# Patient Record
Sex: FEMALE | Race: WHITE | NOT HISPANIC OR LATINO | Employment: UNEMPLOYED | URBAN - NONMETROPOLITAN AREA
[De-identification: names, ages, dates, MRNs, and addresses within clinical notes are randomized per-mention and may not be internally consistent; named-entity substitution may affect disease eponyms.]

---

## 2017-01-06 ENCOUNTER — OFFICE VISIT - GICH (OUTPATIENT)
Dept: FAMILY MEDICINE | Facility: OTHER | Age: 47
End: 2017-01-06

## 2017-01-06 ENCOUNTER — HISTORY (OUTPATIENT)
Dept: FAMILY MEDICINE | Facility: OTHER | Age: 47
End: 2017-01-06

## 2017-01-06 DIAGNOSIS — Z00.00 ENCOUNTER FOR GENERAL ADULT MEDICAL EXAMINATION WITHOUT ABNORMAL FINDINGS: ICD-10-CM

## 2017-01-17 ENCOUNTER — OFFICE VISIT - GICH (OUTPATIENT)
Dept: FAMILY MEDICINE | Facility: OTHER | Age: 47
End: 2017-01-17

## 2017-01-17 ENCOUNTER — HISTORY (OUTPATIENT)
Dept: FAMILY MEDICINE | Facility: OTHER | Age: 47
End: 2017-01-17

## 2017-01-17 DIAGNOSIS — M17.9 OSTEOARTHRITIS OF KNEE, UNSPECIFIED (CODE): ICD-10-CM

## 2017-01-17 DIAGNOSIS — I10 ESSENTIAL (PRIMARY) HYPERTENSION: ICD-10-CM

## 2017-01-17 ASSESSMENT — PATIENT HEALTH QUESTIONNAIRE - PHQ9: SUM OF ALL RESPONSES TO PHQ QUESTIONS 1-9: 0

## 2017-03-05 ENCOUNTER — COMMUNICATION - GICH (OUTPATIENT)
Dept: FAMILY MEDICINE | Facility: OTHER | Age: 47
End: 2017-03-05

## 2017-03-05 DIAGNOSIS — M54.6 PAIN IN THORACIC SPINE: ICD-10-CM

## 2017-03-22 ENCOUNTER — HISTORY (OUTPATIENT)
Dept: FAMILY MEDICINE | Facility: OTHER | Age: 47
End: 2017-03-22

## 2017-03-22 ENCOUNTER — OFFICE VISIT - GICH (OUTPATIENT)
Dept: FAMILY MEDICINE | Facility: OTHER | Age: 47
End: 2017-03-22

## 2017-03-22 DIAGNOSIS — Q77.4 ACHONDROPLASIA: ICD-10-CM

## 2017-03-22 DIAGNOSIS — G25.81 RESTLESS LEGS SYNDROME: ICD-10-CM

## 2017-03-22 DIAGNOSIS — G89.29 OTHER CHRONIC PAIN: ICD-10-CM

## 2017-03-22 DIAGNOSIS — Z02.89 ENCOUNTER FOR OTHER ADMINISTRATIVE EXAMINATIONS: ICD-10-CM

## 2017-03-22 DIAGNOSIS — M54.6 PAIN IN THORACIC SPINE: ICD-10-CM

## 2017-03-22 DIAGNOSIS — M17.10 PRIMARY OSTEOARTHRITIS OF ONE KNEE: ICD-10-CM

## 2017-03-22 DIAGNOSIS — M54.50 LOW BACK PAIN: ICD-10-CM

## 2017-03-22 DIAGNOSIS — F41.9 ANXIETY DISORDER: ICD-10-CM

## 2017-03-22 DIAGNOSIS — F41.8 OTHER SPECIFIED ANXIETY DISORDERS: ICD-10-CM

## 2017-03-22 ASSESSMENT — ANXIETY QUESTIONNAIRES
6. BECOMING EASILY ANNOYED OR IRRITABLE: NOT AT ALL
5. BEING SO RESTLESS THAT IT IS HARD TO SIT STILL: NOT AT ALL
GAD7 TOTAL SCORE: 0
1. FEELING NERVOUS, ANXIOUS, OR ON EDGE: NOT AT ALL
2. NOT BEING ABLE TO STOP OR CONTROL WORRYING: NOT AT ALL
4. TROUBLE RELAXING: NOT AT ALL
3. WORRYING TOO MUCH ABOUT DIFFERENT THINGS: NOT AT ALL
7. FEELING AFRAID AS IF SOMETHING AWFUL MIGHT HAPPEN: NOT AT ALL

## 2017-03-22 ASSESSMENT — PATIENT HEALTH QUESTIONNAIRE - PHQ9: SUM OF ALL RESPONSES TO PHQ QUESTIONS 1-9: 1

## 2017-04-05 LAB
6-MONOACETYL MORPHINE - HISTORICAL: ABNORMAL NG/ML
AMPHETAMINE URINE - HISTORICAL: ABNORMAL NG/ML
BARBITURATE URINE - HISTORICAL: ABNORMAL NG/ML
BENZODIAZEPINE URINE - HISTORICAL: ABNORMAL NG/ML
BUPRENORPHRINE URINE - HISTORICAL: ABNORMAL NG/ML
COCAINE METAB URINE - HISTORICAL: ABNORMAL NG/ML
CREAT UR - HISTORICAL: 148 MG/DL
ETHYLGLUCURONIDE URINE - HISTORICAL: ABNORMAL NG/ML
FENTANYL URINE - HISTORICAL: ABNORMAL NG/ML
MASS SPECTROMETRY URINE - HISTORICAL: ABNORMAL
METHADONE URINE - HISTORICAL: ABNORMAL NG/ML
OPIATES URINE - HISTORICAL: ABNORMAL NG/ML
OXYCODONE URINE - HISTORICAL: ABNORMAL NG/ML
PH URINE - HISTORICAL: 9.4
PROPOXYPHENE URINE - HISTORICAL: ABNORMAL NG/ML
THC 50 URINE - HISTORICAL: ABNORMAL NG/ML
TRAMADOL - HISTORICAL: ABNORMAL NG/ML

## 2017-04-06 ENCOUNTER — OFFICE VISIT - GICH (OUTPATIENT)
Dept: FAMILY MEDICINE | Facility: OTHER | Age: 47
End: 2017-04-06

## 2017-04-06 ENCOUNTER — HISTORY (OUTPATIENT)
Dept: FAMILY MEDICINE | Facility: OTHER | Age: 47
End: 2017-04-06

## 2017-04-06 DIAGNOSIS — M17.10 PRIMARY OSTEOARTHRITIS OF ONE KNEE: ICD-10-CM

## 2017-04-21 ENCOUNTER — COMMUNICATION - GICH (OUTPATIENT)
Dept: INTERNAL MEDICINE | Facility: OTHER | Age: 47
End: 2017-04-21

## 2017-04-21 ENCOUNTER — COMMUNICATION - GICH (OUTPATIENT)
Dept: FAMILY MEDICINE | Facility: OTHER | Age: 47
End: 2017-04-21

## 2017-04-21 DIAGNOSIS — F41.1 GENERALIZED ANXIETY DISORDER: ICD-10-CM

## 2017-04-21 DIAGNOSIS — F43.21 ADJUSTMENT DISORDER WITH DEPRESSED MOOD: ICD-10-CM

## 2017-05-16 ENCOUNTER — AMBULATORY - GICH (OUTPATIENT)
Dept: FAMILY MEDICINE | Facility: OTHER | Age: 47
End: 2017-05-16

## 2017-05-16 ENCOUNTER — AMBULATORY - GICH (OUTPATIENT)
Dept: SCHEDULING | Facility: OTHER | Age: 47
End: 2017-05-16

## 2017-06-19 ENCOUNTER — OFFICE VISIT - GICH (OUTPATIENT)
Dept: FAMILY MEDICINE | Facility: OTHER | Age: 47
End: 2017-06-19

## 2017-06-19 ENCOUNTER — HISTORY (OUTPATIENT)
Dept: FAMILY MEDICINE | Facility: OTHER | Age: 47
End: 2017-06-19

## 2017-06-19 DIAGNOSIS — N92.0 EXCESSIVE AND FREQUENT MENSTRUATION WITH REGULAR CYCLE: ICD-10-CM

## 2017-06-19 DIAGNOSIS — M17.10 PRIMARY OSTEOARTHRITIS OF ONE KNEE: ICD-10-CM

## 2017-06-19 DIAGNOSIS — M54.6 PAIN IN THORACIC SPINE: ICD-10-CM

## 2017-06-19 DIAGNOSIS — F43.21 ADJUSTMENT DISORDER WITH DEPRESSED MOOD: ICD-10-CM

## 2017-07-06 ENCOUNTER — HISTORY (OUTPATIENT)
Dept: FAMILY MEDICINE | Facility: OTHER | Age: 47
End: 2017-07-06

## 2017-07-06 ENCOUNTER — OFFICE VISIT - GICH (OUTPATIENT)
Dept: FAMILY MEDICINE | Facility: OTHER | Age: 47
End: 2017-07-06

## 2017-07-06 DIAGNOSIS — M17.11 PRIMARY OSTEOARTHRITIS OF RIGHT KNEE: ICD-10-CM

## 2017-07-12 ENCOUNTER — COMMUNICATION - GICH (OUTPATIENT)
Dept: FAMILY MEDICINE | Facility: OTHER | Age: 47
End: 2017-07-12

## 2017-07-12 DIAGNOSIS — F41.8 OTHER SPECIFIED ANXIETY DISORDERS: ICD-10-CM

## 2017-08-29 ENCOUNTER — AMBULATORY - GICH (OUTPATIENT)
Dept: FAMILY MEDICINE | Facility: OTHER | Age: 47
End: 2017-08-29

## 2017-08-29 DIAGNOSIS — M17.11 PRIMARY OSTEOARTHRITIS OF RIGHT KNEE: ICD-10-CM

## 2017-08-29 DIAGNOSIS — Q77.4 ACHONDROPLASIA: ICD-10-CM

## 2017-09-01 ENCOUNTER — AMBULATORY - GICH (OUTPATIENT)
Dept: ORTHOPEDICS | Facility: OTHER | Age: 47
End: 2017-09-01

## 2017-09-01 DIAGNOSIS — Q77.4 ACHONDROPLASIA: ICD-10-CM

## 2017-09-01 DIAGNOSIS — M17.10 PRIMARY OSTEOARTHRITIS OF ONE KNEE: ICD-10-CM

## 2017-09-05 ENCOUNTER — HISTORY (OUTPATIENT)
Dept: ORTHOPEDICS | Facility: OTHER | Age: 47
End: 2017-09-05

## 2017-09-05 ENCOUNTER — OFFICE VISIT - GICH (OUTPATIENT)
Dept: ORTHOPEDICS | Facility: OTHER | Age: 47
End: 2017-09-05

## 2017-09-05 ENCOUNTER — HOSPITAL ENCOUNTER (OUTPATIENT)
Dept: RADIOLOGY | Facility: OTHER | Age: 47
End: 2017-09-05
Attending: ORTHOPAEDIC SURGERY

## 2017-09-05 DIAGNOSIS — Q77.4 ACHONDROPLASIA: ICD-10-CM

## 2017-09-05 DIAGNOSIS — M17.11 PRIMARY OSTEOARTHRITIS OF RIGHT KNEE: ICD-10-CM

## 2017-09-05 DIAGNOSIS — M17.10 PRIMARY OSTEOARTHRITIS OF ONE KNEE: ICD-10-CM

## 2017-09-13 ENCOUNTER — HOSPITAL ENCOUNTER (OUTPATIENT)
Dept: RADIOLOGY | Facility: OTHER | Age: 47
End: 2017-09-13
Attending: ORTHOPAEDIC SURGERY

## 2017-09-13 DIAGNOSIS — Q77.4 ACHONDROPLASIA: ICD-10-CM

## 2017-09-13 DIAGNOSIS — M17.11 PRIMARY OSTEOARTHRITIS OF RIGHT KNEE: ICD-10-CM

## 2017-09-15 ENCOUNTER — OFFICE VISIT - GICH (OUTPATIENT)
Dept: FAMILY MEDICINE | Facility: OTHER | Age: 47
End: 2017-09-15

## 2017-09-15 ENCOUNTER — HISTORY (OUTPATIENT)
Dept: FAMILY MEDICINE | Facility: OTHER | Age: 47
End: 2017-09-15

## 2017-09-15 DIAGNOSIS — F41.9 ANXIETY DISORDER: ICD-10-CM

## 2017-09-15 DIAGNOSIS — M17.10 PRIMARY OSTEOARTHRITIS OF ONE KNEE: ICD-10-CM

## 2017-09-15 DIAGNOSIS — R60.9 EDEMA: ICD-10-CM

## 2017-09-15 DIAGNOSIS — G25.81 RESTLESS LEGS SYNDROME: ICD-10-CM

## 2017-09-15 DIAGNOSIS — Z02.89 ENCOUNTER FOR OTHER ADMINISTRATIVE EXAMINATIONS: ICD-10-CM

## 2017-09-26 LAB
6-MONOACETYL MORPHINE - HISTORICAL: ABNORMAL NG/ML
AMPHETAMINE URINE - HISTORICAL: ABNORMAL NG/ML
BARBITURATE URINE - HISTORICAL: ABNORMAL NG/ML
BENZODIAZEPINE URINE - HISTORICAL: ABNORMAL NG/ML
BUPRENORPHRINE URINE - HISTORICAL: ABNORMAL NG/ML
COCAINE METAB URINE - HISTORICAL: ABNORMAL NG/ML
CREAT UR - HISTORICAL: 150 MG/DL
ETHYLGLUCURONIDE URINE - HISTORICAL: ABNORMAL NG/ML
FENTANYL URINE - HISTORICAL: ABNORMAL NG/ML
MASS SPECTROMETRY URINE - HISTORICAL: ABNORMAL
METHADONE URINE - HISTORICAL: ABNORMAL NG/ML
OPIATES URINE - HISTORICAL: ABNORMAL NG/ML
OXYCODONE URINE - HISTORICAL: ABNORMAL NG/ML
PH URINE - HISTORICAL: 5.9
PROPOXYPHENE URINE - HISTORICAL: ABNORMAL NG/ML
THC 50 URINE - HISTORICAL: ABNORMAL NG/ML
TRAMADOL - HISTORICAL: ABNORMAL NG/ML

## 2017-10-24 ENCOUNTER — AMBULATORY - GICH (OUTPATIENT)
Dept: FAMILY MEDICINE | Facility: OTHER | Age: 47
End: 2017-10-24

## 2017-10-24 ENCOUNTER — OFFICE VISIT - GICH (OUTPATIENT)
Dept: FAMILY MEDICINE | Facility: OTHER | Age: 47
End: 2017-10-24

## 2017-10-24 ENCOUNTER — HISTORY (OUTPATIENT)
Dept: FAMILY MEDICINE | Facility: OTHER | Age: 47
End: 2017-10-24

## 2017-10-24 DIAGNOSIS — Q77.4 ACHONDROPLASIA: ICD-10-CM

## 2017-10-24 DIAGNOSIS — M17.11 PRIMARY OSTEOARTHRITIS OF RIGHT KNEE: ICD-10-CM

## 2017-10-24 ASSESSMENT — PATIENT HEALTH QUESTIONNAIRE - PHQ9: SUM OF ALL RESPONSES TO PHQ QUESTIONS 1-9: 0

## 2017-11-08 ENCOUNTER — COMMUNICATION - GICH (OUTPATIENT)
Dept: FAMILY MEDICINE | Facility: OTHER | Age: 47
End: 2017-11-08

## 2017-11-08 DIAGNOSIS — G25.81 RESTLESS LEGS SYNDROME: ICD-10-CM

## 2017-11-08 DIAGNOSIS — F41.9 ANXIETY DISORDER: ICD-10-CM

## 2017-11-09 ENCOUNTER — AMBULATORY - GICH (OUTPATIENT)
Dept: SCHEDULING | Facility: OTHER | Age: 47
End: 2017-11-09

## 2017-12-11 ENCOUNTER — OFFICE VISIT - GICH (OUTPATIENT)
Dept: FAMILY MEDICINE | Facility: OTHER | Age: 47
End: 2017-12-11

## 2017-12-11 ENCOUNTER — HISTORY (OUTPATIENT)
Dept: FAMILY MEDICINE | Facility: OTHER | Age: 47
End: 2017-12-11

## 2017-12-11 DIAGNOSIS — R03.0 ELEVATED BLOOD PRESSURE READING WITHOUT DIAGNOSIS OF HYPERTENSION: ICD-10-CM

## 2017-12-11 DIAGNOSIS — M54.81 OCCIPITAL NEURALGIA: ICD-10-CM

## 2017-12-11 DIAGNOSIS — Z12.31 ENCOUNTER FOR SCREENING MAMMOGRAM FOR MALIGNANT NEOPLASM OF BREAST: ICD-10-CM

## 2017-12-11 DIAGNOSIS — G89.29 OTHER CHRONIC PAIN: ICD-10-CM

## 2017-12-11 DIAGNOSIS — Z80.3 FAMILY HISTORY OF MALIGNANT NEOPLASM OF BREAST: ICD-10-CM

## 2017-12-11 DIAGNOSIS — M54.50 LOW BACK PAIN: ICD-10-CM

## 2017-12-11 DIAGNOSIS — F43.21 ADJUSTMENT DISORDER WITH DEPRESSED MOOD: ICD-10-CM

## 2017-12-11 DIAGNOSIS — M17.10 PRIMARY OSTEOARTHRITIS OF ONE KNEE: ICD-10-CM

## 2017-12-11 DIAGNOSIS — F41.1 GENERALIZED ANXIETY DISORDER: ICD-10-CM

## 2017-12-11 DIAGNOSIS — Z02.89 ENCOUNTER FOR OTHER ADMINISTRATIVE EXAMINATIONS: ICD-10-CM

## 2017-12-11 ASSESSMENT — PATIENT HEALTH QUESTIONNAIRE - PHQ9: SUM OF ALL RESPONSES TO PHQ QUESTIONS 1-9: 5

## 2017-12-11 ASSESSMENT — ANXIETY QUESTIONNAIRES
3. WORRYING TOO MUCH ABOUT DIFFERENT THINGS: SEVERAL DAYS
4. TROUBLE RELAXING: NOT AT ALL
7. FEELING AFRAID AS IF SOMETHING AWFUL MIGHT HAPPEN: NOT AT ALL
GAD7 TOTAL SCORE: 3
6. BECOMING EASILY ANNOYED OR IRRITABLE: NOT AT ALL
1. FEELING NERVOUS, ANXIOUS, OR ON EDGE: SEVERAL DAYS
5. BEING SO RESTLESS THAT IT IS HARD TO SIT STILL: NOT AT ALL
2. NOT BEING ABLE TO STOP OR CONTROL WORRYING: SEVERAL DAYS

## 2017-12-12 ENCOUNTER — AMBULATORY - GICH (OUTPATIENT)
Dept: FAMILY MEDICINE | Facility: OTHER | Age: 47
End: 2017-12-12

## 2017-12-27 NOTE — PROGRESS NOTES
Patient Information     Patient Name MRN Sex Wilmer Odell [id number] Female 1970      Progress Notes by Zhou Lang DO at 2017  3:45 PM     Author:  Zhou Lang DO Service:  (none) Author Type:  PHYS- Osteopathic     Filed:  2017  4:44 PM Encounter Date:  2017 Status:  Signed     :  Zhou Lang DO (PHYS- Osteopathic)            Wilmer Morse was seen in consultation for Neptali French MD for a chief complaint of right knee pain.    CHIEF COMPLAINT: Wilmer Morse is a 46 y.o.  female  Chief Complaint     Patient presents with       Consult      right knee       HISTORY OF PRESENTING INJURY:  This 46-year-old female presents with history of chronic right knee pain.  Patient has achondroplastic dwarfism. Relates a history of lower extremity limb lengthening years ago.  Over the past year or more she has received cortisone injections to the right knee. Most recent was in July of this year.  Prior injections helped better.  Describes generalized right knee pain worse with weightbearing activities.  Ambulates unassisted.  Interested in pursuing viscoelastic injection for the right knee.    REVIEW OF SYSTEMS:  Constitutional:  Denies constitutional problems  Cardiovascular: normal  Respiratory: normal    The review of systems as documented in the HPI and on the intake questionnaire, completed by the patient 2017, have been reviewed by myself and the pertinent positives and negatives addressed.  The remainder of the complete review of systems was non-contributory.    (PFSH) PAST, FAMILY, and/or SOCIAL HISTORY:    PAST MEDICAL HISTORY:  Past Medical History:     Diagnosis  Date     Achondroplastic dwarfism      Degenerative disc disease, lumbar      Depression with anxiety      Family history of breast cancer in female      Herpes zoster 2006    left side of neck.      Hx of pregnancy      3, Para 2-0-1-2.      Hyperlipidemia     not currently on  treatment      Osteoarthritis of knee, unilateral 5/3/2012     Overactive bladder 2014     Restless leg syndrome        PAST SURGICAL HISTORY:  Past Surgical History:      Procedure  Laterality Date     ADENOIDECTOMY  as a child      SECTION  1996    Primary low transverse  section. Repeat        OSTEOTOMY      Limb lengthening procedures.       TUBAL LIGATION         ALLERGIES:  Allergies     Allergen  Reactions     Penicillins *Unknown - Childhood Rxn       CURRENT MEDICATIONS:  Current Outpatient Prescriptions       Medication  Sig Dispense Refill     buPROPion (WELLBUTRIN XL) 300 mg Extended-Release tablet Take 1 tablet by mouth every morning. 90 tablet 3     clonazePAM (KLONOPIN) 0.5 mg tablet Take 1 tablet by mouth 3 times daily if needed. 60 tablet 5     ferrous sulfate 325 mg delayed release tablet Take 1 tablet by mouth 3 times daily with meals. 90 tablet 6     gabapentin (NEURONTIN) 300 mg capsule 1 tab a.m. 2 tab pm. 270 capsule 4     HYDROcodone-acetaminophen, 5-325 mg, (NORCO) per tablet Take 2 tablets by mouth 3 times daily if needed  for Pain May take a maximum of 7 tablets per day. 200 tablet 0     ibuprofen (ADVIL; MOTRIN) 600 mg tablet Take 600 mg by mouth 3 times daily if needed. Maximum of 3200 mg in 24 hours.       sertraline (ZOLOFT) 100 mg tablet Take 2 tablets by mouth once daily. 180 tablet 3     No current facility-administered medications for this visit.      Medications have been reviewed by me and are current to the best of my knowledge and ability.      FAMILY HISTORY:  Family History       Problem   Relation Age of Onset     Other  Mother      Achondroplasia.         Cancer-breast  Mother      breast cancer        Cancer-breast  Maternal Grandmother 70      breast cancer        Unknown  Father      Good Health  Daughter      Good Health  Son      Good Health  Other      Good Health  Son      (mariana)91         Additional [facility] information  documented on the intake form completed by the patient 9/5/2017 was reviewed by myself.    PHYSICAL EXAM:   There were no vitals taken for this visit. There is no height or weight on file to calculate BMI.    General Appearance: Pleasant female in good appearance, mood and affect.  Alert and orientated times three ( time, date and location).    Examination of Right knee:    Skin: Multiple scars to the right lower extremity, healed from prior surgeries.  Standing exam demonstrates a varus deformity of both lower extremities.  Sensation is Normal  Alignment: Varummoderate to severe  Effusion: mild  Passive extension: Lacks about 5    Passive flexion: Knee flexion to about 100+ degrees  Patella tracks:  without an inverted J sign  ligaments appear stable with varus and valgus stress of the right knee joint  stable with anterior drawer testing  patient's calf is supple  patients knee joint is difficult to appreciate with palpation because of the soft tissue layers and skin folds.    Xray/MRI/MRA:  Radiographic images where independently reviewed and discussed with the patient.   X-rays of the right knee today are compared to prior x-rays. Varus bowing of the right femur   advanced right knee osteoarthritis with medial bone-on-bone and patellofemoral arthritis.   Bowing and irregularity of the proximal middle right tibia   on the lateral view there is considerable anatomic variation of the proximal tibial plateau    IMPRESSION:  Achondroplastic dwarf with right knee advanced osteoarthritis   varus bowing of the right tibia   anatomic variation of the right knee joint proximal tibia with regards to deficiency of the posterior condylar region on the lateral view   history of lower extremity limb lengthening years ago   history of prior right knee cortisone injections     PLAN:  Given the patient's anatomy and short extremity along with soft tissue thickening over the knee joint I would recommend referral to radiology for  ultrasound-guided right knee viscoelastic injection.   Patient has had prior cortisone injections and would like to pursue the viscoelastic injection at this time. I discussed with her it may have limited results given her arthritic findings.   Ultimately she may require knee replacement surgery but I would recommend referring her to a university setting as she may require custom implants and this would involve more complex surgical intervention and treatment.     Plan for referral to radiology for right knee injection.    questions answered.    Zhou Lang D.O., F.A.O.A.O.  Orthopedic Surgeon    [address] [address]  Phone [phone]  Fax [phone]    4:31 PM 9/5/2017

## 2017-12-27 NOTE — PROGRESS NOTES
Patient Information     Patient Name MRN Sex Wilmer Odell [id number] Female 1970      Progress Notes by Neptali French MD at 10/24/2017  4:15 PM     Author:  Neptali French MD Service:  (none) Author Type:  Physician     Filed:  10/24/2017  5:50 PM Encounter Date:  10/24/2017 Status:  Signed     :  Neptali French MD (Physician)            Nursing Notes:   Ruth Salazar  10/24/2017  5:00 PM  Signed  She is here today for increased right knee pain.  Ruth Salazar LPN..................10/24/2017   4:56 PM      SUBJECTIVE:  Wilmer Morse  is a 46 y.o. female who comes in today for follow-up and consideration of repeat right knee injection. She had fluoroscopic placement of Synvisc 1 done on  after she saw Dr. Lang.    Very little relief from the Synvisc injection. She wonders about getting a steroid shot.    Past Medical, Family, and Social History reviewed and updated as noted below.   ROS is negative except as noted above       Allergies     Allergen  Reactions     Penicillins *Unknown - Childhood Rxn   ,   Family History       Problem   Relation Age of Onset     Other  Mother      Achondroplasia.         Cancer-breast  Mother      breast cancer        Cancer-breast  Maternal Grandmother 70      breast cancer        Unknown  Father      Good Health  Daughter      Good Health  Son      Good Health  Other      Good Health  Son      (alanon)[phone]     ,   Current Outpatient Prescriptions on File Prior to Visit       Medication  Sig Dispense Refill     buPROPion (WELLBUTRIN XL) 300 mg Extended-Release tablet Take 1 tablet by mouth every morning. 90 tablet 3     clonazePAM (KLONOPIN) 0.5 mg tablet Take 1 tablet by mouth 3 times daily if needed. 90 tablet 5     gabapentin (NEURONTIN) 300 mg capsule 1 tab a.m. 2 tab pm. 270 capsule 4     HYDROcodone-acetaminophen, 5-325 mg, (NORCO) per tablet Take 2 tablets by mouth 3 times daily if needed  for Pain Earliest Fill Date:  9/15/17 May take a maximum of 7 tablets per day. 200 tablet 0     HYDROcodone-acetaminophen, 5-325 mg, (NORCO) per tablet Take 2 tablets by mouth 3 times daily if needed  for Pain Earliest Fill Date: 9/15/17 May take a maximum of 7 tablets per day. 200 tablet 0     HYDROcodone-acetaminophen, 5-325 mg, (NORCO) per tablet Take 2 tablets by mouth 3 times daily if needed  for Pain Earliest Fill Date: 9/15/17 May take a maximum of 7 tablets per day. 200 tablet 0     ibuprofen (ADVIL; MOTRIN) 600 mg tablet Take 600 mg by mouth 3 times daily if needed. Maximum of 3200 mg in 24 hours.       sertraline (ZOLOFT) 100 mg tablet Take 2 tablets by mouth once daily. 180 tablet 3     No current facility-administered medications on file prior to visit.    ,   Past Medical History:     Diagnosis  Date     Achondroplastic dwarfism      Degenerative disc disease, lumbar      Depression with anxiety      Family history of breast cancer in female      Herpes zoster 2006    left side of neck.      Hx of pregnancy      3, Para 2-0-1-2.      Hyperlipidemia     not currently on treatment      Osteoarthritis of knee, unilateral 5/3/2012     Overactive bladder 2014     Restless leg syndrome    ,   Patient Active Problem List       Diagnosis  Date Noted     Dependent edema  09/15/2017     Chronic midline low back pain without sciatica  2016     Achondroplasia  06/15/2016     Pain medication agreement completed 16,3/22/17  2016     Health care maintenance  2016     Colonoscopy: Age 51  Breast Exam/Mammography: Does complete self exams; last mammogram  and normal, repeat every 2 years now with family history and then likely yearly around age 51  Pap smear: Never any abnormal, next visit  DEXA: May consider checking early due to significant alteration with surgeries/co-morbidities  Immunizations: UTD on vaccines.   Lipids/Annual Exam: Done today and every 5 years or as needed sooner  Hepatitis C screen:  not indicated             Restless leg syndrome       Hyperlipidemia       not currently on treatment        Family history of breast cancer in female       Depression with anxiety       Osteoarthritis of knee, unilateral  2012   ,   Past Surgical History:      Procedure  Laterality Date     ADENOIDECTOMY  as a child      SECTION  1996    Primary low transverse  section. Repeat        OSTEOTOMY      Limb lengthening procedures.       TUBAL LIGATION      and   Social History     Substance Use Topics       Smoking status: Never Smoker     Smokeless tobacco: Never Used     Alcohol use No     OBJECTIVE:  /100  Pulse 88  Wt 83.7 kg (184 lb 9.6 oz)  Breastfeeding? No  BMI 49.9 kg/m2   EXAM:  Alert and cooperative, no distress. Reviewed x-rays of her tib-fib and knees with her.  ASSESSMENT/Plan :      Wilmer was seen today for follow up.    Diagnoses and all orders for this visit:    Primary osteoarthritis of right knee  -     MS DRAIN/INJECT LARGE JOINT/BURSA (hip, knee,shoulder) - single joint  [92791.0]  -     triamcinolone acetonide 80 mg injection (KENALOG); Inject 2 mL intra-articular one time.    Achondroplasia     lengthy discussion with regard to potential knee replacement and we'll begin to investigate where we might send her for an opinion as far as how best to proceed. Dr. Lang's note was reviewed.    A total of 15 minutes was spent with the patient, greater than 50% of the time was spent in counseling/discussion of the aforementioned concerns.     RIGHT KNEE INJECTION:  Risks, benefits, alternatives discussed, consent signed and pt wishes to proceed. Chloraprep used for sterile prep x2 prior to injection. Timeout is performed. Ethyl Chloride used as skin refridgerant for topical anesthesia. A lateral approach is used.  The knee joint is injected with a mixture of 2ml each of 1% lidocaine without epinephrine, 0.5% Marcaine, and  2ml Kenalog 40mg/ml.  Patient  noted improvement in symptoms.  Ice 20 min tid and prn.  RTC if not improved over next 7 days.       Neptali French MD

## 2017-12-27 NOTE — PROGRESS NOTES
Patient Information     Patient Name MRN Sex Wilmer Odell [id number] Female 1970      Progress Notes by Ashley Grijalva R.T. ([facility]) at 2017 12:49 PM     Author:  Ash[facility]T. [facility] Service:  (none) Author Type:  [employer] - Registered Radiologic Technologist     Filed:  2017 12:49 PM Date of Service:  2017 12:49 PM Status:  Signed     :  Ashley Grijalva R.T. (ARRT) ([facility] - Registered Radiologic Technologist)            Falls Risk Criteria:    Age 65 and older or under age 4        Sensory deficits    Poor vision    Use of ambulatory aides    Impaired judgment    Unable to walk independently    Meets High Risk criteria for falls:  no

## 2017-12-27 NOTE — PROGRESS NOTES
Patient Information     Patient Name MRN Sex Wilmer Odell [id number] Female 1970      Progress Notes by Neptali French MD at 2017  1:45 PM     Author:  Neptali French MD Service:  (none) Author Type:  Physician     Filed:  2017  3:09 PM Encounter Date:  2017 Status:  Signed     :  Neptali French MD (Physician)            There are no exam notes on file for this visit.    SUBJECTIVE:  Wilmer Morse  is a 46 y.o. female who comes in today for follow-up and medication management. I last saw her in April when we injected her right knee with steroid. She is using a knee brace.     She had increased anxiety. She gets relief of anxiety with clonazepam. She will take it always at night.    PHQ Depression Screening 2017   Date of PHQ exam (doc flow) 2017   1. Lack of interest/pleasure (No Data) 0 - Not at all   2. Feeling down/depressed - 0 - Not at all   PHQ-2 TOTAL SCORE - 0   3. Trouble sleeping - 1 - Several days   4. Decreased energy - 1 - Several days   5. Appetite change - 0 - Not at all   6. Feelings of failure - 0 - Not at all   7. Trouble concentrating - 0 - Not at all   8. Activity level - 0 - Not at all   9. Hurting yourself - 0 - Not at all   PHQ-9 TOTAL SCORE - 2   PHQ-9 Severity Level - none   Functional Impairment - somewhat difficult          She has had increased leg pain, right greater left.  She has lost some weight and is more active.  She has had to take more medication.  She tried increasing her gabapentin to tid and she got more drowsy. She typically will sleep ok.  No particular stressors going on.  She has more symptoms perimenstrually. Her menses are regular and heavy.      CHRONIC PAIN MANAGEMENT:    DIAGNOSIS: (N92.0) Menorrhagia with regular cycle  (primary encounter diagnosis)  (F43.21) Adjustment disorder with depressed mood  (M54.6) BACK PAIN, THORACIC REGION  (M17.10) Osteoarthritis of knee, unilateral     PAIN  CLINIC EVALUATION:(NO)      PAIN MEDICATION AGREEMENT: (YES)    DATE SIGNED: 4/28/16,3/22/17      NON-MEDICATION MODALITIES MAXIMIZED? (YES)  Better after knee injection      NEUROPATHIC PAIN: (NO)  ON GABAPENTIN/LYRICA/TCA/SNRI: (NO) was previously, but that pain has dissipated.       NOCICEPTIVE PAIN: (YES)      HISTORY OF CD: (NO)      MENTAL HEALTH DIAGNOSIS: (YES)  DIAGNOSIS: Anxiety and restless leg syndrome    ON BENZODIAZEPINES: (YES). Klonopin 0.5-1 mg at bedtime.    DISCLOSURE REGARDING RISK OF CONCOMITANT USE WITH OPIOIDS DISCUSSED: (YES)  DATE DISCUSSED: 4/28/16,06/15/16, 9/6/16, 12/20/16, 3/22/17      MEDICATION: hydrocodone/APAP 7.5/325 #180/month, 6 per day      ORAL MORPHINE EQUIVALENTS: 30/d      LAST TAPER TRIAL: April 2016       QUERY TODAY: (YES)  APPROPRIATE?:         (YES)      TOXASSURE TODAY: (YES)  IF NO, DATE OF LAST TOXASURE: 9/6/16      PAIN SCORE FLOWSHEET REVIEWED: (YES)  STABLE OR IMPROVED: (YES)     AUDIT-10 QUESTIONNAIRE COMPLETED:   AUDIT ALCOHOL SCREEN 6/15/2016   Date of exam 6/15/2016   Frequency 0 = never         OSWESTRY PAIN DISABILITY INDEX:  OSWESTRY DISABILITY INDEX (ESTEFANIA) v2.1A 6/15/2016   Date of exam 6/15/2016   Pain intensity 2 = the pain is moderate at the moment   Personal care 0 = I can look after myself normally without causing additional pain   Lifting 2 = pain prevents me from lifting heavy weights off the floor but I can manage if they are conveniently positioned, e.g. on a table   Walking 2 = pain prevents me from walking more than a quarter of a mile   Sitting 0 = I can sit in any chair as long as I like   Standing 3 = pain prevents me from standing for more than half an hour   Sleeping 0 = my sleep is never interrupted by pain   Sex life 0 = my sex life is normal and causes no additional pain   Social life 0 = my social life is normal and causes me no additional pain   Traveling 0 = I can travel anywhere without pain   Score (max 50) 9                 Past  Medical, Family, and Social History reviewed and updated as noted below.   ROS is negative except as noted above       Allergies     Allergen  Reactions     Penicillins *Unknown - Childhood Rxn   ,   Family History       Problem   Relation Age of Onset     Other  Mother      Achondroplasia.         Cancer-breast  Mother      breast cancer        Cancer-breast  Maternal Grandmother 70      breast cancer        Unknown  Father      Good Health  Daughter      Good Health  Son      Good Health  Other      Good Health  Son      (mariana)91     ,   Current Outpatient Prescriptions on File Prior to Visit       Medication  Sig Dispense Refill     buPROPion (WELLBUTRIN XL) 300 mg Extended-Release tablet Take 1 tablet by mouth every morning. 90 tablet 3     clonazePAM (KLONOPIN) 0.5 mg tablet Take 1 tablet by mouth 3 times daily if needed. 60 tablet 5     ferrous sulfate 325 mg delayed release tablet Take 1 tablet by mouth 3 times daily with meals. 90 tablet 6     ibuprofen (ADVIL; MOTRIN) 600 mg tablet Take 600 mg by mouth 3 times daily if needed. Maximum of 3200 mg in 24 hours.       No current facility-administered medications on file prior to visit.    ,   Past Medical History:     Diagnosis  Date     Achondroplastic dwarfism      Degenerative disc disease, lumbar      Depression with anxiety      Family history of breast cancer in female      Herpes zoster 2006    left side of neck.      Hx of pregnancy      3, Para 2-0-1-2.      Hyperlipidemia     not currently on treatment      Osteoarthritis of knee, unilateral 5/3/2012     Overactive bladder 2014     Restless leg syndrome    ,   Patient Active Problem List       Diagnosis  Date Noted     Chronic midline low back pain without sciatica  2016     Achondroplasia  06/15/2016     Pain medication agreement completed 16,3/22/17  2016     Health care maintenance  2016     Colonoscopy: Age 51  Breast Exam/Mammography: Does complete  self exams; last mammogram  and normal, repeat every 2 years now with family history and then likely yearly around age 51  Pap smear: Never any abnormal, next visit  DEXA: May consider checking early due to significant alteration with surgeries/co-morbidities  Immunizations: UTD on vaccines.   Lipids/Annual Exam: Done today and every 5 years or as needed sooner  Hepatitis C screen: not indicated             Restless leg syndrome       Hyperlipidemia       not currently on treatment        Family history of breast cancer in female       Depression with anxiety       Osteoarthritis of knee, unilateral  2012   ,   Past Surgical History:      Procedure  Laterality Date     ADENOIDECTOMY  as a child      SECTION  1996    Primary low transverse  section. Repeat        OSTEOTOMY      Limb lengthening procedures.       TUBAL LIGATION      and   Social History     Substance Use Topics       Smoking status: Never Smoker     Smokeless tobacco: Never Used     Alcohol use No     OBJECTIVE:  /100  Pulse 96  Wt 81.5 kg (179 lb 9.6 oz)  Breastfeeding? No  BMI 48.55 kg/m2   EXAM:  Alert and cooperative but anxious, no distress. She does become tearful but appropriately so. She moves slowly with pain behavior. Exam is otherwise unchanged.  ASSESSMENT/Plan :      Wilmer was seen today for medication management.    Diagnoses and all orders for this visit:    Menorrhagia with regular cycle  -     AMB CONSULT TO GYNECOLOGY; Future    Adjustment disorder with depressed mood  -     sertraline (ZOLOFT) 100 mg tablet; Take 2 tablets by mouth once daily.    BACK PAIN, THORACIC REGION  -     gabapentin (NEURONTIN) 300 mg capsule; 1 tab a.m. 2 tab pm.    Osteoarthritis of knee, unilateral  -     Discontinue: HYDROcodone-acetaminophen, 5-325 mg, (NORCO) per tablet; Take 2 tablets by mouth 3 times daily if needed  for Pain May take a maximum of 7 tablets per day.  -     Discontinue:  HYDROcodone-acetaminophen, 5-325 mg, (NORCO) per tablet; Take 2 tablets by mouth 3 times daily if needed  for Pain May take a maximum of 7 tablets per day.  -     HYDROcodone-acetaminophen, 5-325 mg, (NORCO) per tablet; Take 2 tablets by mouth 3 times daily if needed  for Pain May take a maximum of 7 tablets per day.      With regard to menorrhagia, discussed options but will refer to gynecology for consultation. She really does not want to do any type of hormonal treatments that she is worried about her family history of breast cancer. She might be interested in an ablation but technically this could be challenging because of her anatomy.    We'll increase her gabapentin to 1 tablet in the a.m. and 2 in the PM.    We will increase her sertraline to 200 mg daily. Continue with clonazepam on a when necessary basis during the day and only at nighttime for restless legs. Again reviewed the potential risks with use with hydrocodone.     query is appropriate and renewed hydrocodone 5/325 #200 x3.     Recommend follow-up in a few weeks, sooner if needed    A total of 25 minutes was spent with the patient, greater than 50% of the time was spent in counseling/discussion of the aforementioned concerns.     Neptali French MD

## 2017-12-27 NOTE — PROGRESS NOTES
Patient Information     Patient Name MRN Sex Wilmer Odell [id number] Female 1970      Progress Notes by Ashley [facility] R.T. [facility] at 2017 12:49 PM     Author:  Ashley Grijalva R.T. ([facility] Service:  (none) Author Type:  [employer] - Registered Radiologic Technologist     Filed:  2017 12:49 PM Date of Service:  2017 12:49 PM Status:  Signed     :  Ashley Grijalva R.T. ([facility]) ([facility] - Registered Radiologic Technologist)            [facility] Protocol    A. Pre-procedure verification complete yes  1-relevant information / documentation available, reviewed and properly matched to the patient; 2-consent accurate and complete, 3-equipment and supplies available    B. Site marking complete Yes  Site marked if not in continuous attendance with patient    C. TIME OUT completed yes  Time Out was conducted just prior to starting procedure to verify the eight required elements: 1-patient identity, 2-consent accurate and complete, 3-position, 4-correct side/site marked (if applicable), 5-procedure, 6-relevant images / results properly labeled and displayed (if applicable), 7-antibiotics / irrigation fluids (if applicable), 8-safety precautions.

## 2017-12-28 ENCOUNTER — AMBULATORY - GICH (OUTPATIENT)
Dept: FAMILY MEDICINE | Facility: OTHER | Age: 47
End: 2017-12-28

## 2017-12-28 NOTE — PROGRESS NOTES
Patient Information     Patient Name MRN Wilmer Razo [id number] Female 1970      Progress Notes by Ashley Grijalva R.T. (ARRT) at 2017 12:49 PM     Author:  Ashley Grjialva R.T. (ARRT) Service:  (none) Author Type:  [employer] - Registered Radiologic Technologist     Filed:  2017 12:49 PM Date of Service:  2017 12:49 PM Status:  Signed     :  Ashley Grijalva R.T. (ARRT) ([facility] - Registered Radiologic Technologist)            RECOVERY TIME  You may experience numbness and/or relief of your pain for up to 4-6 hours after the injection.  Your usual symptoms may return the night of the procedure and may possible be more severe than usual a day or two following.  Please keep track of your pain over the next several days and report how long the relief lasts to the doctor who referred you for this procedure.    The beneficial effects of the steroids usually require 2 to 3 days to take effect, buy may take as long as 5 to 7 days.  If there is no change in the pain, then investigation can be focused on other possible sources of your pain.  In either case, the information is useful to the doctor who referred you for this procedure.    POSSIBLE SIDE EFFECTS  Facial flushing (redness), occasional low grade fevers of 99.5F or less, hiccups, insomnia, headaches, increased heart rate, abdominal cramping, and/or a bloating feeling are side effects of the steroid medications and will go away 3 to 4 days after the injection.    Diabetic Patients  The steroids you have received may significantly increase your blood sugar levels.  Monitor your blood sugar level closely (4-6 times per day) for a period of 4 days or until your blood sugar level normalizes.  If your blood sugar level elevates significantly or you experience confusion, dizziness, sweating, please notify our primary physician and make him/her aware that you have received steroids.

## 2017-12-28 NOTE — TELEPHONE ENCOUNTER
Patient Information     Patient Name MRN Wilmer Razo [id number] Female 1970      Telephone Encounter by Angy Velásquez RN at 2017 12:09 PM     Author:  Angy Velásquez RN Service:  (none) Author Type:  NURS- Registered Nurse     Filed:  2017 12:11 PM Encounter Date:  2017 Status:  Signed     :  Angy Velásquez RN (NURS- Registered Nurse)            New Rx filled 17 180 x 3. Pharmacy alerted. Unable to complete prescription refill per RN Medication Refill Policy.................... Angy Velásquez RN ....................  2017   12:10 PM  Prescribing Provider: Ruddy French MD                   Order Date: 2017  Ordered by: RUDDY FRENCH  Medication:sertraline (ZOLOFT) 100 mg tablet    Qty:180 tablet  Ref:3  Start:2017   End:              Route:Oral                  WHITNEY:No   Class:eRx    Sig:Take 2 tablets by mouth once daily.    Pharmacy:[facility] [id number] [address]  [address]              OF [facility] [address] - [phone]

## 2017-12-28 NOTE — PROGRESS NOTES
Patient Information     Patient Name MRN Sex Wilmer Odell [id number] Female 1970      Progress Notes by Neptali French MD at 2017  3:45 PM     Author:  Neptali French MD Service:  (none) Author Type:  Physician     Filed:  2017  4:29 PM Encounter Date:  2017 Status:  Signed     :  Neptali French MD (Physician)            Nursing Notes:   Gosselin, Norma J  2017  4:14 PM  Signed  Patient has right knee pain, requesting cortisone injection.  Norma J Gosselin LPN....................  2017   3:53 PM    Universal Protocol    A. Pre-procedure verification complete yes  1-relevant information / documentation available, reviewed and properly matched to the patient; 2-consent accurate and complete, 3-equipment and supplies available    B. Site marking complete N/A  Site marked if not in continuous attendance with patient    C. TIME OUT completed yes  Time Out was conducted just prior to starting procedure to verify the eight required elements: 1-patient identity, 2-consent accurate and complete, 3-position, 4-correct side/site marked (if applicable), 5-procedure, 6-relevant images / results properly labeled and displayed (if applicable), 7-antibiotics / irrigation fluids (if applicable), 8-safety precautions.        SUBJECTIVE:  Wilmer Morse  is a 46 y.o. female who comes in today for follow-up and consideration of injection in her right knee for osteoarthritis. Her last injection was 3 months ago.    At her last visit, we discussed sending her to gynecology for consultation with regard to menorrhagia.    We also increase gabapentin to 1 tablet in a.m. and 2 in the PM, and increased sertraline to 200 mg daily. She feels it is helpful. She has been able to limit her hydrocodone to 5-6 tab per day.     PHQ Depression Screening 2017   Date of PHQ exam (doc flow) 2017   1. Lack of interest/pleasure 0 - Not at all 0 - Not at all   2. Feeling  down/depressed 0 - Not at all 0 - Not at all   PHQ-2 TOTAL SCORE 0 0   3. Trouble sleeping 1 - Several days -   4. Decreased energy 1 - Several days -   5. Appetite change 0 - Not at all -   6. Feelings of failure 0 - Not at all -   7. Trouble concentrating 0 - Not at all -   8. Activity level 0 - Not at all -   9. Hurting yourself 0 - Not at all -   PHQ-9 TOTAL SCORE 2 -   PHQ-9 Severity Level none -   Functional Impairment somewhat difficult -          .    Past Medical, Family, and Social History reviewed and updated as noted below.   ROS is negative except as noted above       Allergies     Allergen  Reactions     Penicillins *Unknown - Childhood Rxn   ,   Family History       Problem   Relation Age of Onset     Other  Mother      Achondroplasia.         Cancer-breast  Mother      breast cancer        Cancer-breast  Maternal Grandmother 70      breast cancer        Unknown  Father      Good Health  Daughter      Good Health  Son      Good Health  Other      Good Health  Son      (mariana)7/24/91     ,   Current Outpatient Prescriptions on File Prior to Visit       Medication  Sig Dispense Refill     buPROPion (WELLBUTRIN XL) 300 mg Extended-Release tablet Take 1 tablet by mouth every morning. 90 tablet 3     clonazePAM (KLONOPIN) 0.5 mg tablet Take 1 tablet by mouth 3 times daily if needed. 60 tablet 5     ferrous sulfate 325 mg delayed release tablet Take 1 tablet by mouth 3 times daily with meals. 90 tablet 6     gabapentin (NEURONTIN) 300 mg capsule 1 tab a.m. 2 tab pm. 270 capsule 4     HYDROcodone-acetaminophen, 5-325 mg, (NORCO) per tablet Take 2 tablets by mouth 3 times daily if needed  for Pain May take a maximum of 7 tablets per day. 200 tablet 0     ibuprofen (ADVIL; MOTRIN) 600 mg tablet Take 600 mg by mouth 3 times daily if needed. Maximum of 3200 mg in 24 hours.       sertraline (ZOLOFT) 100 mg tablet Take 2 tablets by mouth once daily. 180 tablet 3     No current facility-administered medications  "on file prior to visit.    ,   Past Medical History:     Diagnosis  Date     Achondroplastic dwarfism      Degenerative disc disease, lumbar      Depression with anxiety      Family history of breast cancer in female      Herpes zoster 2006    left side of neck.      Hx of pregnancy      3, Para 2-0-1-2.      Hyperlipidemia     not currently on treatment      Osteoarthritis of knee, unilateral 5/3/2012     Overactive bladder 2014     Restless leg syndrome    ,   Patient Active Problem List       Diagnosis  Date Noted     Chronic midline low back pain without sciatica  2016     Achondroplasia  06/15/2016     Pain medication agreement completed 16,3/22/17  2016     Health care maintenance  2016     Colonoscopy: Age 51  Breast Exam/Mammography: Does complete self exams; last mammogram  and normal, repeat every 2 years now with family history and then likely yearly around age 51  Pap smear: Never any abnormal, next visit  DEXA: May consider checking early due to significant alteration with surgeries/co-morbidities  Immunizations: UTD on vaccines.   Lipids/Annual Exam: Done today and every 5 years or as needed sooner  Hepatitis C screen: not indicated             Restless leg syndrome       Hyperlipidemia       not currently on treatment        Family history of breast cancer in female       Depression with anxiety       Osteoarthritis of knee, unilateral  2012   ,   Past Surgical History:      Procedure  Laterality Date     ADENOIDECTOMY  as a child      SECTION  1996    Primary low transverse  section. Repeat        OSTEOTOMY      Limb lengthening procedures.       TUBAL LIGATION      and   Social History     Substance Use Topics       Smoking status: Never Smoker     Smokeless tobacco: Never Used     Alcohol use No     OBJECTIVE:  /88  Pulse 68  Temp 98.9  F (37.2  C) (Tympanic)   Ht 1.295 m (4' 3\")  Wt 81.6 kg (180 lb)  BMI " 48.66 kg/m2   EXAM:  Affect is broad ranging and appropriate. Examination of her right knee is unchanged  ASSESSMENT/Plan :      Wilmer was seen today for knee pain/problem.    Diagnoses and all orders for this visit:    Primary osteoarthritis of right knee  -     NC DRAIN/INJECT LARGE JOINT/BURSA (hip, knee,shoulder) - single joint  [85349.0]  -     triamcinolone acetonide 80 mg injection (KENALOG); Inject 2 mL intra-articular one time.        RIGHT KNEE INJECTION:  Risks, benefits, alternatives discussed, consent signed and pt wishes to proceed. Chloraprep used for sterile prep x2 prior to injection. Timeout is performed. Ethyl Chloride used as skin refridgerant for topical anesthesia. A lateral approach is used.  The knee joint is injected with a mixture of 2ml each of 1% lidocaine without epinephrine, 0.5% Marcaine, and  2ml Kenalog 40mg/ml.  Patient noted improvement in symptoms.  Ice 20 min tid and prn.  RTC if not improved over next 7 days.  Neptali French MD

## 2017-12-28 NOTE — TELEPHONE ENCOUNTER
Patient Information     Patient Name MRN Wilmer Razo [id number] Female 1970      Telephone Encounter by Bassam Sepulveda RN at 11/10/2017 10:47 AM     Author:  Bassam Sepulveda RN Service:  (none) Author Type:  NURS- Registered Nurse     Filed:  11/10/2017 10:49 AM Encounter Date:  2017 Status:  Signed     :  Bassam Sepulveda RN (NURS- Registered Nurse)            Redundant Refill Request for Clonazepam refused;    Prescribing Provider: Ruddy French MD                   Order Date: 09/15/2017  Ordered by: RUDDY FRENCH  Medication:clonazePAM (KLONOPIN) 0.5 mg tablet    Qty:90 tablet   Ref:5  Start:9/15/2017   End:              Route:Oral                  WHITNEY:No   Class:Print    Sig:Take 1 tablet by mouth 3 times daily if needed.    Pharmacy:[facility] [address] [place] [address] AT [address]              OF [address] [facility] - [phone]    Unable to complete prescription refill per RN Medication Refill Policy.................... Bassam Sepulveda RN ....................  11/10/2017   10:47 AM

## 2017-12-28 NOTE — PROGRESS NOTES
Patient Information     Patient Name MRN Sex Wilmer Odell [id number] Female 1970      Progress Notes by Neptali French MD at 9/15/2017  1:45 PM     Author:  Neptali French MD Service:  (none) Author Type:  Physician     Filed:  9/15/2017  3:58 PM Encounter Date:  9/15/2017 Status:  Signed     :  Neptali French MD (Physician)            Nursing Notes:   Meredith Chan  9/15/2017  2:15 PM  Signed  Patient presents to the clinic today for med refill, and to follow up on her right knee.    Meredith Chan ....................  9/15/2017   1:52 PM         SUBJECTIVE:  Wilmer Morse  is a 46 y.o. female who comes in today for follow-up and medication refills. She saw Dr. Lang for consultation with regard to her knee and possible replacement. He referred her for fluoroscopic guided right knee joint injection of Synvisc 1. She had that done 2 days ago.    CHRONIC PAIN MANAGEMENT:    DIAGNOSIS: (M17.10) Osteoarthritis of knee, unilateral  (primary encounter diagnosis)  (F41.9) Anxiety  (G25.81) Restless leg syndrome  (Z02.89) Pain medication agreement completed 16,3/22/17  (R60.9) Dependent edema     PAIN CLINIC EVALUATION:(NO)      PAIN MEDICATION AGREEMENT: (YES)    DATE SIGNED: 16,3/22/17      NON-MEDICATION MODALITIES MAXIMIZED? (YES)  Better after knee injection      NEUROPATHIC PAIN: (NO)  ON GABAPENTIN/LYRICA/TCA/SNRI: (NO) was previously, but that pain has dissipated.       NOCICEPTIVE PAIN: (YES)      HISTORY OF CD: (NO)      MENTAL HEALTH DIAGNOSIS: (YES)  DIAGNOSIS: Anxiety and restless leg syndrome    ON BENZODIAZEPINES: (YES). Klonopin 0.5-1 mg at bedtime.    DISCLOSURE REGARDING RISK OF CONCOMITANT USE WITH OPIOIDS DISCUSSED: (YES)  DATE DISCUSSED: 16,06/15/16, 16, 16, 3/22/17, 9/15/17      MEDICATION: hydrocodone/APAP 7.5/325 #200/month, 7 per day      ORAL MORPHINE EQUIVALENTS: 30/d      LAST TAPER TRIAL: 2016       QUERY TODAY:  (YES)  APPROPRIATE?:         (YES)      TOXASSURE TODAY: (YES)  IF NO, DATE OF LAST TOXASURE: 9/6/16, 3/22/17      PAIN SCORE FLOWSHEET REVIEWED: (YES)  STABLE OR IMPROVED: (YES)      AUDIT-10 QUESTIONNAIRE COMPLETED:   AUDIT ALCOHOL SCREEN 6/15/2016   Date of exam 6/15/2016   Frequency 0 = never           OSWESTRY PAIN DISABILITY INDEX:  OSWESTRY DISABILITY INDEX (ESTEFANIA) v2.1A 6/15/2016   Date of exam 6/15/2016   Pain intensity 2 = the pain is moderate at the moment   Personal care 0 = I can look after myself normally without causing additional pain   Lifting 2 = pain prevents me from lifting heavy weights off the floor but I can manage if they are conveniently positioned, e.g. on a table   Walking 2 = pain prevents me from walking more than a quarter of a mile   Sitting 0 = I can sit in any chair as long as I like   Standing 3 = pain prevents me from standing for more than half an hour   Sleeping 0 = my sleep is never interrupted by pain   Sex life 0 = my sex life is normal and causes no additional pain   Social life 0 = my social life is normal and causes me no additional pain   Traveling 0 = I can travel anywhere without pain   Score (max 50) 9                    Past Medical, Family, and Social History reviewed and updated as noted below.   ROS is negative except as noted above       Allergies     Allergen  Reactions     Penicillins *Unknown - Childhood Rxn   ,   Family History       Problem   Relation Age of Onset     Other  Mother      Achondroplasia.         Cancer-breast  Mother      breast cancer        Cancer-breast  Maternal Grandmother 70      breast cancer        Unknown  Father      Good Health  Daughter      Good Health  Son      Good Health  Other      Good Health  Son      (mariana)7/24/91     ,   Current Outpatient Prescriptions on File Prior to Visit       Medication  Sig Dispense Refill     buPROPion (WELLBUTRIN XL) 300 mg Extended-Release tablet Take 1 tablet by mouth every morning. 90 tablet 3      gabapentin (NEURONTIN) 300 mg capsule 1 tab a.m. 2 tab pm. 270 capsule 4     ibuprofen (ADVIL; MOTRIN) 600 mg tablet Take 600 mg by mouth 3 times daily if needed. Maximum of 3200 mg in 24 hours.       sertraline (ZOLOFT) 100 mg tablet Take 2 tablets by mouth once daily. 180 tablet 3     No current facility-administered medications on file prior to visit.    ,   Past Medical History:     Diagnosis  Date     Achondroplastic dwarfism      Degenerative disc disease, lumbar      Depression with anxiety      Family history of breast cancer in female      Herpes zoster 2006    left side of neck.      Hx of pregnancy      3, Para 2-0-1-2.      Hyperlipidemia     not currently on treatment      Osteoarthritis of knee, unilateral 5/3/2012     Overactive bladder 2014     Restless leg syndrome    ,   Patient Active Problem List       Diagnosis  Date Noted     Dependent edema  09/15/2017     Chronic midline low back pain without sciatica  2016     Achondroplasia  06/15/2016     Pain medication agreement completed 16,3/22/17  2016     Health care maintenance  2016     Colonoscopy: Age 51  Breast Exam/Mammography: Does complete self exams; last mammogram  and normal, repeat every 2 years now with family history and then likely yearly around age 51  Pap smear: Never any abnormal, next visit  DEXA: May consider checking early due to significant alteration with surgeries/co-morbidities  Immunizations: UTD on vaccines.   Lipids/Annual Exam: Done today and every 5 years or as needed sooner  Hepatitis C screen: not indicated             Restless leg syndrome       Hyperlipidemia       not currently on treatment        Family history of breast cancer in female       Depression with anxiety       Osteoarthritis of knee, unilateral  2012   ,   Past Surgical History:      Procedure  Laterality Date     ADENOIDECTOMY  as a child      SECTION  1996    Primary low transverse  " section. Repeat 1996       OSTEOTOMY      Limb lengthening procedures.       TUBAL LIGATION  1996    and   Social History     Substance Use Topics       Smoking status: Never Smoker     Smokeless tobacco: Never Used     Alcohol use No     OBJECTIVE:  /86  Pulse 76  Ht 1.295 m (4' 3\")  Wt 83 kg (183 lb)  Breastfeeding? No  BMI 49.47 kg/m2   EXAM:  Alert and cooperative, no distress. Exam is unchanged.  ASSESSMENT/Plan :      Wilmer was seen today for medication management.    Diagnoses and all orders for this visit:    Osteoarthritis of knee, unilateral  -     HYDROcodone-acetaminophen, 5-325 mg, (NORCO) per tablet; Take 2 tablets by mouth 3 times daily if needed  for Pain Earliest Fill Date: 9/15/17 May take a maximum of 7 tablets per day.  -     HYDROcodone-acetaminophen, 5-325 mg, (NORCO) per tablet; Take 2 tablets by mouth 3 times daily if needed  for Pain Earliest Fill Date: 9/15/17 May take a maximum of 7 tablets per day.  -     HYDROcodone-acetaminophen, 5-325 mg, (NORCO) per tablet; Take 2 tablets by mouth 3 times daily if needed  for Pain Earliest Fill Date: 9/15/17 May take a maximum of 7 tablets per day.  -     COMPLIANCE DRUG ANALYSIS; Future    Anxiety  -     clonazePAM (KLONOPIN) 0.5 mg tablet; Take 1 tablet by mouth 3 times daily if needed.    Restless leg syndrome  -     clonazePAM (KLONOPIN) 0.5 mg tablet; Take 1 tablet by mouth 3 times daily if needed.    Pain medication agreement completed 16,3/22/17  -     COMPLIANCE DRUG ANALYSIS; Future    Dependent edema     she remains optimistic that she may get some good relief from the viscoelastic injection in her right knee. It's too early to tell.  query is appropriate. ToxAssure today. Renewal on hydrocodone 5/325 #200×3. Renewal on clonazepam which she takes mainly at nighttime for restless leg syndrome and anxiety and has tolerated long-term. It is hoped that when she has relief from the injection, she can drop down on the " hydrocodone to a lower level again. She'll continue to work on weight loss.    A total of 15 minutes was spent with the patient, greater than 50% of the time was spent in counseling/discussion of the aforementioned concerns.       Neptali French MD

## 2017-12-30 NOTE — NURSING NOTE
Patient Information     Patient Name MRN Wilmer Razo [id number] Female 1970      Nursing Note by Ruth Salazar at 10/24/2017  4:15 PM     Author:  Ruth Salazar Service:  (none) Author Type:  (none)     Filed:  10/24/2017  5:00 PM Encounter Date:  10/24/2017 Status:  Signed     :  Ruth Salazar            She is here today for increased right knee pain.  Ruth Salazar LPN..................10/24/2017   4:56 PM

## 2017-12-30 NOTE — NURSING NOTE
Patient Information     Patient Name MRN Wilmer Razo [id number] Female 1970      Nursing Note by Meredith Chan at 9/15/2017  1:45 PM     Author:  Meredith Chan Service:  (none) Author Type:  (none)     Filed:  9/15/2017  2:15 PM Encounter Date:  9/15/2017 Status:  Signed     :  Meredith Chan            Patient presents to the clinic today for med refill, and to follow up on her right knee.    Meredith Chan ....................  9/15/2017   1:52 PM

## 2017-12-30 NOTE — NURSING NOTE
Patient Information     Patient Name MRN Sex Wilmer Odell [id number] Female 1970      Nursing Note by Eufemia Mayberry at 2017  3:45 PM     Author:  Eufemia Mayberry Service:  (none) Author Type:  (none)     Filed:  2017  4:01 PM Encounter Date:  2017 Status:  Signed     :  Eufemia Mayberry            Patient was referred by Dr. French for her right knee OA.  Eufemia Mayberry LPN .......2017 4:00 PM

## 2017-12-30 NOTE — NURSING NOTE
Patient Information     Patient Name MRN Sex Wilmer Odell [id number] Female 1970      Nursing Note by Gosselin, Norma J at 2017  3:45 PM     Author:  Gosselin, Norma J Service:  (none) Author Type:  (none)     Filed:  2017  4:14 PM Encounter Date:  2017 Status:  Signed     :  Gosselin, Norma J            Patient has right knee pain, requesting cortisone injection.  Norma J Gosselin LPN....................  2017   3:53 PM    Universal Protocol    A. Pre-procedure verification complete yes  1-relevant information / documentation available, reviewed and properly matched to the patient; 2-consent accurate and complete, 3-equipment and supplies available    B. Site marking complete N/A  Site marked if not in continuous attendance with patient    C. TIME OUT completed yes  Time Out was conducted just prior to starting procedure to verify the eight required elements: 1-patient identity, 2-consent accurate and complete, 3-position, 4-correct side/site marked (if applicable), 5-procedure, 6-relevant images / results properly labeled and displayed (if applicable), 7-antibiotics / irrigation fluids (if applicable), 8-safety precautions.

## 2018-01-02 NOTE — PATIENT INSTRUCTIONS
Patient Information     Patient Name MRN Wilmer Razo [id number] Female 1970      Patient Instructions by Jamila Haley NP at 2017  2:00 PM     Author:  Jamila Haley NP Service:  (none) Author Type:  PHYS- Nurse Practitioner     Filed:  2017  2:22 PM Encounter Date:  2017 Status:  Signed     :  Jamila Haley NP (PHYS- Nurse Practitioner)            Thank you for choosing [facility] for your care.     You are advised to contact our office if there is no improvement or if there is worsening of conditions or symptoms, either come back or follow up with your primary care provider.     You were seen in the [facility]. This is for urgent care needs. If you have other questions or concerns please see your primary care provider.     You will need to be evaluated if you start to experience:  Fever higher than 102.5 F (39.2 C)   Trouble seeing or seeing double   Trouble thinking clearly   Trouble breathing or a stiff neck    * If you are a smoker, try to quit *    Call  or go to the emergency room if you:  Have trouble breathing   Chest pain  Abdominal pain    Jamila Haley RN, MSN, FNP  [facility]

## 2018-01-02 NOTE — PROGRESS NOTES
"Patient Information     Patient Name MRN Sex Wilmer Odell [id number] Female 1970      Progress Notes by Jamila Haley NP at 2017  2:30 PM     Author:  Jamila Haley NP Service:  (none) Author Type:  PHYS- Nurse Practitioner     Filed:  2017  3:16 PM Encounter Date:  2017 Status:  Signed     :  Jamila Haley NP (PHYS- Nurse Practitioner)            Nursing Notes:   Vaishali Hinojosa  2017  2:53 PM  Signed  Patient presents to clinic with fatigue which could be r/t menses currently but unsure.  Also, doesn't take iron routinely and could be r/t to fatigue as well per pt.  \"Cheek and eye heaviness and right ear pain.\"  Missed two days of work and would like a work note.  Vaishali Hinojosa ....................  2017   2:06 PM.   SUBJECTIVE:    Wilmer Morse is a 46 y.o. female who presents for fatigue    General Illness/Other    The current episode started yesterday (She c/o sinus fullness and fatigue). The onset is undetermined. The problem is moderate. Nothing relieves the symptoms. Nothing aggravates the symptoms. Pertinent negatives include no fever, no eye itching, no nausea, no vomiting, no congestion, no ear discharge, no ear pain, no headaches, no hearing loss, no mouth sores, no rhinorrhea, no sore throat, no stridor, no swollen glands, no cough, no URI, no rash, no eye discharge and no eye pain. She has been eating and drinking normally.     She wants a note d/t missing work yesterday and today. However s/s she presents with are vague.     Current Outpatient Prescriptions on File Prior to Visit       Medication  Sig Dispense Refill     buPROPion (WELLBUTRIN XL) 300 mg Extended-Release tablet Take 1 tablet by mouth every morning. 90 tablet 3     clonazePAM (KLONOPIN) 0.5 mg tablet TAKE 1 TABLET BY MOUTH THREE TIMES DAILY AS NEEDED 60 tablet 5     ferrous sulfate 325 mg delayed release tablet Take 1 tablet by mouth 3 times daily with meals. 90 tablet " "6     HYDROcodone-acetaminophen, 5-325 mg, (NORCO) per tablet Take 2 tablets by mouth 3 times daily if needed  for Pain May take a maximum of 7 tablets per day. 200 tablet 0     ibuprofen (ADVIL; MOTRIN) 600 mg tablet Take 600 mg by mouth 3 times daily if needed. Maximum of 3200 mg in 24 hours.       sertraline (ZOLOFT) 100 mg tablet TAKE 1 AND 1/2 TABLETS BY MOUTH DAILY 45 tablet 10     No current facility-administered medications on file prior to visit.        REVIEW OF SYSTEMS:  Review of Systems   Constitutional: Negative for fever.   HENT: Negative for congestion, ear discharge, ear pain, hearing loss, mouth sores, rhinorrhea and sore throat.    Eyes: Negative for pain, discharge and itching.   Respiratory: Negative for cough and stridor.    Gastrointestinal: Negative for nausea and vomiting.   Skin: Negative for rash.   Neurological: Negative for headaches.       OBJECTIVE:  /84  Pulse 91  Temp 98.6  F (37  C) (Tympanic)  Resp 16  Ht 1.295 m (4' 3\")  Wt 85.7 kg (189 lb)  LMP 01/06/2017 (Exact Date)  SpO2 96%  Breastfeeding? No  BMI 51.09 kg/m2    EXAM:   Physical Exam   Constitutional: She is well-developed, well-nourished, and in no distress.   HENT:   Head: Normocephalic and atraumatic.   Right Ear: Tympanic membrane and ear canal normal.   Left Ear: Tympanic membrane and ear canal normal.   Nose: Nose normal.   Mouth/Throat: Uvula is midline, oropharynx is clear and moist and mucous membranes are normal.   Eyes: Conjunctivae are normal.   Neck: Neck supple.   Cardiovascular: Normal rate, regular rhythm and normal heart sounds.    Pulmonary/Chest: Effort normal and breath sounds normal. No respiratory distress. She has no wheezes. She has no rales.   Lymphadenopathy:     She has no cervical adenopathy.   Nursing note and vitals reviewed.      ASSESSMENT/PLAN:    ICD-10-CM    1. Normal physical exam Z00.00         Plan:  Clearly here just for a work note since her s/s are vague and exam is benign. " Unsure why she missed work, note just states she was seen and can rtw today. If work calls I can not verify she was ill enough to miss two days of work.     RICO DUQUE NP ....................  1/6/2017   2:34 PM

## 2018-01-02 NOTE — NURSING NOTE
"Patient Information     Patient Name MRN Wilmer Razo [id number] Female 1970      Nursing Note by Vaishali Hinojosa at 2017  2:30 PM     Author:  Vaishali Hinojosa Service:  (none) Author Type:  (none)     Filed:  2017  2:53 PM Encounter Date:  2017 Status:  Signed     :  Vaishali Hinojosa            Patient presents to clinic with fatigue which could be r/t menses currently but unsure.  Also, doesn't take iron routinely and could be r/t to fatigue as well per pt.  \"Cheek and eye heaviness and right ear pain.\"  Missed two days of work and would like a work note.  Vaishali Hinojosa ....................  2017   2:06 PM.         "

## 2018-01-03 ENCOUNTER — AMBULATORY - GICH (OUTPATIENT)
Dept: FAMILY MEDICINE | Facility: OTHER | Age: 48
End: 2018-01-03

## 2018-01-03 NOTE — TELEPHONE ENCOUNTER
Patient Information     Patient Name MRN Wilmer Razo [id number] Female 1970      Telephone Encounter by Bassam Sepulveda RN at 3/6/2017  3:28 PM     Author:  Bassam Sepulveda RN Service:  (none) Author Type:  NURS- Registered Nurse     Filed:  3/6/2017  3:42 PM Encounter Date:  3/5/2017 Status:  Signed     :  Bassam Sepulveda RN (NURS- Registered Nurse)            This is a Refill request from: [facility]  Name of Medication: Gabapentin  Quantity requested: 60 capsules  Last fill date: 17  Due for refill: Unknown, see below  Last visit with RUDDY FRENCH was on: 2017 in [facility]  PCP:  Ruddy French MD  Controlled Substance Agreement:  Yes, but not in relation to this medication   Diagnosis r/t this medication request: Back Pain, Thoracic region     Per chart review, requested medication was discontinued in patient's chart on 16 as patient stated no longer taking. Office visit notes on that date show that patient and PCP discussed gabapentin use, and it was agreed upon that patient would remain off of the medication. Attempted to contact patient to discuss. Unable to reach patient at this time. Patient with follow up appointment with PCP on 3/16/17 per chart review. Will pend rx request to PCP for his consideration/approval.    Unable to complete prescription refill per RN Medication Refill Policy.................... Bassam Sepulveda RN ....................  3/6/2017   3:34 PM

## 2018-01-03 NOTE — ADDENDUM NOTE
Patient Information     Patient Name MRN Wilmer Razo [id number] Female 1970      Addendum Note by Ruddy French MD at 2017 11:26 AM     Author:  Ruddy French MD Service:  (none) Author Type:  Physician     Filed:  2017 11:26 AM Encounter Date:  2017 Status:  Signed     :  Ruddy French MD (Physician)       Addended by: RUDDY FRENCH on: 2017 11:26 AM        Modules accepted: Orders, SmartSet

## 2018-01-03 NOTE — NURSING NOTE
Patient Information     Patient Name MRN Sex Wilmer Odell [id number] Female 1970      Nursing Note by Gosselin, Norma J at 2017  4:15 PM     Author:  Gosselin, Norma J Service:  (none) Author Type:  (none)     Filed:  2017  4:53 PM Encounter Date:  2017 Status:  Signed     :  Gosselin, Norma J            Patient Presents to clinic with right knee pain requesting cortisone injection .    Norma Gosselin LPN ....................................2017 4:21 PM

## 2018-01-03 NOTE — PROGRESS NOTES
Patient Information     Patient Name MRN Sex Wilmer Odell [id number] Female 1970      Progress Notes by Neptali rFench MD at 2017  4:15 PM     Author:  Neptali French MD  Service:  (none) Author Type:  Physician     Filed:  2017 11:26 AM  Encounter Date:  2017 Status:  Addendum     :  Neptali French MD (Physician)        Related Notes: Original Note by Neptali French MD (Physician) filed at 2017  7:56 AM            Nursing Notes:   Gosselin, Norma J  2017  4:53 PM  Signed  Patient Presents to clinic with right knee pain requesting cortisone injection .    Norma Gosselin LPN ....................................2017 4:21 PM      SUBJECTIVE:  Wilmer Morse  is a 46 y.o. female who comes in today for follow-up of right knee pain. She would like to do another injection. Her previous injection was in October. She got modest relief from that one.  She got great relief from the initial injection.  Her second injection was technically difficult and likely did not go intra-articular.     Blood pressure has been better outside of the clinic.    She is beginning to work on losing some weight. Needs to be able to exercise.    Past Medical, Family, and Social History reviewed and updated as noted below.   ROS is negative except as noted above       Allergies     Allergen  Reactions     Penicillins *Unknown - Childhood Rxn   ,   Family History       Problem   Relation Age of Onset     Other  Mother      Achondroplasia.         Cancer-breast  Mother      breast cancer        Cancer-breast  Maternal Grandmother 70      breast cancer        Unknown  Father      Good Health  Daughter      Good Health  Son      Good Health  Other      Good Health  Son      (stepson)91     ,   Current Outpatient Prescriptions on File Prior to Visit       Medication  Sig Dispense Refill     buPROPion (WELLBUTRIN XL) 300 mg Extended-Release tablet Take 1 tablet by mouth every morning. 90  tablet 3     clonazePAM (KLONOPIN) 0.5 mg tablet TAKE 1 TABLET BY MOUTH THREE TIMES DAILY AS NEEDED 60 tablet 5     ferrous sulfate 325 mg delayed release tablet Take 1 tablet by mouth 3 times daily with meals. 90 tablet 6     HYDROcodone-acetaminophen, 5-325 mg, (NORCO) per tablet Take 2 tablets by mouth 3 times daily if needed  for Pain May take a maximum of 7 tablets per day. 200 tablet 0     ibuprofen (ADVIL; MOTRIN) 600 mg tablet Take 600 mg by mouth 3 times daily if needed. Maximum of 3200 mg in 24 hours.       sertraline (ZOLOFT) 100 mg tablet TAKE 1 AND 1/2 TABLETS BY MOUTH DAILY 45 tablet 10     No current facility-administered medications on file prior to visit.    ,   Past Medical History      Diagnosis   Date     Achondroplastic dwarfism       Degenerative disc disease, lumbar       Depression with anxiety       Family history of breast cancer in female       Herpes zoster  2006     left side of neck.      Hx of pregnancy        3, Para 2-0-1-2.      Hyperlipidemia       not currently on treatment      Osteoarthritis of knee, unilateral  5/3/2012     Overactive bladder  2014     Restless leg syndrome     ,   Patient Active Problem List       Diagnosis  Date Noted     Chronic midline low back pain without sciatica  2016     Achondroplasia  06/15/2016     Pain medication agreement completed 2016     Health care maintenance  2016     Colonoscopy: Age 51  Breast Exam/Mammography: Does complete self exams; last mammogram  and normal, repeat every 2 years now with family history and then likely yearly around age 51  Pap smear: Never any abnormal, next visit  DEXA: May consider checking early due to significant alteration with surgeries/co-morbidities  Immunizations: UTD on vaccines.   Lipids/Annual Exam: Done today and every 5 years or as needed sooner  Hepatitis C screen: not indicated             Restless leg syndrome       Hyperlipidemia       not currently on  "treatment        Family history of breast cancer in female       Depression with anxiety       Osteoarthritis of knee, unilateral  2012   ,   Past Surgical History       Procedure   Laterality Date     Osteotomy        Limb lengthening procedures.        section   1996     Primary low transverse  section. Repeat        Tubal ligation        Adenoidectomy   as a child    and   Social History     Substance Use Topics       Smoking status: Never Smoker     Smokeless tobacco: Never Used     Alcohol use No     OBJECTIVE:  Visit Vitals       BP (!) 158/120     Pulse 80     Ht 1.295 m (4' 3\")     Wt 86.7 kg (191 lb 2 oz)     LMP 2017 (Exact Date)     BMI 51.66 kg/m2      EXAM:  Alert and cooperative, no distress. Examination of the right knee is difficult because of distortion of anatomic landmarks. The lateral dimple where we would typically inject feels actually lower than what would be anticipated based on skin folds etc. with her knee completely relaxed and hanging at 90 , it was easy to feel.  ASSESSMENT/Plan :      Wilmer was seen today for knee pain/problem.    Diagnoses and all orders for this visit:    Osteoarthritis of knee, unilateral  -     [place] DRAIN/INJECT LARGE JOINT/BURSA (hip, knee,shoulder) - single joint  [42362.0]  -     triamcinolone acetonide 80 mg injection (KENALOG); Inject 2 mL intra-articular one time.    Elevated blood pressure      Continue to monitor blood pressure at home and at work.    Ms. Morse's Body mass index is 51.66 kg/(m^2). This is out of the normal range for a 46 y.o. Normal range for ages 18-64 is between 18.5 and 24.9; normal range for ages 65+ is 23-30. To lose weight we reviewed risks and benefits of appropriate options such as diet, exercise, and medications. Patient's strategy will be  self-directed nutrition plan and self-directed exercise program     Weight loss will help both the knee pain and her blood pressure.    Discussed " possible orthopedic referral and/or Synvisc shot as options if steroid shot is ineffective.  For now, she wants to go ahead with the steroid injection.        RIGHT KNEE INJECTION:  Risks, benefits, alternatives discussed, consent signed and pt wishes to proceed. Chloraprep used for sterile prep x2 prior to injection. Timeout is performed. Ethyl Chloride used as skin refridgerant for topical anesthesia. A lateral approach is used.  The knee joint is injected with a mixture of 2ml each of 1% lidocaine without epinephrine, 0.5% Marcaine, and  2ml Kenalog 40mg/ml.  Patient noted improvement in symptoms.  Ice 20 min tid and prn.  RTC if not improved over next 7 days.    Neptali French MD

## 2018-01-04 NOTE — TELEPHONE ENCOUNTER
Patient Information     Patient Name MRN Wilmer Razo [id number] Female 1970      Telephone Encounter by Bassam Sepulveda RN at 2017  1:44 PM     Author:  Bassam Sepulveda RN Service:  (none) Author Type:  NURS- Registered Nurse     Filed:  2017  1:46 PM Encounter Date:  2017 Status:  Signed     :  Bassam Sepulveda RN (NURS- Registered Nurse)            Redundant Refill Request for Wellbutrin refused;    Prescribing Provider: Ruddy French MD                   Order Date: 2016  Ordered by: RUDDY FRENCH  Medication:buPROPion (WELLBUTRIN XL) 300 mg Extended-Release tablet    Qty:90 tablet   Ref:3  Start:2016  End:              Route:Oral                  WHITNEY:No   Class:eRx    Sig:Take 1 tablet by mouth every morning.    Pharmacy:[facility] [address] [place] [address] AT [address]              OF [facility] [address] [facility] [phone]    Unable to complete prescription refill per RN Medication Refill Policy.................... Bassam Sepulveda RN ....................  2017   1:45 PM

## 2018-01-04 NOTE — TELEPHONE ENCOUNTER
Patient Information     Patient Name MRN Wilmer Razo [id number] Female 1970      Telephone Encounter by Soledad Barksdale RN at 2017  1:16 PM     Author:  Soledad Barksdale RN Service:  (none) Author Type:  NURS- Registered Nurse     Filed:  2017  1:19 PM Encounter Date:  2017 Status:  Signed     :  Soledad Barksdale RN (NURS- Registered Nurse)            Depression-in adults 18 and over  SSRI    Office visit in the past 12 months or as indicated in chart.  Should have clinic visit 1-2 months after initial prescription.    Last visit with Neptali French MD for med management was 3/22/17  Max refills 12 months from last office visit or per providers notes.    Prescription refilled per RN Medication Refill Policy.................... SOLEDAD BARKSDALE RN ....................  2017   1:16 PM      PHQ Depression Screening 3/22/2017 2017   Date of PHQ exam (doc flow) 3/22/2017 2017   1. Lack of interest/pleasure 0 - Not at all (No Data)   2. Feeling down/depressed 0 - Not at all -   PHQ-2 TOTAL SCORE 0 -   3. Trouble sleeping 1 - Several days -   4. Decreased energy 0 - Not at all -   5. Appetite change 0 - Not at all -   6. Feelings of failure 0 - Not at all -   7. Trouble concentrating 0 - Not at all -   8. Activity level 0 - Not at all -   9. Hurting yourself 0 - Not at all -   PHQ-9 TOTAL SCORE 1 -   PHQ-9 Severity Level none -   Functional Impairment not difficult at all -     Due for FU in 3 months per provider note

## 2018-01-04 NOTE — PROGRESS NOTES
Patient Information     Patient Name MRN Sex Wilmer Odell [id number] Female 1970      Progress Notes by Neptali French MD at 2017  3:45 PM     Author:  Neptali French MD Service:  (none) Author Type:  Physician     Filed:  2017  4:53 PM Encounter Date:  2017 Status:  Signed     :  Neptali French MD (Physician)            There are no exam notes on file for this visit.    SUBJECTIVE:  Wilmer Morse  is a 46 y.o. female who comes in today for follow-up of right knee pain. She would like to do another injection. Her previous injection was in October. She got modest relief from that one.  She got great relief from the initial injection.  Her second injection was technically difficult and likely did not go intra-articular.  We injected her again about 3 months ago when she got good relief from that. Discussed possible orthopedic referral and/or Synvisc shot as options if steroid shot is ineffective.  For now, she wants to go ahead with the steroid injection.     Past Medical, Family, and Social History reviewed and updated as noted below.   ROS is negative except as noted above       Allergies     Allergen  Reactions     Penicillins *Unknown - Childhood Rxn   ,   Family History       Problem   Relation Age of Onset     Other  Mother      Achondroplasia.         Cancer-breast  Mother      breast cancer        Cancer-breast  Maternal Grandmother 70      breast cancer        Unknown  Father      Good Health  Daughter      Good Health  Son      Good Health  Other      Good Health  Son      (alanon)91     ,   Current Outpatient Prescriptions on File Prior to Visit       Medication  Sig Dispense Refill     buPROPion (WELLBUTRIN XL) 300 mg Extended-Release tablet Take 1 tablet by mouth every morning. 90 tablet 3     clonazePAM (KLONOPIN) 0.5 mg tablet Take 1 tablet by mouth 3 times daily if needed. 60 tablet 5     ferrous sulfate 325 mg delayed release tablet Take 1 tablet by  mouth 3 times daily with meals. 90 tablet 6     gabapentin (NEURONTIN) 300 mg capsule Take 1 capsule by mouth 2 times daily. 180 capsule 4     HYDROcodone-acetaminophen, 5-325 mg, (NORCO) per tablet Take 2 tablets by mouth 3 times daily if needed  for Pain May take a maximum of 7 tablets per day. 200 tablet 0     ibuprofen (ADVIL; MOTRIN) 600 mg tablet Take 600 mg by mouth 3 times daily if needed. Maximum of 3200 mg in 24 hours.       sertraline (ZOLOFT) 100 mg tablet TAKE 1 AND 1/2 TABLETS BY MOUTH DAILY 45 tablet 10     No current facility-administered medications on file prior to visit.    ,   Past Medical History:     Diagnosis  Date     Achondroplastic dwarfism      Degenerative disc disease, lumbar      Depression with anxiety      Family history of breast cancer in female      Herpes zoster 2006    left side of neck.      Hx of pregnancy      3, Para 2-0-1-2.      Hyperlipidemia     not currently on treatment      Osteoarthritis of knee, unilateral 5/3/2012     Overactive bladder 2014     Restless leg syndrome    ,   Patient Active Problem List       Diagnosis  Date Noted     Chronic midline low back pain without sciatica  2016     Achondroplasia  06/15/2016     Pain medication agreement completed 16,3/22/17  2016     Health care maintenance  2016     Colonoscopy: Age 51  Breast Exam/Mammography: Does complete self exams; last mammogram  and normal, repeat every 2 years now with family history and then likely yearly around age 51  Pap smear: Never any abnormal, next visit  DEXA: May consider checking early due to significant alteration with surgeries/co-morbidities  Immunizations: UTD on vaccines.   Lipids/Annual Exam: Done today and every 5 years or as needed sooner  Hepatitis C screen: not indicated             Restless leg syndrome       Hyperlipidemia       not currently on treatment        Family history of breast cancer in female       Depression with anxiety        Osteoarthritis of knee, unilateral  2012   ,   Past Surgical History:      Procedure  Laterality Date     ADENOIDECTOMY  as a child      SECTION  1996    Primary low transverse  section. Repeat        OSTEOTOMY      Limb lengthening procedures.       TUBAL LIGATION      and   Social History     Substance Use Topics       Smoking status: Never Smoker     Smokeless tobacco: Never Used     Alcohol use No     OBJECTIVE:  /90  Pulse 64  Wt 82.8 kg (182 lb 9.6 oz)  Breastfeeding? No  BMI 49.36 kg/m2   EXAM:  Alert and cooperative, no distress. Examination of her right knee is unchanged.  ASSESSMENT/Plan :      Wilmer was seen today for follow up.    Diagnoses and all orders for this visit:    Osteoarthritis of knee, unilateral  -     [place] DRAIN/INJECT LARGE JOINT/BURSA (hip, knee,shoulder) - single joint  [25680.0]  -     triamcinolone acetonide 80 mg injection (KENALOG); Inject 2 mL intra-articular one time.        RIGHT KNEE INJECTION:  Risks, benefits, alternatives discussed, consent signed and pt wishes to proceed. Chloraprep used for sterile prep x2 prior to injection. Timeout is performed. Ethyl Chloride used as skin refridgerant for topical anesthesia. A lateral approach is used.  The knee joint is injected with a mixture of 2ml each of 1% lidocaine without epinephrine, 0.5% Marcaine, and  2ml Kenalog 40mg/ml.  Patient noted improvement in symptoms.  Ice 20 min tid and prn.  RTC if not improved over next 7 days.  Neptali French MD

## 2018-01-04 NOTE — PROGRESS NOTES
Patient Information     Patient Name MRN Sex Wilmer Odell [id number] Female 1970      Progress Notes by Neptali French MD at 3/22/2017  8:30 AM     Author:  Neptali French MD Service:  (none) Author Type:  Physician     Filed:  3/22/2017  6:21 PM Encounter Date:  3/22/2017 Status:  Signed     :  Neptali French MD (Physician)            There are no exam notes on file for this visit.    SUBJECTIVE:  Wilmer Morse  is a 46 y.o. female who comes in today for medication management follow-up. I last saw her 3 months ago. She has chronic right knee pain. We have injected it with some mixed results in the past. We talked about possibly sending her for an orthopedic referral and/or Synvisc injection as an option at some point. She was working on some weight loss.She's been able to stay at 6 tablets a day for the most part on the hydrocodone and finds that gabapentin is helpful and has continued with that.    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 (YES)  Better after knee injection      NEUROPATHIC PAIN: (NO)  ON GABAPENTIN/LYRICA/TCA/SNRI: (NO) was previously, but that pain has dissipated.       NOCICEPTIVE PAIN: (YES)      HISTORY OF CD: (NO)      MENTAL HEALTH DIAGNOSIS: (YES)  DIAGNOSIS: Anxiety and restless leg syndrome    ON BENZODIAZEPINES: (YES). Klonopin 0.5-1 mg at bedtime.    DISCLOSURE REGARDING RISK OF CONCOMITANT USE WITH OPIOIDS DISCUSSED: (YES)  DATE DISCUSSED: 16,06/15/16, 16, 16,  3/22/17      MEDICATION: hydrocodone/APAP 7.5/325 #180/month, 6 per day      ORAL MORPHINE EQUIVALENTS: 30/d      LAST TAPER TRIAL: April 2016       QUERY TODAY: (YES)  APPROPRIATE?:         (YES)     TOXASSURE TODAY: (YES)  IF NO, DATE OF LAST TOXASURE: 9/6/16     PAIN SCORE FLOWSHEET REVIEWED: (YES)  STABLE OR IMPROVED: (YES)    AUDIT-10 QUESTIONNAIRE COMPLETED:   AUDIT ALCOHOL SCREEN 6/15/2016   Date of exam 6/15/2016   Frequency 0 = never       OSWESTRY PAIN DISABILITY INDEX:  OSWESTRY DISABILITY INDEX (ESTEFANIA) v2.1A 6/15/2016   Date of exam 6/15/2016   Pain intensity 2 = the pain is moderate at the moment   Personal care 0 = I can look after myself normally without causing additional pain   Lifting 2 = pain prevents me from lifting heavy weights off the floor but I can manage if they are conveniently positioned, e.g. on a table   Walking 2 = pain prevents me from walking more than a quarter of a mile   Sitting 0 = I can sit in any chair as long as I like   Standing 3 = pain prevents me from standing for more than half an hour   Sleeping 0 = my sleep is never interrupted by pain   Sex life 0 = my sex life is normal and causes no additional pain   Social life 0 = my social life is normal and causes me no additional pain   Traveling 0 = I can travel anywhere without pain   Score (max 50) 9                    Past Medical, Family, and Social History reviewed and updated as noted below.   ROS is negative except as noted above       Allergies     Allergen  Reactions     Penicillins *Unknown - Childhood Rxn   ,   Family History       Problem   Relation Age of Onset     Other  Mother      Achondroplasia.         Cancer-breast  Mother      breast cancer        Cancer-breast  Maternal Grandmother 70      breast cancer        Unknown  Father      Good Health  Daughter      Good Health  Son      Good Health  Other      Good Health  Son      (mariana)7/24/91     ,   Current Outpatient Prescriptions on File Prior to Visit        Medication  Sig Dispense Refill     buPROPion (WELLBUTRIN XL) 300 mg Extended-Release tablet Take 1 tablet by mouth every morning. 90 tablet 3     ferrous sulfate 325 mg delayed release tablet Take 1 tablet by mouth 3 times daily with meals. 90 tablet 6     ibuprofen (ADVIL; MOTRIN) 600 mg tablet Take 600 mg by mouth 3 times daily if needed. Maximum of 3200 mg in 24 hours.       sertraline (ZOLOFT) 100 mg tablet TAKE 1 AND 1/2 TABLETS BY MOUTH DAILY 45 tablet 10     No current facility-administered medications on file prior to visit.    ,   Past Medical History      Diagnosis   Date     Achondroplastic dwarfism       Degenerative disc disease, lumbar       Depression with anxiety       Family history of breast cancer in female       Herpes zoster  2006     left side of neck.      Hx of pregnancy        3, Para 2-0-1-2.      Hyperlipidemia       not currently on treatment      Osteoarthritis of knee, unilateral  5/3/2012     Overactive bladder  2014     Restless leg syndrome     ,   Patient Active Problem List       Diagnosis  Date Noted     Chronic midline low back pain without sciatica  2016     Achondroplasia  06/15/2016     Pain medication agreement completed 16,3/22/17  2016     Health care maintenance  2016     Colonoscopy: Age 51  Breast Exam/Mammography: Does complete self exams; last mammogram  and normal, repeat every 2 years now with family history and then likely yearly around age 51  Pap smear: Never any abnormal, next visit  DEXA: May consider checking early due to significant alteration with surgeries/co-morbidities  Immunizations: UTD on vaccines.   Lipids/Annual Exam: Done today and every 5 years or as needed sooner  Hepatitis C screen: not indicated             Restless leg syndrome       Hyperlipidemia       not currently on treatment        Family history of breast cancer in female       Depression with anxiety       Osteoarthritis of knee, unilateral   2012   ,   Past Surgical History       Procedure   Laterality Date     Osteotomy        Limb lengthening procedures.        section   1996     Primary low transverse  section. Repeat        Tubal ligation        Adenoidectomy   as a child    and   Social History     Substance Use Topics       Smoking status: Never Smoker     Smokeless tobacco: Never Used     Alcohol use No     OBJECTIVE:  Visit Vitals       /74     Pulse 80     Wt 81.6 kg (180 lb)     Breastfeeding No     BMI 48.66 kg/m2      EXAM:  Alert and cooperative, no distress. Walks with an antalgic gait. Affect is broad ranging and appropriate today. The exam is unchanged  ASSESSMENT/Plan :      Wilmer was seen today for medication management.    Diagnoses and all orders for this visit:    Osteoarthritis of knee, unilateral  -     COMPLIANCE DRUG ANALYSIS; Future  -     COMPLIANCE DRUG ANALYSIS  -     Discontinue: HYDROcodone-acetaminophen, 5-325 mg, (NORCO) per tablet; Take 2 tablets by mouth 3 times daily if needed  for Pain May take a maximum of 7 tablets per day.  -     Discontinue: HYDROcodone-acetaminophen, 5-325 mg, (NORCO) per tablet; Take 2 tablets by mouth 3 times daily if needed  for Pain May take a maximum of 7 tablets per day.  -     HYDROcodone-acetaminophen, 5-325 mg, (NORCO) per tablet; Take 2 tablets by mouth 3 times daily if needed  for Pain May take a maximum of 7 tablets per day.    Pain medication agreement completed 16  -     COMPLIANCE DRUG ANALYSIS; Future  -     COMPLIANCE DRUG ANALYSIS    Chronic midline low back pain without sciatica  -     COMPLIANCE DRUG ANALYSIS; Future  -     COMPLIANCE DRUG ANALYSIS    Achondroplasia    Depression with anxiety    Restless leg syndrome  -     clonazePAM (KLONOPIN) 0.5 mg tablet; Take 1 tablet by mouth 3 times daily if needed.    Anxiety  -     clonazePAM (KLONOPIN) 0.5 mg tablet; Take 1 tablet by mouth 3 times daily if needed.    BACK PAIN,  THORACIC REGION  -     gabapentin (NEURONTIN) 300 mg capsule; Take 1 capsule by mouth 2 times daily.    Continue with gabapentin. Klonopin is renewed for bedtime (see previous note for attempts at tapering). Renewal on hydrocodone 5/325 #200 tablets. She will try and minimize use. ToxAssure today and  query is appropriate    Discussed possible referral to [facility] for consultation with regard to her knee at some point but she wishes to hold off at this time as she seems to get reasonable relief from the injections and is continuing to work on weight loss which has helped her knee pain. She is congratulated on her weight loss and will continue with that.    Ms. Morse's Body mass index is 48.66 kg/(m^2). This is out of the normal range for a 46 y.o. Normal range for ages 18-64 is between 18.5 and 24.9; normal range for ages 65+ is 23-30. To lose weight we reviewed risks and benefits of appropriate options such as diet, exercise, and medications. Patient's strategy will be  self-directed nutrition plan and self-directed exercise program     follow-up in 3 months, sooner if needed  Neptali French MD

## 2018-01-05 ENCOUNTER — AMBULATORY - GICH (OUTPATIENT)
Dept: SCHEDULING | Facility: OTHER | Age: 48
End: 2018-01-05

## 2018-01-08 ENCOUNTER — AMBULATORY - GICH (OUTPATIENT)
Dept: FAMILY MEDICINE | Facility: OTHER | Age: 48
End: 2018-01-08

## 2018-01-08 DIAGNOSIS — G89.29 OTHER CHRONIC PAIN: ICD-10-CM

## 2018-01-08 DIAGNOSIS — Q77.4 ACHONDROPLASIA: ICD-10-CM

## 2018-01-08 DIAGNOSIS — M17.10 PRIMARY OSTEOARTHRITIS OF ONE KNEE: ICD-10-CM

## 2018-01-08 DIAGNOSIS — M54.50 LOW BACK PAIN: ICD-10-CM

## 2018-01-12 ENCOUNTER — COMMUNICATION - GICH (OUTPATIENT)
Dept: FAMILY MEDICINE | Facility: OTHER | Age: 48
End: 2018-01-12

## 2018-01-12 DIAGNOSIS — M17.10 PRIMARY OSTEOARTHRITIS OF ONE KNEE: ICD-10-CM

## 2018-01-18 ENCOUNTER — HISTORY (OUTPATIENT)
Dept: RADIOLOGY | Facility: OTHER | Age: 48
End: 2018-01-18

## 2018-01-18 ENCOUNTER — HOSPITAL ENCOUNTER (OUTPATIENT)
Dept: RADIOLOGY | Facility: OTHER | Age: 48
End: 2018-01-18
Attending: FAMILY MEDICINE

## 2018-01-18 DIAGNOSIS — Z80.3 FAMILY HISTORY OF MALIGNANT NEOPLASM OF BREAST: ICD-10-CM

## 2018-01-18 DIAGNOSIS — Z12.31 ENCOUNTER FOR SCREENING MAMMOGRAM FOR MALIGNANT NEOPLASM OF BREAST: ICD-10-CM

## 2018-01-25 ENCOUNTER — HISTORY (OUTPATIENT)
Dept: FAMILY MEDICINE | Facility: OTHER | Age: 48
End: 2018-01-25

## 2018-01-25 ENCOUNTER — DOCUMENTATION ONLY (OUTPATIENT)
Dept: FAMILY MEDICINE | Facility: OTHER | Age: 48
End: 2018-01-25

## 2018-01-25 ENCOUNTER — OFFICE VISIT - GICH (OUTPATIENT)
Dept: FAMILY MEDICINE | Facility: OTHER | Age: 48
End: 2018-01-25

## 2018-01-25 DIAGNOSIS — R03.0 ELEVATED BLOOD PRESSURE READING WITHOUT DIAGNOSIS OF HYPERTENSION: ICD-10-CM

## 2018-01-25 DIAGNOSIS — Q77.4 ACHONDROPLASIA: ICD-10-CM

## 2018-01-25 DIAGNOSIS — M17.10 PRIMARY OSTEOARTHRITIS OF ONE KNEE: ICD-10-CM

## 2018-01-25 PROBLEM — F41.8 DEPRESSION WITH ANXIETY: Status: ACTIVE | Noted: 2018-01-25

## 2018-01-25 PROBLEM — R60.9 DEPENDENT EDEMA: Status: ACTIVE | Noted: 2017-09-15

## 2018-01-25 PROBLEM — Z80.3 FAMILY HISTORY OF BREAST CANCER IN FEMALE: Status: ACTIVE | Noted: 2018-01-25

## 2018-01-25 PROBLEM — E78.5 HYPERLIPIDEMIA: Status: ACTIVE | Noted: 2018-01-25

## 2018-01-25 PROBLEM — G25.81 RESTLESS LEG SYNDROME: Status: ACTIVE | Noted: 2018-01-25

## 2018-01-25 RX ORDER — IBUPROFEN 600 MG/1
600 TABLET, FILM COATED ORAL 3 TIMES DAILY PRN
COMMUNITY

## 2018-01-25 RX ORDER — SERTRALINE HYDROCHLORIDE 100 MG/1
200 TABLET, FILM COATED ORAL DAILY
COMMUNITY
Start: 2017-06-19 | End: 2018-03-08

## 2018-01-25 RX ORDER — CLONAZEPAM 0.5 MG/1
0.5 TABLET ORAL 3 TIMES DAILY PRN
COMMUNITY
Start: 2017-09-15 | End: 2018-03-08

## 2018-01-25 RX ORDER — HYDROCODONE BITARTRATE AND ACETAMINOPHEN 5; 325 MG/1; MG/1
2 TABLET ORAL 3 TIMES DAILY PRN
COMMUNITY
Start: 2017-12-11 | End: 2018-03-08

## 2018-01-25 RX ORDER — BUPROPION HYDROCHLORIDE 300 MG/1
300 TABLET ORAL EVERY MORNING
COMMUNITY
Start: 2017-12-11 | End: 2018-12-20

## 2018-01-25 ASSESSMENT — PATIENT HEALTH QUESTIONNAIRE - PHQ9: SUM OF ALL RESPONSES TO PHQ QUESTIONS 1-9: 4

## 2018-01-27 VITALS
WEIGHT: 183 LBS | HEART RATE: 76 BPM | HEIGHT: 55 IN | SYSTOLIC BLOOD PRESSURE: 132 MMHG | DIASTOLIC BLOOD PRESSURE: 86 MMHG | BODY MASS INDEX: 42.35 KG/M2

## 2018-01-27 VITALS
DIASTOLIC BLOOD PRESSURE: 74 MMHG | HEART RATE: 80 BPM | WEIGHT: 180 LBS | SYSTOLIC BLOOD PRESSURE: 146 MMHG | BODY MASS INDEX: 48.66 KG/M2

## 2018-01-27 VITALS
RESPIRATION RATE: 16 BRPM | BODY MASS INDEX: 43.74 KG/M2 | DIASTOLIC BLOOD PRESSURE: 84 MMHG | SYSTOLIC BLOOD PRESSURE: 120 MMHG | TEMPERATURE: 98.6 F | OXYGEN SATURATION: 96 % | HEART RATE: 91 BPM | WEIGHT: 189 LBS | HEIGHT: 55 IN

## 2018-01-27 VITALS
HEART RATE: 88 BPM | HEART RATE: 96 BPM | DIASTOLIC BLOOD PRESSURE: 100 MMHG | SYSTOLIC BLOOD PRESSURE: 148 MMHG | DIASTOLIC BLOOD PRESSURE: 100 MMHG | TEMPERATURE: 98.9 F | HEART RATE: 68 BPM | BODY MASS INDEX: 41.66 KG/M2 | BODY MASS INDEX: 49.9 KG/M2 | SYSTOLIC BLOOD PRESSURE: 150 MMHG | WEIGHT: 179.6 LBS | WEIGHT: 184.6 LBS | BODY MASS INDEX: 48.55 KG/M2 | DIASTOLIC BLOOD PRESSURE: 88 MMHG | WEIGHT: 180 LBS | SYSTOLIC BLOOD PRESSURE: 130 MMHG | HEIGHT: 55 IN

## 2018-01-27 VITALS
DIASTOLIC BLOOD PRESSURE: 120 MMHG | HEIGHT: 55 IN | SYSTOLIC BLOOD PRESSURE: 158 MMHG | HEART RATE: 80 BPM | BODY MASS INDEX: 44.23 KG/M2 | WEIGHT: 191.13 LBS

## 2018-01-27 VITALS
SYSTOLIC BLOOD PRESSURE: 136 MMHG | WEIGHT: 182.6 LBS | HEART RATE: 64 BPM | BODY MASS INDEX: 49.36 KG/M2 | DIASTOLIC BLOOD PRESSURE: 90 MMHG

## 2018-02-01 ASSESSMENT — PATIENT HEALTH QUESTIONNAIRE - PHQ9
SUM OF ALL RESPONSES TO PHQ QUESTIONS 1-9: 2
SUM OF ALL RESPONSES TO PHQ QUESTIONS 1-9: 0
SUM OF ALL RESPONSES TO PHQ QUESTIONS 1-9: 1
SUM OF ALL RESPONSES TO PHQ QUESTIONS 1-9: 0

## 2018-02-01 ASSESSMENT — ANXIETY QUESTIONNAIRES: GAD7 TOTAL SCORE: 0

## 2018-02-05 ENCOUNTER — AMBULATORY - GICH (OUTPATIENT)
Dept: SCHEDULING | Facility: OTHER | Age: 48
End: 2018-02-05

## 2018-02-09 VITALS
WEIGHT: 180 LBS | HEART RATE: 76 BPM | SYSTOLIC BLOOD PRESSURE: 146 MMHG | DIASTOLIC BLOOD PRESSURE: 100 MMHG | TEMPERATURE: 99.6 F | BODY MASS INDEX: 48.66 KG/M2

## 2018-02-09 VITALS
HEART RATE: 92 BPM | SYSTOLIC BLOOD PRESSURE: 138 MMHG | DIASTOLIC BLOOD PRESSURE: 86 MMHG | BODY MASS INDEX: 49.3 KG/M2 | WEIGHT: 182.4 LBS

## 2018-02-10 ASSESSMENT — PATIENT HEALTH QUESTIONNAIRE - PHQ9: SUM OF ALL RESPONSES TO PHQ QUESTIONS 1-9: 5

## 2018-02-10 ASSESSMENT — ANXIETY QUESTIONNAIRES: GAD7 TOTAL SCORE: 3

## 2018-02-11 ENCOUNTER — HEALTH MAINTENANCE LETTER (OUTPATIENT)
Age: 48
End: 2018-02-11

## 2018-02-11 ASSESSMENT — PATIENT HEALTH QUESTIONNAIRE - PHQ9: SUM OF ALL RESPONSES TO PHQ QUESTIONS 1-9: 4

## 2018-02-12 ENCOUNTER — TRANSFERRED RECORDS (OUTPATIENT)
Dept: HEALTH INFORMATION MANAGEMENT | Facility: OTHER | Age: 48
End: 2018-02-12

## 2018-02-12 NOTE — PROGRESS NOTES
Patient Information     Patient Name MRN Wilmer Razo [id number] Female 1970      Progress Notes by Neptali French MD at 2017  3:45 PM     Author:  Neptali French MD Service:  (none) Author Type:  Physician     Filed:  2017  5:47 PM Encounter Date:  2017 Status:  Signed     :  Neptali French MD (Physician)            Nursing Notes:   Ruth Salazar  2017  4:09 PM  Signed  She is here for medication management.  Ruth Salazar LPN..................2017   4:06 PM      SUBJECTIVE:  Wilmer Morse  is a 47 y.o. female who comes in today for follow-up and medication management. He has chronic right knee pain and had a Synvisc 1 injection done in September but really didn't get any relief from that. We gave her steroid shot on . Prior to that had gone on  and then on . We talked about exploring potential knee replacement in the future.    She was taking gabapentin 300 mg a.m. 600 mg pm.  She was feeling shaky and felt foggy in the head. She stopped completely and was restless for 3 nights but it helped with the side effects.  She has been off since December 3. She is not sure if it made the stiffness in her legs worse but she has been more stiff.     She fell out of bed on  and hit her head on the nightstand. She has had a constant headache since then. It is some better with pain medications.  She no loss of consciousness.  No laceration. She did have 2 goose eggs.  It is better with sleep. She will at times feel slight vertigo when laying on her right side, but it is brief.     PHQ Depression Screening 10/24/2017 2017   Date of PHQ exam (doc flow) 10/24/2017 2017   1. Lack of interest/pleasure 0 - Not at all 0 - Not at all   2. Feeling down/depressed 0 - Not at all 1 - Several days   PHQ-2 TOTAL SCORE 0 1   3. Trouble sleeping 0 - Not at all 1 - Several days   4. Decreased energy 0 - Not at all 1 - Several  days   5. Appetite change 0 - Not at all 0 - Not at all   6. Feelings of failure 0 - Not at all 1 - Several days   7. Trouble concentrating 0 - Not at all 0 - Not at all   8. Activity level 0 - Not at all 1 - Several days   9. Hurting yourself 0 - Not at all 0 - Not at all   PHQ-9 TOTAL SCORE 0 5   PHQ-9 Severity Level none mild   Functional Impairment not difficult at all somewhat difficult   Some recent data might be hidden        YESI-7 ANXIETY SCREENING 3/22/2017 12/11/2017   YESI date (doc flow) 3/22/2017 12/11/2017   Nervous, anxious 0 1   Cannot stop worrying 0 1   Worry about different things 0 1   Cannot relax 0 0   Feeling restless 0 0   Easily annoyed/irritated 0 0   Afraid of awful event 0 0   Score 0 3   Severity none none   Some recent data might be hidden          CHRONIC PAIN MANAGEMENT:    DIAGNOSIS: (M17.10) Osteoarthritis of knee, unilateral  (primary encounter diagnosis)  (Z02.89) Pain medication agreement completed 4-28-16,3/22/17  (Z12.31) Visit for screening mammogram  (Z80.3) Family history of breast cancer in female  (R03.0) Elevated blood pressure reading without diagnosis of hypertension  (M54.5,  G89.29) Chronic midline low back pain without sciatica  (M54.81) Occipital neuralgia, unspecified laterality  (F41.1) Anxiety state  (F43.21) Adjustment disorder with depressed mood     PAIN CLINIC EVALUATION:(NO)      PAIN MEDICATION AGREEMENT: (YES)    DATE SIGNED: 4/28/16,3/22/17      NON-MEDICATION MODALITIES MAXIMIZED? (YES)  Better after knee injection      NEUROPATHIC PAIN: (NO)  ON GABAPENTIN/LYRICA/TCA/SNRI: (NO) was previously, but that pain has dissipated.       NOCICEPTIVE PAIN: (YES)      HISTORY OF CD: (NO)      MENTAL HEALTH DIAGNOSIS: (YES)  DIAGNOSIS: Anxiety and restless leg syndrome    ON BENZODIAZEPINES: (YES). Klonopin 0.5-1 mg at bedtime.    DISCLOSURE REGARDING RISK OF CONCOMITANT USE WITH OPIOIDS DISCUSSED: (YES)  DATE DISCUSSED: 4/28/16,06/15/16, 9/6/16, 12/20/16, 3/22/17,  9/15/17      MEDICATION: hydrocodone/APAP 7.5/325 #200/month, 7 per day      ORAL MORPHINE EQUIVALENTS: 30/d      LAST TAPER TRIAL: April 2016       QUERY TODAY: (YES)  APPROPRIATE?:         (YES)      TOXASSURE TODAY: (NO)  IF NO, DATE OF LAST TOXASURE: 9/6/16, 3/22/17      PAIN SCORE FLOWSHEET REVIEWED: (YES)  STABLE OR IMPROVED: (YES)      AUDIT-10 QUESTIONNAIRE COMPLETED:   AUDIT ALCOHOL SCREEN 6/15/2016   Date of exam 6/15/2016   Frequency 0 = never           OSWESTRY PAIN DISABILITY INDEX:  OSWESTRY DISABILITY INDEX (ESTEFANIA) v2.1A 6/15/2016   Date of exam 6/15/2016   Pain intensity 2 = the pain is moderate at the moment   Personal care 0 = I can look after myself normally without causing additional pain   Lifting 2 = pain prevents me from lifting heavy weights off the floor but I can manage if they are conveniently positioned, e.g. on a table   Walking 2 = pain prevents me from walking more than a quarter of a mile   Sitting 0 = I can sit in any chair as long as I like   Standing 3 = pain prevents me from standing for more than half an hour   Sleeping 0 = my sleep is never interrupted by pain   Sex life 0 = my sex life is normal and causes no additional pain   Social life 0 = my social life is normal and causes me no additional pain   Traveling 0 = I can travel anywhere without pain   Score (max 50) 9                      Past Medical, Family, and Social History reviewed and updated as noted below.   ROS is negative except as noted above       Allergies     Allergen  Reactions     Penicillins *Unknown - Childhood Rxn   ,   Family History       Problem   Relation Age of Onset     Other  Mother      Achondroplasia.         Cancer-breast  Mother      breast cancer        Cancer-breast  Maternal Grandmother 70      breast cancer        Unknown  Father      Good Health  Daughter      Good Health  Son      Good Health  Other      Good Health  Son      (mariana)7/24/91     ,   Current Outpatient Prescriptions on File  Prior to Visit       Medication  Sig Dispense Refill     clonazePAM (KLONOPIN) 0.5 mg tablet Take 1 tablet by mouth 3 times daily if needed. 90 tablet 5     ibuprofen (ADVIL; MOTRIN) 600 mg tablet Take 600 mg by mouth 3 times daily if needed. Maximum of 3200 mg in 24 hours.       sertraline (ZOLOFT) 100 mg tablet Take 2 tablets by mouth once daily. 180 tablet 3     No current facility-administered medications on file prior to visit.    ,   Past Medical History:     Diagnosis  Date     Achondroplastic dwarfism      Degenerative disc disease, lumbar      Depression with anxiety      Family history of breast cancer in female      Herpes zoster 2006    left side of neck.      Hx of pregnancy      3, Para 2-0-1-2.      Hyperlipidemia     not currently on treatment      Osteoarthritis of knee, unilateral 5/3/2012     Overactive bladder 2014     Restless leg syndrome    ,   Patient Active Problem List       Diagnosis  Date Noted     Dependent edema  09/15/2017     Chronic midline low back pain without sciatica  2016     Achondroplasia  06/15/2016     Pain medication agreement completed 16,3/22/17  2016     Restless leg syndrome       Hyperlipidemia       not currently on treatment        Family history of breast cancer in female       Depression with anxiety       Osteoarthritis of knee, unilateral  2012   ,   Past Surgical History:      Procedure  Laterality Date     ADENOIDECTOMY  as a child      SECTION  1996    Primary low transverse  section. Repeat        OSTEOTOMY      Limb lengthening procedures.       steroid injection right knee Right 10/16,,,10/17     Synvisc 1 injection Right 2017    fluorscopically guided. no improvement.        TUBAL LIGATION  1996    and   Social History     Substance Use Topics       Smoking status: Never Smoker     Smokeless tobacco: Never Used     Alcohol use No     OBJECTIVE:  /86  Pulse 92  Wt 82.7  kg (182 lb 6.4 oz)  Breastfeeding? No  BMI 49.3 kg/m2   EXAM:  Alert and cooperative but somewhat tearful today. Has had increased pain since stopping the gabapentin but not sure it's related to that or possibly the weather or just other stressors. She is feeling like she is ready to move ahead with consultation with regard to her knee and possible replacement. Neck range of motion is preserved but she has some tightness on flexion and extension. Some tightness across the trapezius and neck strap muscles as well.  ASSESSMENT/Plan :      Wilmer was seen today for medication management.    Diagnoses and all orders for this visit:    Osteoarthritis of knee, unilateral  -     AMB CONSULT TO PHYSICAL THERAPY; Future  -     Discontinue: HYDROcodone-acetaminophen, 5-325 mg, (NORCO) per tablet; Take 2 tablets by mouth 3 times daily if needed  for Pain May take a maximum of 7 tablets per day.  -     Discontinue: HYDROcodone-acetaminophen, 5-325 mg, (NORCO) per tablet; Take 2 tablets by mouth 3 times daily if needed  for Pain May take a maximum of 7 tablets per day.  -     HYDROcodone-acetaminophen, 5-325 mg, (NORCO) per tablet; Take 2 tablets by mouth 3 times daily if needed  for Pain May take a maximum of 7 tablets per day.    Pain medication agreement completed 4-28-16,3/22/17    Visit for screening mammogram  -     XR MAMMO BILAT SCREENING; Future    Family history of breast cancer in female  -     XR MAMMO BILAT SCREENING; Future    Elevated blood pressure reading without diagnosis of hypertension    Chronic midline low back pain without sciatica  -     AMB CONSULT TO PHYSICAL THERAPY; Future    Occipital neuralgia, unspecified laterality  -     AMB CONSULT TO PHYSICAL THERAPY; Future    Anxiety state  -     buPROPion (WELLBUTRIN XL) 300 mg Extended-Release tablet; Take 1 tablet by mouth every morning.    Adjustment disorder with depressed mood  -     buPROPion (WELLBUTRIN XL) 300 mg Extended-Release tablet; Take 1  tablet by mouth every morning.     I think her headache is probably due from a neck strain. Reassured and will do expectant and symptomatic treatment. For her low back and her neck, as well as for her knee, will send to physical therapy at [facility] in the warm therapy pool to see if that will be helpful.    We'll continue to monitor blood pressure. She is referred for mammogram. Renewal on Wellbutrin.     query is appropriate. Renewed hydrocodone 5/325 #200×3. We'll try and find out options for her for consultation for total knee arthroplasty.    A total of 25 minutes was spent with the patient, greater than 50% of the time was spent in counseling/discussion of the aforementioned concerns.       Neptali French MD

## 2018-02-12 NOTE — TELEPHONE ENCOUNTER
Patient Information     Patient Name MRN Wilmer Razo [id number] Female 1970      Telephone Encounter by Guanako Velazquez at 2017  5:41 PM     Author:  Guanako Velazquez Service:  (none) Author Type:  (none)     Filed:  2017  5:41 PM Encounter Date:  2017 Status:  Signed     :  Guanako Velazquez            Closing encounter.    Guanako Velazquez ....................  2017   5:41 PM

## 2018-02-12 NOTE — NURSING NOTE
Patient Information     Patient Name MRN Wilmer Razo [id number] Female 1970      Nursing Note by Ruth Salazar at 2017  3:45 PM     Author:  Ruth Salazar Service:  (none) Author Type:  (none)     Filed:  2017  4:09 PM Encounter Date:  2017 Status:  Signed     :  Ruth Salazar            She is here for medication management.  Ruth Salazar LPN..................2017   4:06 PM

## 2018-02-12 NOTE — TELEPHONE ENCOUNTER
Patient Information     Patient Name MRN Wilmer Razo [id number] Female 1970      Telephone Encounter by Bassam Sepulveda RN at 2018  4:08 PM     Author:  Bassam Sepulveda RN Service:  (none) Author Type:  NURS- Registered Nurse     Filed:  2018  4:13 PM Encounter Date:  2018 Status:  Signed     :  Bassam Sepulveda RN (NURS- Registered Nurse)            Writer received a faxed rx request from [facility] pharmacy in relation to patient's norco. Chart review shows that patient is on a CSA dated 3/22/17 for that rx, as well as patient was given a 3 month supply of rx as requested on 17. Writer will refuse rx request at this time.    Unable to complete prescription refill per RN Medication Refill Policy.................... Bassam Sepulveda RN ....................  2018   4:11 PM

## 2018-02-13 NOTE — NURSING NOTE
Patient Information     Patient Name MRN Wilmer Razo [id number] Female 1970      Nursing Note by Ruth Salazar at 2018  2:00 PM     Author:  Ruth Salazar Service:  (none) Author Type:  (none)     Filed:  2018  2:20 PM Encounter Date:  2018 Status:  Signed     :  Ruth Salazar            She is here today as she wants an injection in her right knee.  Ruth Salazar LPN..................2018   2:17 PM

## 2018-02-13 NOTE — PROGRESS NOTES
Patient Information     Patient Name MRN Sex Wilmer Odell [id number] Female 1970      Progress Notes by Zo Causey at 2018  2:18 PM     Author:  Zo Causey Service:  (none) Author Type:  (none)     Filed:  2018  2:18 PM Date of Service:  2018  2:18 PM Status:  Signed     :  Zo Causey            Falls Risk Criteria:    Age 65 and older or under age 4        Sensory deficits    Poor vision    Use of ambulatory aides    Impaired judgment    Unable to walk independently    Meets High Risk criteria for falls:  no

## 2018-02-13 NOTE — PROGRESS NOTES
Patient Information     Patient Name MRN Sex Wilmer Odell [id number] Female 1970      Progress Notes by Neptali French MD at 2018  2:00 PM     Author:  Neptali French MD Service:  (none) Author Type:  Physician     Filed:  2018  2:50 PM Encounter Date:  2018 Status:  Signed     :  Neptali French MD (Physician)            Nursing Notes:   Ruth Salazar  2018  2:20 PM  Signed  She is here today as she wants an injection in her right knee.  Ruth CARABALLO Martin LPN..................2018   2:17 PM      SUBJECTIVE:  Wilmer Morse  is a 47 y.o. female who comes in today for consideration of an injection in her right knee. She has advanced osteoarthritis in her right knee and likely is in need of knee replacement. She will be seeing Nahid Terrazas M.D. from [facility] coming up on  and then likely will be referred on to either [facility] or [facility] for consideration of total knee arthroplasty. Because of her income hyperplasia, she is certainly a high risk case. She will need this done in a tertiary care setting. She like to get an injection in her knee in the interim. We last injected her knee on 2017. She had fluoroscopic placement of Synvisc one done on  without any significant improvement.    She is now on a leave from work because of her disability with her worsening right knee pain and also chronic back issues. It is hoped that after she has knee replacement, she'll be able to exercise more easily and able to improve her fitness. She continues on chronic pain medication under contract.    Past Medical, Family, and Social History reviewed and updated as noted below.   ROS is negative except as noted above       Allergies     Allergen  Reactions     Penicillins *Unknown - Childhood Rxn   ,   Family History       Problem   Relation Age of Onset     Other  Mother      Achondroplasia.         Cancer-breast  Mother       breast cancer        Cancer-breast  Maternal Grandmother 70      breast cancer        Unknown  Father      Good Health  Daughter      Good Health  Son      Good Health  Other      Good Health  Son      (mariana)[phone]     ,   Current Outpatient Prescriptions on File Prior to Visit       Medication  Sig Dispense Refill     buPROPion (WELLBUTRIN XL) 300 mg Extended-Release tablet Take 1 tablet by mouth every morning. 90 tablet 3     clonazePAM (KLONOPIN) 0.5 mg tablet Take 1 tablet by mouth 3 times daily if needed. 90 tablet 5     HYDROcodone-acetaminophen, 5-325 mg, (NORCO) per tablet Take 2 tablets by mouth 3 times daily if needed  for Pain May take a maximum of 7 tablets per day. 200 tablet 0     ibuprofen (ADVIL; MOTRIN) 600 mg tablet Take 600 mg by mouth 3 times daily if needed. Maximum of 3200 mg in 24 hours.       sertraline (ZOLOFT) 100 mg tablet Take 2 tablets by mouth once daily. 180 tablet 3     No current facility-administered medications on file prior to visit.    ,   Past Medical History:     Diagnosis  Date     Achondroplastic dwarfism      Degenerative disc disease, lumbar      Depression with anxiety      Family history of breast cancer in female      Herpes zoster 2006    left side of neck.      Hx of pregnancy      3, Para 2-0-1-2.      Hyperlipidemia     not currently on treatment      Osteoarthritis of knee, unilateral 5/3/2012     Overactive bladder 2014     Restless leg syndrome    ,   Patient Active Problem List       Diagnosis  Date Noted     Dependent edema  09/15/2017     Chronic midline low back pain without sciatica  2016     Achondroplasia  06/15/2016     Pain medication agreement completed 16,3/22/17  2016     Restless leg syndrome       Hyperlipidemia       not currently on treatment        Family history of breast cancer in female       Depression with anxiety       Osteoarthritis of knee, unilateral  2012   ,   Past Surgical History:       Procedure  Laterality Date     ADENOIDECTOMY  as a child      SECTION  1996    Primary low transverse  section. Repeat        OSTEOTOMY      Limb lengthening procedures.       steroid injection right knee Right 10/16,,,10/17     Synvisc 1 injection Right 2017    fluorscopically guided. no improvement.        TUBAL LIGATION      and   Social History     Substance Use Topics       Smoking status: Never Smoker     Smokeless tobacco: Never Used     Alcohol use No     OBJECTIVE:  /100 (Cuff Site: Right Arm, Position: Sitting, Cuff Size: Adult Large)  Pulse 76  Temp 99.6  F (37.6  C) (Tympanic)   Wt 81.6 kg (180 lb)  Breastfeeding? No  BMI 48.66 kg/m2   EXAM:  Alert and cooperative, no distress. Examination of her right knee reveals degenerative change.  ASSESSMENT/Plan :      Wilmer was seen today for follow up.    Diagnoses and all orders for this visit:    Osteoarthritis of knee, unilateral  -     NH DRAIN/INJECT LARGE JOINT/BURSA (hip, knee,shoulder) - single joint  [38942.0]  -     triamcinolone acetonide 80 mg injection (KENALOG); Inject 2 mL intra-articular one time.    Achondroplasia    Elevated blood pressure reading without diagnosis of hypertension     will follow through with her appointment with Dr. Terrazas. Once she sees the surgeon, will need preoperative clearance and will come back for that. Blood pressure today is elevated and we may need to consider use of antihypertensive medication in the future. Discussed continuing to work on weight loss prior to her surgery and this will also help to optimize her blood pressure.    RIGHT KNEE INJECTION:  Risks, benefits, alternatives discussed, consent signed and pt wishes to proceed. Chloraprep used for sterile prep x2 prior to injection. Timeout is performed. Ethyl Chloride used as skin refridgerant for topical anesthesia. A lateral approach is used.  The knee joint is injected with a mixture of 2ml each of  1% lidocaine without epinephrine, 0.5% Marcaine, and  2ml Kenalog 40mg/ml.  Patient noted improvement in symptoms.  Ice 20 min tid and prn.  RTC if not improved over next 7 days.  Neptali French MD

## 2018-02-20 ENCOUNTER — TELEPHONE (OUTPATIENT)
Dept: FAMILY MEDICINE | Facility: OTHER | Age: 48
End: 2018-02-20

## 2018-02-28 NOTE — TELEPHONE ENCOUNTER
No return call from the patient after messages left. This message will be filed.  Ruth Salazar LPN..................2/28/2018   11:45 AM

## 2018-03-08 ENCOUNTER — OFFICE VISIT (OUTPATIENT)
Dept: FAMILY MEDICINE | Facility: OTHER | Age: 48
End: 2018-03-08
Attending: FAMILY MEDICINE
Payer: COMMERCIAL

## 2018-03-08 VITALS
WEIGHT: 174.4 LBS | DIASTOLIC BLOOD PRESSURE: 100 MMHG | SYSTOLIC BLOOD PRESSURE: 142 MMHG | BODY MASS INDEX: 47.14 KG/M2 | HEART RATE: 88 BPM

## 2018-03-08 DIAGNOSIS — Q77.4 ACHONDROPLASIA: ICD-10-CM

## 2018-03-08 DIAGNOSIS — M17.10 OSTEOARTHRITIS OF KNEE, UNILATERAL: ICD-10-CM

## 2018-03-08 DIAGNOSIS — E78.5 HYPERLIPIDEMIA, UNSPECIFIED HYPERLIPIDEMIA TYPE: ICD-10-CM

## 2018-03-08 DIAGNOSIS — Z13.0 SCREENING FOR DEFICIENCY ANEMIA: ICD-10-CM

## 2018-03-08 DIAGNOSIS — F41.8 DEPRESSION WITH ANXIETY: ICD-10-CM

## 2018-03-08 DIAGNOSIS — Z01.818 PREOP GENERAL PHYSICAL EXAM: ICD-10-CM

## 2018-03-08 DIAGNOSIS — Z00.00 VISIT FOR PREVENTIVE HEALTH EXAMINATION: Primary | ICD-10-CM

## 2018-03-08 DIAGNOSIS — Z02.89 PAIN MEDICATION AGREEMENT COMPLETED: ICD-10-CM

## 2018-03-08 DIAGNOSIS — I10 BENIGN ESSENTIAL HYPERTENSION: ICD-10-CM

## 2018-03-08 DIAGNOSIS — Z13.228 SCREENING FOR METABOLIC DISORDER: ICD-10-CM

## 2018-03-08 DIAGNOSIS — E61.1 IRON DEFICIENCY: ICD-10-CM

## 2018-03-08 LAB
ALBUMIN SERPL-MCNC: 4.4 G/DL (ref 3.5–5.7)
ALBUMIN UR-MCNC: NEGATIVE MG/DL
ALP SERPL-CCNC: 68 U/L (ref 34–104)
ALT SERPL W P-5'-P-CCNC: 12 U/L (ref 7–52)
ANION GAP SERPL CALCULATED.3IONS-SCNC: 11 MMOL/L (ref 3–14)
APPEARANCE UR: CLEAR
AST SERPL W P-5'-P-CCNC: 13 U/L (ref 13–39)
BACTERIA #/AREA URNS HPF: ABNORMAL /HPF
BASOPHILS # BLD AUTO: 0 10E9/L (ref 0–0.2)
BASOPHILS NFR BLD AUTO: 0.3 %
BILIRUB SERPL-MCNC: 0.5 MG/DL (ref 0.3–1)
BILIRUB UR QL STRIP: ABNORMAL
BUN SERPL-MCNC: 10 MG/DL (ref 7–25)
CALCIUM SERPL-MCNC: 9.5 MG/DL (ref 8.6–10.3)
CHLORIDE SERPL-SCNC: 102 MMOL/L (ref 98–107)
CHOLEST SERPL-MCNC: 256 MG/DL
CO2 SERPL-SCNC: 24 MMOL/L (ref 21–31)
COLOR UR AUTO: YELLOW
CREAT SERPL-MCNC: 0.59 MG/DL (ref 0.6–1.2)
DIFFERENTIAL METHOD BLD: ABNORMAL
EOSINOPHIL # BLD AUTO: 0 10E9/L (ref 0–0.7)
EOSINOPHIL NFR BLD AUTO: 0.4 %
ERYTHROCYTE [DISTWIDTH] IN BLOOD BY AUTOMATED COUNT: 13.1 % (ref 10–15)
GFR SERPL CREATININE-BSD FRML MDRD: >90 ML/MIN/1.7M2
GLUCOSE SERPL-MCNC: 95 MG/DL (ref 70–105)
GLUCOSE UR STRIP-MCNC: NEGATIVE MG/DL
HCT VFR BLD AUTO: 43.8 % (ref 35–47)
HDLC SERPL-MCNC: 58 MG/DL (ref 23–92)
HGB BLD-MCNC: 14.9 G/DL (ref 11.7–15.7)
HGB UR QL STRIP: ABNORMAL
IMM GRANULOCYTES # BLD: 0 10E9/L (ref 0–0.4)
IMM GRANULOCYTES NFR BLD: 0.3 %
IRON SATN MFR SERPL: 14 % (ref 20–55)
IRON SERPL-MCNC: 71 UG/DL (ref 50–212)
KETONES UR STRIP-MCNC: NEGATIVE MG/DL
LDLC SERPL CALC-MCNC: 158 MG/DL
LEUKOCYTE ESTERASE UR QL STRIP: NEGATIVE
LYMPHOCYTES # BLD AUTO: 2.2 10E9/L (ref 0.8–5.3)
LYMPHOCYTES NFR BLD AUTO: 23.3 %
MCH RBC QN AUTO: 28.6 PG (ref 26.5–33)
MCHC RBC AUTO-ENTMCNC: 34 G/DL (ref 31.5–36.5)
MCV RBC AUTO: 84 FL (ref 78–100)
MONOCYTES # BLD AUTO: 0.8 10E9/L (ref 0–1.3)
MONOCYTES NFR BLD AUTO: 8.7 %
NEUTROPHILS # BLD AUTO: 6.2 10E9/L (ref 1.6–8.3)
NEUTROPHILS NFR BLD AUTO: 67 %
NITRATE UR QL: NEGATIVE
NON-SQ EPI CELLS #/AREA URNS LPF: ABNORMAL /LPF
NONHDLC SERPL-MCNC: 198 MG/DL
PH UR STRIP: 6 PH (ref 5–7)
PLATELET # BLD AUTO: 403 10E9/L (ref 150–450)
POTASSIUM SERPL-SCNC: 3.5 MMOL/L (ref 3.5–5.1)
PROT SERPL-MCNC: 8.4 G/DL (ref 6.4–8.9)
RBC # BLD AUTO: 5.21 10E12/L (ref 3.8–5.2)
RBC #/AREA URNS AUTO: ABNORMAL /HPF
SODIUM SERPL-SCNC: 137 MMOL/L (ref 134–144)
SOURCE: ABNORMAL
SP GR UR STRIP: >1.03 (ref 1–1.03)
TIBC SERPL-MCNC: 502.6 UG/DL (ref 245–400)
TRIGL SERPL-MCNC: 199 MG/DL
UIBC (UNSATURATED): 431.6 MG/DL
UROBILINOGEN UR STRIP-ACNC: 0.2 EU/DL (ref 0.2–1)
WBC # BLD AUTO: 9.3 10E9/L (ref 4–11)
WBC #/AREA URNS AUTO: ABNORMAL /HPF

## 2018-03-08 PROCEDURE — 83550 IRON BINDING TEST: CPT | Performed by: FAMILY MEDICINE

## 2018-03-08 PROCEDURE — 99396 PREV VISIT EST AGE 40-64: CPT | Performed by: FAMILY MEDICINE

## 2018-03-08 PROCEDURE — 36415 COLL VENOUS BLD VENIPUNCTURE: CPT | Performed by: FAMILY MEDICINE

## 2018-03-08 PROCEDURE — 83540 ASSAY OF IRON: CPT | Performed by: FAMILY MEDICINE

## 2018-03-08 PROCEDURE — 80061 LIPID PANEL: CPT | Performed by: FAMILY MEDICINE

## 2018-03-08 PROCEDURE — 80307 DRUG TEST PRSMV CHEM ANLYZR: CPT | Performed by: FAMILY MEDICINE

## 2018-03-08 PROCEDURE — 85025 COMPLETE CBC W/AUTO DIFF WBC: CPT | Performed by: FAMILY MEDICINE

## 2018-03-08 PROCEDURE — 80053 COMPREHEN METABOLIC PANEL: CPT | Performed by: FAMILY MEDICINE

## 2018-03-08 PROCEDURE — 81001 URINALYSIS AUTO W/SCOPE: CPT | Performed by: FAMILY MEDICINE

## 2018-03-08 RX ORDER — HYDROCODONE BITARTRATE AND ACETAMINOPHEN 5; 325 MG/1; MG/1
2 TABLET ORAL 3 TIMES DAILY PRN
Qty: 200 TABLET | Refills: 0 | Status: SHIPPED | OUTPATIENT
Start: 2018-03-08 | End: 2018-06-04

## 2018-03-08 RX ORDER — SERTRALINE HYDROCHLORIDE 100 MG/1
200 TABLET, FILM COATED ORAL DAILY
Qty: 90 TABLET | Refills: 11 | Status: SHIPPED | OUTPATIENT
Start: 2018-03-08 | End: 2019-03-13

## 2018-03-08 RX ORDER — LISINOPRIL 10 MG/1
10 TABLET ORAL DAILY
Qty: 30 TABLET | Refills: 11 | Status: SHIPPED | OUTPATIENT
Start: 2018-03-08 | End: 2019-03-08

## 2018-03-08 RX ORDER — HYDROCODONE BITARTRATE AND ACETAMINOPHEN 5; 325 MG/1; MG/1
2 TABLET ORAL 3 TIMES DAILY PRN
Qty: 200 TABLET | Refills: 0 | Status: SHIPPED | OUTPATIENT
Start: 2018-03-08 | End: 2018-03-08

## 2018-03-08 RX ORDER — CLONAZEPAM 0.5 MG/1
0.5 TABLET ORAL 3 TIMES DAILY PRN
Qty: 180 TABLET | Refills: 5 | Status: SHIPPED | OUTPATIENT
Start: 2018-03-08 | End: 2018-09-15

## 2018-03-08 ASSESSMENT — ANXIETY QUESTIONNAIRES
2. NOT BEING ABLE TO STOP OR CONTROL WORRYING: NOT AT ALL
IF YOU CHECKED OFF ANY PROBLEMS ON THIS QUESTIONNAIRE, HOW DIFFICULT HAVE THESE PROBLEMS MADE IT FOR YOU TO DO YOUR WORK, TAKE CARE OF THINGS AT HOME, OR GET ALONG WITH OTHER PEOPLE: SOMEWHAT DIFFICULT
GAD7 TOTAL SCORE: 8
1. FEELING NERVOUS, ANXIOUS, OR ON EDGE: MORE THAN HALF THE DAYS
6. BECOMING EASILY ANNOYED OR IRRITABLE: MORE THAN HALF THE DAYS
3. WORRYING TOO MUCH ABOUT DIFFERENT THINGS: NEARLY EVERY DAY
5. BEING SO RESTLESS THAT IT IS HARD TO SIT STILL: NOT AT ALL
7. FEELING AFRAID AS IF SOMETHING AWFUL MIGHT HAPPEN: NOT AT ALL

## 2018-03-08 ASSESSMENT — PAIN SCALES - GENERAL: PAINLEVEL: NO PAIN (0)

## 2018-03-08 ASSESSMENT — PATIENT HEALTH QUESTIONNAIRE - PHQ9: 5. POOR APPETITE OR OVEREATING: SEVERAL DAYS

## 2018-03-08 NOTE — LETTER
2018      RE: Wilmer Morse (:1970)  [address] [facility] [address]        To Whom It May Concern,     I am the primary physician for this patient.  She has achondroplastic dwarfism and has had multiple surgeries on her legs in the past.  She has chronic knee arthritis and needs knee replacement.  This will be challenging and will need to be done at a tertiary medical center.  This is currently in the planning stages and will be happening sometime in the next few months.    She also has chronic back pain and severe generalized anxiety disorder.  She is physically unable to continue with work at this time.  She struggles with anxiety and is very nervous and worried regarding her upcoming surgery and her likely prolonged recovery.  It is unclear at this time if she will have significant enough improvement after surgery to be able to return to work or whether she may need to pursue disability.    It is my recommendation that she pursue a medical leave.    I hope this is been helpful and if I can answer further questions or be of assistance please feel free to contact our office      Sincerely,        Neptali French MD

## 2018-03-08 NOTE — MR AVS SNAPSHOT
After Visit Summary   3/8/2018    Wilmer Morse    MRN: [id number]           Patient Information     Date Of Birth          1970        Visit Information        Provider Department      3/8/2018 1:00 PM Neptali French MD [facility]        Today's Diagnoses     Visit for preventive health examination    -  1    Achondroplasia        Osteoarthritis of knee, unilateral        Hyperlipidemia, unspecified hyperlipidemia type        Pain medication agreement completed        Screening for deficiency anemia        Screening for metabolic disorder        Preop general physical exam        Depression with anxiety        Benign essential hypertension        Iron deficiency           Follow-ups after your visit        Who to contact     If you have questions or need follow up information about today's clinic visit or your schedule please contact [facility] hospitals directly at [phone].  Normal or non-critical lab and imaging results will be communicated to you by Dsg.[email or web address], letter or phone within 4 business days after the clinic has received the results. If you do not hear from us within 7 days, please contact the clinic through Aspectivat or phone. If you have a critical or abnormal lab result, we will notify you by phone as soon as possible.  Submit refill requests through Wattics or call your pharmacy and they will forward the refill request to us. Please allow 3 business days for your refill to be completed.          Additional Information About Your Visit        [email or web address] Information     Wattics gives you secure access to your electronic health record. If you see a primary care provider, you can also send messages to your care team and make appointments. If you have questions, please call your primary care clinic.  If you do not have a primary care provider, please call [phone] and they will assist you.        Care EveryWhere ID     This is your Care  EveryWhere ID. This could be used by other organizations to access your [place] medical records  [id number]        Your Vitals Were     Pulse Breastfeeding? BMI (Body Mass Index)             88 No 47.14 kg/m2          Blood Pressure from Last 3 Encounters:   03/08/18 (!) 142/100   01/25/18 (!) 146/100   12/11/17 138/86    Weight from Last 3 Encounters:   03/08/18 174 lb 6.4 oz (79.1 kg)   01/25/18 180 lb (81.6 kg)   12/11/17 182 lb 6.4 oz (82.7 kg)              We Performed the Following     *UA reflex to Microscopic     CBC with platelets differential     Comprehensive metabolic panel     Iron Binding Panel GH     Lipid Profile     Pain Drug Scr UR W Rptd Meds     Urine Microscopic          Today's Medication Changes          These changes are accurate as of 3/8/18  6:18 PM.  If you have any questions, ask your nurse or doctor.               Start taking these medicines.        Dose/Directions    HYDROcodone-acetaminophen 5-325 MG per tablet   Commonly known as:  NORCO   Used for:  Pain medication agreement completed, Osteoarthritis of knee, unilateral   Started by:  Neptali French MD        Dose:  2 tablet   Take 2 tablets by mouth 3 times daily as needed for pain . May take a maximum of 7 tablets per day.   Quantity:  200 tablet   Refills:  0       lisinopril 10 MG tablet   Commonly known as:  PRINIVIL/ZESTRIL   Used for:  Benign essential hypertension   Started by:  Neptali French MD        Dose:  10 mg   Take 1 tablet (10 mg) by mouth daily   Quantity:  30 tablet   Refills:  11            Where to get your medicines      These medications were sent to [id number] - [place], [place] - [address] [facility] [address]     Phone:  [phone]     lisinopril 10 MG tablet    sertraline 100 MG tablet         Some of these will need a paper prescription and others can be bought over the counter.  Ask your nurse if you have questions.     Bring a  paper prescription for each of these medications     clonazePAM 0.5 MG tablet    HYDROcodone-acetaminophen 5-325 MG per tablet                Primary Care Provider Office Phone # Fax #    Neptali GIRON MD [place] [phone] [address]   [place] [address]        Equal Access to Services     [facility] LEAH : Hadii monica ku hadasho Soomaali, waaxda luqadaha, qaybta kaalmada adeegyada, waxay herlinda monterojodeecandy sanchez. So [facility] [phone].    ATENCIÓN: Si habla español, tiene a valdez disposición servicios gratuitos de asistencia lingüística. Trino al [phone].    We comply with applicable federal civil rights laws and [place] laws. We do not discriminate on the basis of race, color, national origin, age, disability, sex, sexual orientation, or gender identity.            Thank you!     Thank you for choosing [facility] AND [facility]  for your care. Our goal is always to provide you with excellent care. Hearing back from our patients is one way we can continue to improve our services. Please take a few minutes to complete the written survey that you may receive in the mail after your visit with us. Thank you!             Your Updated Medication List - Protect others around you: Learn how to safely use, store and throw away your medicines at www.disposemymeds.org.          This list is accurate as of 3/8/18  6:18 PM.  Always use your most recent med list.                   Brand Name Dispense Instructions for use Diagnosis    buPROPion 300 MG 24 hr tablet    WELLBUTRIN XL     Take 300 mg by mouth every morning        clonazePAM 0.5 MG tablet    klonoPIN    180 tablet    Take 1 tablet (0.5 mg) by mouth 3 times daily as needed    Depression with anxiety       HYDROcodone-acetaminophen 5-325 MG per tablet    NORCO    200 tablet    Take 2 tablets by mouth 3 times daily as needed for pain . May take a maximum of 7 tablets per day.    Pain medication agreement completed,  Osteoarthritis of knee, unilateral       ibuprofen 600 MG tablet    ADVIL/MOTRIN     Take 600 mg by mouth 3 times daily as needed . Maximum of 3200 mg in 24 hours.        lisinopril 10 MG tablet    PRINIVIL/ZESTRIL    30 tablet    Take 1 tablet (10 mg) by mouth daily    Benign essential hypertension       sertraline 100 MG tablet    ZOLOFT    90 tablet    Take 2 tablets (200 mg) by mouth daily    Depression with anxiety

## 2018-03-08 NOTE — NURSING NOTE
Patient presents today for annual physical and medication management.    Herlinda Golden LPN on 3/8/2018 at 1:05 PM

## 2018-03-08 NOTE — PROGRESS NOTES
Nursing Notes:   Herlinda Golden LPN  3/8/2018  1:05 PM  Signed  Patient presents today for annual physical and medication management.    Herlinda Golden LPN on 3/8/2018 at 1:05 PM    SUBJECTIVE:  Wilmer Morse  is a 47 year old female who comes in today for complete evaluation and medication management.    She is due for Pap smear.  She had negative Pap and HPV 5 years ago.  She is up-to-date on mammogram and other health maintenance issues.     She saw Dr. Magaña for consultation regarding total knee arthroplasty.  She sees him on Monday.  She is ready to proceed with surgery once the preoperative planning is done.  She is going to [place] for a gait analysis through Dr. Terrazas.      This exam today is also for her preoperative risk assessment consultation for Dr. Magaña.    CHRONIC PAIN MANAGEMENT:    Patient Active Problem List   Diagnosis     Achondroplasia     Chronic midline low back pain without sciatica     Dependent edema     Depression with anxiety     Family history of breast cancer in female     Hyperlipidemia     Osteoarthritis of knee, unilateral     Pain medication agreement completed     Restless leg syndrome        PAIN CLINIC EVALUATION:(NO)      PAIN MEDICATION AGREEMENT: (YES)    DATE SIGNED: 4/28/16, 3/22/17, 3/8/18      NON-MEDICATION MODALITIES MAXIMIZED? (YES)  Better after knee injection      NEUROPATHIC PAIN: (NO)  ON GABAPENTIN/LYRICA/TCA/SNRI: (NO) was previously, but that pain has dissipated.       NOCICEPTIVE PAIN: (YES)      HISTORY OF CD: (NO)      MENTAL HEALTH DIAGNOSIS: (YES)  DIAGNOSIS: Anxiety and restless leg syndrome    ON BENZODIAZEPINES: (YES). Klonopin 0.5-1 mg at bedtime.    DISCLOSURE REGARDING RISK OF CONCOMITANT USE WITH OPIOIDS DISCUSSED: (YES)  DATE DISCUSSED: 4/28/16,06/15/16, 9/6/16, 12/20/16, 3/22/17, 9/15/17      MEDICATION: hydrocodone/APAP 7.5/325 #200/month, 7 per day      ORAL MORPHINE EQUIVALENTS: 30/d      LAST TAPER TRIAL: April 2016        QUERY TODAY: (YES)  APPROPRIATE?:         (YES)      TOXASSURE TODAY: (YES)  IF NO, DATE OF LAST TOXASURE: 9/6/16, 3/22/17      PAIN SCORE FLOWSHEET REVIEWED: (YES)  STABLE OR IMPROVED: (YES)      AUDIT-10 QUESTIONNAIRE COMPLETED:   AUDIT ALCOHOL SCREEN 6/15/2016   Date of exam 6/15/2016   Frequency 0 = never           OSWESTRY PAIN DISABILITY INDEX:  OSWESTRY DISABILITY INDEX (ESTEFANIA) v2.1A 6/15/2016   Date of exam 6/15/2016   Pain intensity 2 = the pain is moderate at the moment   Personal care 0 = I can look after myself normally without causing additional pain   Lifting 2 = pain prevents me from lifting heavy weights off the floor but I can manage if they are conveniently positioned, e.g. on a table   Walking 2 = pain prevents me from walking more than a quarter of a mile   Sitting 0 = I can sit in any chair as long as I like   Standing 3 = pain prevents me from standing for more than half an hour   Sleeping 0 = my sleep is never interrupted by pain   Sex life 0 = my sex life is normal and causes no additional pain   Social life 0 = my social life is normal and causes me no additional pain   Traveling 0 = I can travel anywhere without pain   Score (max 50) 9                        Past Medical, Family, and Social History reviewed and updated as noted below.   ROS is negative except as noted above       Allergies   Allergen Reactions     Penicillins Unknown   ,   Family History   Problem Relation Age of Onset     Other - See Comments Mother      Achondroplasia.     Breast Cancer Mother      Cancer-breast,breast cancer     Breast Cancer Maternal Grandmother 70     Cancer-breast, breast cancer     Unknown/Adopted Father      Unknown     Family History Negative Daughter      Good Health     Family History Negative Son      Good Health     Family History Negative Other      Good Health     Family History Negative Son      Good Health,(stepson)7/24/91   ,   Current Outpatient Prescriptions   Medication     sertraline  (ZOLOFT) 100 MG tablet     clonazePAM (KLONOPIN) 0.5 MG tablet     HYDROcodone-acetaminophen (NORCO) 5-325 MG per tablet     lisinopril (PRINIVIL/ZESTRIL) 10 MG tablet     buPROPion (WELLBUTRIN XL) 300 MG 24 hr tablet     ibuprofen (ADVIL/MOTRIN) 600 MG tablet     [DISCONTINUED] clonazePAM (KLONOPIN) 0.5 MG tablet     [DISCONTINUED] sertraline (ZOLOFT) 100 MG tablet     No current facility-administered medications for this visit.    ,   Past Medical History:   Diagnosis Date     Achondroplasia     No Comments Provided     Family history of malignant neoplasm of breast     No Comments Provided     Hyperlipidemia     not currently on treatment     Other intervertebral disc degeneration, lumbar region     No Comments Provided     Other specified anxiety disorders     No Comments Provided     Overactive bladder     2014     Personal history of other medical treatment (CODE)      3, Para 2-0-1-2.     Primary osteoarthritis of one knee     5/3/2012     Restless legs syndrome     No Comments Provided     Zoster without complications     2006,left side of neck.   ,   Patient Active Problem List    Diagnosis Date Noted     Depression with anxiety 2018     Priority: Medium     Family history of breast cancer in female 2018     Priority: Medium     Hyperlipidemia 2018     Priority: Medium     Overview:   not currently on treatment       Restless leg syndrome 2018     Priority: Medium     Dependent edema 09/15/2017     Priority: Medium     Chronic midline low back pain without sciatica 2016     Priority: Medium     Achondroplasia 06/15/2016     Priority: Medium     Pain medication agreement completed 2016     Priority: Medium     Osteoarthritis of knee, unilateral 2012     Priority: Medium   ,   Past Surgical History:   Procedure Laterality Date     ADENOIDECTOMY      as a child      SECTION      1996,Primary low transverse  section. Repeat       LAPAROSCOPIC TUBAL LIGATION      1996     OTHER SURGICAL HISTORY      [id number],OSTEOTOMY,Limb lengthening procedures.     OTHER SURGICAL HISTORY      10/16,4/17,7/17,10/17,721193,OTHER,Right     OTHER SURGICAL HISTORY      09/13/2017,077922,OTHER,Right,fluorscopically guided. no improvement.    and   Social History   Substance Use Topics     Smoking status: Never Smoker     Smokeless tobacco: Never Used     Alcohol use No     OBJECTIVE:  BP (!) 142/100 (BP Location: Right arm, Patient Position: Sitting, Cuff Size: Adult Large)  Pulse 88  Wt 174 lb 6.4 oz (79.1 kg)  Breastfeeding? No  BMI 47.14 kg/m2   EXAM:  General Appearance: Pleasant, alert, appropriate appearance for age. No acute distress.  Has achondroplastic dwarfism.  Head Exam: Normal. Normocephalic, atraumatic.  Eye Exam: PERRLA, EOMI, conjunctiva, sclerae normal.  Ear Exam: Normal TM's bilaterally. Normal auditory canals and externalears. Non-tender.  Nose Exam: Normal external nose, mucus membranes, and septum.  OroPharynx Exam:  Dental hygiene adequate. Normal buccal mucose. Normal pharynx.  Neck Exam:  Supple, no masses or nodes. No bruits  Thyroid Exam: No nodules or enlargement.  Chest/Respiratory Exam: Normal chest wall and respirations. Clear to auscultation.  Breast Exam: No dimpling, nipple retraction or discharge. No masses or nodes.  Cardiovascular Exam: Regular rate and rhythm. S1, S2, no murmur, click, gallop, or rubs.  Gastrointestinal Exam: Soft, non-tender, no masses or organomegaly.  Genitourinary exam: Patient declines Pap smear today  Lymphatic Exam: Non-palpable nodes in neck,clavicular, axillary, or inguinal regions.  Musculoskeletal Exam: Back is straight and non-tender, full ROM of upper and lower extremities.  Significant degenerative change of her right knee.  Foot Exam: Left and right foot: good pedal pulses .   Skin: no rash or abnormalities  Neurologic Exam:  normal gross motor, tone coordination and no  tremor.  Psychiatric Exam: Alert and oriented - appropriate affect.     Results for orders placed or performed in visit on 03/08/18   CBC with platelets differential   Result Value Ref Range    WBC 9.3 4.0 - 11.0 10e9/L    RBC Count 5.21 (H) 3.8 - 5.2 10e12/L    Hemoglobin 14.9 11.7 - 15.7 g/dL    Hematocrit 43.8 35.0 - 47.0 %    MCV 84 78 - 100 fl    MCH 28.6 26.5 - 33.0 pg    MCHC 34.0 31.5 - 36.5 g/dL    RDW 13.1 10.0 - 15.0 %    Platelet Count 403 150 - 450 10e9/L    Diff Method Automated Method     % Neutrophils 67.0 %    % Lymphocytes 23.3 %    % Monocytes 8.7 %    % Eosinophils 0.4 %    % Basophils 0.3 %    % Immature Granulocytes 0.3 %    Absolute Neutrophil 6.2 1.6 - 8.3 10e9/L    Absolute Lymphocytes 2.2 0.8 - 5.3 10e9/L    Absolute Monocytes 0.8 0.0 - 1.3 10e9/L    Absolute Eosinophils 0.0 0.0 - 0.7 10e9/L    Absolute Basophils 0.0 0.0 - 0.2 10e9/L    Abs Immature Granulocytes 0.0 0 - 0.4 10e9/L   Comprehensive metabolic panel   Result Value Ref Range    Sodium 137 134 - 144 mmol/L    Potassium 3.5 3.5 - 5.1 mmol/L    Chloride 102 98 - 107 mmol/L    Carbon Dioxide 24 21 - 31 mmol/L    Anion Gap 11 3 - 14 mmol/L    Glucose 95 70 - 105 mg/dL    Urea Nitrogen 10 7 - 25 mg/dL    Creatinine 0.59 (L) 0.60 - 1.20 mg/dL    GFR Estimate >90 >60 mL/min/1.7m2    GFR Estimate If Black >90 >60 mL/min/1.7m2    Calcium 9.5 8.6 - 10.3 mg/dL    Bilirubin Total 0.5 0.3 - 1.0 mg/dL    Albumin 4.4 3.5 - 5.7 g/dL    Protein Total 8.4 6.4 - 8.9 g/dL    Alkaline Phosphatase 68 34 - 104 U/L    ALT 12 7 - 52 U/L    AST 13 13 - 39 U/L   *UA reflex to Microscopic   Result Value Ref Range    Color Urine Yellow     Appearance Urine Clear     Glucose Urine Negative NEG^Negative mg/dL    Bilirubin Urine Small (A) NEG^Negative    Ketones Urine Negative NEG^Negative mg/dL    Specific Gravity Urine >1.030 1.003 - 1.035    Blood Urine Trace (A) NEG^Negative    pH Urine 6.0 5.0 - 7.0 pH    Protein Albumin Urine Negative NEG^Negative mg/dL     Urobilinogen Urine 0.2 0.2 - 1.0 EU/dL    Nitrite Urine Negative NEG^Negative    Leukocyte Esterase Urine Negative NEG^Negative    Source Unspecified Urine    Lipid Profile   Result Value Ref Range    Cholesterol 256 (H) <200 mg/dL    Triglycerides 199 (H) <150 mg/dL    HDL Cholesterol 58 23 - 92 mg/dL    LDL Cholesterol Calculated 158 (H) <100 mg/dL    Non HDL Cholesterol 198 (H) <130 mg/dL   Iron Binding Panel GH   Result Value Ref Range    Iron 71 50 - 212 ug/dL    UIBC (Unsaturated) 431.60 mg/dL    Iron Binding Capacity 502.60 (H) 245.00 - 400.00 ug/dL    Iron Saturation 14 (L) 20 - 55 %   Urine Microscopic   Result Value Ref Range    WBC Urine 5-10 (A) OTO5^0 - 5 /HPF    RBC Urine O - 2 OTO2^O - 2 /HPF    Squamous Epithelial /LPF Urine Many (A) FEW^Few /LPF    Bacteria Urine Many (A) NEG^Negative /HPF        EKG not indicated.     ASSESSMENT/Plan :    Wilmer was seen today for physical.    Diagnoses and all orders for this visit:    Visit for preventive health examination  -     Urine Microscopic    Achondroplasia    Osteoarthritis of knee, unilateral  -     Discontinue: HYDROcodone-acetaminophen (NORCO) 5-325 MG per tablet; Take 2 tablets by mouth 3 times daily as needed for pain . May take a maximum of 7 tablets per day.  -     Discontinue: HYDROcodone-acetaminophen (NORCO) 5-325 MG per tablet; Take 2 tablets by mouth 3 times daily as needed for pain . May take a maximum of 7 tablets per day.  -     HYDROcodone-acetaminophen (NORCO) 5-325 MG per tablet; Take 2 tablets by mouth 3 times daily as needed for pain . May take a maximum of 7 tablets per day.  -     Pain Drug Scr UR W Rptd Meds    Hyperlipidemia, unspecified hyperlipidemia type  -     Lipid Profile    Pain medication agreement completed  -     Discontinue: HYDROcodone-acetaminophen (NORCO) 5-325 MG per tablet; Take 2 tablets by mouth 3 times daily as needed for pain . May take a maximum of 7 tablets per day.  -     Discontinue:  HYDROcodone-acetaminophen (NORCO) 5-325 MG per tablet; Take 2 tablets by mouth 3 times daily as needed for pain . May take a maximum of 7 tablets per day.  -     HYDROcodone-acetaminophen (NORCO) 5-325 MG per tablet; Take 2 tablets by mouth 3 times daily as needed for pain . May take a maximum of 7 tablets per day.  -     Pain Drug Scr UR W Rptd Meds    Screening for deficiency anemia  -     CBC with platelets differential  -     Iron Binding Panel GH    Screening for metabolic disorder  -     Comprehensive metabolic panel    Preop general physical exam  -     *UA reflex to Microscopic    Depression with anxiety  -     sertraline (ZOLOFT) 100 MG tablet; Take 2 tablets (200 mg) by mouth daily  -     clonazePAM (KLONOPIN) 0.5 MG tablet; Take 1 tablet (0.5 mg) by mouth 3 times daily as needed    Benign essential hypertension  -     lisinopril (PRINIVIL/ZESTRIL) 10 MG tablet; Take 1 tablet (10 mg) by mouth daily    Will notify of lab results when available. Discussed diet, exercise and healthy lifestyle changes. Continue current medications.  She has declined Pap smear today.    We will begin lisinopril 10 mg daily for hypertension.    Renewal on her other medications.   query is appropriate.  ToxAssure today.  Renewal on hydrocodone 5/325 #200×3.  She will try and minimize pain medications prior to her surgery.    She is not anemic but despite having normal iron, has an increased iron binding capacity and low percent saturation so does have some mild iron deficiency and will benefit from iron supplementation.  Will begin on ferrous sulfate 325 mg twice daily.    No evidence of UTI as she has no symptoms but this is not a clean-catch urine specimen so epithelial cells and white cells are seen.  This should not preclude her from surgery.    She should be low risk from a cardiovascular standpoint for upcoming knee replacement.    Letter done for her work for medical leave.    Neptali French MD

## 2018-03-09 ASSESSMENT — ANXIETY QUESTIONNAIRES: GAD7 TOTAL SCORE: 8

## 2018-03-09 ASSESSMENT — PATIENT HEALTH QUESTIONNAIRE - PHQ9: SUM OF ALL RESPONSES TO PHQ QUESTIONS 1-9: 3

## 2018-03-12 ENCOUNTER — TRANSFERRED RECORDS (OUTPATIENT)
Dept: HEALTH INFORMATION MANAGEMENT | Facility: OTHER | Age: 48
End: 2018-03-12

## 2018-03-15 LAB — PAIN DRUG SCR UR W RPTD MEDS: NORMAL

## 2018-04-23 ENCOUNTER — OFFICE VISIT (OUTPATIENT)
Dept: FAMILY MEDICINE | Facility: OTHER | Age: 48
End: 2018-04-23
Attending: FAMILY MEDICINE
Payer: COMMERCIAL

## 2018-04-23 VITALS
HEIGHT: 55 IN | WEIGHT: 173.6 LBS | SYSTOLIC BLOOD PRESSURE: 136 MMHG | DIASTOLIC BLOOD PRESSURE: 96 MMHG | TEMPERATURE: 98.1 F | HEART RATE: 84 BPM | BODY MASS INDEX: 40.17 KG/M2 | OXYGEN SATURATION: 95 %

## 2018-04-23 DIAGNOSIS — M17.10 OSTEOARTHRITIS OF KNEE, UNILATERAL: ICD-10-CM

## 2018-04-23 DIAGNOSIS — Z01.818 PREOP GENERAL PHYSICAL EXAM: Primary | ICD-10-CM

## 2018-04-23 DIAGNOSIS — Z02.89 PAIN MEDICATION AGREEMENT COMPLETED: ICD-10-CM

## 2018-04-23 DIAGNOSIS — E61.1 IRON DEFICIENCY: ICD-10-CM

## 2018-04-23 DIAGNOSIS — Q77.4 ACHONDROPLASIA: ICD-10-CM

## 2018-04-23 DIAGNOSIS — I10 BENIGN ESSENTIAL HYPERTENSION: ICD-10-CM

## 2018-04-23 LAB
ANION GAP SERPL CALCULATED.3IONS-SCNC: 9 MMOL/L (ref 3–14)
BASOPHILS # BLD AUTO: 0 10E9/L (ref 0–0.2)
BASOPHILS NFR BLD AUTO: 0.3 %
BUN SERPL-MCNC: 16 MG/DL (ref 7–25)
CALCIUM SERPL-MCNC: 9.7 MG/DL (ref 8.6–10.3)
CHLORIDE SERPL-SCNC: 105 MMOL/L (ref 98–107)
CO2 SERPL-SCNC: 22 MMOL/L (ref 21–31)
CREAT SERPL-MCNC: 0.67 MG/DL (ref 0.6–1.2)
DIFFERENTIAL METHOD BLD: NORMAL
EOSINOPHIL # BLD AUTO: 0.1 10E9/L (ref 0–0.7)
EOSINOPHIL NFR BLD AUTO: 0.9 %
ERYTHROCYTE [DISTWIDTH] IN BLOOD BY AUTOMATED COUNT: 13.9 % (ref 10–15)
GFR SERPL CREATININE-BSD FRML MDRD: >90 ML/MIN/1.7M2
GLUCOSE SERPL-MCNC: 87 MG/DL (ref 70–105)
HCT VFR BLD AUTO: 42.8 % (ref 35–47)
HGB BLD-MCNC: 14.8 G/DL (ref 11.7–15.7)
IMM GRANULOCYTES # BLD: 0 10E9/L (ref 0–0.4)
IMM GRANULOCYTES NFR BLD: 0.5 %
LYMPHOCYTES # BLD AUTO: 2.6 10E9/L (ref 0.8–5.3)
LYMPHOCYTES NFR BLD AUTO: 29.9 %
MCH RBC QN AUTO: 29.8 PG (ref 26.5–33)
MCHC RBC AUTO-ENTMCNC: 34.6 G/DL (ref 31.5–36.5)
MCV RBC AUTO: 86 FL (ref 78–100)
MONOCYTES # BLD AUTO: 0.7 10E9/L (ref 0–1.3)
MONOCYTES NFR BLD AUTO: 8.2 %
NEUTROPHILS # BLD AUTO: 5.3 10E9/L (ref 1.6–8.3)
NEUTROPHILS NFR BLD AUTO: 60.2 %
PLATELET # BLD AUTO: 313 10E9/L (ref 150–450)
POTASSIUM SERPL-SCNC: 4.1 MMOL/L (ref 3.5–5.1)
RBC # BLD AUTO: 4.97 10E12/L (ref 3.8–5.2)
SODIUM SERPL-SCNC: 136 MMOL/L (ref 134–144)
WBC # BLD AUTO: 8.8 10E9/L (ref 4–11)

## 2018-04-23 PROCEDURE — 36415 COLL VENOUS BLD VENIPUNCTURE: CPT | Performed by: FAMILY MEDICINE

## 2018-04-23 PROCEDURE — 99214 OFFICE O/P EST MOD 30 MIN: CPT | Performed by: FAMILY MEDICINE

## 2018-04-23 PROCEDURE — 85025 COMPLETE CBC W/AUTO DIFF WBC: CPT | Performed by: FAMILY MEDICINE

## 2018-04-23 PROCEDURE — 80048 BASIC METABOLIC PNL TOTAL CA: CPT | Performed by: FAMILY MEDICINE

## 2018-04-23 RX ORDER — FERROUS SULFATE 325(65) MG
325 TABLET ORAL 2 TIMES DAILY
Qty: 60 TABLET | Refills: 2 | Status: SHIPPED | OUTPATIENT
Start: 2018-04-23 | End: 2019-06-18

## 2018-04-23 ASSESSMENT — ANXIETY QUESTIONNAIRES
3. WORRYING TOO MUCH ABOUT DIFFERENT THINGS: NOT AT ALL
IF YOU CHECKED OFF ANY PROBLEMS ON THIS QUESTIONNAIRE, HOW DIFFICULT HAVE THESE PROBLEMS MADE IT FOR YOU TO DO YOUR WORK, TAKE CARE OF THINGS AT HOME, OR GET ALONG WITH OTHER PEOPLE: NOT DIFFICULT AT ALL
GAD7 TOTAL SCORE: 2
5. BEING SO RESTLESS THAT IT IS HARD TO SIT STILL: NOT AT ALL
1. FEELING NERVOUS, ANXIOUS, OR ON EDGE: SEVERAL DAYS
7. FEELING AFRAID AS IF SOMETHING AWFUL MIGHT HAPPEN: NOT AT ALL
6. BECOMING EASILY ANNOYED OR IRRITABLE: NOT AT ALL
2. NOT BEING ABLE TO STOP OR CONTROL WORRYING: SEVERAL DAYS

## 2018-04-23 ASSESSMENT — PAIN SCALES - GENERAL: PAINLEVEL: MODERATE PAIN (5)

## 2018-04-23 ASSESSMENT — PATIENT HEALTH QUESTIONNAIRE - PHQ9: 5. POOR APPETITE OR OVEREATING: NOT AT ALL

## 2018-04-23 NOTE — MR AVS SNAPSHOT
After Visit Summary   4/23/2018    Wilmer Morse    MRN: [id number]           Patient Information     Date Of Birth          1970        Visit Information        Provider Department      4/23/2018 1:00 PM Neptali French MD [facility]        Today's Diagnoses     Preop general physical exam    -  1    Osteoarthritis of knee, unilateral        Pain medication agreement completed        Achondroplasia        Benign essential hypertension        Iron deficiency          Care Instructions      Before Your Surgery      Call your surgeon if there is any change in your health. This includes signs of a cold or flu (such as a sore throat, runny nose, cough, rash or fever).    Do not smoke, drink alcohol or take over the counter medicine (unless your surgeon or primary care doctor tells you to) for the 24 hours before and after surgery.    If you take prescribed drugs: Follow your doctor s orders about which medicines to take and which to stop until after surgery.    Eating and drinking prior to surgery: follow the instructions from your surgeon    Take a shower or bath the night before surgery. Use the soap your surgeon gave you to gently clean your skin. If you do not have soap from your surgeon, use your regular soap. Do not shave or scrub the surgery site.  Wear clean pajamas and have clean sheets on your bed.           Follow-ups after your visit        Future tests that were ordered for you today     Open Future Orders        Priority Expected Expires Ordered    UA reflex to Microscopic Routine  4/23/2019 4/23/2018            Who to contact     If you have questions or need follow up information about today's clinic visit or your schedule please contact [facility] directly at [phone].  Normal or non-critical lab and imaging results will be communicated to you by MyChart, letter or phone within 4 business days after the clinic has received the  "results. If you do not hear from us within 7 days, please contact the clinic through [employer] or phone. If you have a critical or abnormal lab result, we will notify you by phone as soon as possible.  Submit refill requests through [employer] or call your pharmacy and they will forward the refill request to us. Please allow 3 business days for your refill to be completed.          Additional Information About Your Visit        [employer] Information     [employer] gives you secure access to your electronic health record. If you see a primary care provider, you can also send messages to your care team and make appointments. If you have questions, please call your primary care clinic.  If you do not have a primary care provider, please call [phone] and they will assist you.        Care EveryWhere ID     This is your Care EveryWhere ID. This could be used by other organizations to access your [facility] medical records  [id number]        Your Vitals Were     Pulse Temperature Height Last Period Pulse Oximetry Breastfeeding?    84 98.1  F (36.7  C) (Tympanic) 4' 3.75\" (1.314 m) 04/22/2018 95% No    BMI (Body Mass Index)                   45.58 kg/m2            Blood Pressure from Last 3 Encounters:   04/23/18 (!) 136/96   03/08/18 (!) 142/100   01/25/18 (!) 146/100    Weight from Last 3 Encounters:   04/23/18 173 lb 9.6 oz (78.7 kg)   03/08/18 174 lb 6.4 oz (79.1 kg)   01/25/18 180 lb (81.6 kg)              We Performed the Following     Basic metabolic panel     CBC with platelets differential          Today's Medication Changes          These changes are accurate as of 4/23/18  6:01 PM.  If you have any questions, ask your nurse or doctor.               Start taking these medicines.        Dose/Directions    ferrous sulfate 325 (65 Fe) MG tablet   Commonly known as:  IRON   Used for:  Iron deficiency   Started by:  Neptali French MD        Dose:  325 mg   Take 1 tablet (325 mg) by mouth 2 times daily   Quantity:  60 " tablet   Refills:  2            Where to get your medicines      These medications were sent to [id number] - [place], [place] - [address] AT [address]  [address], [facility] [address]     Phone:  [phone]     ferrous sulfate 325 (65 Fe) MG tablet                Primary Care Provider Office Phone # Fax #    Neptali GIRON MD [place] [phone]       [address]  [facility] [address]        Equal Access to Services     [facility] Hospital[facility] : Hadii aad ku hadasho Soomaali, waaxda luqadaha, qaybta kaalmada adeegyada, waxay idiin hayaan adejosé antonio khla decker . So [facility] [phone].    ATENCIÓN: Si habla español, tiene a valdez disposición servicios gratuitos de asistencia lingüística. Ll[facility] [phone].    We comply with applicable federal civil rights laws and [place] laws. We do not discriminate on the basis of race, color, national origin, age, disability, sex, sexual orientation, or gender identity.            Thank you!     Thank you for choosing [facility]  for your care. Our goal is always to provide you with excellent care. Hearing back from our patients is one way we can continue to improve our services. Please take a few minutes to complete the written survey that you may receive in the mail after your visit with us. Thank you!             Your Updated Medication List - Protect others around you: Learn how to safely use, store and throw away your medicines at www.disposemymeds.org.          This list is accurate as of 4/23/18  6:01 PM.  Always use your most recent med list.                   Brand Name Dispense Instructions for use Diagnosis    buPROPion 300 MG 24 hr tablet    WELLBUTRIN XL     Take 300 mg by mouth every morning        clonazePAM 0.5 MG tablet    klonoPIN    180 tablet    Take 1 tablet (0.5 mg) by mouth 3 times daily as needed    Depression with anxiety       ferrous sulfate 325 (65 Fe) MG tablet    IRON    60  tablet    Take 1 tablet (325 mg) by mouth 2 times daily    Iron deficiency       HYDROcodone-acetaminophen 5-325 MG per tablet    NORCO    200 tablet    Take 2 tablets by mouth 3 times daily as needed for pain . May take a maximum of 7 tablets per day.    Pain medication agreement completed, Osteoarthritis of knee, unilateral       ibuprofen 600 MG tablet    ADVIL/MOTRIN     Take 600 mg by mouth 3 times daily as needed . Maximum of 3200 mg in 24 hours.        lisinopril 10 MG tablet    PRINIVIL/ZESTRIL    30 tablet    Take 1 tablet (10 mg) by mouth daily    Benign essential hypertension       sertraline 100 MG tablet    ZOLOFT    90 tablet    Take 2 tablets (200 mg) by mouth daily    Depression with anxiety

## 2018-04-23 NOTE — NURSING NOTE
She is here today for a pre-op physical.  Ruth Salazar LPN..................4/23/2018   1:21 PM  -

## 2018-04-23 NOTE — PROGRESS NOTES
[facility] AND [facility]  [address]  [place] [address]  [phone]    PRE-OP EVALUATION:  Today's date: 2018    Wilmer Morse (: 1970) presents for pre-operative evaluation assessment as requested by Dr. Magaña.  She requires evaluation and anesthesia risk assessment prior to undergoing surgery/procedure for treatment of Right knee pain .    Proposed Surgery/ Procedure: Right TKA  Date of Surgery/ Procedure: 2018  Time of Surgery/ Procedure: to be determined  Hospital/Surgical Facility: [facility]  Primary Physician: Neptali French  Type of Anesthesia Anticipated: to be determined    Patient has a Health Care Directive or Living Will:  NO    1. NO - Do you have a history of heart attack, stroke, stent, bypass or surgery on an artery in the head, neck, heart or legs?  2. NO - Do you ever have any pain or discomfort in your chest?  3. NO - Do you have a history of  Heart Failure?  4. NO - Are you troubled by shortness of breath when: walking on the level, up a slight hill or at night?  5. NO - Do you currently have a cold, bronchitis or other respiratory infection?  6. NO - Do you have a cough, shortness of breath or wheezing?  7. NO - Do you sometimes get pains in the calves of your legs when you walk?  9. NO - Do you or does anyone in your family have a serious bleeding problem such as prolonged bleeding following surgeries or cuts?  10. NO - Have you ever had problems with anemia or been told to take iron pills?  12. NO - Have you ever had a blood transfusion?  13. NO - Have you or any of your relatives ever had problems with anesthesia?  14. NO - Do you have sleep apnea, excessive snoring or daytime drowsiness?  15. NO - Do you have any prosthetic heart valves?  16. NO - Do you have prosthetic joints?  17. NO - Is there any chance that you may be pregnant?  8. yes - Do you or anyone in your family have previous history of blood clots?  11. yes - Have you  had any abnormal blood loss such as black, tarry or bloody stools, or abnormal vaginal bleeding?      HPI:     HPI related to upcoming procedure: Right knee pain    She had a gait analysis from Dr. Terrazas.       HYPERTENSION - Patient has longstanding history of HTN , currently denies any symptoms referable to elevated blood pressure. Specifically denies chest pain, palpitations, dyspnea, orthopnea, PND or peripheral edema. Blood pressure readings have been in normal range. Current medication regimen is as listed below. Patient denies any side effects of medication.                                                                                                                                                                                          .    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     Priority: Medium     Pain medication agreement completed 04/28/2016     Priority: Medium     Osteoarthritis of knee, unilateral 05/03/2012     Priority: Medium      Past Medical History:   Diagnosis Date     Achondroplasia     No Comments Provided     Family history of malignant neoplasm of breast     No Comments Provided     Hyperlipidemia     not currently on treatment     Other intervertebral disc degeneration, lumbar region     No Comments Provided     Other specified anxiety disorders     No Comments Provided     Overactive bladder     12/18/2014     Personal history of  other medical treatment (CODE)      3, Para 2-0-1-2.     Primary osteoarthritis of one knee     5/3/2012     Restless legs syndrome     No Comments Provided     Zoster without complications     2006,left side of neck.     Past Surgical History:   Procedure Laterality Date     ADENOIDECTOMY      as a child      SECTION      1996,Primary low transverse  section. Repeat      LAPAROSCOPIC TUBAL LIGATION           OTHER SURGICAL HISTORY      [id number],OSTEOTOMY,Limb lengthening procedures.     OTHER SURGICAL HISTORY      10/16,,,10/17,[id number],OTHER,Right     OTHER SURGICAL HISTORY      2017,[id number],OTHER,Right,fluorscopically guided. no improvement.     Current Outpatient Prescriptions   Medication Sig Dispense Refill     buPROPion (WELLBUTRIN XL) 300 MG 24 hr tablet Take 300 mg by mouth every morning       clonazePAM (KLONOPIN) 0.5 MG tablet Take 1 tablet (0.5 mg) by mouth 3 times daily as needed 180 tablet 5     ferrous sulfate (IRON) 325 (65 Fe) MG tablet Take 1 tablet (325 mg) by mouth 2 times daily 60 tablet 2     HYDROcodone-acetaminophen (NORCO) 5-325 MG per tablet Take 2 tablets by mouth 3 times daily as needed for pain . May take a maximum of 7 tablets per day. 200 tablet 0     ibuprofen (ADVIL/MOTRIN) 600 MG tablet Take 600 mg by mouth 3 times daily as needed . Maximum of 3200 mg in 24 hours.       lisinopril (PRINIVIL/ZESTRIL) 10 MG tablet Take 1 tablet (10 mg) by mouth daily 30 tablet 11     sertraline (ZOLOFT) 100 MG tablet Take 2 tablets (200 mg) by mouth daily 90 tablet 11     OTC products: no recent use of OTC ASA, NSAIDS or Steroids    Allergies   Allergen Reactions     Penicillins Unknown      Latex Allergy: NO    Social History   Substance Use Topics     Smoking status: Never Smoker     Smokeless tobacco: Never Used     Alcohol use No     History   Drug Use Not on file     Comment: Drug use: No       REVIEW OF SYSTEMS:   Constitutional, neuro, ENT,  "endocrine, pulmonary, cardiac, gastrointestinal, genitourinary, musculoskeletal, integument and psychiatric systems are negative, except as otherwise noted.    EXAM:   BP (!) 138/96 (BP Location: Right arm, Patient Position: Sitting, Cuff Size: Adult Large)  Pulse 84  Temp 98.1  F (36.7  C) (Tympanic)  Ht 4' 3.75\" (1.314 m)  Wt 173 lb 9.6 oz (78.7 kg)  LMP 04/22/2018  SpO2 95%  Breastfeeding? No  BMI 45.58 kg/m2  General Appearance: Pleasant, alert, appropriate appearance for age. No acute distress  Head Exam: Normal. Normocephalic, atraumatic.  Eye Exam: PERRLA, EOMI, conjunctiva, sclerae normal.  Ear Exam: Normal TM's bilaterally. Normal auditory canals and externalears. Non-tender.  Nose Exam: Normal external nose, mucus membranes, and septum.  OroPharynx Exam:  Dental hygiene adequate. Normal buccal mucose. Normal pharynx.  Neck Exam:  Supple, no masses or nodes. No bruits  Thyroid Exam: No nodules or enlargement.  Chest/Respiratory Exam: Normal chest wall and respirations. Clear to auscultation.  Cardiovascular Exam: Regular rate and rhythm. S1, S2, no murmur, click, gallop, or rubs.  Gastrointestinal Exam: Soft, non-tender, no masses or organomegaly.  Lymphatic Exam: Non-palpable nodes in neck,clavicular  regions.  Musculoskeletal Exam: per Dr. Magaña.  Achondroplastic dwarfism is noted.  Foot Exam: Left and right foot: good pedal pulses   Skin: no rash or abnormalities  Neurologic Exam:  normal gross motor, tone coordination and no tremor.  Psychiatric Exam: Alert and oriented - appropriate affect.     DIAGNOSTICS:   EKG: Not indicated due to non-vascular surgery and low risk of event (age <65 and without cardiac risk factors)    Recent Labs   Lab Test  03/08/18   1424  03/09/16   0959  03/09/16   0938   HGB  14.9   --   12.5   PLT  403   --   345   NA  137  138   --    POTASSIUM  3.5  3.9   --    CR  0.59*  0.52*   --       Results for orders placed or performed in visit on 04/23/18   CBC with " platelets differential   Result Value Ref Range    WBC 8.8 4.0 - 11.0 10e9/L    RBC Count 4.97 3.8 - 5.2 10e12/L    Hemoglobin 14.8 11.7 - 15.7 g/dL    Hematocrit 42.8 35.0 - 47.0 %    MCV 86 78 - 100 fl    MCH 29.8 26.5 - 33.0 pg    MCHC 34.6 31.5 - 36.5 g/dL    RDW 13.9 10.0 - 15.0 %    Platelet Count 313 150 - 450 10e9/L    Diff Method Automated Method     % Neutrophils 60.2 %    % Lymphocytes 29.9 %    % Monocytes 8.2 %    % Eosinophils 0.9 %    % Basophils 0.3 %    % Immature Granulocytes 0.5 %    Absolute Neutrophil 5.3 1.6 - 8.3 10e9/L    Absolute Lymphocytes 2.6 0.8 - 5.3 10e9/L    Absolute Monocytes 0.7 0.0 - 1.3 10e9/L    Absolute Eosinophils 0.1 0.0 - 0.7 10e9/L    Absolute Basophils 0.0 0.0 - 0.2 10e9/L    Abs Immature Granulocytes 0.0 0 - 0.4 10e9/L   Basic metabolic panel   Result Value Ref Range    Sodium 136 134 - 144 mmol/L    Potassium 4.1 3.5 - 5.1 mmol/L    Chloride 105 98 - 107 mmol/L    Carbon Dioxide 22 21 - 31 mmol/L    Anion Gap 9 3 - 14 mmol/L    Glucose 87 70 - 105 mg/dL    Urea Nitrogen 16 7 - 25 mg/dL    Creatinine 0.67 0.60 - 1.20 mg/dL    GFR Estimate >90 >60 mL/min/1.7m2    GFR Estimate If Black >90 >60 mL/min/1.7m2    Calcium 9.7 8.6 - 10.3 mg/dL      IMPRESSION:   Reason for surgery/procedure: osteoarthritis right knee with chronic pain   Diagnosis/reason for consult: Hypertension. Chronic back pain     The proposed surgical procedure is considered INTERMEDIATE risk.    REVISED CARDIAC RISK INDEX  The patient has the following serious cardiovascular risks for perioperative complications such as (MI, PE, VFib and 3  AV Block):  No serious cardiac risks  INTERPRETATION: 1 risks: Class II (low risk - 0.9% complication rate)    The patient has the following additional risks for perioperative complications:  No identified additional risks      ICD-10-CM    1. Preop general physical exam Z01.818 CBC with platelets differential     Basic metabolic panel     *UA reflex to Microscopic   2.  Osteoarthritis of knee, unilateral M17.10 CBC with platelets differential     Basic metabolic panel   3. Pain medication agreement completed Z02.89    4. Achondroplasia Q77.4    5. Benign essential hypertension I10 Basic metabolic panel   6. Iron deficiency E61.1 ferrous sulfate (IRON) 325 (65 Fe) MG tablet       RECOMMENDATIONS:         --Patient is to take all scheduled medications on the day of surgery EXCEPT for modifications listed below.    ACE Inhibitor or Angiotensin Receptor Blocker (ARB) Use  Ace inhibitor or Angiotensin Receptor Blocker (ARB) and will continue this medication due to the higher risk of uncontrolled perioerative hypertension (e.g. neurosurgical procedure)      APPROVAL GIVEN to proceed with proposed procedure, without further diagnostic evaluation     Patient is on chronic pain medication and takes 30 morphine equivalents daily for chronic pain from her knee and back.  May need about twice that much postoperatively initially for the first few days so could achieve that with 10 mg of hydrocodone up to 6-7 tablets per day, or oxycodone up to 4-5 tablets per day.    Signed Electronically by: Neptali French MD    Copy of this evaluation report is provided to requesting physician.    [facility] Preop Guidelines    Revised Cardiac Risk Index

## 2018-04-24 ASSESSMENT — ANXIETY QUESTIONNAIRES: GAD7 TOTAL SCORE: 2

## 2018-04-24 ASSESSMENT — PATIENT HEALTH QUESTIONNAIRE - PHQ9: SUM OF ALL RESPONSES TO PHQ QUESTIONS 1-9: 0

## 2018-05-01 ENCOUNTER — TRANSFERRED RECORDS (OUTPATIENT)
Dept: HEALTH INFORMATION MANAGEMENT | Facility: OTHER | Age: 48
End: 2018-05-01

## 2018-05-06 ENCOUNTER — TRANSFERRED RECORDS (OUTPATIENT)
Dept: HEALTH INFORMATION MANAGEMENT | Facility: OTHER | Age: 48
End: 2018-05-06

## 2018-05-06 DIAGNOSIS — Q77.4 ACHONDROPLASIA: ICD-10-CM

## 2018-05-06 DIAGNOSIS — M54.50 CHRONIC MIDLINE LOW BACK PAIN WITHOUT SCIATICA: ICD-10-CM

## 2018-05-06 DIAGNOSIS — I10 BENIGN ESSENTIAL HYPERTENSION: ICD-10-CM

## 2018-05-06 DIAGNOSIS — Z96.651 S/P TOTAL KNEE ARTHROPLASTY, RIGHT: Primary | ICD-10-CM

## 2018-05-06 DIAGNOSIS — M17.10 OSTEOARTHRITIS OF KNEE, UNILATERAL: ICD-10-CM

## 2018-05-06 DIAGNOSIS — F41.8 DEPRESSION WITH ANXIETY: ICD-10-CM

## 2018-05-06 DIAGNOSIS — Z02.89 PAIN MEDICATION AGREEMENT COMPLETED: ICD-10-CM

## 2018-05-06 DIAGNOSIS — G89.29 CHRONIC MIDLINE LOW BACK PAIN WITHOUT SCIATICA: ICD-10-CM

## 2018-05-06 PROCEDURE — G0180 MD CERTIFICATION HHA PATIENT: HCPCS | Performed by: FAMILY MEDICINE

## 2018-05-07 ENCOUNTER — TRANSFERRED RECORDS (OUTPATIENT)
Dept: HEALTH INFORMATION MANAGEMENT | Facility: OTHER | Age: 48
End: 2018-05-07

## 2018-05-08 ENCOUNTER — MYC MEDICAL ADVICE (OUTPATIENT)
Dept: FAMILY MEDICINE | Facility: OTHER | Age: 48
End: 2018-05-08

## 2018-05-08 DIAGNOSIS — M62.838 MUSCLE SPASM: Primary | ICD-10-CM

## 2018-05-08 RX ORDER — METAXALONE 800 MG/1
800 TABLET ORAL 3 TIMES DAILY PRN
Qty: 30 TABLET | Refills: 1 | Status: SHIPPED | OUTPATIENT
Start: 2018-05-08 | End: 2018-05-17

## 2018-05-08 NOTE — TELEPHONE ENCOUNTER
Please call her pharmacy and ask.  Probably is Skelaxin. Ok to refill if not already done by Dr. Magaña's office.  Neptali French MD on 5/8/2018 at 1:20 PM

## 2018-05-08 NOTE — TELEPHONE ENCOUNTER
Spoke with Leif at [facility], he see's nothing on file for muscle relaxer's for this patient.  Mabel Nunez LPN ...... 5/8/2018 3:06 PM

## 2018-05-09 ENCOUNTER — TELEPHONE (OUTPATIENT)
Dept: FAMILY MEDICINE | Facility: OTHER | Age: 48
End: 2018-05-09

## 2018-05-09 NOTE — TELEPHONE ENCOUNTER
Called pharmacy and they stated the patient got an Rx from her Dr. Magaña in [place] of Flexeril 10mg and PCP ordered the Metaxalone 800mg tablet. Pharmacy is wondering if patient needs the Rx PCP sent to them or not. Please advise.  Garrison Keller ..............5/9/2018 2:56 PM

## 2018-05-09 NOTE — TELEPHONE ENCOUNTER
Called and talked to Leif the pharmacist and let him know what PCP stated.  Garrison Keller ..............5/9/2018 3:41 PM

## 2018-05-09 NOTE — TELEPHONE ENCOUNTER
Okay to disregard my prescription for metaxalone and fill the prescription for cyclobenzaprine from Dr. Magaña.  Neptali French MD on 5/9/2018 at 3:32 PM

## 2018-05-14 ENCOUNTER — TRANSFERRED RECORDS (OUTPATIENT)
Dept: HEALTH INFORMATION MANAGEMENT | Facility: OTHER | Age: 48
End: 2018-05-14

## 2018-05-14 ENCOUNTER — MYC MEDICAL ADVICE (OUTPATIENT)
Dept: FAMILY MEDICINE | Facility: OTHER | Age: 48
End: 2018-05-14

## 2018-05-14 NOTE — TELEPHONE ENCOUNTER
I called [facility] and they had nothing on file for this?  Ruth Salazar LPN..................5/14/2018   12:18 PM

## 2018-05-16 ENCOUNTER — MYC MEDICAL ADVICE (OUTPATIENT)
Dept: FAMILY MEDICINE | Facility: OTHER | Age: 48
End: 2018-05-16

## 2018-05-16 DIAGNOSIS — M62.838 MUSCLE SPASM: Primary | ICD-10-CM

## 2018-05-17 RX ORDER — CYCLOBENZAPRINE HCL 10 MG
10 TABLET ORAL 3 TIMES DAILY PRN
Qty: 30 TABLET | Refills: 3 | Status: SHIPPED | OUTPATIENT
Start: 2018-05-17 | End: 2018-12-20

## 2018-05-29 ENCOUNTER — MYC MEDICAL ADVICE (OUTPATIENT)
Dept: FAMILY MEDICINE | Facility: OTHER | Age: 48
End: 2018-05-29

## 2018-05-29 ENCOUNTER — TELEPHONE (OUTPATIENT)
Dept: FAMILY MEDICINE | Facility: OTHER | Age: 48
End: 2018-05-29

## 2018-05-29 NOTE — TELEPHONE ENCOUNTER
The patient was told there was no open appointments with Neptali French MD this week or next week.  Ruth Salazar LPN..................5/29/2018   2:19 PM

## 2018-05-29 NOTE — TELEPHONE ENCOUNTER
[facility] - Patient called and stated she needed a refill on her Hydrocodone.  We tried scheduling her this week or early next week, but there was nothing available for me to schedule.  Is there anything she can be worked in?  Please call patient with any questions.          Martha Reed

## 2018-05-31 ENCOUNTER — MYC MEDICAL ADVICE (OUTPATIENT)
Dept: FAMILY MEDICINE | Facility: OTHER | Age: 48
End: 2018-05-31

## 2018-06-04 ENCOUNTER — OFFICE VISIT (OUTPATIENT)
Dept: FAMILY MEDICINE | Facility: OTHER | Age: 48
End: 2018-06-04
Attending: PHYSICIAN ASSISTANT
Payer: COMMERCIAL

## 2018-06-04 VITALS
BODY MASS INDEX: 44.63 KG/M2 | DIASTOLIC BLOOD PRESSURE: 90 MMHG | WEIGHT: 170 LBS | HEART RATE: 92 BPM | SYSTOLIC BLOOD PRESSURE: 130 MMHG

## 2018-06-04 DIAGNOSIS — M17.10 OSTEOARTHRITIS OF KNEE, UNILATERAL: ICD-10-CM

## 2018-06-04 DIAGNOSIS — Z02.89 PAIN MEDICATION AGREEMENT COMPLETED: Primary | ICD-10-CM

## 2018-06-04 DIAGNOSIS — M54.42 ACUTE LEFT-SIDED LOW BACK PAIN WITH LEFT-SIDED SCIATICA: ICD-10-CM

## 2018-06-04 PROCEDURE — 99213 OFFICE O/P EST LOW 20 MIN: CPT | Performed by: PHYSICIAN ASSISTANT

## 2018-06-04 RX ORDER — ASPIRIN 325 MG
1 TABLET ORAL
Refills: 0 | COMMUNITY
Start: 2018-05-04 | End: 2018-12-20

## 2018-06-04 RX ORDER — GABAPENTIN 300 MG/1
300 CAPSULE ORAL AT BEDTIME
Qty: 30 CAPSULE | Refills: 0 | Status: SHIPPED | OUTPATIENT
Start: 2018-06-04 | End: 2018-06-28

## 2018-06-04 RX ORDER — HYDROCODONE BITARTRATE AND ACETAMINOPHEN 5; 325 MG/1; MG/1
2 TABLET ORAL 3 TIMES DAILY PRN
Qty: 200 TABLET | Refills: 0 | Status: SHIPPED | OUTPATIENT
Start: 2018-06-04 | End: 2018-06-28

## 2018-06-04 ASSESSMENT — PAIN SCALES - GENERAL: PAINLEVEL: EXTREME PAIN (8)

## 2018-06-04 NOTE — PATIENT INSTRUCTIONS
Encouraged to take ibuprofen for relief up to 4 times per day.  Refilled norco. Started on gabapentin 300 mg at night.   Encouraged rest and elevation.  Encouraged to use ice or heat 15 minutes at a time several times per day to decrease pain. Return to clinic with any change or worsening of symptoms.   Recheck with Dr. French in the next month prior to further refills.

## 2018-06-04 NOTE — PROGRESS NOTES
Nursing Notes:   Meghan Fowler LPN  2018 11:25 AM  Signed  Patient  presenting today for medication management   Meghan Fowler LPN 2018 11:10 AM     HPI:    Wilmer Morse is a 47 year old female who presents for medication management.  Patient is up-to-date on her narcotic contract along with her urine drug screen.  Patient has history of right knee pain.  She had a total right knee arthroplasty on May 1.  History of having injections in her right knee.  She has advanced osteoarthritis in her right knee.  Left buttock pain down to leg.  Right knee is feeling better after surgery.  Walking differently after surgery.  Currently on 2nd week of PT. Using a walker since surgery. Have a cane also. Next recheck is on . No infections, fevers, chills. Hx sciatica issues in the past. PT is working on sciatica pain too. Go to [facility].  Using norco for pain every 4 hours. Before was using the norco at nighttime. Now not using norco at night. Tx flexeril with spasms.  Tx ice, heat.  Stretching. Doing PT exercises at home.   Hx gabapentin. They gave it to her in the hospital which helped.  Last took from [facility] about 6 months ago.  They gave it to her in the hospital a few weeks ago once at night 300 mg.  Helped with her symptoms.  Wondering if she can restart this short-term.    Past Medical History:   Diagnosis Date     Achondroplasia     No Comments Provided     Family history of malignant neoplasm of breast     No Comments Provided     Hyperlipidemia     not currently on treatment     Other intervertebral disc degeneration, lumbar region     No Comments Provided     Other specified anxiety disorders     No Comments Provided     Overactive bladder     2014     Personal history of other medical treatment (CODE)      3, Para 2-0-1-2.     Primary osteoarthritis of one knee     5/3/2012     Restless legs syndrome     No Comments Provided     Zoster without complications     2006,left  side of neck.       Past Surgical History:   Procedure Laterality Date     ADENOIDECTOMY      as a child      SECTION      1996,Primary low transverse  section. Repeat      LAPAROSCOPIC TUBAL LIGATION           OTHER SURGICAL HISTORY      [id number],OSTEOTOMY,Limb lengthening procedures.     OTHER SURGICAL HISTORY      10/16,,,10/17,364340,OTHER,Right     OTHER SURGICAL HISTORY      2017,[id number],OTHER,Right,fluorscopically guided. no improvement.       Family History   Problem Relation Age of Onset     Other - See Comments Mother      Achondroplasia.     Breast Cancer Mother      Cancer-breast,breast cancer     Breast Cancer Maternal Grandmother 70     Cancer-breast, breast cancer     Unknown/Adopted Father      Unknown     Family History Negative Daughter      Good Health     Family History Negative Son      Good Health     Family History Negative Other      Good Health     Family History Negative Son      Good Health,(mariana)91       Social History     Social History     Marital status:      Spouse name: N/A     Number of children: N/A     Years of education: N/A     Occupational History     Not on file.     Social History Main Topics     Smoking status: Never Smoker     Smokeless tobacco: Never Used     Alcohol use No     Drug use: Not on file      Comment: Drug use: No     Sexual activity: Not Currently     Other Topics Concern     Not on file     Social History Narrative    .  She has two children without dwarfism.    Works at [employer] in [place]    Jourdan Gutierrez Father.  Lauren Gutierrez Mother ().  Mikel Spouse.  Alyssa Child.  Solitario Obrien (mariana)91  Emerson Child.       Current Outpatient Prescriptions   Medication Sig Dispense Refill     aspirin 325 MG tablet Take 1 tablet by mouth 2 times daily  0     buPROPion (WELLBUTRIN XL) 300 MG 24 hr tablet Take 300 mg by mouth every morning       clonazePAM  (KLONOPIN) 0.5 MG tablet Take 1 tablet (0.5 mg) by mouth 3 times daily as needed 180 tablet 5     cyclobenzaprine (FLEXERIL) 10 MG tablet Take 1 tablet (10 mg) by mouth 3 times daily as needed for muscle spasms 30 tablet 3     ferrous sulfate (IRON) 325 (65 Fe) MG tablet Take 1 tablet (325 mg) by mouth 2 times daily 60 tablet 2     gabapentin (NEURONTIN) 300 MG capsule Take 1 capsule (300 mg) by mouth At Bedtime 30 capsule 0     HYDROcodone-acetaminophen (NORCO) 5-325 MG per tablet Take 2 tablets by mouth 3 times daily as needed for pain . May take a maximum of 7 tablets per day. 200 tablet 0     ibuprofen (ADVIL/MOTRIN) 600 MG tablet Take 600 mg by mouth 3 times daily as needed . Maximum of 3200 mg in 24 hours.       lisinopril (PRINIVIL/ZESTRIL) 10 MG tablet Take 1 tablet (10 mg) by mouth daily 30 tablet 11     sertraline (ZOLOFT) 100 MG tablet Take 2 tablets (200 mg) by mouth daily 90 tablet 11       Allergies   Allergen Reactions     Penicillins Unknown       REVIEW OF SYSTEMS:  Refer to HPI.    EXAM:   Vitals:    /90 (BP Location: Right arm, Patient Position: Sitting, Cuff Size: Adult Regular)  Pulse 92  Wt 170 lb (77.1 kg)  Breastfeeding? No  BMI 44.63 kg/m2    General Appearance: Pleasant, alert, appropriate appearance for age. No acute distress  Chest/Respiratory Exam: Normal chest wall and respirations. Clear to auscultation.  Cardiovascular Exam: Regular rate and rhythm. S1, S2, no murmur, click, gallop, or rubs.  Musculoskeletal Exam: Back is straight, full ROM of lower extremities.  Posterior buttock pain with palpation.  No bruising or swelling appreciated.  Left SI joint pain with palpation.  Low back pain with left leg flexion, extension, internal and external rotation.  Skin: no rash or abnormalities  Neurologic Exam: Nonfocal, normal gross motor, tone coordination and no tremor.  Psychiatric Exam: Alert and oriented - appropriate affect.    PHQ Depression Screen  PHQ-9 SCORE 3/8/2018  4/23/2018 6/4/2018   Total Score 3 0 0       ASSESSMENT AND PLAN:    1. Pain medication agreement completed    2. Osteoarthritis of knee, unilateral    3. Acute left-sided low back pain with left-sided sciatica        Encouraged to take ibuprofen for relief up to 4 times per day.  Refilled norco 5/325 mg quantity 200. Started on gabapentin 300 mg at night quantity 30 capsules given.   Encouraged rest and elevation.  Encouraged to use ice or heat 15 minutes at a time several times per day to decrease pain. Return to clinic with any change or worsening of symptoms.   Recheck with Dr. French in the next month prior to further refills.      Patient was given a one time refill of the narcotic medication to use prior to having a medication management appointment with the primary care provider.   Patient was given the side effect profile and the safety concerns with using a narcotic prescription.   Patient should not share the narcotic medication with other people.    website was reviewed and printed.     Patient Instructions   Encouraged to take ibuprofen for relief up to 4 times per day.  Refilled norco. Started on gabapentin 300 mg at night.   Encouraged rest and elevation.  Encouraged to use ice or heat 15 minutes at a time several times per day to decrease pain. Return to clinic with any change or worsening of symptoms.   Recheck with Dr. French in the next month prior to further refills.      Carmen Gilbert PA-C..................6/4/2018 11:21 AM

## 2018-06-04 NOTE — MR AVS SNAPSHOT
After Visit Summary   6/4/2018    Wilmer Morse    MRN: [id number]           Patient Information     Date Of Birth          1970        Visit Information        Provider Department      6/4/2018 10:45 AM Carmen Gilbert PA-C [facility]        Today's Diagnoses     Pain medication agreement completed    -  1    Osteoarthritis of knee, unilateral        Acute left-sided low back pain with left-sided sciatica          Care Instructions    Encouraged to take ibuprofen for relief up to 4 times per day.  Refilled norco. Started on gabapentin 300 mg at night.   Encouraged rest and elevation.  Encouraged to use ice or heat 15 minutes at a time several times per day to decrease pain. Return to clinic with any change or worsening of symptoms.   Recheck with Dr. French in the next month prior to further refills.           Follow-ups after your visit        Follow-up notes from your care team     Return if symptoms worsen or fail to improve.      Who to contact     If you have questions or need follow up information about today's clinic visit or your schedule please contact [facility] directly at [phone].  Normal or non-critical lab and imaging results will be communicated to you by [facility], letter or phone within 4 business days after the clinic has received the results. If you do not hear from us within 7 days, please contact the clinic through In[employer]antonlinet or phone. If you have a critical or abnormal lab result, we will notify you by phone as soon as possible.  Submit refill requests through Peer60 or call your pharmacy and they will forward the refill request to us. Please allow 3 business days for your refill to be completed.          Additional Information About Your Visit        MyChart Information     Peer60 gives you secure access to your electronic health record. If you see a primary care provider, you can also send messages to your care team  and make appointments. If you have questions, please call your primary care clinic.  If you do not have a primary care provider, please call [phone] and they will assist you.        Care EveryWhere ID     This is your Care EveryWhere ID. This could be used by other organizations to access your [place] medical records  [id number]        Your Vitals Were     Pulse Breastfeeding? BMI (Body Mass Index)             92 No 44.63 kg/m2          Blood Pressure from Last 3 Encounters:   06/04/18 130/90   04/23/18 (!) 136/96   03/08/18 (!) 142/100    Weight from Last 3 Encounters:   06/04/18 170 lb (77.1 kg)   04/23/18 173 lb 9.6 oz (78.7 kg)   03/08/18 174 lb 6.4 oz (79.1 kg)              Today, you had the following     No orders found for display         Today's Medication Changes          These changes are accurate as of 6/4/18 11:37 AM.  If you have any questions, ask your nurse or doctor.               Start taking these medicines.        Dose/Directions    gabapentin 300 MG capsule   Commonly known as:  NEURONTIN   Used for:  Pain medication agreement completed, Osteoarthritis of knee, unilateral, Acute left-sided low back pain with left-sided sciatica   Started by:  Carmen Gilbert PA-C        Dose:  300 mg   Take 1 capsule (300 mg) by mouth At Bedtime   Quantity:  30 capsule   Refills:  0            Where to get your medicines      These medications were sent to [facility] [address] [place], [place] - [address] [facility] [facility] [address]     Phone:  [phone]     gabapentin 300 MG capsule         Some of these will need a paper prescription and others can be bought over the counter.  Ask your nurse if you have questions.     Bring a paper prescription for each of these medications     HYDROcodone-acetaminophen 5-325 MG per tablet               Information about OPIOIDS     PRESCRIPTION OPIOIDS: WHAT YOU NEED TO KNOW   You have a prescription for an  opioid (narcotic) pain medicine. Opioids can cause addiction. If you have a history of chemical dependency of any type, you are at a higher risk of becoming addicted to opioids. Only take this medicine after all other options have been tried. Take it for as short a time and as few doses as possible.     Do not:    Drive. If you drive while taking these medicines, you could be arrested for driving under the influence (DUI).    Operate heavy machinery    Do any other dangerous activities while taking these medicines.     Drink any alcohol while taking these medicines.      Take with any other medicines that contain acetaminophen. Read all labels carefully. Look for the word  acetaminophen  or  Tylenol.  Ask your pharmacist if you have questions or are unsure.    Store your pills in a secure place, locked if possible. We will not replace any lost or stolen medicine. If you don t finish your medicine, please throw away (dispose) as directed by your pharmacist. The [place] Pollution Control Agency has more information about safe disposal: https://www.pca.[facility].[place]/living-green/managing-unwanted-medications    All opioids tend to cause constipation. Drink plenty of water and eat foods that have a lot of fiber, such as fruits, vegetables, prune juice, apple juice and high-fiber cereal. Take a laxative (Miralax, milk of magnesia, Colace, Senna) if you don t move your bowels at least every other day.          Primary Care Provider Office Phone # Fax #    Neptali GIRON MD [place] [phone]       [address] [facility] [address]        Equal Access to Services     CHARU LYNN : Hadii monica dacostao Socelestine, waaxda lupaulie, qaybta kaalmada vince velasquez . So [facility] [phone].    ATENCIÓN: Si habla español, tiene a valdez disposición servicios gratuitos de asistencia lingüística. Llame al [phone].    We comply with applicable federal civil rights laws and  [place] laws. We do not discriminate on the basis of race, color, national origin, age, disability, sex, sexual orientation, or gender identity.            Thank you!     Thank you for choosing [facility] [place]  for your care. Our goal is always to provide you with excellent care. Hearing back from our patients is one way we can continue to improve our services. Please take a few minutes to complete the written survey that you may receive in the mail after your visit with us. Thank you!             Your Updated Medication List - Protect others around you: Learn how to safely use, store and throw away your medicines at www.disposemymeds.org.          This list is accurate as of 6/4/18 11:37 AM.  Always use your most recent med list.                   Brand Name Dispense Instructions for use Diagnosis    aspirin 325 MG tablet      Take 1 tablet by mouth 2 times daily        buPROPion 300 MG 24 hr tablet    WELLBUTRIN XL     Take 300 mg by mouth every morning        clonazePAM 0.5 MG tablet    klonoPIN    180 tablet    Take 1 tablet (0.5 mg) by mouth 3 times daily as needed    Depression with anxiety       cyclobenzaprine 10 MG tablet    FLEXERIL    30 tablet    Take 1 tablet (10 mg) by mouth 3 times daily as needed for muscle spasms    Muscle spasm       ferrous sulfate 325 (65 Fe) MG tablet    IRON    60 tablet    Take 1 tablet (325 mg) by mouth 2 times daily    Iron deficiency       gabapentin 300 MG capsule    NEURONTIN    30 capsule    Take 1 capsule (300 mg) by mouth At Bedtime    Pain medication agreement completed, Osteoarthritis of knee, unilateral, Acute left-sided low back pain with left-sided sciatica       HYDROcodone-acetaminophen 5-325 MG per tablet    NORCO    200 tablet    Take 2 tablets by mouth 3 times daily as needed for pain . May take a maximum of 7 tablets per day.    Pain medication agreement completed, Osteoarthritis of knee, unilateral       ibuprofen 600 MG tablet     ADVIL/MOTRIN     Take 600 mg by mouth 3 times daily as needed . Maximum of 3200 mg in 24 hours.        lisinopril 10 MG tablet    PRINIVIL/ZESTRIL    30 tablet    Take 1 tablet (10 mg) by mouth daily    Benign essential hypertension       sertraline 100 MG tablet    ZOLOFT    90 tablet    Take 2 tablets (200 mg) by mouth daily    Depression with anxiety

## 2018-06-05 ASSESSMENT — PATIENT HEALTH QUESTIONNAIRE - PHQ9: SUM OF ALL RESPONSES TO PHQ QUESTIONS 1-9: 0

## 2018-06-18 ENCOUNTER — TRANSFERRED RECORDS (OUTPATIENT)
Dept: HEALTH INFORMATION MANAGEMENT | Facility: OTHER | Age: 48
End: 2018-06-18

## 2018-06-27 DIAGNOSIS — F41.8 DEPRESSION WITH ANXIETY: ICD-10-CM

## 2018-06-28 ENCOUNTER — OFFICE VISIT (OUTPATIENT)
Dept: FAMILY MEDICINE | Facility: OTHER | Age: 48
End: 2018-06-28
Attending: FAMILY MEDICINE
Payer: COMMERCIAL

## 2018-06-28 VITALS
BODY MASS INDEX: 46.36 KG/M2 | SYSTOLIC BLOOD PRESSURE: 126 MMHG | WEIGHT: 176.6 LBS | HEART RATE: 66 BPM | DIASTOLIC BLOOD PRESSURE: 82 MMHG

## 2018-06-28 DIAGNOSIS — Z02.89 PAIN MEDICATION AGREEMENT COMPLETED: ICD-10-CM

## 2018-06-28 DIAGNOSIS — M17.10 OSTEOARTHRITIS OF KNEE, UNILATERAL: ICD-10-CM

## 2018-06-28 DIAGNOSIS — Z96.651 HISTORY OF TOTAL KNEE ARTHROPLASTY, RIGHT: Primary | ICD-10-CM

## 2018-06-28 DIAGNOSIS — M54.42 ACUTE LEFT-SIDED LOW BACK PAIN WITH LEFT-SIDED SCIATICA: ICD-10-CM

## 2018-06-28 PROCEDURE — 99214 OFFICE O/P EST MOD 30 MIN: CPT | Performed by: FAMILY MEDICINE

## 2018-06-28 RX ORDER — HYDROCODONE BITARTRATE AND ACETAMINOPHEN 5; 325 MG/1; MG/1
2 TABLET ORAL 3 TIMES DAILY PRN
Qty: 200 TABLET | Refills: 0 | Status: SHIPPED | OUTPATIENT
Start: 2018-06-28 | End: 2018-06-28

## 2018-06-28 RX ORDER — GABAPENTIN 300 MG/1
300 CAPSULE ORAL 3 TIMES DAILY
Qty: 90 CAPSULE | Refills: 11 | Status: SHIPPED | OUTPATIENT
Start: 2018-06-28 | End: 2019-03-13

## 2018-06-28 RX ORDER — HYDROCODONE BITARTRATE AND ACETAMINOPHEN 5; 325 MG/1; MG/1
2 TABLET ORAL 3 TIMES DAILY PRN
Qty: 200 TABLET | Refills: 0 | Status: SHIPPED | OUTPATIENT
Start: 2018-06-28 | End: 2018-08-27

## 2018-06-28 RX ORDER — SERTRALINE HYDROCHLORIDE 100 MG/1
TABLET, FILM COATED ORAL
Qty: 180 TABLET | Refills: 0 | OUTPATIENT
Start: 2018-06-28

## 2018-06-28 ASSESSMENT — ANXIETY QUESTIONNAIRES
6. BECOMING EASILY ANNOYED OR IRRITABLE: NOT AT ALL
3. WORRYING TOO MUCH ABOUT DIFFERENT THINGS: NOT AT ALL
7. FEELING AFRAID AS IF SOMETHING AWFUL MIGHT HAPPEN: NOT AT ALL
2. NOT BEING ABLE TO STOP OR CONTROL WORRYING: NOT AT ALL
GAD7 TOTAL SCORE: 0
1. FEELING NERVOUS, ANXIOUS, OR ON EDGE: NOT AT ALL
5. BEING SO RESTLESS THAT IT IS HARD TO SIT STILL: NOT AT ALL
IF YOU CHECKED OFF ANY PROBLEMS ON THIS QUESTIONNAIRE, HOW DIFFICULT HAVE THESE PROBLEMS MADE IT FOR YOU TO DO YOUR WORK, TAKE CARE OF THINGS AT HOME, OR GET ALONG WITH OTHER PEOPLE: NOT DIFFICULT AT ALL

## 2018-06-28 ASSESSMENT — PATIENT HEALTH QUESTIONNAIRE - PHQ9: 5. POOR APPETITE OR OVEREATING: NOT AT ALL

## 2018-06-28 ASSESSMENT — PAIN SCALES - GENERAL: PAINLEVEL: MILD PAIN (2)

## 2018-06-28 NOTE — MR AVS SNAPSHOT
After Visit Summary   6/28/2018    Wilmer Morse    MRN: [id number]           Patient Information     Date Of Birth          1970        Visit Information        Provider Department      6/28/2018 9:30 AM Neptali French MD [facility]        Today's Diagnoses     History of total knee arthroplasty, right    -  1    Pain medication agreement completed        Osteoarthritis of knee, unilateral        Acute left-sided low back pain with left-sided sciatica           Follow-ups after your visit        Who to contact     If you have questions or need follow up information about today's clinic visit or your schedule please contact [facility] directly at [phone].  Normal or non-critical lab and imaging results will be communicated to you by Zefanclubhart, letter or phone within 4 business days after the clinic has received the results. If you do not hear from us within 7 days, please contact the clinic through [facility] or phone. If you have a critical or abnormal lab result, we will notify you by phone as soon as possible.  Submit refill requests through Druva or call your pharmacy and they will forward the refill request to us. Please allow 3 business days for your refill to be completed.          Additional Information About Your Visit        MyChart Information     Druva gives you secure access to your electronic health record. If you see a primary care provider, you can also send messages to your care team and make appointments. If you have questions, please call your primary care clinic.  If you do not have a primary care provider, please call [phone] and they will assist you.        Care EveryWhere ID     This is your Care EveryWhere ID. This could be used by other organizations to access your [place] medical records  [id number]        Your Vitals Were     Pulse Breastfeeding? BMI (Body Mass Index)             66 No 46.36 kg/m2           Blood Pressure from Last 3 Encounters:   06/28/18 126/82   06/04/18 130/90   04/23/18 (!) 136/96    Weight from Last 3 Encounters:   06/28/18 176 lb 9.6 oz (80.1 kg)   06/04/18 170 lb (77.1 kg)   04/23/18 173 lb 9.6 oz (78.7 kg)              Today, you had the following     No orders found for display         Today's Medication Changes          These changes are accurate as of 6/28/18  1:19 PM.  If you have any questions, ask your nurse or doctor.               Start taking these medicines.        Dose/Directions    HYDROcodone-acetaminophen 5-325 MG per tablet   Commonly known as:  NORCO   Used for:  Pain medication agreement completed, Osteoarthritis of knee, unilateral   Started by:  Neptali French MD        Dose:  2 tablet   Take 2 tablets by mouth 3 times daily as needed for pain . May take a maximum of 7 tablets per day.   Quantity:  200 tablet   Refills:  0         These medicines have changed or have updated prescriptions.        Dose/Directions    gabapentin 300 MG capsule   Commonly known as:  NEURONTIN   This may have changed:  when to take this   Used for:  Pain medication agreement completed, Osteoarthritis of knee, unilateral, Acute left-sided low back pain with left-sided sciatica   Changed by:  Neptali French MD        Dose:  300 mg   Take 1 capsule (300 mg) by mouth 3 times daily   Quantity:  90 capsule   Refills:  11            Where to get your medicines      These medications were sent to [facility] [address] [place], [place] - [address], [facility] [address]     Phone:  [phone]     gabapentin 300 MG capsule         Some of these will need a paper prescription and others can be bought over the counter.  Ask your nurse if you have questions.     Bring a paper prescription for each of these medications     HYDROcodone-acetaminophen 5-325 MG per tablet               Information about OPIOIDS     PRESCRIPTION OPIOIDS: WHAT YOU NEED  TO KNOW   We gave you an opioid (narcotic) pain medicine. It is important to manage your pain, but opioids are not always the best choice. You should first try all the other options your care team gave you. Take this medicine for as short a time (and as few doses) as possible.     These medicines have risks:    DO NOT drive when on new or higher doses of pain medicine. These medicines can affect your alertness and reaction times, and you could be arrested for driving under the influence (DUI). If you need to use opioids long-term, talk to your care team about driving.    DO NOT operate heave machinery    DO NOT do any other dangerous activities while taking these medicines.     DO NOT drink any alcohol while taking these medicines.      If the opioid prescribed includes acetaminophen, DO NOT take with any other medicines that contain acetaminophen. Read all labels carefully. Look for the word  acetaminophen  or  Tylenol.  Ask your pharmacist if you have questions or are unsure.    You can get addicted to pain medicines, especially if you have a history of addiction (chemical, alcohol or substance dependence). Talk to your care team about ways to reduce this risk.    Store your pills in a secure place, locked if possible. We will not replace any lost or stolen medicine. If you don t finish your medicine, please throw away (dispose) as directed by your pharmacist. The [place] Pollution Control Agency has more information about safe disposal: https://www.pca.[facility].[place]/living-green/managing-unwanted-medications.     All opioids tend to cause constipation. Drink plenty of water and eat foods that have a lot of fiber, such as fruits, vegetables, prune juice, apple juice and high-fiber cereal. Take a laxative (Miralax, milk of magnesia, Colace, Senna) if you don t move your bowels at least every other day.          Primary Care Provider Office Phone # Fax #    Neptali French [phone] [facility] [address]        Equal Access to Services     EL LYNN : Hadii monica An, berna morris, brown velasquez, vince sanchez. So [facility] [phone].    ATENCIÓN: Si habla español, tiene a valdez disposición servicios gratuitos de asistencia lingüística. Llame al [phone].    We comply with applicable federal civil rights laws and [place] laws. We do not discriminate on the basis of race, color, national origin, age, disability, sex, sexual orientation, or gender identity.            Thank you!     Thank you for choosing [facility]  for your care. Our goal is always to provide you with excellent care. Hearing back from our patients is one way we can continue to improve our services. Please take a few minutes to complete the written survey that you may receive in the mail after your visit with us. Thank you!             Your Updated Medication List - Protect others around you: Learn how to safely use, store and throw away your medicines at www.disposemymeds.org.          This list is accurate as of 6/28/18  1:19 PM.  Always use your most recent med list.                   Brand Name Dispense Instructions for use Diagnosis    aspirin 325 MG tablet      Take 1 tablet by mouth 2 times daily        buPROPion 300 MG 24 hr tablet    WELLBUTRIN XL     Take 300 mg by mouth every morning        clonazePAM 0.5 MG tablet    klonoPIN    180 tablet    Take 1 tablet (0.5 mg) by mouth 3 times daily as needed    Depression with anxiety       cyclobenzaprine 10 MG tablet    FLEXERIL    30 tablet    Take 1 tablet (10 mg) by mouth 3 times daily as needed for muscle spasms    Muscle spasm       ferrous sulfate 325 (65 Fe) MG tablet    IRON    60 tablet    Take 1 tablet (325 mg) by mouth 2 times daily    Iron deficiency       gabapentin 300 MG capsule    NEURONTIN    90 capsule    Take 1 capsule (300 mg) by mouth 3 times daily    Pain  medication agreement completed, Osteoarthritis of knee, unilateral, Acute left-sided low back pain with left-sided sciatica       HYDROcodone-acetaminophen 5-325 MG per tablet    NORCO    200 tablet    Take 2 tablets by mouth 3 times daily as needed for pain . May take a maximum of 7 tablets per day.    Pain medication agreement completed, Osteoarthritis of knee, unilateral       ibuprofen 600 MG tablet    ADVIL/MOTRIN     Take 600 mg by mouth 3 times daily as needed . Maximum of 3200 mg in 24 hours.        lisinopril 10 MG tablet    PRINIVIL/ZESTRIL    30 tablet    Take 1 tablet (10 mg) by mouth daily    Benign essential hypertension       sertraline 100 MG tablet    ZOLOFT    90 tablet    Take 2 tablets (200 mg) by mouth daily    Depression with anxiety

## 2018-06-28 NOTE — PROGRESS NOTES
Nursing Notes:   Ashley Edwards LPN  6/28/2018 10:24 AM  Signed  Patient presents to clinic for post-op knee surgery from May 1st 2018.   Ashley Edwards LPN on 6/28/2018 at 9:51 AM      SUBJECTIVE:  Wilmer Morse  is a 47 year old female who comes in for follow up after total knee arthoplasty on the right.  She has seen Dr. Magaña twice in follow up. She is doing well and has no knee pain, but now she has more pain in her back and her left hip.  She sees Dr. Terrazas again in August. She has been seeing Narinder at [facility] and will continue through mid July. She is now just using a cane instead of the walker. She is going to [employer] today.     She has varied her pain medications depending on how she feels. She has had some muscle spasms. She is still having some disrupted sleep and is going from bed to recliner. She has some pain in her left thigh anteriorly and posteriorly.     She has a wedding August 1 at [facility] in the [address] [facility].     Past Medical, Family, and Social History reviewed and updated as noted below.   ROS is negative except as noted above       Allergies   Allergen Reactions     Penicillins Unknown   ,   Family History   Problem Relation Age of Onset     Other - See Comments Mother      Achondroplasia.     Breast Cancer Mother      Cancer-breast,breast cancer     Breast Cancer Maternal Grandmother 70     Cancer-breast, breast cancer     Unknown/Adopted Father      Unknown     Family History Negative Daughter      Good Health     Family History Negative Son      Good Health     Family History Negative Other      Good Health     Family History Negative Son      Good Health,(stepson)7/24/91   ,   Current Outpatient Prescriptions   Medication     aspirin 325 MG tablet     buPROPion (WELLBUTRIN XL) 300 MG 24 hr tablet     clonazePAM (KLONOPIN) 0.5 MG tablet     cyclobenzaprine (FLEXERIL) 10 MG tablet     ferrous sulfate (IRON) 325 (65 Fe) MG tablet     gabapentin (NEURONTIN) 300 MG capsule      HYDROcodone-acetaminophen (NORCO) 5-325 MG per tablet     ibuprofen (ADVIL/MOTRIN) 600 MG tablet     lisinopril (PRINIVIL/ZESTRIL) 10 MG tablet     sertraline (ZOLOFT) 100 MG tablet     [DISCONTINUED] gabapentin (NEURONTIN) 300 MG capsule     No current facility-administered medications for this visit.    ,   Past Medical History:   Diagnosis Date     Achondroplasia     No Comments Provided     Family history of malignant neoplasm of breast     No Comments Provided     Hyperlipidemia     not currently on treatment     Other intervertebral disc degeneration, lumbar region     No Comments Provided     Other specified anxiety disorders     No Comments Provided     Overactive bladder     2014     Personal history of other medical treatment (CODE)      3, Para 2-0-1-2.     Primary osteoarthritis of one knee     5/3/2012     Restless legs syndrome     No Comments Provided     Zoster without complications     2006,left side of neck.   ,   Patient Active Problem List    Diagnosis Date Noted     Benign essential hypertension 2018     Priority: Medium     Depression with anxiety 2018     Priority: Medium     Family history of breast cancer in female 2018     Priority: Medium     Hyperlipidemia 2018     Priority: Medium     Overview:   not currently on treatment       Restless leg syndrome 2018     Priority: Medium     Dependent edema 09/15/2017     Priority: Medium     Chronic midline low back pain without sciatica 2016     Priority: Medium     Achondroplasia 06/15/2016     Priority: Medium     Pain medication agreement completed 2016     Priority: Medium     Osteoarthritis of knee, unilateral 2012     Priority: Medium   ,   Past Surgical History:   Procedure Laterality Date     ADENOIDECTOMY      as a child      SECTION      1996,Primary low transverse  section. Repeat      LAPAROSCOPIC TUBAL LIGATION           OTHER SURGICAL  HISTORY      [id number],OSTEOTOMY,Limb lengthening procedures.     OTHER SURGICAL HISTORY      10/16,4/17,7/17,10/17,009062,OTHER,Right     OTHER SURGICAL HISTORY      09/13/2017,442132,OTHER,Right,fluorscopically guided. no improvement.     right knee total arthroplasty Right 05/01/2018    Dr. Magaña, [facility]    and   Social History   Substance Use Topics     Smoking status: Never Smoker     Smokeless tobacco: Never Used     Alcohol use No     OBJECTIVE:  /82 (BP Location: Right arm, Patient Position: Chair, Cuff Size: Adult Regular)  Pulse 66  Wt 176 lb 9.6 oz (80.1 kg)  Breastfeeding? No  BMI 46.36 kg/m2   EXAM:  Alert cooperative, no distress.  She has greater than 90  flexion of her right knee and her wound is healing well.  She walks with a cane.  Affect is broad ranging and appropriate.  Back exam was not repeated today.  ASSESSMENT/Plan :    Wilmer was seen today for surgical followup.    Diagnoses and all orders for this visit:    History of total knee arthroplasty, right    Pain medication agreement completed  -     gabapentin (NEURONTIN) 300 MG capsule; Take 1 capsule (300 mg) by mouth 3 times daily  -     Discontinue: HYDROcodone-acetaminophen (NORCO) 5-325 MG per tablet; Take 2 tablets by mouth 3 times daily as needed for pain . May take a maximum of 7 tablets per day.  -     Discontinue: HYDROcodone-acetaminophen (NORCO) 5-325 MG per tablet; Take 2 tablets by mouth 3 times daily as needed for pain . May take a maximum of 7 tablets per day.  -     HYDROcodone-acetaminophen (NORCO) 5-325 MG per tablet; Take 2 tablets by mouth 3 times daily as needed for pain . May take a maximum of 7 tablets per day.    Osteoarthritis of knee, unilateral  -     gabapentin (NEURONTIN) 300 MG capsule; Take 1 capsule (300 mg) by mouth 3 times daily  -     Discontinue: HYDROcodone-acetaminophen (NORCO) 5-325 MG per tablet; Take 2 tablets by mouth 3 times daily as needed for pain . May take a maximum of 7 tablets  per day.  -     Discontinue: HYDROcodone-acetaminophen (NORCO) 5-325 MG per tablet; Take 2 tablets by mouth 3 times daily as needed for pain . May take a maximum of 7 tablets per day.  -     HYDROcodone-acetaminophen (NORCO) 5-325 MG per tablet; Take 2 tablets by mouth 3 times daily as needed for pain . May take a maximum of 7 tablets per day.    Acute left-sided low back pain with left-sided sciatica  -     gabapentin (NEURONTIN) 300 MG capsule; Take 1 capsule (300 mg) by mouth 3 times daily    Lengthy discussion today with regard to gradual resumption of increased physical activity after therapy is done.  Discussed continued work on getting to a healthier weight.  Will increase gabapentin to 300 mg 3 times a day as tolerated to help with neuropathic pain in her left leg.  Might consider imaging in the future if not improving.  Might benefit from pool therapy.     query is appropriate.  Last Tox[employer]ure was in March.  Renewal on hydrocodone 5/325 #200 ×3.    Disability form completed for MARLA.  Will notify patient to come and pick this up.    A total of 25 minutes was spent with the patient, greater than 50% of the time was spent in counseling/discussion of the aforementioned concerns.     Neptali French MD

## 2018-06-28 NOTE — TELEPHONE ENCOUNTER
Redundant Refill Request for Zoloft refused;    Medication Detail      Disp Refills Start End WHITNEY   sertraline (ZOLOFT) 100 MG tablet 90 tablet 11 3/8/2018  No   Sig - Route: Take 2 tablets (200 mg) by mouth daily - Oral   Class: E-Prescribe   Order: [id number]   E-Prescribing Status: Receipt confirmed by pharmacy (3/8/2018  1:57 PM CST)   Printout Tracking   External Result Report   Pharmacy   [id number] - [place], [place] - [address]     Unable to complete prescription refill per RN Medication Refill Policy. Bassam Sepulveda 6/28/2018 3:18 PM

## 2018-06-28 NOTE — NURSING NOTE
Patient presents to clinic for post-op knee surgery from May 1st 2018.   Ashley Edwards LPN on 6/28/2018 at 9:51 AM

## 2018-06-29 ASSESSMENT — ANXIETY QUESTIONNAIRES: GAD7 TOTAL SCORE: 0

## 2018-06-29 ASSESSMENT — PATIENT HEALTH QUESTIONNAIRE - PHQ9: SUM OF ALL RESPONSES TO PHQ QUESTIONS 1-9: 0

## 2018-07-23 ENCOUNTER — OFFICE VISIT (OUTPATIENT)
Dept: FAMILY MEDICINE | Facility: OTHER | Age: 48
End: 2018-07-23
Attending: NURSE PRACTITIONER
Payer: COMMERCIAL

## 2018-07-23 VITALS
DIASTOLIC BLOOD PRESSURE: 64 MMHG | WEIGHT: 172.1 LBS | HEART RATE: 66 BPM | SYSTOLIC BLOOD PRESSURE: 116 MMHG | BODY MASS INDEX: 45.18 KG/M2 | TEMPERATURE: 99.6 F

## 2018-07-23 DIAGNOSIS — L25.9 CONTACT DERMATITIS, UNSPECIFIED CONTACT DERMATITIS TYPE, UNSPECIFIED TRIGGER: Primary | ICD-10-CM

## 2018-07-23 PROCEDURE — 99213 OFFICE O/P EST LOW 20 MIN: CPT | Performed by: NURSE PRACTITIONER

## 2018-07-23 RX ORDER — TRIAMCINOLONE ACETONIDE 5 MG/G
CREAM TOPICAL
Qty: 60 G | Refills: 0 | Status: SHIPPED | OUTPATIENT
Start: 2018-07-23 | End: 2018-12-20

## 2018-07-23 ASSESSMENT — PAIN SCALES - GENERAL: PAINLEVEL: MILD PAIN (2)

## 2018-07-23 ASSESSMENT — ANXIETY QUESTIONNAIRES
7. FEELING AFRAID AS IF SOMETHING AWFUL MIGHT HAPPEN: NOT AT ALL
5. BEING SO RESTLESS THAT IT IS HARD TO SIT STILL: NOT AT ALL
6. BECOMING EASILY ANNOYED OR IRRITABLE: NOT AT ALL
3. WORRYING TOO MUCH ABOUT DIFFERENT THINGS: NOT AT ALL
2. NOT BEING ABLE TO STOP OR CONTROL WORRYING: NOT AT ALL
GAD7 TOTAL SCORE: 0
IF YOU CHECKED OFF ANY PROBLEMS ON THIS QUESTIONNAIRE, HOW DIFFICULT HAVE THESE PROBLEMS MADE IT FOR YOU TO DO YOUR WORK, TAKE CARE OF THINGS AT HOME, OR GET ALONG WITH OTHER PEOPLE: NOT DIFFICULT AT ALL
1. FEELING NERVOUS, ANXIOUS, OR ON EDGE: NOT AT ALL

## 2018-07-23 ASSESSMENT — PATIENT HEALTH QUESTIONNAIRE - PHQ9: 5. POOR APPETITE OR OVEREATING: NOT AT ALL

## 2018-07-23 NOTE — MR AVS SNAPSHOT
"              After Visit Summary   7/23/2018    Wilmer Morse    MRN: [id number]           Patient Information     Date Of Birth          1970        Visit Information        Provider Department      7/23/2018 6:30 PM Jamila Haley NP [facility]        Today's Diagnoses     Contact dermatitis, unspecified contact dermatitis type, unspecified trigger    -  1      Care Instructions      Contact Dermatitis  Contact dermatitis is a skin rash caused by something that touches the skin and makes it irritated and inflamed. Your skin may be red, swollen, dry, and may be cracked. Blisters may form and ooze. The rash will itch.  Contact dermatitis can form on the face and neck, backs of hands, forearms, genitals, and lower legs.  People can get contact dermatitis from lots of sources. These include:    Plants such as poison ivy, oak, or sumac    Chemicals in hair dyes and rinses, soaps, solvents, waxes, fingernail polish, and deodorants     Jewelry or watchbands made of nickel  Contact dermatitis is not passed from person to person.  Talk with your healthcare provider about what may have caused the rash. A type of allergy testing called \"patch testing\" may be used to discover what you are allergic to. You will need to avoid the source of your rash in the future to prevent it from coming back.  Treatment is done to relieve itching and prevent the rash from coming back. The rash should go away in a few days to a few weeks.  Home care  Your healthcare provider may prescribe medicine to relieve swelling and itching. Follow all instructions when using these medicines.  General care:    Avoid anything that heats up your skin, such as hot showers or baths, or direct sunlight. This can make itching worse.    Apply cold compresses to soothe your sores to help relieve your symptoms. Do this for 30 minutes 3 to 4 times a day. You can make a cold compress by soaking a cloth in cold water. Squeeze out " excess water. You can add colloidal oatmeal to the water to help reduce itching. For severe itching in a small area, apply an ice pack wrapped in a thin towel. Do this for 20 minutes 3 to 4 times a day.    You can also try wet dressings. One way to do this is to wear a wet piece of clothing under a dry one. Wear a damp shirt under a dry shirt if your upper body is affected. This can relieve itching and prevent you from scratching the affected area.    You can also help relieve large areas of itching by taking a lukewarm bath with colloidal oatmeal added to the water.    Use hydrocortisone cream for redness and irritation, unless another medicine was prescribed. You can also use benzocaine anesthetic cream or spray. Calamine lotion can also relieve mild symptoms.    Use oral diphenhydramine to help reduce itching. You can buy this antihistamine at drug and grocery stores. It can make you sleepy, so use lower doses during the daytime. Or you can use loratadine. This is an antihistamine that will not make you sleepy. Do not use diphenhydramine if you have glaucoma or have trouble urinating due to an enlarged prostate.    If a plant causes your rash, make sure to wash your skin and the clothes you were wearing when you came into contact with the plant. This is to wash away the plant oils that gave you the rash and prevent more or worse symptoms.    Stay away from the substance or object that causes your symptoms. If you can t avoid it, wear gloves or some other type of protection.  Follow-up care  Follow up with your healthcare provider, or as advised.  When to seek medical advice  Call your healthcare provider right away if any of these occur:    Spreading of the rash to other parts of your body    Severe swelling of your face, eyelids, mouth, throat or tongue    Trouble urinating due to swelling in the genital area    Fever of 100.4 F (38 C) or higher    Redness or swelling that gets worse    Pain that gets  worse    Foul-smelling fluid leaking from the skin    Yellow-brown crusts on the open blisters                  Follow-ups after your visit        Who to contact     If you have questions or need follow up information about today's clinic visit or your schedule please contact [facility] directly at [phone].  Normal or non-critical lab and imaging results will be communicated to you by MyChart, letter or phone within 4 business days after the clinic has received the results. If you do not hear from us within 7 days, please contact the clinic through [place] or phone. If you have a critical or abnormal lab result, we will notify you by phone as soon as possible.  Submit refill requests through [employer] or call your pharmacy and they will forward the refill request to us. Please allow 3 business days for your refill to be completed.          Additional Information About Your Visit        MyChart Information     [employer] gives you secure access to your electronic health record. If you see a primary care provider, you can also send messages to your care team and make appointments. If you have questions, please call your primary care clinic.  If you do not have a primary care provider, please call [phone] and they will assist you.        Care EveryWhere ID     This is your Care EveryWhere ID. This could be used by other organizations to access your [place] medical records  [id number]        Your Vitals Were     Pulse Temperature Breastfeeding? BMI (Body Mass Index)          66 99.6  F (37.6  C) (Tympanic) No 45.18 kg/m2         Blood Pressure from Last 3 Encounters:   07/23/18 116/64   06/28/18 126/82   06/04/18 130/90    Weight from Last 3 Encounters:   07/23/18 172 lb 1.6 oz (78.1 kg)   06/28/18 176 lb 9.6 oz (80.1 kg)   06/04/18 170 lb (77.1 kg)              Today, you had the following     No orders found for display         Today's Medication Changes          These changes are  accurate as of 7/23/18  7:51 PM.  If you have any questions, ask your nurse or doctor.               Start taking these medicines.        Dose/Directions    triamcinolone 0.5 % cream   Commonly known as:  KENALOG   Used for:  Contact dermatitis, unspecified contact dermatitis type, unspecified trigger   Started by:  Jamila Haley NP        Apply sparingly to affected area three times daily. Use for 10 days   Quantity:  60 g   Refills:  0            Where to get your medicines      These medications were sent to [id number] - [place], [place] - [address]  [address], [facility] [address]     Phone:  [phone]     triamcinolone 0.5 % cream                Primary Care Provider Office Phone # Fax #    Neptali GIRON MD Man[place] [phone]       [address] [facility] [address]        Equal Access to Services     [facility]: Hadii monica ku hadasho Sojazzali, waaxda luqadaha, qaybta kaalmada adeegyada, vince pate haybetty decker . So [facility] [phone].    ATENCIÓN: Si habla español, tiene a valdez disposición servicios gratuitos de asistencia lingüística. Llame al [phone].    We comply with applicable federal civil rights laws and [place] laws. We do not discriminate on the basis of race, color, national origin, age, disability, sex, sexual orientation, or gender identity.            Thank you!     Thank you for choosing [facility]  for your care. Our goal is always to provide you with excellent care. Hearing back from our patients is one way we can continue to improve our services. Please take a few minutes to complete the written survey that you may receive in the mail after your visit with us. Thank you!             Your Updated Medication List - Protect others around you: Learn how to safely use, store and throw away your medicines at www.disposemymeds.org.          This list is accurate as of 7/23/18   7:51 PM.  Always use your most recent med list.                   Brand Name Dispense Instructions for use Diagnosis    aspirin 325 MG tablet      Take 1 tablet by mouth 2 times daily        buPROPion 300 MG 24 hr tablet    WELLBUTRIN XL     Take 300 mg by mouth every morning        clonazePAM 0.5 MG tablet    klonoPIN    180 tablet    Take 1 tablet (0.5 mg) by mouth 3 times daily as needed    Depression with anxiety       cyclobenzaprine 10 MG tablet    FLEXERIL    30 tablet    Take 1 tablet (10 mg) by mouth 3 times daily as needed for muscle spasms    Muscle spasm       ferrous sulfate 325 (65 Fe) MG tablet    IRON    60 tablet    Take 1 tablet (325 mg) by mouth 2 times daily    Iron deficiency       gabapentin 300 MG capsule    NEURONTIN    90 capsule    Take 1 capsule (300 mg) by mouth 3 times daily    Pain medication agreement completed, Osteoarthritis of knee, unilateral, Acute left-sided low back pain with left-sided sciatica       HYDROcodone-acetaminophen 5-325 MG per tablet    NORCO    200 tablet    Take 2 tablets by mouth 3 times daily as needed for pain . May take a maximum of 7 tablets per day.    Pain medication agreement completed, Osteoarthritis of knee, unilateral       ibuprofen 600 MG tablet    ADVIL/MOTRIN     Take 600 mg by mouth 3 times daily as needed . Maximum of 3200 mg in 24 hours.        lisinopril 10 MG tablet    PRINIVIL/ZESTRIL    30 tablet    Take 1 tablet (10 mg) by mouth daily    Benign essential hypertension       sertraline 100 MG tablet    ZOLOFT    90 tablet    Take 2 tablets (200 mg) by mouth daily    Depression with anxiety       triamcinolone 0.5 % cream    KENALOG    60 g    Apply sparingly to affected area three times daily. Use for 10 days    Contact dermatitis, unspecified contact dermatitis type, unspecified trigger

## 2018-07-23 NOTE — PROGRESS NOTES
Patient Information     Patient Name  Wilmer Morse MRN  [id number] Sex  Female   1970      Letter by Jamila Haley NP at      Author:  Jamila Haley NP Service:  (none) Author Type:  (none)    Filed:   Encounter Date:  2017 Status:  (Other)           Wilmer Morse  [address] [facility] [address]          2017    To whom it may concern,     Wilmer Morse was seen today in the [facility]. Patient may return to work today or next scheduled shift.    Thank you,    Jamila Haley MSN, FNP  [facility]

## 2018-07-23 NOTE — PROGRESS NOTES
Patient Information     Patient Name  Wilmer Morse MRN  [id number] Sex  Female   1970      Letter by Neptali French MD at      Author:  Neptali French MD Service:  (none) Author Type:  (none)    Filed:   Encounter Date:  2017 Status:  (Other)               Work/School Excuse and Restrictions         2017        Wilmer Morse  was seen today in the [facility].    Wilmer is unable to return to work until 2017.            If you have any questions regarding the validity of this note please call the number above.               Neptali French MD             Send to [phone]. Attn: [employer]

## 2018-07-24 ASSESSMENT — ANXIETY QUESTIONNAIRES: GAD7 TOTAL SCORE: 0

## 2018-07-24 ASSESSMENT — PATIENT HEALTH QUESTIONNAIRE - PHQ9: SUM OF ALL RESPONSES TO PHQ QUESTIONS 1-9: 0

## 2018-07-24 NOTE — PATIENT INSTRUCTIONS
"  Contact Dermatitis  Contact dermatitis is a skin rash caused by something that touches the skin and makes it irritated and inflamed. Your skin may be red, swollen, dry, and may be cracked. Blisters may form and ooze. The rash will itch.  Contact dermatitis can form on the face and neck, backs of hands, forearms, genitals, and lower legs.  People can get contact dermatitis from lots of sources. These include:    Plants such as poison ivy, oak, or sumac    Chemicals in hair dyes and rinses, soaps, solvents, waxes, fingernail polish, and deodorants     Jewelry or watchbands made of nickel  Contact dermatitis is not passed from person to person.  Talk with your healthcare provider about what may have caused the rash. A type of allergy testing called \"patch testing\" may be used to discover what you are allergic to. You will need to avoid the source of your rash in the future to prevent it from coming back.  Treatment is done to relieve itching and prevent the rash from coming back. The rash should go away in a few days to a few weeks.  Home care  Your healthcare provider may prescribe medicine to relieve swelling and itching. Follow all instructions when using these medicines.  General care:    Avoid anything that heats up your skin, such as hot showers or baths, or direct sunlight. This can make itching worse.    Apply cold compresses to soothe your sores to help relieve your symptoms. Do this for 30 minutes 3 to 4 times a day. You can make a cold compress by soaking a cloth in cold water. Squeeze out excess water. You can add colloidal oatmeal to the water to help reduce itching. For severe itching in a small area, apply an ice pack wrapped in a thin towel. Do this for 20 minutes 3 to 4 times a day.    You can also try wet dressings. One way to do this is to wear a wet piece of clothing under a dry one. Wear a damp shirt under a dry shirt if your upper body is affected. This can relieve itching and prevent you from " scratching the affected area.    You can also help relieve large areas of itching by taking a lukewarm bath with colloidal oatmeal added to the water.    Use hydrocortisone cream for redness and irritation, unless another medicine was prescribed. You can also use benzocaine anesthetic cream or spray. Calamine lotion can also relieve mild symptoms.    Use oral diphenhydramine to help reduce itching. You can buy this antihistamine at drug and grocery stores. It can make you sleepy, so use lower doses during the daytime. Or you can use loratadine. This is an antihistamine that will not make you sleepy. Do not use diphenhydramine if you have glaucoma or have trouble urinating due to an enlarged prostate.    If a plant causes your rash, make sure to wash your skin and the clothes you were wearing when you came into contact with the plant. This is to wash away the plant oils that gave you the rash and prevent more or worse symptoms.    Stay away from the substance or object that causes your symptoms. If you can t avoid it, wear gloves or some other type of protection.  Follow-up care  Follow up with your healthcare provider, or as advised.  When to seek medical advice  Call your healthcare provider right away if any of these occur:    Spreading of the rash to other parts of your body    Severe swelling of your face, eyelids, mouth, throat or tongue    Trouble urinating due to swelling in the genital area    Fever of 100.4 F (38 C) or higher    Redness or swelling that gets worse    Pain that gets worse    Foul-smelling fluid leaking from the skin    Yellow-brown crusts on the open blisters

## 2018-07-24 NOTE — PROGRESS NOTES
Patient Information     Patient Name  Wilmer Morse MRN  [id number] Sex  Female   1970      Letter by Neptali French MD at      Author:  Neptali French MD Service:  (none) Author Type:  (none)    Filed:   Encounter Date:  2017 Status:  (Other)             Work/School Excuse and Restrictions        2017        Wilmer Morse  was seen today in the [facility].    Wilmer missed work today.  She should be able to return to work tomorrow for her normal duties.          If you have any questions regarding the validity of this note please call the number above.             Neptali French MD

## 2018-07-24 NOTE — PROGRESS NOTES
Patient Information     Patient Name  Wilmer Morse MRN  [id number] Sex  Female   1970      Letter by Dayne Mulligan MD at      Author:  Dayne Mulligan MD Service:  (none) Author Type:  (none)    Filed:   Date of Service:   Status:  (Other)       [facility]  [address]  [place] [address]  [phone]         Wilmer Morse   [address] [facility] [address]      2018  Date of Breast Imagin2018  2:35 PM    Dear Ms. Morse:    We are pleased to inform you that the result of your recent breast imaging examination is normal/benign (not cancer).    A report of your results was sent to your health care provider(s).    Your images will become part of your medical file here at [facility] and will be available for your continuing care. You are responsible for informing any new health care provider or breast imaging facility of the date and location of this examination.    Although mammography is the most accurate method for early detection, not all cancers are found through mammography. If you notice any new changes in your breast(s) please inform your health care provider without delay.    Thank you for choosing [facility] to participate in your healthcare needs.         [facility] Recommendations for Early Breast Cancer Detection   in Women without Symptoms  When to start having mammograms to screen for breast cancer, and how often to have them, is a personal decision. It should be based on your preferences, your values and your risk for developing breast cancer. [facility] recommends that you and your health care provider together determine when mammograms are right for you.    [facility] recommends the following guidelines for women who have an average risk for breast cancer, based on American Cancer Society guidelines:    Age 40 to 44:  Mammograms are optional.     Age 45 to 54: Have a mammogram every year.           Age 55 and older: Have a mammogram every year, or transition to having one every 2 years. Continue to have mammograms as long as your health is good.    If you have a higher than average risk for breast cancer, your health care provider may recommend a different schedule.

## 2018-07-24 NOTE — PROGRESS NOTES
Nursing Notes:   Ashley Edwards LPN  7/23/2018  7:49 PM  Signed  Pt present to clinic today for a rash, she states it started on Saturday on her right buttock. She has been using topical benadryl with no relief.  Ashley Edwards LPN on 7/23/2018 at 7:27 PM      SUBJECTIVE:   Wilmer Morse is a 47 year old female who presents to clinic today for the following health issues:    Rash      Duration: 2-3 days    Description  Location: Buttocks  Itching: moderate    Intensity:  moderate    Accompanying signs and symptoms: Denies fevers, chills, cough, or sore throat    History (similar episodes/previous evaluation): None    Precipitating or alleviating factors:  New exposures:  None  Recent travel: no      Therapies tried and outcome: none        Problem list and histories reviewed & adjusted, as indicated.  Additional history: as documented    Current Outpatient Prescriptions   Medication Sig Dispense Refill     triamcinolone (KENALOG) 0.5 % cream Apply sparingly to affected area three times daily. Use for 10 days 60 g 0     aspirin 325 MG tablet Take 1 tablet by mouth 2 times daily  0     buPROPion (WELLBUTRIN XL) 300 MG 24 hr tablet Take 300 mg by mouth every morning       clonazePAM (KLONOPIN) 0.5 MG tablet Take 1 tablet (0.5 mg) by mouth 3 times daily as needed 180 tablet 5     cyclobenzaprine (FLEXERIL) 10 MG tablet Take 1 tablet (10 mg) by mouth 3 times daily as needed for muscle spasms 30 tablet 3     ferrous sulfate (IRON) 325 (65 Fe) MG tablet Take 1 tablet (325 mg) by mouth 2 times daily 60 tablet 2     gabapentin (NEURONTIN) 300 MG capsule Take 1 capsule (300 mg) by mouth 3 times daily 90 capsule 11     HYDROcodone-acetaminophen (NORCO) 5-325 MG per tablet Take 2 tablets by mouth 3 times daily as needed for pain . May take a maximum of 7 tablets per day. 200 tablet 0     ibuprofen (ADVIL/MOTRIN) 600 MG tablet Take 600 mg by mouth 3 times daily as needed . Maximum of 3200 mg in 24 hours.        lisinopril (PRINIVIL/ZESTRIL) 10 MG tablet Take 1 tablet (10 mg) by mouth daily 30 tablet 11     sertraline (ZOLOFT) 100 MG tablet Take 2 tablets (200 mg) by mouth daily 90 tablet 11     Allergies   Allergen Reactions     Penicillins Unknown         ROS:  Notable findings in the HPI.       OBJECTIVE:     /64 (BP Location: Right arm, Patient Position: Sitting, Cuff Size: Adult Regular)  Pulse 66  Temp 99.6  F (37.6  C) (Tympanic)  Wt 172 lb 1.6 oz (78.1 kg)  Breastfeeding? No  BMI 45.18 kg/m2  Body mass index is 45.18 kg/(m^2).  GENERAL: healthy, alert and no distress  EYES: Eyes grossly normal to inspection  RESP: without increased work of breathing.   SKIN: Papular rash on RT buttocks small patches on the LT upper buttock. Not vesicled, not appearing like shingles.     Diagnostic Test Results:  none     ASSESSMENT/PLAN:     1. Contact dermatitis, unspecified contact dermatitis type, unspecified trigger  - triamcinolone (KENALOG) 0.5 % cream; Apply sparingly to affected area three times daily. Use for 10 days  Dispense: 60 g; Refill: 0    PLAN:    Rash:  moisturizer  Reassurance was given to the patient  Zyrtec 10mg daily suggested for itchy rash.  Rx    Followup:    If not improving or if condition worsens, follow up with your Primary Care Provider    Disclaimer:  This note consists of words and symbols derived from keyboarding, dictation, or using voice recognition software. As a result, there may be errors in the script that have gone undetected. Please consider this when interpreting information found in this note.      Jamila Haley NP, 7/23/2018 7:32 PM

## 2018-08-06 ENCOUNTER — TRANSFERRED RECORDS (OUTPATIENT)
Dept: HEALTH INFORMATION MANAGEMENT | Facility: OTHER | Age: 48
End: 2018-08-06

## 2018-08-13 ENCOUNTER — MYC MEDICAL ADVICE (OUTPATIENT)
Dept: FAMILY MEDICINE | Facility: OTHER | Age: 48
End: 2018-08-13

## 2018-08-20 ENCOUNTER — TRANSFERRED RECORDS (OUTPATIENT)
Dept: HEALTH INFORMATION MANAGEMENT | Facility: OTHER | Age: 48
End: 2018-08-20

## 2018-08-27 ENCOUNTER — MYC MEDICAL ADVICE (OUTPATIENT)
Dept: FAMILY MEDICINE | Facility: OTHER | Age: 48
End: 2018-08-27

## 2018-08-27 DIAGNOSIS — M17.10 OSTEOARTHRITIS OF KNEE, UNILATERAL: ICD-10-CM

## 2018-08-27 DIAGNOSIS — Z02.89 PAIN MEDICATION AGREEMENT COMPLETED: ICD-10-CM

## 2018-08-27 RX ORDER — HYDROCODONE BITARTRATE AND ACETAMINOPHEN 5; 325 MG/1; MG/1
2 TABLET ORAL 3 TIMES DAILY PRN
Qty: 158 TABLET | Refills: 0 | Status: SHIPPED | OUTPATIENT
Start: 2018-08-27 | End: 2018-09-19

## 2018-08-27 NOTE — TELEPHONE ENCOUNTER
She has been on this medication chronically. Not sure if an insurance change or pharmacy change kicked in this new more stringent policy but should not apply in this case.  New prescription for #158 hydrocodone/APAP pursuant to her medication agreement.  Neptali French MD on 8/27/2018 at 4:49 PM

## 2018-09-15 DIAGNOSIS — F41.8 DEPRESSION WITH ANXIETY: ICD-10-CM

## 2018-09-17 ENCOUNTER — MYC MEDICAL ADVICE (OUTPATIENT)
Dept: FAMILY MEDICINE | Facility: OTHER | Age: 48
End: 2018-09-17

## 2018-09-18 RX ORDER — CLONAZEPAM 0.5 MG/1
TABLET ORAL
Qty: 90 TABLET | Refills: 5 | Status: SHIPPED | OUTPATIENT
Start: 2018-09-18 | End: 2019-03-13

## 2018-09-18 NOTE — TELEPHONE ENCOUNTER
Clonazepam        Last Written Prescription Date: 3/8/18 as per chart review  Last Fill Quantity: 180 tabs, # refills: 5 as per chart review  Last Office Visit: 6/28/18 with PCP as per chart review  Future Office visit:    Next 5 appointments (look out 90 days)     Sep 19, 2018  9:30 AM CDT   MyChart Long with Neptali French MD   [facility] ([facility])    [address]  [facility] [address]   [phone]                 Routing refill request to provider for review/approval because:  Drug not on the [facility], [facility] or [facility] refill protocol or controlled substance    Unable to complete prescription refill per RN Medication Refill Policy. Bassam Sepulveda 9/18/2018 2:58 PM

## 2018-09-19 ENCOUNTER — OFFICE VISIT (OUTPATIENT)
Dept: FAMILY MEDICINE | Facility: OTHER | Age: 48
End: 2018-09-19
Attending: FAMILY MEDICINE
Payer: COMMERCIAL

## 2018-09-19 VITALS
TEMPERATURE: 97.5 F | WEIGHT: 168.8 LBS | SYSTOLIC BLOOD PRESSURE: 146 MMHG | RESPIRATION RATE: 18 BRPM | HEART RATE: 88 BPM | BODY MASS INDEX: 39.07 KG/M2 | DIASTOLIC BLOOD PRESSURE: 94 MMHG | HEIGHT: 55 IN

## 2018-09-19 DIAGNOSIS — M17.10 OSTEOARTHRITIS OF KNEE, UNILATERAL: ICD-10-CM

## 2018-09-19 DIAGNOSIS — G89.29 CHRONIC MIDLINE LOW BACK PAIN WITHOUT SCIATICA: ICD-10-CM

## 2018-09-19 DIAGNOSIS — M54.50 CHRONIC MIDLINE LOW BACK PAIN WITHOUT SCIATICA: ICD-10-CM

## 2018-09-19 DIAGNOSIS — Z02.89 PAIN MEDICATION AGREEMENT COMPLETED: ICD-10-CM

## 2018-09-19 DIAGNOSIS — F41.8 DEPRESSION WITH ANXIETY: ICD-10-CM

## 2018-09-19 DIAGNOSIS — Q77.4 ACHONDROPLASIA: Primary | ICD-10-CM

## 2018-09-19 DIAGNOSIS — G25.81 RESTLESS LEG SYNDROME: ICD-10-CM

## 2018-09-19 DIAGNOSIS — I10 BENIGN ESSENTIAL HYPERTENSION: ICD-10-CM

## 2018-09-19 PROCEDURE — 99214 OFFICE O/P EST MOD 30 MIN: CPT | Performed by: FAMILY MEDICINE

## 2018-09-19 PROCEDURE — 80307 DRUG TEST PRSMV CHEM ANLYZR: CPT | Performed by: FAMILY MEDICINE

## 2018-09-19 RX ORDER — HYDROCODONE BITARTRATE AND ACETAMINOPHEN 5; 325 MG/1; MG/1
2 TABLET ORAL 3 TIMES DAILY PRN
Qty: 200 TABLET | Refills: 0 | Status: SHIPPED | OUTPATIENT
Start: 2018-09-19 | End: 2018-09-19

## 2018-09-19 RX ORDER — HYDROCODONE BITARTRATE AND ACETAMINOPHEN 5; 325 MG/1; MG/1
2 TABLET ORAL 3 TIMES DAILY PRN
Qty: 200 TABLET | Refills: 0 | Status: SHIPPED | OUTPATIENT
Start: 2018-09-19 | End: 2018-12-20

## 2018-09-19 ASSESSMENT — PAIN SCALES - GENERAL: PAINLEVEL: SEVERE PAIN (6)

## 2018-09-19 ASSESSMENT — ANXIETY QUESTIONNAIRES
7. FEELING AFRAID AS IF SOMETHING AWFUL MIGHT HAPPEN: NOT AT ALL
1. FEELING NERVOUS, ANXIOUS, OR ON EDGE: NOT AT ALL
5. BEING SO RESTLESS THAT IT IS HARD TO SIT STILL: NOT AT ALL
GAD7 TOTAL SCORE: 0
3. WORRYING TOO MUCH ABOUT DIFFERENT THINGS: NOT AT ALL
6. BECOMING EASILY ANNOYED OR IRRITABLE: NOT AT ALL
2. NOT BEING ABLE TO STOP OR CONTROL WORRYING: NOT AT ALL
IF YOU CHECKED OFF ANY PROBLEMS ON THIS QUESTIONNAIRE, HOW DIFFICULT HAVE THESE PROBLEMS MADE IT FOR YOU TO DO YOUR WORK, TAKE CARE OF THINGS AT HOME, OR GET ALONG WITH OTHER PEOPLE: NOT DIFFICULT AT ALL

## 2018-09-19 ASSESSMENT — PATIENT HEALTH QUESTIONNAIRE - PHQ9: 5. POOR APPETITE OR OVEREATING: NOT AT ALL

## 2018-09-19 NOTE — PROGRESS NOTES
"Nursing Notes:   Ruth Salazar LPN  9/19/2018  9:51 AM  Signed  Chief Complaint   Patient presents with     Recheck Medication       Initial BP (!) 146/94  Pulse 88  Temp 97.5  F (36.4  C) (Temporal)  Resp 18  Ht 4' 3\" (1.295 m)  Wt 168 lb 12.8 oz (76.6 kg)  Breastfeeding? No  BMI 45.63 kg/m2 Estimated body mass index is 45.63 kg/(m^2) as calculated from the following:    Height as of this encounter: 4' 3\" (1.295 m).    Weight as of this encounter: 168 lb 12.8 oz (76.6 kg).  Medication Reconciliation: complete    Ruth Salazar LPN      SUBJECTIVE:  Wilmer Morse  is a 47 year old female who comes in today for follow-up and renewal of medication.  She is now 4 months out from her right total knee arthroplasty and is doing well.  She recently saw Dr. Terrazas about a month ago for physiatry follow-up.  She is no longer having any knee pain.  Still has aching sensation in both legs. That suggested some orthotics and discussed use of a scooter for longer distance travel.  They ordered other gait analysis.  She is no longer on gabapentin because it was too sedating, but Dr. Terrazas talked about starting in the lower dose and gradually increasing.  He thought that would be helpful for her.  He recommended gradual weight loss.  Dr. Terrazas also talked about potentially repeating an MRI scan of the brain and entire spine.    CHRONIC PAIN MANAGEMENT:    DIAGNOSIS:      1. Achondroplasia    2. Chronic midline low back pain without sciatica    3. Pain medication agreement 3/19/18    4. Restless leg syndrome    5. Benign essential hypertension    6. Depression with anxiety    7. Pain medication agreement completed    8. Osteoarthritis of knee, unilateral        PAIN CLINIC EVALUATION:(NO)      PAIN MEDICATION AGREEMENT: (YES)    DATE SIGNED: 4/28/16,3/22/17      NON-MEDICATION MODALITIES MAXIMIZED? (YES)  Better after knee injection      NEUROPATHIC PAIN: (NO)  ON GABAPENTIN/LYRICA/TCA/SNRI: (NO) was " previously, but that pain has dissipated.       NOCICEPTIVE PAIN: (YES)      HISTORY OF CD: (NO)      MENTAL HEALTH DIAGNOSIS: (YES)  DIAGNOSIS: Anxiety and restless leg syndrome    ON BENZODIAZEPINES: (YES). Klonopin 0.5-1 mg at bedtime.    DISCLOSURE REGARDING RISK OF CONCOMITANT USE WITH OPIOIDS DISCUSSED: (YES)  DATE DISCUSSED: 4/28/16,06/15/16, 9/6/16, 12/20/16, 3/22/17, 9/15/17      MEDICATION: hydrocodone/APAP 5/325 #200/month, max 7 per day      ORAL MORPHINE EQUIVALENTS: 30/d      LAST TAPER TRIAL: underway.       QUERY TODAY: (YES)  APPROPRIATE?:         (YES)      TOXASSURE TODAY: (NO)  IF NO, DATE OF LAST TOXASURE: 9/6/16, 3/22/17      PAIN SCORE FLOWSHEET REVIEWED: (YES)  STABLE OR IMPROVED: (YES)      AUDIT-10 QUESTIONNAIRE COMPLETED:   AUDIT ALCOHOL SCREEN 6/15/2016   Date of exam 6/15/2016   Frequency 0 = never           OSWESTRY PAIN DISABILITY INDEX:  OSWESTRY DISABILITY INDEX (ESTEFANIA) v2.1A 6/15/2016   Date of exam 6/15/2016   Pain intensity 2 = the pain is moderate at the moment   Personal care 0 = I can look after myself normally without causing additional pain   Lifting 2 = pain prevents me from lifting heavy weights off the floor but I can manage if they are conveniently positioned, e.g. on a table   Walking 2 = pain prevents me from walking more than a quarter of a mile   Sitting 0 = I can sit in any chair as long as I like   Standing 3 = pain prevents me from standing for more than half an hour   Sleeping 0 = my sleep is never interrupted by pain   Sex life 0 = my sex life is normal and causes no additional pain   Social life 0 = my social life is normal and causes me no additional pain   Traveling 0 = I can travel anywhere without pain   Score (max 50) 9                            She is still taking hydrocodone.  Last ToxAssure was in March so she is due.    PHQ-9 SCORE 6/28/2018 7/23/2018 9/19/2018   Total Score 0 0 0     YESI-7 SCORE 6/28/2018 7/23/2018 9/19/2018   Total Score 0 0 0            Past Medical, Family, and Social History reviewed and updated as noted below.   ROS is negative except as noted above       Allergies   Allergen Reactions     Penicillins Unknown   ,   Family History   Problem Relation Age of Onset     Other - See Comments Mother      Achondroplasia.     Breast Cancer Mother      Cancer-breast,breast cancer     Breast Cancer Maternal Grandmother 70     Cancer-breast, breast cancer     Unknown/Adopted Father      Unknown     Family History Negative Daughter      Good Health     Family History Negative Son      Good Health     Family History Negative Other      Good Health     Family History Negative Son      Good Health,(stepson)91   ,   Current Outpatient Prescriptions   Medication     aspirin 325 MG tablet     buPROPion (WELLBUTRIN XL) 300 MG 24 hr tablet     clonazePAM (KLONOPIN) 0.5 MG tablet     cyclobenzaprine (FLEXERIL) 10 MG tablet     ferrous sulfate (IRON) 325 (65 Fe) MG tablet     gabapentin (NEURONTIN) 300 MG capsule     HYDROcodone-acetaminophen (NORCO) 5-325 MG per tablet     ibuprofen (ADVIL/MOTRIN) 600 MG tablet     lisinopril (PRINIVIL/ZESTRIL) 10 MG tablet     sertraline (ZOLOFT) 100 MG tablet     triamcinolone (KENALOG) 0.5 % cream     No current facility-administered medications for this visit.    ,   Past Medical History:   Diagnosis Date     Achondroplasia     No Comments Provided     Family history of malignant neoplasm of breast     No Comments Provided     Hyperlipidemia     not currently on treatment     Other intervertebral disc degeneration, lumbar region     No Comments Provided     Other specified anxiety disorders     No Comments Provided     Overactive bladder     2014     Personal history of other medical treatment (CODE)      3, Para 2-0-1-2.     Primary osteoarthritis of one knee     5/3/2012     Restless legs syndrome     No Comments Provided     Zoster without complications     2006,left side of neck.   ,   Patient  "Active Problem List    Diagnosis Date Noted     Benign essential hypertension 2018     Priority: Medium     Depression with anxiety 2018     Priority: Medium     Family history of breast cancer in female 2018     Priority: Medium     Hyperlipidemia 2018     Priority: Medium     Overview:   not currently on treatment       Restless leg syndrome 2018     Priority: Medium     Dependent edema 09/15/2017     Priority: Medium     Chronic midline low back pain without sciatica 2016     Priority: Medium     Achondroplasia 06/15/2016     Priority: Medium     Pain medication agreement 3/19/18 2016     Priority: Medium     Osteoarthritis of knee, unilateral 2012     Priority: Medium   ,   Past Surgical History:   Procedure Laterality Date     ADENOIDECTOMY      as a child      SECTION      1996,Primary low transverse  section. Repeat      LAPAROSCOPIC TUBAL LIGATION           OTHER SURGICAL HISTORY      [id number],OSTEOTOMY,Limb lengthening procedures.     OTHER SURGICAL HISTORY      10/16,,,10/17,340978,OTHER,Right     OTHER SURGICAL HISTORY      2017,260871,OTHER,Right,fluorscopically guided. no improvement.     right knee total arthroplasty Right 2018    Dr. Magaña, [facility]    and   Social History   Substance Use Topics     Smoking status: Never Smoker     Smokeless tobacco: Never Used     Alcohol use No     OBJECTIVE:  BP (!) 146/94  Pulse 88  Temp 97.5  F (36.4  C) (Temporal)  Resp 18  Ht 4' 3\" (1.295 m)  Wt 168 lb 12.8 oz (76.6 kg)  Breastfeeding? No  BMI 45.63 kg/m2   EXAM:  Alert and cooperative, no distress.  Affect is broad ranging and appropriate.  She moves more easily without pain behavior and seems less anxious today.  ASSESSMENT/Plan :    Wilmer was seen today for recheck medication.    Diagnoses and all orders for this visit:    Achondroplasia  -     Drug  Screen Comprehensive , Urine with Reported Meds " (MedTox) (Pain Care Package)    Chronic midline low back pain without sciatica  -     Drug  Screen Comprehensive , Urine with Reported Meds (MedTox) (Pain Care Package)    Pain medication agreement 3/19/18  -     Drug  Screen Comprehensive , Urine with Reported Meds (MedTox) (Pain Care Package)  -     Discontinue: HYDROcodone-acetaminophen (NORCO) 5-325 MG per tablet; Take 2 tablets by mouth 3 times daily as needed for pain . May take a maximum of 7 tablets per day.  -     Discontinue: HYDROcodone-acetaminophen (NORCO) 5-325 MG per tablet; Take 2 tablets by mouth 3 times daily as needed for pain . May take a maximum of 7 tablets per day.  -     HYDROcodone-acetaminophen (NORCO) 5-325 MG per tablet; Take 2 tablets by mouth 3 times daily as needed for pain . May take a maximum of 7 tablets per day.    Restless leg syndrome  -     Drug  Screen Comprehensive , Urine with Reported Meds (MedTox) (Pain Care Package)    Benign essential hypertension    Depression with anxiety    Pain medication agreement completed  -     Drug  Screen Comprehensive , Urine with Reported Meds (MedTox) (Pain Care Package)  -     Discontinue: HYDROcodone-acetaminophen (NORCO) 5-325 MG per tablet; Take 2 tablets by mouth 3 times daily as needed for pain . May take a maximum of 7 tablets per day.  -     Discontinue: HYDROcodone-acetaminophen (NORCO) 5-325 MG per tablet; Take 2 tablets by mouth 3 times daily as needed for pain . May take a maximum of 7 tablets per day.  -     HYDROcodone-acetaminophen (NORCO) 5-325 MG per tablet; Take 2 tablets by mouth 3 times daily as needed for pain . May take a maximum of 7 tablets per day.    Osteoarthritis of knee, unilateral  -     Discontinue: HYDROcodone-acetaminophen (NORCO) 5-325 MG per tablet; Take 2 tablets by mouth 3 times daily as needed for pain . May take a maximum of 7 tablets per day.  -     Discontinue: HYDROcodone-acetaminophen (NORCO) 5-325 MG per tablet; Take 2 tablets by mouth 3 times daily  as needed for pain . May take a maximum of 7 tablets per day.  -     HYDROcodone-acetaminophen (NORCO) 5-325 MG per tablet; Take 2 tablets by mouth 3 times daily as needed for pain . May take a maximum of 7 tablets per day.       query is appropriate.  ToxAssure today.  Renewal on hydrocodone 5/325 number 200 x 3.  She will try and decrease by 1/2 tablet daily each month to wean herself down.    Letter done for [facility].     A total of 25 minutes was spent with the patient, greater than 50% of the time was spent in counseling/discussion of the aforementioned concerns.     Neptali French MD

## 2018-09-19 NOTE — MR AVS SNAPSHOT
After Visit Summary   9/19/2018    Wilmer Morse    MRN: [id number]           Patient Information     Date Of Birth          1970        Visit Information        Provider Department      9/19/2018 9:30 AM Neptali French MD [facility]        Today's Diagnoses     Achondroplasia    -  1    Chronic midline low back pain without sciatica        Pain medication agreement 3/19/18        Restless leg syndrome        Benign essential hypertension        Depression with anxiety        Pain medication agreement completed        Osteoarthritis of knee, unilateral           Follow-ups after your visit        Who to contact     If you have questions or need follow up information about today's clinic visit or your schedule please contact [facility] directly at [phone].  Normal or non-critical lab and imaging results will be communicated to you by HeartThishart, letter or phone within 4 business days after the clinic has received the results. If you do not hear from us within 7 days, please contact the clinic through HeartThishart or phone. If you have a critical or abnormal lab result, we will notify you by phone as soon as possible.  Submit refill requests through GeeYee or call your pharmacy and they will forward the refill request to us. Please allow 3 business days for your refill to be completed.          Additional Information About Your Visit        MyChart Information     GeeYee gives you secure access to your electronic health record. If you see a primary care provider, you can also send messages to your care team and make appointments. If you have questions, please call your primary care clinic.  If you do not have a primary care provider, please call [phone] and they will assist you.        Care EveryWhere ID     This is your Care EveryWhere ID. This could be used by other organizations to access your [facility] medical records  [id number]       "  Your Vitals Were     Pulse Temperature Respirations Height Breastfeeding? BMI (Body Mass Index)    88 97.5  F (36.4  C) (Temporal) 18 4' 3\" (1.295 m) No 45.63 kg/m2       Blood Pressure from Last 3 Encounters:   09/19/18 (!) 146/94   07/23/18 116/64   06/28/18 126/82    Weight from Last 3 Encounters:   09/19/18 168 lb 12.8 oz (76.6 kg)   07/23/18 172 lb 1.6 oz (78.1 kg)   06/28/18 176 lb 9.6 oz (80.1 kg)              We Performed the Following     Drug  Screen Comprehensive , Urine with Reported Meds (MedTox) (Pain Care Package)          Today's Medication Changes          These changes are accurate as of 9/19/18  6:01 PM.  If you have any questions, ask your nurse or doctor.               Start taking these medicines.        Dose/Directions    HYDROcodone-acetaminophen 5-325 MG per tablet   Commonly known as:  NORCO   Used for:  Pain medication agreement completed, Osteoarthritis of knee, unilateral   Started by:  Neptali French MD        Dose:  2 tablet   Take 2 tablets by mouth 3 times daily as needed for pain . May take a maximum of 7 tablets per day.   Quantity:  200 tablet   Refills:  0            Where to get your medicines      Some of these will need a paper prescription and others can be bought over the counter.  Ask your nurse if you have questions.     Bring a paper prescription for each of these medications     HYDROcodone-acetaminophen 5-325 MG per tablet               Information about OPIOIDS     PRESCRIPTION OPIOIDS: WHAT YOU NEED TO KNOW   We gave you an opioid (narcotic) pain medicine. It is important to manage your pain, but opioids are not always the best choice. You should first try all the other options your care team gave you. Take this medicine for as short a time (and as few doses) as possible.    Some activities can increase your pain, such as bandage changes or therapy sessions. It may help to take your pain medicine 30 to 60 minutes before these activities. Reduce your stress by " getting enough sleep, working on hobbies you enjoy and practicing relaxation or meditation. Talk to your care team about ways to manage your pain beyond prescription opioids.    These medicines have risks:    DO NOT drive when on new or higher doses of pain medicine. These medicines can affect your alertness and reaction times, and you could be arrested for driving under the influence (DUI). If you need to use opioids long-term, talk to your care team about driving.    DO NOT operate heavy machinery    DO NOT do any other dangerous activities while taking these medicines.    DO NOT drink any alcohol while taking these medicines.     If the opioid prescribed includes acetaminophen, DO NOT take with any other medicines that contain acetaminophen. Read all labels carefully. Look for the word  acetaminophen  or  Tylenol.  Ask your pharmacist if you have questions or are unsure.    You can get addicted to pain medicines, especially if you have a history of addiction (chemical, alcohol or substance dependence). Talk to your care team about ways to reduce this risk.    All opioids tend to cause constipation. Drink plenty of water and eat foods that have a lot of fiber, such as fruits, vegetables, prune juice, apple juice and high-fiber cereal. Take a laxative (Miralax, milk of magnesia, Colace, Senna) if you don t move your bowels at least every other day. Other side effects include upset stomach, sleepiness, dizziness, throwing up, tolerance (needing more of the medicine to have the same effect), physical dependence and slowed breathing.    Store your pills in a secure place, locked if possible. We will not replace any lost or stolen medicine. If you don t finish your medicine, please throw away (dispose) as directed by your pharmacist. The [place] [employer]ution [employer] has more information about safe disposal: https://www.pca.[email or web address]/[email or web address]-unwanted-medications         Primary Care Provider Office  Phone # Fax #    Neptali GIRON -[phone] [address]  [facility] [address]        Equal Access to Services     EL LYNN : Maia An, margotholeksandr morris, brown kazoltanoleksandr oglesbyalfredooleksandr, vince haynesin hayaawalt oglesbyjosé antonio winstonjodeecandy sanchez. So [facility] [phone].    ATENCIÓN: Si habla español, tiene a valdez disposición servicios gratuitos de asistencia lingüística. Llame al [phone].    We comply with applicable federal civil rights laws and [place] laws. We do not discriminate on the basis of race, color, national origin, age, disability, sex, sexual orientation, or gender identity.            Thank you!     Thank you for choosing [facility]  for your care. Our goal is always to provide you with excellent care. Hearing back from our patients is one way we can continue to improve our services. Please take a few minutes to complete the written survey that you may receive in the mail after your visit with us. Thank you!             Your Updated Medication List - Protect others around you: Learn how to safely use, store and throw away your medicines at www.disposemymeds.org.          This list is accurate as of 9/19/18  6:01 PM.  Always use your most recent med list.                   Brand Name Dispense Instructions for use Diagnosis    aspirin 325 MG tablet      Take 1 tablet by mouth 2 times daily        buPROPion 300 MG 24 hr tablet    WELLBUTRIN XL     Take 300 mg by mouth every morning        clonazePAM 0.5 MG tablet    klonoPIN    90 tablet    TAKE 1 TABLET BY MOUTH THREE TIMES DAILY AS NEEDED    Depression with anxiety       cyclobenzaprine 10 MG tablet    FLEXERIL    30 tablet    Take 1 tablet (10 mg) by mouth 3 times daily as needed for muscle spasms    Muscle spasm       ferrous sulfate 325 (65 Fe) MG tablet    IRON    60 tablet    Take 1 tablet (325 mg) by mouth 2 times daily    Iron deficiency       gabapentin 300 MG capsule    NEURONTIN     90 capsule    Take 1 capsule (300 mg) by mouth 3 times daily    Pain medication agreement completed, Osteoarthritis of knee, unilateral, Acute left-sided low back pain with left-sided sciatica       HYDROcodone-acetaminophen 5-325 MG per tablet    NORCO    200 tablet    Take 2 tablets by mouth 3 times daily as needed for pain . May take a maximum of 7 tablets per day.    Pain medication agreement completed, Osteoarthritis of knee, unilateral       ibuprofen 600 MG tablet    ADVIL/MOTRIN     Take 600 mg by mouth 3 times daily as needed . Maximum of 3200 mg in 24 hours.        lisinopril 10 MG tablet    PRINIVIL/ZESTRIL    30 tablet    Take 1 tablet (10 mg) by mouth daily    Benign essential hypertension       sertraline 100 MG tablet    ZOLOFT    90 tablet    Take 2 tablets (200 mg) by mouth daily    Depression with anxiety       triamcinolone 0.5 % cream    KENALOG    60 g    Apply sparingly to affected area three times daily. Use for 10 days    Contact dermatitis, unspecified contact dermatitis type, unspecified trigger

## 2018-09-19 NOTE — NURSING NOTE
"Chief Complaint   Patient presents with     Recheck Medication       Initial BP (!) 146/94  Pulse 88  Temp 97.5  F (36.4  C) (Temporal)  Resp 18  Ht 4' 3\" (1.295 m)  Wt 168 lb 12.8 oz (76.6 kg)  Breastfeeding? No  BMI 45.63 kg/m2 Estimated body mass index is 45.63 kg/(m^2) as calculated from the following:    Height as of this encounter: 4' 3\" (1.295 m).    Weight as of this encounter: 168 lb 12.8 oz (76.6 kg).  Medication Reconciliation: complete    Ruth Salazar LPN  "

## 2018-09-20 ASSESSMENT — ANXIETY QUESTIONNAIRES: GAD7 TOTAL SCORE: 0

## 2018-09-20 ASSESSMENT — PATIENT HEALTH QUESTIONNAIRE - PHQ9: SUM OF ALL RESPONSES TO PHQ QUESTIONS 1-9: 0

## 2018-09-24 ENCOUNTER — TELEPHONE (OUTPATIENT)
Dept: FAMILY MEDICINE | Facility: OTHER | Age: 48
End: 2018-09-24

## 2018-09-24 LAB — PAIN DRUG SCR UR W RPTD MEDS: NORMAL

## 2018-09-24 NOTE — TELEPHONE ENCOUNTER
What diagnosis would you connect with her being permanently disabled?  Allie Giles LPN  9/24/2018  1:20 PM

## 2018-09-24 NOTE — TELEPHONE ENCOUNTER
Anesthetic History   No history of anesthetic complications            Review of Systems / Medical History  Patient summary reviewed and pertinent labs reviewed    Pulmonary        Sleep apnea           Neuro/Psych       CVA (memory issues, otherwise on symptoms.)       Cardiovascular    Hypertension          CAD, PAD, CABG and hyperlipidemia    Exercise tolerance: <4 METS  Comments: H/o mr, mild on most recent echo.    GI/Hepatic/Renal     GERD: well controlled    Renal disease: CRI       Endo/Other    Diabetes: type 2    Arthritis     Other Findings              Physical Exam    Airway  Mallampati: II  TM Distance: 4 - 6 cm  Neck ROM: normal range of motion   Mouth opening: Normal     Cardiovascular    Rhythm: regular  Rate: normal      Pertinent negatives: No murmur, JVD and peripheral edema   Dental  No notable dental hx       Pulmonary  Breath sounds clear to auscultation               Abdominal         Other Findings            Anesthetic Plan    ASA: 3  Anesthesia type: total IV anesthesia          Induction: Intravenous  Anesthetic plan and risks discussed with: Patient Chronic back and leg pain due to achondroplasia, multiple leg surgeries, osteoarthritis.  Neptali French MD on 9/24/2018 at 6:02 PM

## 2018-09-25 NOTE — TELEPHONE ENCOUNTER
Spoke with Linda and gave her diagnosis and codes.  Meredith Kelley LPN...................9/25/2018  8:23 AM

## 2018-10-02 ENCOUNTER — MYC MEDICAL ADVICE (OUTPATIENT)
Dept: FAMILY MEDICINE | Facility: OTHER | Age: 48
End: 2018-10-02

## 2018-11-19 ENCOUNTER — TRANSFERRED RECORDS (OUTPATIENT)
Dept: HEALTH INFORMATION MANAGEMENT | Facility: OTHER | Age: 48
End: 2018-11-19

## 2018-12-20 ENCOUNTER — OFFICE VISIT (OUTPATIENT)
Dept: FAMILY MEDICINE | Facility: OTHER | Age: 48
End: 2018-12-20
Attending: FAMILY MEDICINE
Payer: COMMERCIAL

## 2018-12-20 VITALS
TEMPERATURE: 98.7 F | WEIGHT: 171 LBS | SYSTOLIC BLOOD PRESSURE: 112 MMHG | DIASTOLIC BLOOD PRESSURE: 80 MMHG | HEART RATE: 68 BPM | BODY MASS INDEX: 46.22 KG/M2

## 2018-12-20 DIAGNOSIS — M54.50 CHRONIC MIDLINE LOW BACK PAIN WITHOUT SCIATICA: Primary | ICD-10-CM

## 2018-12-20 DIAGNOSIS — F41.8 DEPRESSION WITH ANXIETY: ICD-10-CM

## 2018-12-20 DIAGNOSIS — I10 BENIGN ESSENTIAL HYPERTENSION: ICD-10-CM

## 2018-12-20 DIAGNOSIS — G89.29 CHRONIC MIDLINE LOW BACK PAIN WITHOUT SCIATICA: Primary | ICD-10-CM

## 2018-12-20 DIAGNOSIS — Q77.4 ACHONDROPLASIA: ICD-10-CM

## 2018-12-20 DIAGNOSIS — M17.10 OSTEOARTHRITIS OF KNEE, UNILATERAL: ICD-10-CM

## 2018-12-20 DIAGNOSIS — Z02.89 PAIN MEDICATION AGREEMENT COMPLETED: ICD-10-CM

## 2018-12-20 DIAGNOSIS — G25.81 RESTLESS LEG SYNDROME: ICD-10-CM

## 2018-12-20 PROCEDURE — 99214 OFFICE O/P EST MOD 30 MIN: CPT | Performed by: FAMILY MEDICINE

## 2018-12-20 RX ORDER — HYDROCODONE BITARTRATE AND ACETAMINOPHEN 5; 325 MG/1; MG/1
2 TABLET ORAL 3 TIMES DAILY PRN
Qty: 200 TABLET | Refills: 0 | Status: SHIPPED | OUTPATIENT
Start: 2018-12-20 | End: 2019-03-13

## 2018-12-20 RX ORDER — HYDROCODONE BITARTRATE AND ACETAMINOPHEN 5; 325 MG/1; MG/1
2 TABLET ORAL 3 TIMES DAILY PRN
Qty: 200 TABLET | Refills: 0 | Status: SHIPPED | OUTPATIENT
Start: 2018-12-20 | End: 2018-12-20

## 2018-12-20 RX ORDER — BUPROPION HYDROCHLORIDE 150 MG/1
TABLET ORAL
Qty: 60 TABLET | Refills: 0 | Status: SHIPPED | OUTPATIENT
Start: 2018-12-20 | End: 2019-03-13

## 2018-12-20 RX ORDER — BUPROPION HYDROCHLORIDE 300 MG/1
300 TABLET ORAL EVERY MORNING
Qty: 90 TABLET | Status: SHIPPED | OUTPATIENT
Start: 2018-12-20 | End: 2018-12-31

## 2018-12-20 ASSESSMENT — ANXIETY QUESTIONNAIRES
GAD7 TOTAL SCORE: 1
2. NOT BEING ABLE TO STOP OR CONTROL WORRYING: SEVERAL DAYS
6. BECOMING EASILY ANNOYED OR IRRITABLE: NOT AT ALL
IF YOU CHECKED OFF ANY PROBLEMS ON THIS QUESTIONNAIRE, HOW DIFFICULT HAVE THESE PROBLEMS MADE IT FOR YOU TO DO YOUR WORK, TAKE CARE OF THINGS AT HOME, OR GET ALONG WITH OTHER PEOPLE: SOMEWHAT DIFFICULT
3. WORRYING TOO MUCH ABOUT DIFFERENT THINGS: NOT AT ALL
5. BEING SO RESTLESS THAT IT IS HARD TO SIT STILL: NOT AT ALL
3. WORRYING TOO MUCH ABOUT DIFFERENT THINGS: SEVERAL DAYS
1. FEELING NERVOUS, ANXIOUS, OR ON EDGE: NOT AT ALL
GAD7 TOTAL SCORE: 1
1. FEELING NERVOUS, ANXIOUS, OR ON EDGE: NOT AT ALL
IF YOU CHECKED OFF ANY PROBLEMS ON THIS QUESTIONNAIRE, HOW DIFFICULT HAVE THESE PROBLEMS MADE IT FOR YOU TO DO YOUR WORK, TAKE CARE OF THINGS AT HOME, OR GET ALONG WITH OTHER PEOPLE: SOMEWHAT DIFFICULT
7. FEELING AFRAID AS IF SOMETHING AWFUL MIGHT HAPPEN: NOT AT ALL
2. NOT BEING ABLE TO STOP OR CONTROL WORRYING: NOT AT ALL
6. BECOMING EASILY ANNOYED OR IRRITABLE: NOT AT ALL
7. FEELING AFRAID AS IF SOMETHING AWFUL MIGHT HAPPEN: NOT AT ALL
5. BEING SO RESTLESS THAT IT IS HARD TO SIT STILL: NOT AT ALL

## 2018-12-20 ASSESSMENT — PATIENT HEALTH QUESTIONNAIRE - PHQ9
SUM OF ALL RESPONSES TO PHQ QUESTIONS 1-9: 1
5. POOR APPETITE OR OVEREATING: NOT AT ALL
5. POOR APPETITE OR OVEREATING: NOT AT ALL

## 2018-12-20 NOTE — NURSING NOTE
"Chief Complaint   Patient presents with     Recheck Medication       Initial /80   Pulse 68   Temp 98.7  F (37.1  C)   Wt 77.6 kg (171 lb)   BMI 46.22 kg/m   Estimated body mass index is 46.22 kg/m  as calculated from the following:    Height as of 9/19/18: 1.295 m (4' 3\").    Weight as of this encounter: 77.6 kg (171 lb).  Medication Reconciliation: complete    Ruth Salazar LPN  "

## 2018-12-20 NOTE — PROGRESS NOTES
"Nursing Notes:   Ruth Salazar LPN  12/20/2018  4:23 PM  Signed  Chief Complaint   Patient presents with     Recheck Medication       Initial /80   Pulse 68   Temp 98.7  F (37.1  C)   Wt 77.6 kg (171 lb)   BMI 46.22 kg/m    Estimated body mass index is 46.22 kg/m  as calculated from the following:    Height as of 9/19/18: 1.295 m (4' 3\").    Weight as of this encounter: 77.6 kg (171 lb).  Medication Reconciliation: complete    Ruth Salazar LPN      SUBJECTIVE:  Wilmer Morse  is a 48 year old female who comes in today for follow-up and recheck of medications.  I last saw her 3 months ago.  She is now 7 months out from her right total knee arthroplasty and is doing well.  She recently saw Dr. Magaña and he is happy with how she is doing.  Dr. Terrazas of physiatry talked about doing an MRI of the brain and entire spine in addition to another gait analysis.  Because she was still having an aching sensation in both legs he suggested possibly some orthotics and perhaps a scooter for longer distance travel.    We discussed having her try and wean down by half tablet daily each month on the hydrocodone.  Some days she only takes 5, some days she may take up to 7 but in general is using less.        CHRONIC PAIN MANAGEMENT:    DIAGNOSIS:      1. Chronic midline low back pain without sciatica    2. Achondroplasia    3. Restless leg syndrome    4. Benign essential hypertension    5. Pain medication agreement 3/19/18    6. Pain medication agreement completed    7. Osteoarthritis of knee, unilateral    8. Depression with anxiety        PAIN CLINIC EVALUATION:(NO)      PAIN MEDICATION AGREEMENT: (YES)    DATE SIGNED: 4/28/16,3/22/17, 3/19/18      NON-MEDICATION MODALITIES MAXIMIZED? (YES)  Better after knee replacement surgery.      NEUROPATHIC PAIN: (NO)  ON GABAPENTIN/LYRICA/TCA/SNRI: (NO) was previously, but that pain has dissipated.       NOCICEPTIVE PAIN: (YES)      HISTORY OF CD: (NO)      MENTAL " HEALTH DIAGNOSIS: (YES)  DIAGNOSIS: Anxiety and restless leg syndrome    ON BENZODIAZEPINES: (YES). Klonopin 0.5-1 mg at bedtime.    DISCLOSURE REGARDING RISK OF CONCOMITANT USE WITH OPIOIDS DISCUSSED: (YES)  DATE DISCUSSED: 4/28/16,06/15/16, 9/6/16, 12/20/16, 3/22/17, 9/15/17      MEDICATION: hydrocodone/APAP 5/325 #200/month, max 7 per day      ORAL MORPHINE EQUIVALENTS: 30/d      LAST TAPER TRIAL: underway.       QUERY TODAY: (YES)  APPROPRIATE?:         (YES)      TOXASSURE TODAY: (NO)  IF NO, DATE OF LAST TOXASURE: 9/6/16, 3/22/17, 3/8/18, 9/19/18      PAIN SCORE FLOWSHEET REVIEWED: (YES)  STABLE OR IMPROVED: (YES)      She got clearance for [employer] disability, and waiting on SSDI.  She has been seeing Cris in [place] for therapy which has been helpful.  She went off Wellbutrin in September and had had less focus. She thinks it was helpful.  She would be interested in going back on it.                   Past Medical, Family, and Social History reviewed and updated as noted below.   ROS is negative except as noted above       Allergies   Allergen Reactions     Penicillins Unknown   ,   Family History   Problem Relation Age of Onset     Other - See Comments Mother         Achondroplasia.     Breast Cancer Mother         Cancer-breast,breast cancer     Breast Cancer Maternal Grandmother 70        Cancer-breast, breast cancer     Unknown/Adopted Father         Unknown     Family History Negative Daughter         Good Health     Family History Negative Son         Good Health     Family History Negative Other         Good Health     Family History Negative Son         Good Health,(stepson)7/24/91   ,   Current Outpatient Medications   Medication     buPROPion (WELLBUTRIN XL) 150 MG 24 hr tablet     buPROPion (WELLBUTRIN XL) 300 MG 24 hr tablet     clonazePAM (KLONOPIN) 0.5 MG tablet     ferrous sulfate (IRON) 325 (65 Fe) MG tablet     gabapentin (NEURONTIN) 300 MG capsule     HYDROcodone-acetaminophen (NORCO)  5-325 MG tablet     ibuprofen (ADVIL/MOTRIN) 600 MG tablet     lisinopril (PRINIVIL/ZESTRIL) 10 MG tablet     sertraline (ZOLOFT) 100 MG tablet     No current facility-administered medications for this visit.    ,   Past Medical History:   Diagnosis Date     Achondroplasia     No Comments Provided     Family history of malignant neoplasm of breast     No Comments Provided     Hyperlipidemia     not currently on treatment     Other intervertebral disc degeneration, lumbar region     No Comments Provided     Other specified anxiety disorders     No Comments Provided     Overactive bladder     2014     Personal history of other medical treatment (CODE)      3, Para 2-0-1-2.     Primary osteoarthritis of one knee     5/3/2012     Restless legs syndrome     No Comments Provided     Zoster without complications     2006,left side of neck.   ,   Patient Active Problem List    Diagnosis Date Noted     Benign essential hypertension 2018     Priority: Medium     Depression with anxiety 2018     Priority: Medium     Family history of breast cancer in female 2018     Priority: Medium     Hyperlipidemia 2018     Priority: Medium     Overview:   not currently on treatment       Restless leg syndrome 2018     Priority: Medium     Dependent edema 09/15/2017     Priority: Medium     Chronic midline low back pain without sciatica 2016     Priority: Medium     Achondroplasia 06/15/2016     Priority: Medium     Pain medication agreement 3/19/18 2016     Priority: Medium     Osteoarthritis of knee, unilateral 2012     Priority: Medium   ,   Past Surgical History:   Procedure Laterality Date     ADENOIDECTOMY      as a child      SECTION      1996,Primary low transverse  section. Repeat      LAPAROSCOPIC TUBAL LIGATION           OTHER SURGICAL HISTORY      [id number],OSTEOTOMY,Limb lengthening procedures.     OTHER SURGICAL HISTORY       10/16,4/17,7/17,10/17,[id number],OTHER,Right     OTHER SURGICAL HISTORY      09/13/2017,930451,OTHER,Right,fluorscopically guided. no improvement.     right knee total arthroplasty Right 05/01/2018    Dr. Magaña, [facility]    and   Social History     Tobacco Use     Smoking status: Never Smoker     Smokeless tobacco: Never Used   Substance Use Topics     Alcohol use: No     Alcohol/week: 0.0 oz     OBJECTIVE:  /80   Pulse 68   Temp 98.7  F (37.1  C)   Wt 77.6 kg (171 lb)   BMI 46.22 kg/m     EXAM:  Alert and cooperative, no distress.  Moves easily without pain behavior.  Affect is broad ranging and appropriate.  Depression and anxiety inventories are good, but she feels less focused.    PHQ-9 SCORE 7/23/2018 9/19/2018 12/20/2018   PHQ-9 Total Score 0 0 1     YESI-7 SCORE 9/19/2018 12/20/2018 12/20/2018   Total Score 0 1 1         ASSESSMENT/Plan :    Wilmer was seen today for recheck medication.    Diagnoses and all orders for this visit:    Chronic midline low back pain without sciatica    Achondroplasia    Restless leg syndrome    Benign essential hypertension    Pain medication agreement 3/19/18  -     Discontinue: HYDROcodone-acetaminophen (NORCO) 5-325 MG tablet; Take 2 tablets by mouth 3 times daily as needed for pain . May take a maximum of 7 tablets per day.  -     Discontinue: HYDROcodone-acetaminophen (NORCO) 5-325 MG tablet; Take 2 tablets by mouth 3 times daily as needed for pain . May take a maximum of 7 tablets per day.  -     HYDROcodone-acetaminophen (NORCO) 5-325 MG tablet; Take 2 tablets by mouth 3 times daily as needed for pain . May take a maximum of 7 tablets per day.    Pain medication agreement completed  -     Discontinue: HYDROcodone-acetaminophen (NORCO) 5-325 MG tablet; Take 2 tablets by mouth 3 times daily as needed for pain . May take a maximum of 7 tablets per day.  -     Discontinue: HYDROcodone-acetaminophen (NORCO) 5-325 MG tablet; Take 2 tablets by mouth 3 times daily as  needed for pain . May take a maximum of 7 tablets per day.  -     HYDROcodone-acetaminophen (NORCO) 5-325 MG tablet; Take 2 tablets by mouth 3 times daily as needed for pain . May take a maximum of 7 tablets per day.    Osteoarthritis of knee, unilateral  -     Discontinue: HYDROcodone-acetaminophen (NORCO) 5-325 MG tablet; Take 2 tablets by mouth 3 times daily as needed for pain . May take a maximum of 7 tablets per day.  -     Discontinue: HYDROcodone-acetaminophen (NORCO) 5-325 MG tablet; Take 2 tablets by mouth 3 times daily as needed for pain . May take a maximum of 7 tablets per day.  -     HYDROcodone-acetaminophen (NORCO) 5-325 MG tablet; Take 2 tablets by mouth 3 times daily as needed for pain . May take a maximum of 7 tablets per day.    Depression with anxiety  -     buPROPion (WELLBUTRIN XL) 150 MG 24 hr tablet; One tab daily for 7 days, then increase to 2 tab per day  -     buPROPion (WELLBUTRIN XL) 300 MG 24 hr tablet; Take 1 tablet (300 mg) by mouth every morning      Feel it is reasonable to go ahead with Wellbutrin  mg daily for a week increasing to 300 mg thereafter as that seemed to help her focus.  Support and encouragement offered with regard to her anxiety and encouraged her to follow through with ongoing counseling/therapy.    Also encouraged her to continue to work on increasing exercise and working on weight loss.     query is appropriate.  Renewal on hydrocodone 5/325#200 x 3.  Follow-up in 3 months, sooner if needed    A total of 25 minutes was spent with the patient, greater than 50% of the time was spent in counseling/discussion of the aforementioned concerns.       Neptali French MD

## 2018-12-21 ASSESSMENT — ANXIETY QUESTIONNAIRES: GAD7 TOTAL SCORE: 1

## 2018-12-31 DIAGNOSIS — F41.8 DEPRESSION WITH ANXIETY: ICD-10-CM

## 2019-01-04 RX ORDER — BUPROPION HYDROCHLORIDE 300 MG/1
TABLET ORAL
Qty: 90 TABLET | Refills: 3 | Status: SHIPPED | OUTPATIENT
Start: 2019-01-04 | End: 2019-03-13

## 2019-01-04 NOTE — TELEPHONE ENCOUNTER
Chart review shows that patient has an active Rx on file as noted below for Rx as requested:    Medication Detail      Disp Refills Start End WHITNEY   buPROPion (WELLBUTRIN XL) 300 MG 24 hr tablet 90 tablet prn 12/20/2018 12/20/2019 --   Sig - Route: Take 1 tablet (300 mg) by mouth every morning - Oral   Sent to pharmacy as: buPROPion (WELLBUTRIN XL) 300 MG 24 hr tablet   Class: E-Prescribe   Notes to Pharmacy: To start after ramp up with 150 mg dose.   Order: [id number]   E-Prescribing Status: Receipt confirmed by pharmacy (12/20/2018  4:45 PM CST)   Printout Tracking     External Result Report   Pharmacy     [id number] - [place], [place] - [address]     Rx as noted above is sufficient for an annual supply and was sent in on 12/20/18. However, pharmacies do not process refills of PRN.    Writer will refill Rx as requested for an annual supply at this time to match Rx as noted above but will update refills to 3 instead of PRN.    Prescription refilled per RN Medication Refill Policy..................Bassam Sepulveda 1/4/2019 11:10 AM

## 2019-02-11 DIAGNOSIS — F41.8 DEPRESSION WITH ANXIETY: ICD-10-CM

## 2019-02-12 RX ORDER — CLONAZEPAM 0.5 MG/1
TABLET ORAL
Qty: 90 TABLET | Refills: 0 | OUTPATIENT
Start: 2019-02-12

## 2019-02-12 NOTE — TELEPHONE ENCOUNTER
Chart review shows that patient with active order on file for Rx as requested as noted below:    Medication Detail      Disp Refills Start End WHITNEY   clonazePAM (KLONOPIN) 0.5 MG tablet 90 tablet 5 9/18/2018  No   Sig: TAKE 1 TABLET BY MOUTH THREE TIMES DAILY AS NEEDED   Class: Local Print   Order: [id number]   Printout Tracking     External Result Report   Pharmacy     [facility] [address] [place] [address] [address]     Rx as noted above would be due for a refill as of 3/18/19. Rx as requested has been requested to early. Writer will refuse Rx request at this time and make note of Rx as noted above in Rx refusal to pharmacy.    Unable to complete prescription refill per RN Medication Refill Policy. Bassam Sepulveda 2/12/2019 2:34 PM

## 2019-02-28 ENCOUNTER — HOSPITAL ENCOUNTER (OUTPATIENT)
Dept: MAMMOGRAPHY | Facility: OTHER | Age: 49
Discharge: HOME | End: 2019-02-28
Attending: FAMILY MEDICINE | Admitting: FAMILY MEDICINE
Payer: COMMERCIAL

## 2019-02-28 DIAGNOSIS — Z12.39 SCREENING BREAST EXAMINATION: ICD-10-CM

## 2019-02-28 PROCEDURE — 77067 SCR MAMMO BI INCL CAD: CPT

## 2019-03-01 ENCOUNTER — HOSPITAL ENCOUNTER (OUTPATIENT)
Dept: ULTRASOUND IMAGING | Facility: OTHER | Age: 49
End: 2019-03-01
Attending: FAMILY MEDICINE
Payer: COMMERCIAL

## 2019-03-01 ENCOUNTER — HOSPITAL ENCOUNTER (OUTPATIENT)
Dept: MAMMOGRAPHY | Facility: OTHER | Age: 49
Discharge: HOME | End: 2019-03-01
Attending: FAMILY MEDICINE | Admitting: FAMILY MEDICINE
Payer: COMMERCIAL

## 2019-03-01 DIAGNOSIS — R92.8 ABNORMAL MAMMOGRAM: ICD-10-CM

## 2019-03-01 PROCEDURE — G0279 TOMOSYNTHESIS, MAMMO: HCPCS

## 2019-03-01 PROCEDURE — 76642 ULTRASOUND BREAST LIMITED: CPT | Mod: LT

## 2019-03-08 DIAGNOSIS — I10 BENIGN ESSENTIAL HYPERTENSION: ICD-10-CM

## 2019-03-08 RX ORDER — LISINOPRIL 10 MG/1
TABLET ORAL
Qty: 30 TABLET | Refills: 0 | Status: SHIPPED | OUTPATIENT
Start: 2019-03-08 | End: 2019-03-13

## 2019-03-08 NOTE — TELEPHONE ENCOUNTER
Chart review shows that patient is coming due for annual labs to support use of Rx as requested. Patient with an appointment scheduled with PCP for 3/13/19. Writer will refill Rx as requested.    Prescription refilled per RN Medication Refill Policy..................Bassam Sepulveda 3/8/2019 2:05 PM

## 2019-03-13 ENCOUNTER — OFFICE VISIT (OUTPATIENT)
Dept: FAMILY MEDICINE | Facility: OTHER | Age: 49
End: 2019-03-13
Attending: FAMILY MEDICINE
Payer: COMMERCIAL

## 2019-03-13 VITALS
DIASTOLIC BLOOD PRESSURE: 86 MMHG | BODY MASS INDEX: 39.11 KG/M2 | SYSTOLIC BLOOD PRESSURE: 134 MMHG | WEIGHT: 169 LBS | HEIGHT: 55 IN | HEART RATE: 80 BPM | RESPIRATION RATE: 16 BRPM | TEMPERATURE: 99.5 F

## 2019-03-13 DIAGNOSIS — Z12.4 SCREENING FOR MALIGNANT NEOPLASM OF CERVIX: ICD-10-CM

## 2019-03-13 DIAGNOSIS — M54.42 ACUTE LEFT-SIDED LOW BACK PAIN WITH LEFT-SIDED SCIATICA: ICD-10-CM

## 2019-03-13 DIAGNOSIS — Z00.00 VISIT FOR PREVENTIVE HEALTH EXAMINATION: Primary | ICD-10-CM

## 2019-03-13 DIAGNOSIS — I10 BENIGN ESSENTIAL HYPERTENSION: ICD-10-CM

## 2019-03-13 DIAGNOSIS — E78.5 HYPERLIPIDEMIA, UNSPECIFIED HYPERLIPIDEMIA TYPE: ICD-10-CM

## 2019-03-13 DIAGNOSIS — F41.8 DEPRESSION WITH ANXIETY: ICD-10-CM

## 2019-03-13 DIAGNOSIS — M54.50 CHRONIC MIDLINE LOW BACK PAIN WITHOUT SCIATICA: ICD-10-CM

## 2019-03-13 DIAGNOSIS — G89.29 CHRONIC MIDLINE LOW BACK PAIN WITHOUT SCIATICA: ICD-10-CM

## 2019-03-13 DIAGNOSIS — Z02.89 PAIN MEDICATION AGREEMENT COMPLETED: ICD-10-CM

## 2019-03-13 DIAGNOSIS — G25.81 RESTLESS LEG SYNDROME: ICD-10-CM

## 2019-03-13 DIAGNOSIS — E61.1 IRON DEFICIENCY: ICD-10-CM

## 2019-03-13 DIAGNOSIS — M17.10 OSTEOARTHRITIS OF KNEE, UNILATERAL: ICD-10-CM

## 2019-03-13 DIAGNOSIS — Q77.4 ACHONDROPLASIA: ICD-10-CM

## 2019-03-13 LAB
ALBUMIN SERPL-MCNC: 4.2 G/DL (ref 3.5–5.7)
ALP SERPL-CCNC: 65 U/L (ref 34–104)
ALT SERPL W P-5'-P-CCNC: 15 U/L (ref 7–52)
ANION GAP SERPL CALCULATED.3IONS-SCNC: 10 MMOL/L (ref 3–14)
AST SERPL W P-5'-P-CCNC: 11 U/L (ref 13–39)
BASOPHILS # BLD AUTO: 0 10E9/L (ref 0–0.2)
BASOPHILS NFR BLD AUTO: 0.3 %
BILIRUB SERPL-MCNC: 0.3 MG/DL (ref 0.3–1)
BUN SERPL-MCNC: 16 MG/DL (ref 7–25)
CALCIUM SERPL-MCNC: 9.6 MG/DL (ref 8.6–10.3)
CHLORIDE SERPL-SCNC: 104 MMOL/L (ref 98–107)
CHOLEST SERPL-MCNC: 178 MG/DL
CO2 SERPL-SCNC: 22 MMOL/L (ref 21–31)
CREAT SERPL-MCNC: 0.57 MG/DL (ref 0.6–1.2)
DIFFERENTIAL METHOD BLD: NORMAL
EOSINOPHIL # BLD AUTO: 0.1 10E9/L (ref 0–0.7)
EOSINOPHIL NFR BLD AUTO: 1.8 %
ERYTHROCYTE [DISTWIDTH] IN BLOOD BY AUTOMATED COUNT: 13.7 % (ref 10–15)
GFR SERPL CREATININE-BSD FRML MDRD: >90 ML/MIN/{1.73_M2}
GLUCOSE SERPL-MCNC: 87 MG/DL (ref 70–105)
HCT VFR BLD AUTO: 40.9 % (ref 35–47)
HDLC SERPL-MCNC: 42 MG/DL (ref 23–92)
HGB BLD-MCNC: 13.3 G/DL (ref 11.7–15.7)
IMM GRANULOCYTES # BLD: 0 10E9/L (ref 0–0.4)
IMM GRANULOCYTES NFR BLD: 0.3 %
IRON SATN MFR SERPL: 6 % (ref 20–55)
IRON SERPL-MCNC: 29 UG/DL (ref 50–212)
LDLC SERPL CALC-MCNC: 112 MG/DL
LYMPHOCYTES # BLD AUTO: 2.2 10E9/L (ref 0.8–5.3)
LYMPHOCYTES NFR BLD AUTO: 29.9 %
MCH RBC QN AUTO: 27.8 PG (ref 26.5–33)
MCHC RBC AUTO-ENTMCNC: 32.5 G/DL (ref 31.5–36.5)
MCV RBC AUTO: 86 FL (ref 78–100)
MONOCYTES # BLD AUTO: 0.6 10E9/L (ref 0–1.3)
MONOCYTES NFR BLD AUTO: 8.8 %
NEUTROPHILS # BLD AUTO: 4.2 10E9/L (ref 1.6–8.3)
NEUTROPHILS NFR BLD AUTO: 58.9 %
NONHDLC SERPL-MCNC: 136 MG/DL
PLATELET # BLD AUTO: 333 10E9/L (ref 150–450)
POTASSIUM SERPL-SCNC: 4 MMOL/L (ref 3.5–5.1)
PROT SERPL-MCNC: 7.7 G/DL (ref 6.4–8.9)
RBC # BLD AUTO: 4.78 10E12/L (ref 3.8–5.2)
SODIUM SERPL-SCNC: 136 MMOL/L (ref 134–144)
TIBC SERPL-MCNC: 449.4 UG/DL (ref 245–400)
TRIGL SERPL-MCNC: 121 MG/DL
UIBC (UNSATURATED): 420.4 MG/DL
WBC # BLD AUTO: 7.2 10E9/L (ref 4–11)

## 2019-03-13 PROCEDURE — 83540 ASSAY OF IRON: CPT | Performed by: FAMILY MEDICINE

## 2019-03-13 PROCEDURE — 80053 COMPREHEN METABOLIC PANEL: CPT | Performed by: FAMILY MEDICINE

## 2019-03-13 PROCEDURE — 80307 DRUG TEST PRSMV CHEM ANLYZR: CPT | Performed by: FAMILY MEDICINE

## 2019-03-13 PROCEDURE — 85025 COMPLETE CBC W/AUTO DIFF WBC: CPT | Performed by: FAMILY MEDICINE

## 2019-03-13 PROCEDURE — G0123 SCREEN CERV/VAG THIN LAYER: HCPCS | Performed by: FAMILY MEDICINE

## 2019-03-13 PROCEDURE — 80061 LIPID PANEL: CPT | Performed by: FAMILY MEDICINE

## 2019-03-13 PROCEDURE — 83550 IRON BINDING TEST: CPT | Performed by: FAMILY MEDICINE

## 2019-03-13 PROCEDURE — 87624 HPV HI-RISK TYP POOLED RSLT: CPT | Performed by: FAMILY MEDICINE

## 2019-03-13 PROCEDURE — 36415 COLL VENOUS BLD VENIPUNCTURE: CPT | Performed by: FAMILY MEDICINE

## 2019-03-13 PROCEDURE — 99396 PREV VISIT EST AGE 40-64: CPT | Performed by: FAMILY MEDICINE

## 2019-03-13 RX ORDER — GABAPENTIN 300 MG/1
300 CAPSULE ORAL 3 TIMES DAILY
Qty: 90 CAPSULE | Refills: 11 | Status: SHIPPED | OUTPATIENT
Start: 2019-03-13 | End: 2020-01-27

## 2019-03-13 RX ORDER — BUPROPION HYDROCHLORIDE 300 MG/1
300 TABLET ORAL EVERY MORNING
Qty: 90 TABLET | Refills: 11 | Status: SHIPPED | OUTPATIENT
Start: 2019-03-13 | End: 2020-01-27

## 2019-03-13 RX ORDER — SERTRALINE HYDROCHLORIDE 100 MG/1
200 TABLET, FILM COATED ORAL DAILY
Qty: 180 TABLET | Refills: 11 | Status: SHIPPED | OUTPATIENT
Start: 2019-03-13 | End: 2020-01-27

## 2019-03-13 RX ORDER — HYDROCODONE BITARTRATE AND ACETAMINOPHEN 5; 325 MG/1; MG/1
2 TABLET ORAL 3 TIMES DAILY PRN
Qty: 200 TABLET | Refills: 0 | Status: SHIPPED | OUTPATIENT
Start: 2019-03-13 | End: 2019-06-18

## 2019-03-13 RX ORDER — HYDROCODONE BITARTRATE AND ACETAMINOPHEN 5; 325 MG/1; MG/1
2 TABLET ORAL 3 TIMES DAILY PRN
Qty: 200 TABLET | Refills: 0 | Status: SHIPPED | OUTPATIENT
Start: 2019-03-13 | End: 2019-03-13

## 2019-03-13 RX ORDER — LISINOPRIL 10 MG/1
10 TABLET ORAL DAILY
Qty: 90 TABLET | Refills: 11 | Status: SHIPPED | OUTPATIENT
Start: 2019-03-13 | End: 2020-01-27

## 2019-03-13 RX ORDER — CLONAZEPAM 0.5 MG/1
TABLET ORAL
Qty: 90 TABLET | Refills: 5 | Status: SHIPPED | OUTPATIENT
Start: 2019-03-13 | End: 2019-10-13

## 2019-03-13 ASSESSMENT — MIFFLIN-ST. JEOR: SCORE: 1180.21

## 2019-03-13 ASSESSMENT — PATIENT HEALTH QUESTIONNAIRE - PHQ9
SUM OF ALL RESPONSES TO PHQ QUESTIONS 1-9: 1
5. POOR APPETITE OR OVEREATING: NOT AT ALL

## 2019-03-13 ASSESSMENT — ANXIETY QUESTIONNAIRES

## 2019-03-13 ASSESSMENT — PAIN SCALES - GENERAL: PAINLEVEL: MODERATE PAIN (5)

## 2019-03-13 NOTE — PROGRESS NOTES
"Nursing Notes:   Ruth Salazar LPN  3/13/2019  1:18 PM  Signed  Chief Complaint   Patient presents with     Physical       Initial BP (!) 150/106   Pulse 80   Temp 99.5  F (37.5  C) (Temporal)   Resp 16   Ht 1.295 m (4' 3\")   Wt 76.7 kg (169 lb)   LMP 02/15/2019   Breastfeeding? No   BMI 45.68 kg/m    Estimated body mass index is 45.68 kg/m  as calculated from the following:    Height as of this encounter: 1.295 m (4' 3\").    Weight as of this encounter: 76.7 kg (169 lb).  Medication Reconciliation: complete    Ruth Salazar LPN    SUBJECTIVE:  Wilmer Morse  is a 48 year old female who comes in today for complete evaluation.       She is almost a year out from her right TKA and is doing well.  She has hypertension for which she is on lisinopril.  She has chronic generalized anxiety disorder with major depression which has been well controlled on Wellbutrin and sertraline.  She takes clonazepam at bedtime which helps with restless legs as well as anxiety.  She continues on gabapentin.    She had recent mammogram and had to have another view and ultrasound which was considered negative and benign and she will have follow-up in a year. She is due for Pap smear. she is otherwise up-to-date on health maintenance issues.  She is up-to-date on immunizations but has not had a flu shot.    CHRONIC PAIN MANAGEMENT:    DIAGNOSIS:      1. Visit for preventive health examination    2. Pain medication agreement 3/19/18, 3/13/19    3. Screening for malignant neoplasm of cervix    4. Achondroplasia    5. Chronic midline low back pain without sciatica    6. Restless leg syndrome    7. Benign essential hypertension    8. Depression with anxiety    9. Hyperlipidemia, unspecified hyperlipidemia type    10. Pain medication agreement completed    11. Osteoarthritis of knee, unilateral    12. Acute left-sided low back pain with left-sided sciatica    13. Iron deficiency        PAIN CLINIC EVALUATION:(NO)      PAIN " MEDICATION AGREEMENT: (YES)    DATE SIGNED: 4/28/16,3/22/17, 3/19/18, 3/13/19      NON-MEDICATION MODALITIES MAXIMIZED? (YES)  Better after knee replacement surgery.      NEUROPATHIC PAIN: (NO)  ON GABAPENTIN/LYRICA/TCA/SNRI: (NO) was previously, but that pain has dissipated.       NOCICEPTIVE PAIN: (YES)      HISTORY OF CD: (NO)      MENTAL HEALTH DIAGNOSIS: (YES)  DIAGNOSIS: Anxiety and restless leg syndrome    ON BENZODIAZEPINES: (YES). Klonopin 0.5-1 mg at bedtime.    DISCLOSURE REGARDING RISK OF CONCOMITANT USE WITH OPIOIDS DISCUSSED: (YES)  DATE DISCUSSED: 4/28/16,06/15/16, 9/6/16, 12/20/16, 3/22/17, 9/15/17, 3/13/19      MEDICATION: hydrocodone/APAP 5/325 #200/month, max 7 per day      ORAL MORPHINE EQUIVALENTS: 30/d      LAST TAPER TRIAL: underway.       QUERY TODAY: (YES)  APPROPRIATE?:         (YES)      TOXASSURE TODAY: (YES)  IF NO, DATE OF LAST TOXASURE: 9/6/16, 3/22/17, 3/8/18, 9/19/18      PAIN SCORE FLOWSHEET REVIEWED: (YES)  STABLE OR IMPROVED: (YES)               Past Medical, Family, and Social History reviewed and updated as noted below.   ROS is negative except as noted above       Allergies   Allergen Reactions     Penicillins Unknown   ,   Family History   Problem Relation Age of Onset     Other - See Comments Mother         Achondroplasia.     Breast Cancer Mother         Cancer-breast,breast cancer     Breast Cancer Maternal Grandmother 70        Cancer-breast, breast cancer     Unknown/Adopted Father         Unknown     Family History Negative Daughter         Good Health     Family History Negative Son         Good Health     Family History Negative Other         Good Health     Family History Negative Son         Good Health,(stepson)7/24/91   ,   Current Outpatient Medications   Medication     buPROPion (WELLBUTRIN XL) 300 MG 24 hr tablet     clonazePAM (KLONOPIN) 0.5 MG tablet     ferrous sulfate (IRON) 325 (65 Fe) MG tablet     gabapentin (NEURONTIN) 300 MG capsule      HYDROcodone-acetaminophen (NORCO) 5-325 MG tablet     ibuprofen (ADVIL/MOTRIN) 600 MG tablet     lisinopril (PRINIVIL/ZESTRIL) 10 MG tablet     sertraline (ZOLOFT) 100 MG tablet     No current facility-administered medications for this visit.    ,   Past Medical History:   Diagnosis Date     Achondroplasia     No Comments Provided     Family history of malignant neoplasm of breast     No Comments Provided     Hyperlipidemia     not currently on treatment     Other intervertebral disc degeneration, lumbar region     No Comments Provided     Other specified anxiety disorders     No Comments Provided     Overactive bladder     2014     Personal history of other medical treatment (CODE)      3, Para 2-0-1-2.     Primary osteoarthritis of one knee     5/3/2012     Restless legs syndrome     No Comments Provided     Zoster without complications     2006,left side of neck.   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 Repeat      LAPAROSCOPIC TUBAL LIGATION           OTHER SURGICAL HISTORY      [id number],OSTEOTOMY,Limb lengthening  "procedures.     OTHER SURGICAL HISTORY      10/16,4/17,7/17,10/17,[id number],OTHER,Right     OTHER SURGICAL HISTORY      09/13/2017,[id number],OTHER,Right,fluorscopically guided. no improvement.     right knee total arthroplasty Right 05/01/2018    Dr. Magaña, [facility]'s    and   Social History     Tobacco Use     Smoking status: Never Smoker     Smokeless tobacco: Never Used   Substance Use Topics     Alcohol use: No     Alcohol/week: 0.0 oz     OBJECTIVE:  /86   Pulse 80   Temp 99.5  F (37.5  C) (Temporal)   Resp 16   Ht 1.295 m (4' 3\")   Wt 76.7 kg (169 lb)   LMP 02/15/2019   Breastfeeding? No   BMI 45.68 kg/m     EXAM:  General Appearance: Pleasant, alert, appropriate appearance for age. No acute distress  Head Exam: Normal. Normocephalic, atraumatic.  Eye Exam: PERRLA, EOMI, conjunctivae, sclerae normal.  Ear Exam: Normal TM's bilaterally. Normal auditory canals and external ears. Non-tender.  Nose Exam: Normal external nose, mucus membranes, and septum.  OroPharynx Exam:  Dental hygiene adequate. Normal buccal mucosa. Normal pharynx.  Neck Exam:  Supple, no masses or nodes. No bruits  Thyroid Exam: No nodules or enlargement.  Chest/Respiratory Exam: Normal chest wall and respirations. Clear to auscultation.  Breast Exam: No dimpling, nipple retraction or discharge. No masses or nodes.  Cardiovascular Exam: Regular rate and rhythm. S1, S2, no murmur, click, gallop, or rubs.  Gastrointestinal Exam: Soft, non-tender, no masses or organomegaly.  Genitourinary exam: Normal external genitalia. Speculum exam:  Difficult to visualize cervix since it is not in the pelvis.  Pap smear taken.   Bimanual exam reveals normal sized, non tender uterus. Adnexa are negative.  Lymphatic Exam: Non-palpable nodes in neck,clavicular, axillary, or inguinal regions.  Musculoskeletal Exam: Back is straight and non-tender, full ROM of upper and lower extremities.  Foot Exam: Left and right foot: good pedal pulses  Skin: no rash " or abnormalities  Neurologic Exam:  normal gross motor, tone coordination and no tremor.  Psychiatric Exam: Alert and oriented - appropriate affect.     Results for orders placed or performed in visit on 03/13/19   CBC with platelets differential   Result Value Ref Range    WBC 7.2 4.0 - 11.0 10e9/L    RBC Count 4.78 3.8 - 5.2 10e12/L    Hemoglobin 13.3 11.7 - 15.7 g/dL    Hematocrit 40.9 35.0 - 47.0 %    MCV 86 78 - 100 fl    MCH 27.8 26.5 - 33.0 pg    MCHC 32.5 31.5 - 36.5 g/dL    RDW 13.7 10.0 - 15.0 %    Platelet Count 333 150 - 450 10e9/L    Diff Method Automated Method     % Neutrophils 58.9 %    % Lymphocytes 29.9 %    % Monocytes 8.8 %    % Eosinophils 1.8 %    % Basophils 0.3 %    % Immature Granulocytes 0.3 %    Absolute Neutrophil 4.2 1.6 - 8.3 10e9/L    Absolute Lymphocytes 2.2 0.8 - 5.3 10e9/L    Absolute Monocytes 0.6 0.0 - 1.3 10e9/L    Absolute Eosinophils 0.1 0.0 - 0.7 10e9/L    Absolute Basophils 0.0 0.0 - 0.2 10e9/L    Abs Immature Granulocytes 0.0 0 - 0.4 10e9/L      ASSESSMENT/Plan :    Wilmer was seen today for physical.    Diagnoses and all orders for this visit:    Visit for preventive health examination    Pain medication agreement 3/19/18, 3/13/19  -     Drug  Screen Comprehensive , Urine with Reported Meds (MedTox) (Pain Care Package)  -     gabapentin (NEURONTIN) 300 MG capsule; Take 1 capsule (300 mg) by mouth 3 times daily  -     Discontinue: HYDROcodone-acetaminophen (NORCO) 5-325 MG tablet; Take 2 tablets by mouth 3 times daily as needed for pain . May take a maximum of 7 tablets per day.  -     Discontinue: HYDROcodone-acetaminophen (NORCO) 5-325 MG tablet; Take 2 tablets by mouth 3 times daily as needed for pain . May take a maximum of 7 tablets per day.  -     HYDROcodone-acetaminophen (NORCO) 5-325 MG tablet; Take 2 tablets by mouth 3 times daily as needed for pain . May take a maximum of 7 tablets per day.    Screening for malignant neoplasm of cervix  -     HPV High Risk Types  DNA Cervical  -     Pap Screen Thin Prep with HPV - recommended age 30 - 65 years (select HPV order below)    Achondroplasia    Chronic midline low back pain without sciatica  -     Drug  Screen Comprehensive , Urine with Reported Meds (MedTox) (Pain Care Package)    Restless leg syndrome    Benign essential hypertension  -     CBC with platelets differential; Future  -     Comprehensive metabolic panel; Future  -     lisinopril (PRINIVIL/ZESTRIL) 10 MG tablet; Take 1 tablet (10 mg) by mouth daily    Depression with anxiety  -     sertraline (ZOLOFT) 100 MG tablet; Take 2 tablets (200 mg) by mouth daily  -     clonazePAM (KLONOPIN) 0.5 MG tablet; TAKE 1 TABLET BY MOUTH THREE TIMES DAILY AS NEEDED  -     buPROPion (WELLBUTRIN XL) 300 MG 24 hr tablet; Take 1 tablet (300 mg) by mouth every morning    Hyperlipidemia, unspecified hyperlipidemia type  -     Comprehensive metabolic panel; Future  -     Lipid Profile; Future    Pain medication agreement completed  -     Drug  Screen Comprehensive , Urine with Reported Meds (MedTox) (Pain Care Package)  -     gabapentin (NEURONTIN) 300 MG capsule; Take 1 capsule (300 mg) by mouth 3 times daily  -     Discontinue: HYDROcodone-acetaminophen (NORCO) 5-325 MG tablet; Take 2 tablets by mouth 3 times daily as needed for pain . May take a maximum of 7 tablets per day.  -     Discontinue: HYDROcodone-acetaminophen (NORCO) 5-325 MG tablet; Take 2 tablets by mouth 3 times daily as needed for pain . May take a maximum of 7 tablets per day.  -     HYDROcodone-acetaminophen (NORCO) 5-325 MG tablet; Take 2 tablets by mouth 3 times daily as needed for pain . May take a maximum of 7 tablets per day.    Osteoarthritis of knee, unilateral  -     gabapentin (NEURONTIN) 300 MG capsule; Take 1 capsule (300 mg) by mouth 3 times daily  -     Discontinue: HYDROcodone-acetaminophen (NORCO) 5-325 MG tablet; Take 2 tablets by mouth 3 times daily as needed for pain . May take a maximum of 7 tablets per  day.  -     Discontinue: HYDROcodone-acetaminophen (NORCO) 5-325 MG tablet; Take 2 tablets by mouth 3 times daily as needed for pain . May take a maximum of 7 tablets per day.  -     HYDROcodone-acetaminophen (NORCO) 5-325 MG tablet; Take 2 tablets by mouth 3 times daily as needed for pain . May take a maximum of 7 tablets per day.    Acute left-sided low back pain with left-sided sciatica  -     gabapentin (NEURONTIN) 300 MG capsule; Take 1 capsule (300 mg) by mouth 3 times daily    Iron deficiency  -     Iron Binding Panel GH; Future      Will notify of lab results when available. Discussed diet, exercise and healthy lifestyle changes. Continue current medications. Discussed dropping pain medication down by one tablet daily each month.  Renewed pain medication agreement.   query is appropriate.  [employer] today.  Renewal on hydrocodone 5/325#200 x 3        Neptali French MD

## 2019-03-13 NOTE — NURSING NOTE
"Chief Complaint   Patient presents with     Physical       Initial BP (!) 150/106   Pulse 80   Temp 99.5  F (37.5  C) (Temporal)   Resp 16   Ht 1.295 m (4' 3\")   Wt 76.7 kg (169 lb)   LMP 02/15/2019   Breastfeeding? No   BMI 45.68 kg/m   Estimated body mass index is 45.68 kg/m  as calculated from the following:    Height as of this encounter: 1.295 m (4' 3\").    Weight as of this encounter: 76.7 kg (169 lb).  Medication Reconciliation: complete    Ruth Salazar LPN  "

## 2019-03-14 ASSESSMENT — ANXIETY QUESTIONNAIRES: GAD7 TOTAL SCORE: 0

## 2019-03-15 LAB
COPATH REPORT: NORMAL
PAP: NORMAL

## 2019-03-18 LAB
FINAL DIAGNOSIS: NORMAL
HPV HR 12 DNA CVX QL NAA+PROBE: NEGATIVE
HPV16 DNA SPEC QL NAA+PROBE: NEGATIVE
HPV18 DNA SPEC QL NAA+PROBE: NEGATIVE
SPECIMEN DESCRIPTION: NORMAL
SPECIMEN SOURCE CVX/VAG CYTO: NORMAL

## 2019-03-21 LAB — PAIN DRUG SCR UR W RPTD MEDS: NORMAL

## 2019-03-22 DIAGNOSIS — F41.8 DEPRESSION WITH ANXIETY: ICD-10-CM

## 2019-03-25 RX ORDER — CLONAZEPAM 0.5 MG/1
TABLET ORAL
Qty: 90 TABLET | Refills: 0 | OUTPATIENT
Start: 2019-03-25

## 2019-03-25 NOTE — TELEPHONE ENCOUNTER
Redundant Refill Request for Clonazepam refused;    Outpatient Medication Detail      Disp Refills Start End WHITNEY   clonazePAM (KLONOPIN) 0.5 MG tablet 90 tablet 5 3/13/2019  No   Sig: TAKE 1 TABLET BY MOUTH THREE TIMES DAILY AS NEEDED   Class: Local Print   Order: [id number]   Printout Tracking     External Result Report   Medication Administration Instructions     TAKE 1 TABLET BY MOUTH THREE TIMES DAILY AS NEEDED   Pharmacy     [facility] [place] [id number]     Unable to complete prescription refill per RN Medication Refill Policy. Bassam Sepulveda 3/25/2019 3:48 PM

## 2019-06-18 ENCOUNTER — OFFICE VISIT (OUTPATIENT)
Dept: FAMILY MEDICINE | Facility: OTHER | Age: 49
End: 2019-06-18
Attending: FAMILY MEDICINE
Payer: COMMERCIAL

## 2019-06-18 VITALS
HEART RATE: 80 BPM | RESPIRATION RATE: 14 BRPM | DIASTOLIC BLOOD PRESSURE: 96 MMHG | OXYGEN SATURATION: 96 % | WEIGHT: 173 LBS | TEMPERATURE: 98.7 F | BODY MASS INDEX: 46.76 KG/M2 | SYSTOLIC BLOOD PRESSURE: 126 MMHG

## 2019-06-18 DIAGNOSIS — G89.29 CHRONIC MIDLINE LOW BACK PAIN WITHOUT SCIATICA: Primary | ICD-10-CM

## 2019-06-18 DIAGNOSIS — Z02.89 PAIN MEDICATION AGREEMENT COMPLETED: ICD-10-CM

## 2019-06-18 DIAGNOSIS — M17.10 OSTEOARTHRITIS OF KNEE, UNILATERAL: ICD-10-CM

## 2019-06-18 DIAGNOSIS — M54.50 CHRONIC MIDLINE LOW BACK PAIN WITHOUT SCIATICA: Primary | ICD-10-CM

## 2019-06-18 DIAGNOSIS — G25.81 RESTLESS LEG SYNDROME: ICD-10-CM

## 2019-06-18 DIAGNOSIS — I10 BENIGN ESSENTIAL HYPERTENSION: ICD-10-CM

## 2019-06-18 DIAGNOSIS — E61.1 IRON DEFICIENCY: ICD-10-CM

## 2019-06-18 DIAGNOSIS — Q77.4 ACHONDROPLASIA: ICD-10-CM

## 2019-06-18 PROCEDURE — 99213 OFFICE O/P EST LOW 20 MIN: CPT | Performed by: FAMILY MEDICINE

## 2019-06-18 RX ORDER — HYDROCODONE BITARTRATE AND ACETAMINOPHEN 5; 325 MG/1; MG/1
2 TABLET ORAL 3 TIMES DAILY PRN
Qty: 200 TABLET | Refills: 0 | Status: SHIPPED | OUTPATIENT
Start: 2019-06-18 | End: 2019-08-13

## 2019-06-18 RX ORDER — HYDROCODONE BITARTRATE AND ACETAMINOPHEN 5; 325 MG/1; MG/1
2 TABLET ORAL 3 TIMES DAILY PRN
Qty: 200 TABLET | Refills: 0 | Status: SHIPPED | OUTPATIENT
Start: 2019-06-18 | End: 2019-06-18

## 2019-06-18 RX ORDER — FERROUS SULFATE 325(65) MG
325 TABLET ORAL 2 TIMES DAILY
Qty: 60 TABLET | Refills: 2 | Status: SHIPPED | OUTPATIENT
Start: 2019-06-18 | End: 2020-01-27

## 2019-06-18 RX ORDER — PRAMIPEXOLE DIHYDROCHLORIDE 0.5 MG/1
0.5 TABLET ORAL AT BEDTIME
Qty: 90 TABLET | Refills: 11 | Status: SHIPPED | OUTPATIENT
Start: 2019-06-18 | End: 2020-05-14

## 2019-06-18 ASSESSMENT — ANXIETY QUESTIONNAIRES
3. WORRYING TOO MUCH ABOUT DIFFERENT THINGS: NOT AT ALL
7. FEELING AFRAID AS IF SOMETHING AWFUL MIGHT HAPPEN: NOT AT ALL
2. NOT BEING ABLE TO STOP OR CONTROL WORRYING: NOT AT ALL
1. FEELING NERVOUS, ANXIOUS, OR ON EDGE: SEVERAL DAYS
5. BEING SO RESTLESS THAT IT IS HARD TO SIT STILL: NOT AT ALL
6. BECOMING EASILY ANNOYED OR IRRITABLE: NOT AT ALL
IF YOU CHECKED OFF ANY PROBLEMS ON THIS QUESTIONNAIRE, HOW DIFFICULT HAVE THESE PROBLEMS MADE IT FOR YOU TO DO YOUR WORK, TAKE CARE OF THINGS AT HOME, OR GET ALONG WITH OTHER PEOPLE: NOT DIFFICULT AT ALL
GAD7 TOTAL SCORE: 1

## 2019-06-18 ASSESSMENT — PAIN SCALES - GENERAL: PAINLEVEL: MODERATE PAIN (4)

## 2019-06-18 ASSESSMENT — PATIENT HEALTH QUESTIONNAIRE - PHQ9
5. POOR APPETITE OR OVEREATING: NOT AT ALL
SUM OF ALL RESPONSES TO PHQ QUESTIONS 1-9: 2

## 2019-06-18 NOTE — PROGRESS NOTES
Nursing Notes:   Herlinda Washington LPN  6/18/2019  3:09 PM  Signed  Patient presents to the clinic for medication recheck.  Medication lists reconciled.    Herlinda Washington LPN on 6/18/2019 at 2:25 PM      SUBJECTIVE:  Wilmer Morse  is a 48 year old female who comes in for follow up and medication management.  I last saw her 3 months ago.  She is now a year out from her right total knee arthroplasty and is doing well.  She continues on lisinopril for hypertension and sertraline and Wellbutrin for anxiety disorder and major depression.  She continues to take clonazepam at bedtime which helps with restless legs and anxiety and also continues on gabapentin.    She has had significant worsening of late with her restless legs.  The Klonopin does not seem to be controlling the symptoms.  She really does not want to go up higher on the Klonopin.  Wonders about other alternative treatments.    She sees the orthopedic surgeon for her year follow-up coming up fairly shortly.  She is pleased with how her total knee turned out.    CHRONIC PAIN MANAGEMENT:    DIAGNOSIS:      1. Chronic midline low back pain without sciatica    2. Iron deficiency    3. Achondroplasia    4. Pain medication agreement 3/19/18, 3/13/19    5. Benign essential hypertension    6. Restless leg syndrome    7. Pain medication agreement completed    8. Osteoarthritis of knee, unilateral        PAIN CLINIC EVALUATION:(NO)      PAIN MEDICATION AGREEMENT: (YES)    DATE SIGNED: 4/28/16,3/22/17, 3/19/18, 3/13/19      NON-MEDICATION MODALITIES MAXIMIZED? (YES)  Better after knee replacement surgery.      NEUROPATHIC PAIN: (NO)  ON GABAPENTIN/LYRICA/TCA/SNRI: (YES)       NOCICEPTIVE PAIN: (YES)      HISTORY OF CD: (NO)      MENTAL HEALTH DIAGNOSIS: (YES)  DIAGNOSIS: Anxiety and restless leg syndrome    ON BENZODIAZEPINES: (YES). Klonopin 0.5-1 mg at bedtime.    DISCLOSURE REGARDING RISK OF CONCOMITANT USE WITH OPIOIDS DISCUSSED: (YES)  DATE DISCUSSED:  4/28/16,06/15/16, 9/6/16, 12/20/16, 3/22/17, 9/15/17, 3/13/19      MEDICATION: hydrocodone/APAP 5/325 #200/month, max 7 per day      ORAL MORPHINE EQUIVALENTS: 30/d      LAST TAPER TRIAL: underway.       QUERY TODAY: (YES)  APPROPRIATE?:         (YES)      TOXASSURE TODAY: (NO)  IF NO, DATE OF LAST TOXASURE: 9/6/16, 3/22/17, 3/8/18, 9/19/18, 3/13/19      PAIN SCORE FLOWSHEET REVIEWED: (YES)  STABLE OR IMPROVED: (YES)                 Past Medical, Family, and Social History reviewed and updated as noted below.   ROS is negative except as noted above       Allergies   Allergen Reactions     Penicillins Unknown   ,   Family History   Problem Relation Age of Onset     Other - See Comments Mother         Achondroplasia.     Breast Cancer Mother         Cancer-breast,breast cancer     Breast Cancer Maternal Grandmother 70        Cancer-breast, breast cancer     Unknown/Adopted Father         Unknown     Family History Negative Daughter         Good Health     Family History Negative Son         Good Health     Family History Negative Other         Good Health     Family History Negative Son         Good Health,(stepson)7/24/91   ,   Current Outpatient Medications   Medication     buPROPion (WELLBUTRIN XL) 300 MG 24 hr tablet     clonazePAM (KLONOPIN) 0.5 MG tablet     ferrous sulfate (FEROSUL) 325 (65 Fe) MG tablet     gabapentin (NEURONTIN) 300 MG capsule     HYDROcodone-acetaminophen (NORCO) 5-325 MG tablet     ibuprofen (ADVIL/MOTRIN) 600 MG tablet     lisinopril (PRINIVIL/ZESTRIL) 10 MG tablet     pramipexole (MIRAPEX) 0.5 MG tablet     sertraline (ZOLOFT) 100 MG tablet     No current facility-administered medications for this visit.    ,   Past Medical History:   Diagnosis Date     Achondroplasia     No Comments Provided     Family history of malignant neoplasm of breast     No Comments Provided     Hyperlipidemia     not currently on treatment     Other intervertebral disc degeneration, lumbar region     No  Comments Provided     Other specified anxiety disorders     No Comments Provided     Overactive bladder     2014     Personal history of other medical treatment (CODE)      3, Para 2-0-1-2.     Primary osteoarthritis of one knee     5/3/2012     Restless legs syndrome     No Comments Provided     Zoster without complications     2006,left side of neck.   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 Repeat      LAPAROSCOPIC TUBAL LIGATION           OTHER SURGICAL HISTORY      [id number],OSTEOTOMY,Limb lengthening procedures.     OTHER SURGICAL HISTORY      10/16,,,10/17,[id number],OTHER,Right     OTHER SURGICAL HISTORY      2017,[id number],OTHER,Right,fluorscopically guided. no improvement.     right knee total arthroplasty Right 2018    Dr. Magaña, [facility]    and   Social History     Tobacco Use     Smoking status: Never Smoker     Smokeless tobacco: Never Used   Substance Use Topics     Alcohol use: No     Alcohol/week: 0.0 oz     OBJECTIVE:  BP (!) 126/96   Pulse 80   Temp 98.7  F (37.1  C) (Tympanic)   Resp 14    Wt 78.5 kg (173 lb)   SpO2 96%   Breastfeeding? No   BMI 46.76 kg/m     EXAM:  Alert and cooperative, no distress.  Affect is appropriate.  Exam is unchanged.  ASSESSMENT/Plan :    Wilmer was seen today for recheck medication.    Diagnoses and all orders for this visit:    Chronic midline low back pain without sciatica    Iron deficiency  -     ferrous sulfate (FEROSUL) 325 (65 Fe) MG tablet; Take 1 tablet (325 mg) by mouth 2 times daily    Achondroplasia    Pain medication agreement 3/19/18, 3/13/19  -     Discontinue: HYDROcodone-acetaminophen (NORCO) 5-325 MG tablet; Take 2 tablets by mouth 3 times daily as needed for pain . May take a maximum of 7 tablets per day.  -     HYDROcodone-acetaminophen (NORCO) 5-325 MG tablet; Take 2 tablets by mouth 3 times daily as needed for pain . May take a maximum of 7 tablets per day.    Benign essential hypertension    Restless leg syndrome  -     pramipexole (MIRAPEX) 0.5 MG tablet; Take 1 tablet (0.5 mg) by mouth At Bedtime    Pain medication agreement completed  -     Discontinue: HYDROcodone-acetaminophen (NORCO) 5-325 MG tablet; Take 2 tablets by mouth 3 times daily as needed for pain . May take a maximum of 7 tablets per day.  -     HYDROcodone-acetaminophen (NORCO) 5-325 MG tablet; Take 2 tablets by mouth 3 times daily as needed for pain . May take a maximum of 7 tablets per day.    Osteoarthritis of knee, unilateral  -     Discontinue: HYDROcodone-acetaminophen (NORCO) 5-325 MG tablet; Take 2 tablets by mouth 3 times daily as needed for pain . May take a maximum of 7 tablets per day.  -     HYDROcodone-acetaminophen (NORCO) 5-325 MG tablet; Take 2 tablets by mouth 3 times daily as needed for pain . May take a maximum of 7 tablets per day.       query is appropriate.  Discussed the new federal law regarding prescriptions and not being able to fill it past 30 days.  Prescription for hydrocodone 5/325#200×2 with the understanding that she will fill the second 130  days from now.  She will follow-up with me in 2 months.  By that time we may actually have a different policy and will keep her apprised of that.    We will add Mirapex 0.5 mg at bedtime for restless legs.    A total of 15 minutes was spent with the patient, greater than 50% of the time was spent in counseling/discussion of the aforementioned concerns.     Neptali French MD

## 2019-06-18 NOTE — NURSING NOTE
Patient presents to the clinic for medication recheck.  Medication lists reconciled.    Herlinda Washington LPN on 6/18/2019 at 2:25 PM

## 2019-06-19 ASSESSMENT — ANXIETY QUESTIONNAIRES: GAD7 TOTAL SCORE: 1

## 2019-06-20 ENCOUNTER — MYC MEDICAL ADVICE (OUTPATIENT)
Dept: FAMILY MEDICINE | Facility: OTHER | Age: 49
End: 2019-06-20

## 2019-06-25 NOTE — TELEPHONE ENCOUNTER
The patient came in to have her BP checked. It was 138/110 and then she sat for 10 minutes and it was 130/94. I spoke to Neptali French MD and he said for her to check it and keep him updated on her readings. I told her that information.  Ruth Salazar LPN..................6/25/2019   2:15 PM

## 2019-06-26 ENCOUNTER — HOSPITAL ENCOUNTER (OUTPATIENT)
Dept: GENERAL RADIOLOGY | Facility: OTHER | Age: 49
Discharge: HOME | End: 2019-06-26
Attending: NURSE PRACTITIONER | Admitting: NURSE PRACTITIONER
Payer: COMMERCIAL

## 2019-06-26 ENCOUNTER — OFFICE VISIT (OUTPATIENT)
Dept: FAMILY MEDICINE | Facility: OTHER | Age: 49
End: 2019-06-26
Attending: NURSE PRACTITIONER
Payer: COMMERCIAL

## 2019-06-26 VITALS
RESPIRATION RATE: 16 BRPM | HEART RATE: 88 BPM | DIASTOLIC BLOOD PRESSURE: 92 MMHG | TEMPERATURE: 97.9 F | WEIGHT: 173.2 LBS | BODY MASS INDEX: 40.08 KG/M2 | HEIGHT: 55 IN | SYSTOLIC BLOOD PRESSURE: 132 MMHG

## 2019-06-26 DIAGNOSIS — K59.00 CONSTIPATION, UNSPECIFIED CONSTIPATION TYPE: ICD-10-CM

## 2019-06-26 DIAGNOSIS — R35.0 URINARY FREQUENCY: ICD-10-CM

## 2019-06-26 DIAGNOSIS — R10.84 ABDOMINAL PAIN, GENERALIZED: Primary | ICD-10-CM

## 2019-06-26 DIAGNOSIS — R10.84 ABDOMINAL PAIN, GENERALIZED: ICD-10-CM

## 2019-06-26 LAB
ALBUMIN UR-MCNC: NEGATIVE MG/DL
ANION GAP SERPL CALCULATED.3IONS-SCNC: 10 MMOL/L (ref 3–14)
APPEARANCE UR: CLEAR
BASOPHILS # BLD AUTO: 0 10E9/L (ref 0–0.2)
BASOPHILS NFR BLD AUTO: 0.3 %
BILIRUB UR QL STRIP: NEGATIVE
BUN SERPL-MCNC: 17 MG/DL (ref 7–25)
CALCIUM SERPL-MCNC: 9.7 MG/DL (ref 8.6–10.3)
CHLORIDE SERPL-SCNC: 100 MMOL/L (ref 98–107)
CO2 SERPL-SCNC: 24 MMOL/L (ref 21–31)
COLOR UR AUTO: YELLOW
CREAT SERPL-MCNC: 0.56 MG/DL (ref 0.6–1.2)
DIFFERENTIAL METHOD BLD: NORMAL
EOSINOPHIL # BLD AUTO: 0.1 10E9/L (ref 0–0.7)
EOSINOPHIL NFR BLD AUTO: 1.6 %
ERYTHROCYTE [DISTWIDTH] IN BLOOD BY AUTOMATED COUNT: 13.2 % (ref 10–15)
GFR SERPL CREATININE-BSD FRML MDRD: >90 ML/MIN/{1.73_M2}
GLUCOSE SERPL-MCNC: 88 MG/DL (ref 70–105)
GLUCOSE UR STRIP-MCNC: NEGATIVE MG/DL
HCT VFR BLD AUTO: 41.8 % (ref 35–47)
HGB BLD-MCNC: 14.1 G/DL (ref 11.7–15.7)
HGB UR QL STRIP: NEGATIVE
IMM GRANULOCYTES # BLD: 0 10E9/L (ref 0–0.4)
IMM GRANULOCYTES NFR BLD: 0.3 %
KETONES UR STRIP-MCNC: NEGATIVE MG/DL
LEUKOCYTE ESTERASE UR QL STRIP: NEGATIVE
LYMPHOCYTES # BLD AUTO: 2.1 10E9/L (ref 0.8–5.3)
LYMPHOCYTES NFR BLD AUTO: 27.5 %
MCH RBC QN AUTO: 29.3 PG (ref 26.5–33)
MCHC RBC AUTO-ENTMCNC: 33.7 G/DL (ref 31.5–36.5)
MCV RBC AUTO: 87 FL (ref 78–100)
MONOCYTES # BLD AUTO: 0.6 10E9/L (ref 0–1.3)
MONOCYTES NFR BLD AUTO: 8.2 %
NEUTROPHILS # BLD AUTO: 4.7 10E9/L (ref 1.6–8.3)
NEUTROPHILS NFR BLD AUTO: 62.1 %
NITRATE UR QL: NEGATIVE
PH UR STRIP: 5.5 PH (ref 5–9)
PLATELET # BLD AUTO: 309 10E9/L (ref 150–450)
POTASSIUM SERPL-SCNC: 4.1 MMOL/L (ref 3.5–5.1)
RBC # BLD AUTO: 4.81 10E12/L (ref 3.8–5.2)
SODIUM SERPL-SCNC: 134 MMOL/L (ref 134–144)
SOURCE: NORMAL
SP GR UR STRIP: 1.02 (ref 1–1.03)
UROBILINOGEN UR STRIP-ACNC: 0.2 EU/DL (ref 0.2–1)
WBC # BLD AUTO: 7.5 10E9/L (ref 4–11)

## 2019-06-26 PROCEDURE — 74019 RADEX ABDOMEN 2 VIEWS: CPT

## 2019-06-26 PROCEDURE — 36415 COLL VENOUS BLD VENIPUNCTURE: CPT | Mod: ZL | Performed by: NURSE PRACTITIONER

## 2019-06-26 PROCEDURE — 99214 OFFICE O/P EST MOD 30 MIN: CPT | Performed by: NURSE PRACTITIONER

## 2019-06-26 PROCEDURE — 80048 BASIC METABOLIC PNL TOTAL CA: CPT | Mod: ZL | Performed by: NURSE PRACTITIONER

## 2019-06-26 PROCEDURE — 85025 COMPLETE CBC W/AUTO DIFF WBC: CPT | Mod: ZL | Performed by: NURSE PRACTITIONER

## 2019-06-26 PROCEDURE — 81003 URINALYSIS AUTO W/O SCOPE: CPT | Mod: ZL | Performed by: NURSE PRACTITIONER

## 2019-06-26 ASSESSMENT — ENCOUNTER SYMPTOMS
ABDOMINAL PAIN: 1
CONSTIPATION: 1
FREQUENCY: 1

## 2019-06-26 ASSESSMENT — PAIN SCALES - GENERAL: PAINLEVEL: SEVERE PAIN (7)

## 2019-06-26 ASSESSMENT — MIFFLIN-ST. JEOR: SCORE: 1199.26

## 2019-06-26 NOTE — PATIENT INSTRUCTIONS
Increase fluid intake to 8 glasses or water or clear fluids daily     Use try citrate of magnesia or milk of magnesia today with lots of water  For the next 2 days to get bowels moving    Start daily fiber with lots of fluids to keep bowels soft and moving    If at any time your pain worsens, or bowels not working go to ER  Or cant keep fluids down--goal to have 2 BMs daily    Come back if not working well and keeping BM soft and moving

## 2019-06-26 NOTE — NURSING NOTE
Patient presents to clinic today for menstrual issues; period started on the 12th and cramping has continued since. She stated that she has urgency in her urination.     No LMP recorded.  Medication Reconciliation: complete    Eli Valdovinos LPN  6/26/2019 9:51 AM

## 2019-06-26 NOTE — PROGRESS NOTES
Nursing Notes:   Eli Valdovinos LPN  6/26/2019 10:09 AM  Signed  Patient presents to clinic today for menstrual issues; period started on the 12th and cramping has continued since. She stated that she has urgency in her urination.     No LMP recorded.  Medication Reconciliation: complete    Eli Valdovinos LPN  6/26/2019 9:51 AM    Nursing note reviewed with patient.  Accurracy and completeness verified.   Ms. Morse is a 48 year old female who:  Patient presents with:  Amenorrhea      ICD-10-CM    1. Abdominal pain, generalized R10.84 CBC and Differential     Basic Metabolic Panel     XR Abdomen 2 Views     Basic Metabolic Panel     CBC and Differential   2. Urinary frequency R35.0 UA reflex to Microscopic and Culture     CBC and Differential     Basic Metabolic Panel     XR Abdomen 2 Views     Basic Metabolic Panel     CBC and Differential   3. Constipation, unspecified constipation type K59.00 CBC and Differential     Basic Metabolic Panel     XR Abdomen 2 Views     Basic Metabolic Panel     CBC and Differential     HPI    Presents for follow-up on generalized abdominal pain bloating and fullness feeling that started almost a couple weeks ago but is worsened in the past week  She saw her primary for medication review she is on hydrocodone for a long history of major spinal and musculoskeletal issues that are chronic and extremely painful  She has had intermittent issues with constipation and has had painful passage of stool--- was told she may have a fissure  Has not had a bowel movement in 2 days--bowel movements are usually firm has had a couple of soft  Stools are dark but have been this way since she started iron and her iron therapy was doubled over the past week  She denies any nausea vomiting, taking food and fluids normally  Does not like to drink many fluids per usual  Has been no fever  She has been going about her daily routine and driving without any difficulties  No history of abdominal surgery  Noticed  increased urinary frequency does feel she is emptying completely denies hematuria dysuria or flank pain  history of tubal ligation    Review of Systems   Gastrointestinal: Positive for abdominal pain and constipation.   Genitourinary: Positive for frequency.   All other systems reviewed and are negative.     All other systems reviewed and negative.     YESI:   YESI-7 SCORE 12/20/2018 3/13/2019 6/18/2019   Total Score 1 0 1     PHQ9:  PHQ-9 SCORE 12/20/2018 3/13/2019 6/18/2019   PHQ-9 Total Score 1 1 2       I have personally reviewed the past medical history, past surgical history, medications, allergies, family and social history as listed below, on 6/26/2019.    Allergies   Allergen Reactions     Penicillins Unknown       Current Outpatient Medications   Medication Sig Dispense Refill     buPROPion (WELLBUTRIN XL) 300 MG 24 hr tablet Take 1 tablet (300 mg) by mouth every morning 90 tablet 11     clonazePAM (KLONOPIN) 0.5 MG tablet TAKE 1 TABLET BY MOUTH THREE TIMES DAILY AS NEEDED 90 tablet 5     ferrous sulfate (FEROSUL) 325 (65 Fe) MG tablet Take 1 tablet (325 mg) by mouth 2 times daily 60 tablet 2     gabapentin (NEURONTIN) 300 MG capsule Take 1 capsule (300 mg) by mouth 3 times daily 90 capsule 11     HYDROcodone-acetaminophen (NORCO) 5-325 MG tablet Take 2 tablets by mouth 3 times daily as needed for pain . May take a maximum of 7 tablets per day. 200 tablet 0     ibuprofen (ADVIL/MOTRIN) 600 MG tablet Take 600 mg by mouth 3 times daily as needed . Maximum of 3200 mg in 24 hours.       lisinopril (PRINIVIL/ZESTRIL) 10 MG tablet Take 1 tablet (10 mg) by mouth daily 90 tablet 11     pramipexole (MIRAPEX) 0.5 MG tablet Take 1 tablet (0.5 mg) by mouth At Bedtime 90 tablet 11     sertraline (ZOLOFT) 100 MG tablet Take 2 tablets (200 mg) by mouth daily 180 tablet 11        Patient Active Problem List    Diagnosis Date Noted     Benign essential hypertension 04/23/2018     Priority: Medium     Depression with anxiety  2018     Priority: Medium     Family history of breast cancer in female 2018     Priority: Medium     Hyperlipidemia 2018     Priority: Medium     Overview:   not currently on treatment       Restless leg syndrome 2018     Priority: Medium     Dependent edema 09/15/2017     Priority: Medium     Chronic midline low back pain without sciatica 2016     Priority: Medium     Achondroplasia 06/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     Priority: Medium     Pain medication agreement 3/19/18, 3/13/19 2016     Priority: Medium     Osteoarthritis of knee, unilateral 2012     Priority: Medium     Past Medical History:   Diagnosis Date     Achondroplasia     No Comments Provided     Family history of malignant neoplasm of breast     No Comments Provided     Hyperlipidemia     not currently on treatment     Other intervertebral disc degeneration, lumbar region     No Comments Provided     Other specified anxiety disorders     No Comments Provided     Overactive bladder     2014     Personal history of other medical treatment (CODE)      3, Para 2-0-1-2.     Primary osteoarthritis of one knee     5/3/2012     Restless legs syndrome     No Comments Provided     Zoster without complications     2006,left side of neck.     Past Surgical History:   Procedure Laterality Date     ADENOIDECTOMY      as a child      SECTION      1996,Primary low transverse  section. Repeat      LAPAROSCOPIC TUBAL LIGATION           OTHER SURGICAL HISTORY      [id number],OSTEOTOMY,Limb lengthening procedures.     OTHER SURGICAL HISTORY      10/16,,,10/17,360895,OTHER,Right     OTHER SURGICAL HISTORY      2017,571582,OTHER,Right,fluorscopically guided. no improvement.     right knee total arthroplasty Right 2018    Dr. Magaña, [facility]'s     Social History     Socioeconomic History     Marital status:      Spouse name: None     Number of children: None     Years of  education: None     Highest education level: None   Occupational History     None   Social Needs     Financial resource strain: None     Food insecurity:     Worry: None     Inability: None     Transportation needs:     Medical: None     Non-medical: None   Tobacco Use     Smoking status: Never Smoker     Smokeless tobacco: Never Used   Substance and Sexual Activity     Alcohol use: No     Alcohol/week: 0.0 oz     Drug use: No     Comment: Drug use: No     Sexual activity: Not Currently   Lifestyle     Physical activity:     Days per week: None     Minutes per session: None     Stress: None   Relationships     Social connections:     Talks on phone: None     Gets together: None     Attends Anabaptism service: None     Active member of club or organization: None     Attends meetings of clubs or organizations: None     Relationship status: None     Intimate partner violence:     Fear of current or ex partner: None     Emotionally abused: None     Physically abused: None     Forced sexual activity: None   Other Topics Concern     Parent/sibling w/ CABG, MI or angioplasty before 65F 55M? Not Asked   Social History Narrative    .  She has two children without dwarfism.    Works at [employer] in [place]    Jourdan Gutierrez Father.  Lauren Gutierrez Mother ().  Mikel Spouse.  Alyssa Child.  Solitario Obrien (stepson)91  Emerson Child.     Family History   Problem Relation Age of Onset     Other - See Comments Mother         Achondroplasia.     Breast Cancer Mother         Cancer-breast,breast cancer     Breast Cancer Maternal Grandmother 70        Cancer-breast, breast cancer     Unknown/Adopted Father         Unknown     Family History Negative Daughter         Good Health     Family History Negative Son         Good Health     Family History Negative Other         Good Health     Family History Negative Son         Good Health,(stepson)91       EXAM:   Vitals:    19 0952   BP:  "(!) 132/92   BP Location: Right arm   Patient Position: Sitting   Cuff Size: Adult Regular   Pulse: 88   Resp: 16   Temp: 97.9  F (36.6  C)   TempSrc: Temporal   Weight: 78.6 kg (173 lb 3.2 oz)   Height: 1.295 m (4' 3\")       Current Pain Score: Severe Pain (7)     BP Readings from Last 3 Encounters:   06/26/19 (!) 132/92   06/18/19 (!) 126/96   03/13/19 134/86      Wt Readings from Last 3 Encounters:   06/26/19 78.6 kg (173 lb 3.2 oz)   06/18/19 78.5 kg (173 lb)   03/13/19 76.7 kg (169 lb)      Estimated body mass index is 46.82 kg/m  as calculated from the following:    Height as of this encounter: 1.295 m (4' 3\").    Weight as of this encounter: 78.6 kg (173 lb 3.2 oz).     Physical Exam   Constitutional: She is oriented to person, place, and time. She appears well-developed and well-nourished.   Cardiovascular: Normal rate.   Pulmonary/Chest: Effort normal.   Abdominal: Soft. Bowel sounds are normal. She exhibits distension.   Abdomen soft bloated  Nontender with palpation, good bowel sounds upper and lower quadrants  No reproducible tender focal areas  Certainly no guarding or rebound   Musculoskeletal: Normal range of motion.   Neurological: She is alert and oriented to person, place, and time.   Skin: Skin is warm and dry.   Psychiatric: She has a normal mood and affect. Her behavior is normal. Judgment and thought content normal.   Nursing note and vitals reviewed.      INVESTIGATIONS:  Results for orders placed or performed in visit on 06/26/19   UA reflex to Microscopic and Culture   Result Value Ref Range    Color Urine Yellow     Appearance Urine Clear     Glucose Urine Negative NEG^Negative mg/dL    Bilirubin Urine Negative NEG^Negative    Ketones Urine Negative NEG^Negative mg/dL    Specific Gravity Urine 1.020 1.000 - 1.030    Blood Urine Negative NEG^Negative    pH Urine 5.5 5.0 - 9.0 pH    Protein Albumin Urine Negative NEG^Negative mg/dL    Urobilinogen Urine 0.2 0.2 - 1.0 EU/dL    Nitrite Urine " Negative NEG^Negative    Leukocyte Esterase Urine Negative NEG^Negative    Source Midstream Urine    Basic Metabolic Panel   Result Value Ref Range    Sodium 134 134 - 144 mmol/L    Potassium 4.1 3.5 - 5.1 mmol/L    Chloride 100 98 - 107 mmol/L    Carbon Dioxide 24 21 - 31 mmol/L    Anion Gap 10 3 - 14 mmol/L    Glucose 88 70 - 105 mg/dL    Urea Nitrogen 17 7 - 25 mg/dL    Creatinine 0.56 (L) 0.60 - 1.20 mg/dL    GFR Estimate >90 >60 mL/min/[1.73_m2]    GFR Estimate If Black >90 >60 mL/min/[1.73_m2]    Calcium 9.7 8.6 - 10.3 mg/dL   CBC and Differential   Result Value Ref Range    WBC 7.5 4.0 - 11.0 10e9/L    RBC Count 4.81 3.8 - 5.2 10e12/L    Hemoglobin 14.1 11.7 - 15.7 g/dL    Hematocrit 41.8 35.0 - 47.0 %    MCV 87 78 - 100 fl    MCH 29.3 26.5 - 33.0 pg    MCHC 33.7 31.5 - 36.5 g/dL    RDW 13.2 10.0 - 15.0 %    Platelet Count 309 150 - 450 10e9/L    Diff Method Automated Method     % Neutrophils 62.1 %    % Lymphocytes 27.5 %    % Monocytes 8.2 %    % Eosinophils 1.6 %    % Basophils 0.3 %    % Immature Granulocytes 0.3 %    Absolute Neutrophil 4.7 1.6 - 8.3 10e9/L    Absolute Lymphocytes 2.1 0.8 - 5.3 10e9/L    Absolute Monocytes 0.6 0.0 - 1.3 10e9/L    Absolute Eosinophils 0.1 0.0 - 0.7 10e9/L    Absolute Basophils 0.0 0.0 - 0.2 10e9/L    Abs Immature Granulocytes 0.0 0 - 0.4 10e9/L     PROCEDURE: XR ABDOMEN 2 VW 6/26/2019 10:45 AM     HISTORY: Urinary frequency; Abdominal pain, generalized; Constipation,  unspecified constipation type     COMPARISONS: None.     TECHNIQUE: Supine and upright views.     FINDINGS: There is no free intraperitoneal air. Moderate amount of gas  and stool are seen within the colon to the level of the descending  colon. There are no dilated gas-filled small bowel loops. No mass is  seen. There are no suspicious calcifications. There is a left convex  scoliosis with multilevel degenerative change.     There is been previous fixation of the left femur. Both femurs appear  somewhat  dysplastic.                                                                        IMPRESSION: Moderate amount of gas and stool in the colon.     KARON ZHAO MD   Order-Level Documents:     There are no order-level documents.   Lab and Collection     XR Abdomen 2 Views (Order: [id number]) - 6/26/2019   Result History     XR Abdomen 2 Views (Order #[id number]) on 6/26/2019 - Order Result History Report   Result Information     Status: Final result (Exam End: 6/26/2019 10:45 AM) Provider Status: Reviewed   Reading Providers      Reading Role Read Date   Karon Zhao MD  6/26/2019   Signed by     Signed Date/Time  Phone Pager   KARON ZHAO 6/26/2019 11:09 [phone]    Reviewed by     Ca Petty APRN CNP 6/26/2019 11:34 AM   Associated Diagnoses     Urinary frequency [R35.0]       Abdominal pain, generalized [R10.84]       Constipation, unspecified constipation type [K59.00]       Patient Release Status:     [facility]          Result Notes for XR Abdomen 2 Views     Notes recorded by Ca Petty APRN CNP on 6/26/2019 at 11:34 AM CDT  Reviewed during office visit  MELINA Ibarra CNP   June 26, 2019           Recipient List for Orders   Sent From To Cc'd Forwarded To Results   6/26/2019 11:11 AM Interface, Radiant Ib MELINA Ibarra CNP   XR Abdomen 2 Views [[id number]]             Order Providers     Authorizing Provider Encounter Provider Billing Provider   Ca Petty APRN CNP [id number] Karon Zhao MD   Base CPT Code (Reference Only)     Code CPT Chargeables   [id number]     56812     35614     86297   External Result Report     External Result Report   Order Report     View Order Information           ASSESSMENT AND PLAN:  Problem List Items Addressed This Visit     None      Visit Diagnoses     Abdominal pain, generalized    -  Primary    Relevant Orders    CBC and Differential (Completed)    Basic Metabolic Panel  (Completed)    XR Abdomen 2 Views    Urinary frequency        Relevant Orders    UA reflex to Microscopic and Culture (Completed)    CBC and Differential (Completed)    Basic Metabolic Panel (Completed)    XR Abdomen 2 Views    Constipation, unspecified constipation type        Relevant Orders    CBC and Differential (Completed)    Basic Metabolic Panel (Completed)    XR Abdomen 2 Views        We reviewed negative x-rays--showing no evidence of obstruction or active inflammation  X-rays do demonstrate moderate stool   Labs negative      She is comfortable with trying milk of magnesia or citrate of magnesia---  Increasing her fluid volume intake----also discussed fiber supplement daily with fluids for bowel maintenance  Perhaps the increase in iron has triggered the constipation along with chronic hydrocodone has set up a constellation  predisposing the constipation    She will notify me by my chart on how she is doing tomorrow    We discussed if it any time the pain has become intolerable, she is unable to take oral fluids or foods, fever  Or any other abrupt worsening should present to ER immediately    She does not feel she needs close follow-up appointments at this time--communicating through my chart is how she would like to follow-up unless worsening then would present to ED      -- Expected clinical course discussed    -- Medications and their side effects discussed    There are no Patient Instructions on file for this visit.  Ca Petty CNP  [facility]     Portions of this note were dictated using speech recognition software. The note has been proofread but errors in the text may have been overlooked. Please contact me if there are any concerns regarding the accuracy of the dictation.

## 2019-07-08 ENCOUNTER — TRANSFERRED RECORDS (OUTPATIENT)
Dept: HEALTH INFORMATION MANAGEMENT | Facility: OTHER | Age: 49
End: 2019-07-08

## 2019-08-13 ENCOUNTER — OFFICE VISIT (OUTPATIENT)
Dept: FAMILY MEDICINE | Facility: OTHER | Age: 49
End: 2019-08-13
Attending: FAMILY MEDICINE
Payer: COMMERCIAL

## 2019-08-13 VITALS
HEART RATE: 82 BPM | BODY MASS INDEX: 47.84 KG/M2 | SYSTOLIC BLOOD PRESSURE: 128 MMHG | DIASTOLIC BLOOD PRESSURE: 86 MMHG | WEIGHT: 177 LBS | TEMPERATURE: 97.6 F | RESPIRATION RATE: 16 BRPM | OXYGEN SATURATION: 98 %

## 2019-08-13 DIAGNOSIS — M17.10 OSTEOARTHRITIS OF KNEE, UNILATERAL: ICD-10-CM

## 2019-08-13 DIAGNOSIS — I10 BENIGN ESSENTIAL HYPERTENSION: ICD-10-CM

## 2019-08-13 DIAGNOSIS — G25.81 RESTLESS LEG SYNDROME: ICD-10-CM

## 2019-08-13 DIAGNOSIS — M54.50 CHRONIC MIDLINE LOW BACK PAIN WITHOUT SCIATICA: Primary | ICD-10-CM

## 2019-08-13 DIAGNOSIS — Z02.89 PAIN MEDICATION AGREEMENT COMPLETED: ICD-10-CM

## 2019-08-13 DIAGNOSIS — Q77.4 ACHONDROPLASIA: ICD-10-CM

## 2019-08-13 DIAGNOSIS — G89.29 CHRONIC MIDLINE LOW BACK PAIN WITHOUT SCIATICA: Primary | ICD-10-CM

## 2019-08-13 DIAGNOSIS — F41.8 DEPRESSION WITH ANXIETY: ICD-10-CM

## 2019-08-13 PROCEDURE — 99213 OFFICE O/P EST LOW 20 MIN: CPT | Performed by: FAMILY MEDICINE

## 2019-08-13 PROCEDURE — 80307 DRUG TEST PRSMV CHEM ANLYZR: CPT | Mod: ZL | Performed by: FAMILY MEDICINE

## 2019-08-13 RX ORDER — HYDROCODONE BITARTRATE AND ACETAMINOPHEN 5; 325 MG/1; MG/1
2 TABLET ORAL 3 TIMES DAILY PRN
Qty: 200 TABLET | Refills: 0 | Status: SHIPPED | OUTPATIENT
Start: 2019-08-13 | End: 2019-08-13

## 2019-08-13 RX ORDER — HYDROCODONE BITARTRATE AND ACETAMINOPHEN 5; 325 MG/1; MG/1
2 TABLET ORAL 3 TIMES DAILY PRN
Qty: 200 TABLET | Refills: 0 | Status: SHIPPED | OUTPATIENT
Start: 2019-08-13 | End: 2019-10-09

## 2019-08-13 ASSESSMENT — PATIENT HEALTH QUESTIONNAIRE - PHQ9
5. POOR APPETITE OR OVEREATING: NOT AT ALL
SUM OF ALL RESPONSES TO PHQ QUESTIONS 1-9: 2

## 2019-08-13 ASSESSMENT — ANXIETY QUESTIONNAIRES
3. WORRYING TOO MUCH ABOUT DIFFERENT THINGS: SEVERAL DAYS
IF YOU CHECKED OFF ANY PROBLEMS ON THIS QUESTIONNAIRE, HOW DIFFICULT HAVE THESE PROBLEMS MADE IT FOR YOU TO DO YOUR WORK, TAKE CARE OF THINGS AT HOME, OR GET ALONG WITH OTHER PEOPLE: SOMEWHAT DIFFICULT
GAD7 TOTAL SCORE: 3
1. FEELING NERVOUS, ANXIOUS, OR ON EDGE: SEVERAL DAYS
7. FEELING AFRAID AS IF SOMETHING AWFUL MIGHT HAPPEN: NOT AT ALL
2. NOT BEING ABLE TO STOP OR CONTROL WORRYING: NOT AT ALL
6. BECOMING EASILY ANNOYED OR IRRITABLE: NOT AT ALL
5. BEING SO RESTLESS THAT IT IS HARD TO SIT STILL: SEVERAL DAYS

## 2019-08-13 ASSESSMENT — PAIN SCALES - GENERAL: PAINLEVEL: MODERATE PAIN (5)

## 2019-08-13 NOTE — PROGRESS NOTES
"Nursing Notes:   Ruth Salazar LPN  8/13/2019  2:12 PM  Signed  Chief Complaint   Patient presents with     Recheck Medication       Initial BP (!) 150/98   Pulse 82   Temp 97.6  F (36.4  C) (Temporal)   Resp 16   Wt 80.3 kg (177 lb)   SpO2 98%   Breastfeeding? No   BMI 47.84 kg/m    Estimated body mass index is 47.84 kg/m  as calculated from the following:    Height as of 6/26/19: 1.295 m (4' 3\").    Weight as of this encounter: 80.3 kg (177 lb).  Medication Reconciliation: complete    Ruth Salazar LPN    SUBJECTIVE:  Wilmer Morse  is a 48 year old female who comes in today for follow-up and medication management.I last saw her in June.  She is a year out from her right total knee arthroplasty and doing well.  She continues on lisinopril for hypertension and sertraline and Wellbutrin for anxiety disorder and major depression.  She uses clonazepam at bedtime that helps with restless leg and anxiety and also continues on gabapentin.  At her last visit, we added Mirapex to her regimen for her restless legs.  That worked well.     She has her hearing for Social Security disability later this month.  She will have a determination then within 30 days of that date.    CHRONIC PAIN MANAGEMENT:    DIAGNOSIS:      1. Chronic midline low back pain without sciatica    2. Achondroplasia    3. Pain medication agreement 3/19/18, 3/13/19    4. Depression with anxiety    5. Benign essential hypertension    6. Restless leg syndrome    7. Pain medication agreement completed    8. Osteoarthritis of knee, unilateral        PAIN CLINIC EVALUATION:(NO)      PAIN MEDICATION AGREEMENT: (YES)    DATE SIGNED: 4/28/16,3/22/17, 3/19/18, 3/13/19      NON-MEDICATION MODALITIES MAXIMIZED? (YES)  Better after knee replacement surgery.      NEUROPATHIC PAIN: (NO)  ON GABAPENTIN/LYRICA/TCA/SNRI: (YES)       NOCICEPTIVE PAIN: (YES)      HISTORY OF CD: (NO)      MENTAL HEALTH DIAGNOSIS: (YES)  DIAGNOSIS: Anxiety and restless leg " syndrome    ON BENZODIAZEPINES: (YES). Klonopin 0.5-1 mg at bedtime.    DISCLOSURE REGARDING RISK OF CONCOMITANT USE WITH OPIOIDS DISCUSSED: (YES)  DATE DISCUSSED: 4/28/16,06/15/16, 9/6/16, 12/20/16, 3/22/17, 9/15/17, 3/13/19      MEDICATION: hydrocodone/APAP 5/325 #200/month, max 7 per day      ORAL MORPHINE EQUIVALENTS: 30/d      LAST TAPER TRIAL: underway.       QUERY TODAY: (YES)  APPROPRIATE?:         (YES)      TOXASSURE TODAY: (YES)  IF NO, DATE OF LAST TOXASURE: 9/6/16, 3/22/17, 3/8/18, 9/19/18, 3/13/19      PAIN SCORE FLOWSHEET REVIEWED: (YES)  STABLE OR IMPROVED: (YES)               Past Medical, Family, and Social History reviewed and updated as noted below.   ROS is negative except as noted above       Allergies   Allergen Reactions     Penicillins Unknown   ,   Family History   Problem Relation Age of Onset     Other - See Comments Mother         Achondroplasia.     Breast Cancer Mother         Cancer-breast,breast cancer     Breast Cancer Maternal Grandmother 70        Cancer-breast, breast cancer     Unknown/Adopted Father         Unknown     Family History Negative Daughter         Good Health     Family History Negative Son         Good Health     Family History Negative Other         Good Health     Family History Negative Son         Good Health,(stepson)7/24/91   ,   Current Outpatient Medications   Medication     buPROPion (WELLBUTRIN XL) 300 MG 24 hr tablet     clonazePAM (KLONOPIN) 0.5 MG tablet     ferrous sulfate (FEROSUL) 325 (65 Fe) MG tablet     gabapentin (NEURONTIN) 300 MG capsule     HYDROcodone-acetaminophen (NORCO) 5-325 MG tablet     ibuprofen (ADVIL/MOTRIN) 600 MG tablet     lisinopril (PRINIVIL/ZESTRIL) 10 MG tablet     pramipexole (MIRAPEX) 0.5 MG tablet     sertraline (ZOLOFT) 100 MG tablet     No current facility-administered medications for this visit.    ,   Past Medical History:   Diagnosis Date     Achondroplasia     No Comments Provided     Family history of malignant  neoplasm of breast     No Comments Provided     Hyperlipidemia     not currently on treatment     Other intervertebral disc degeneration, lumbar region     No Comments Provided     Other specified anxiety disorders     No Comments Provided     Overactive bladder     2014     Personal history of other medical treatment (CODE)      3, Para 2-0-1-2.     Primary osteoarthritis of one knee     5/3/2012     Restless legs syndrome     No Comments Provided     Zoster without complications     2006,left side of neck.   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 Repeat      LAPAROSCOPIC TUBAL LIGATION           OTHER SURGICAL HISTORY      [id number],OSTEOTOMY,Limb lengthening procedures.     OTHER SURGICAL HISTORY      10/16,,,10/17,201258,OTHER,Right     OTHER SURGICAL HISTORY      2017,059060,OTHER,Right,fluorscopically guided. no improvement.     right knee total arthroplasty Right 2018    Dr. Magaña, [facility]'s    and   Social History     Tobacco Use     Smoking status: Never Smoker     Smokeless tobacco: Never Used    Substance Use Topics     Alcohol use: No     Alcohol/week: 0.0 oz     OBJECTIVE:  /86   Pulse 82   Temp 97.6  F (36.4  C) (Temporal)   Resp 16   Wt 80.3 kg (177 lb)   SpO2 98%   Breastfeeding? No   BMI 47.84 kg/m     EXAM:  Alert and cooperative, no distress.  Affect is appropriate.  She does  get somewhat tearful when talking about her disability hearing.  Back exam was not repeated today.  ASSESSMENT/Plan :    Wilmer was seen today for recheck medication.    Diagnoses and all orders for this visit:    Chronic midline low back pain without sciatica  -     Drug  Screen Comprehensive , Urine with Reported Meds ([facility]) (Pain Care Package)    Achondroplasia  -     Drug  Screen Comprehensive , Urine with Reported Meds ([facility]) (Pain Care Package)    Pain medication agreement 3/19/18, 3/13/19  -     Drug  Screen Comprehensive , Urine with Reported Meds ([facility]) (Pain Care Package)  -     Discontinue: HYDROcodone-acetaminophen (NORCO) 5-325 MG tablet; Take 2 tablets by mouth 3 times daily as needed for pain . May take a maximum of 7 tablets per day.  -     HYDROcodone-acetaminophen (NORCO) 5-325 MG tablet; Take 2 tablets by mouth 3 times daily as needed for pain . May take a maximum of 7 tablets per day.    Depression with anxiety  -     Drug  Screen Comprehensive , Urine with Reported Meds ([facility]) (Pain Care Package)    Benign essential hypertension  -     Drug  Screen Comprehensive , Urine with Reported Meds ([facility]) (Pain Care Package)    Restless leg syndrome  -     Drug  Screen Comprehensive , Urine with Reported Meds ([facility]ox) (Pain Care Package)    Pain medication agreement completed  -     Drug  Screen Comprehensive , Urine with Reported Meds ([facility]ox) (Pain Care Package)  -     Discontinue: HYDROcodone-acetaminophen (NORCO) 5-325 MG tablet; Take 2 tablets by mouth 3 times daily as needed for pain . May take a maximum of 7 tablets per day.  -     HYDROcodone-acetaminophen (NORCO) 5-325 MG tablet;  Take 2 tablets by mouth 3 times daily as needed for pain . May take a maximum of 7 tablets per day.    Osteoarthritis of knee, unilateral  -     Discontinue: HYDROcodone-acetaminophen (NORCO) 5-325 MG tablet; Take 2 tablets by mouth 3 times daily as needed for pain . May take a maximum of 7 tablets per day.  -     HYDROcodone-acetaminophen (NORCO) 5-325 MG tablet; Take 2 tablets by mouth 3 times daily as needed for pain . May take a maximum of 7 tablets per day.      Support and encouragement offered.     query is appropriate.  [employer] today.  Renewal on hydrocodone 5/325#200×2.  Follow-up in 2 months, sooner if needed.    A total of 15 minutes was spent with the patient, greater than 50% of the time was spent in counseling/discussion of the aforementioned concerns.     Neptali French MD

## 2019-08-13 NOTE — NURSING NOTE
"Chief Complaint   Patient presents with     Recheck Medication       Initial BP (!) 150/98   Pulse 82   Temp 97.6  F (36.4  C) (Temporal)   Resp 16   Wt 80.3 kg (177 lb)   SpO2 98%   Breastfeeding? No   BMI 47.84 kg/m   Estimated body mass index is 47.84 kg/m  as calculated from the following:    Height as of 6/26/19: 1.295 m (4' 3\").    Weight as of this encounter: 80.3 kg (177 lb).  Medication Reconciliation: complete    Ruth Salazar LPN  "

## 2019-08-14 ASSESSMENT — ANXIETY QUESTIONNAIRES: GAD7 TOTAL SCORE: 3

## 2019-08-21 LAB — PAIN DRUG SCR UR W RPTD MEDS: NORMAL

## 2019-09-30 ENCOUNTER — MYC MEDICAL ADVICE (OUTPATIENT)
Dept: FAMILY MEDICINE | Facility: OTHER | Age: 49
End: 2019-09-30

## 2019-10-09 ENCOUNTER — OFFICE VISIT (OUTPATIENT)
Dept: FAMILY MEDICINE | Facility: OTHER | Age: 49
End: 2019-10-09
Attending: FAMILY MEDICINE
Payer: COMMERCIAL

## 2019-10-09 VITALS
SYSTOLIC BLOOD PRESSURE: 136 MMHG | BODY MASS INDEX: 49.2 KG/M2 | WEIGHT: 182 LBS | DIASTOLIC BLOOD PRESSURE: 88 MMHG | HEART RATE: 100 BPM | RESPIRATION RATE: 18 BRPM | OXYGEN SATURATION: 95 % | TEMPERATURE: 97.7 F

## 2019-10-09 DIAGNOSIS — Q77.4 ACHONDROPLASIA: ICD-10-CM

## 2019-10-09 DIAGNOSIS — F41.8 DEPRESSION WITH ANXIETY: ICD-10-CM

## 2019-10-09 DIAGNOSIS — I10 BENIGN ESSENTIAL HYPERTENSION: ICD-10-CM

## 2019-10-09 DIAGNOSIS — M54.50 CHRONIC MIDLINE LOW BACK PAIN WITHOUT SCIATICA: Primary | ICD-10-CM

## 2019-10-09 DIAGNOSIS — Z02.89 PAIN MEDICATION AGREEMENT COMPLETED: ICD-10-CM

## 2019-10-09 DIAGNOSIS — G89.29 CHRONIC MIDLINE LOW BACK PAIN WITHOUT SCIATICA: Primary | ICD-10-CM

## 2019-10-09 DIAGNOSIS — M17.10 OSTEOARTHRITIS OF KNEE, UNILATERAL: ICD-10-CM

## 2019-10-09 DIAGNOSIS — F41.1 GENERALIZED ANXIETY DISORDER: ICD-10-CM

## 2019-10-09 PROCEDURE — 99214 OFFICE O/P EST MOD 30 MIN: CPT | Performed by: FAMILY MEDICINE

## 2019-10-09 RX ORDER — HYDROCODONE BITARTRATE AND ACETAMINOPHEN 5; 325 MG/1; MG/1
2 TABLET ORAL 3 TIMES DAILY PRN
Qty: 200 TABLET | Refills: 0 | Status: SHIPPED | OUTPATIENT
Start: 2019-10-09 | End: 2019-12-03

## 2019-10-09 RX ORDER — HYDROCODONE BITARTRATE AND ACETAMINOPHEN 5; 325 MG/1; MG/1
2 TABLET ORAL 3 TIMES DAILY PRN
Qty: 200 TABLET | Refills: 0 | Status: SHIPPED | OUTPATIENT
Start: 2019-10-09 | End: 2019-10-09

## 2019-10-09 ASSESSMENT — ANXIETY QUESTIONNAIRES
7. FEELING AFRAID AS IF SOMETHING AWFUL MIGHT HAPPEN: SEVERAL DAYS
5. BEING SO RESTLESS THAT IT IS HARD TO SIT STILL: NOT AT ALL
3. WORRYING TOO MUCH ABOUT DIFFERENT THINGS: SEVERAL DAYS
6. BECOMING EASILY ANNOYED OR IRRITABLE: SEVERAL DAYS
GAD7 TOTAL SCORE: 6
IF YOU CHECKED OFF ANY PROBLEMS ON THIS QUESTIONNAIRE, HOW DIFFICULT HAVE THESE PROBLEMS MADE IT FOR YOU TO DO YOUR WORK, TAKE CARE OF THINGS AT HOME, OR GET ALONG WITH OTHER PEOPLE: VERY DIFFICULT
1. FEELING NERVOUS, ANXIOUS, OR ON EDGE: SEVERAL DAYS
2. NOT BEING ABLE TO STOP OR CONTROL WORRYING: SEVERAL DAYS

## 2019-10-09 ASSESSMENT — PATIENT HEALTH QUESTIONNAIRE - PHQ9
5. POOR APPETITE OR OVEREATING: SEVERAL DAYS
SUM OF ALL RESPONSES TO PHQ QUESTIONS 1-9: 5

## 2019-10-09 ASSESSMENT — PAIN SCALES - GENERAL: PAINLEVEL: MODERATE PAIN (5)

## 2019-10-09 NOTE — NURSING NOTE
"Chief Complaint   Patient presents with     Recheck Medication      She is here for medication refills and increased anxiety.  Ruth Salazar LPN..................10/9/2019   11:25 AM      Initial BP (!) 136/98   Pulse 100   Temp 97.7  F (36.5  C) (Temporal)   Resp 18   Wt 82.6 kg (182 lb)   LMP 09/27/2019   SpO2 95%   Breastfeeding? No   BMI 49.20 kg/m   Estimated body mass index is 49.2 kg/m  as calculated from the following:    Height as of 6/26/19: 1.295 m (4' 3\").    Weight as of this encounter: 82.6 kg (182 lb).  Medication Reconciliation: complete    Ruth Salazar LPN  "

## 2019-10-09 NOTE — PROGRESS NOTES
"Nursing Notes:   Ruth Salazar LPN  10/9/2019 11:25 AM  Signed  Chief Complaint   Patient presents with     Recheck Medication      She is here for medication refills and increased anxiety.  Ruth Salazar LPN..................10/9/2019   11:25 AM      Initial BP (!) 136/98   Pulse 100   Temp 97.7  F (36.5  C) (Temporal)   Resp 18   Wt 82.6 kg (182 lb)   LMP 09/27/2019   SpO2 95%   Breastfeeding? No   BMI 49.20 kg/m    Estimated body mass index is 49.2 kg/m  as calculated from the following:    Height as of 6/26/19: 1.295 m (4' 3\").    Weight as of this encounter: 82.6 kg (182 lb).  Medication Reconciliation: complete    Ruth Salazar LPN    SUBJECTIVE:  Wilmer Morse  is a 48 year old female who comes in today for follow-up and medication management.  I last saw her 2 months ago.  She continues on lisinopril for hypertension, and sertraline and Wellbutrin for anxiety disorder and major depression.  She uses clonazepam at bedtime helps with her restless legs and anxiety and continues on gabapentin.  She is also on Mirapex to help with restless legs which was also effective.  As of her last visit, she was waiting to hear about social security disability.    She is feeling more anxious of late. She has had some panic.  She got approved for Social Security Disability. She had seen the counselor but not since last December. She feels like she has been in a slump. She has had to take Klonopin during the day.  She has very little patience.  She has some mild night sweats.  She has some early awakening and at times will nap during the day. Menses are regular and variable heaviness.     PHQ-9 SCORE 6/18/2019 8/13/2019 10/9/2019   PHQ-9 Total Score 2 2 5     YESI-7 SCORE 6/18/2019 8/13/2019 10/9/2019   Total Score 1 3 6           CHRONIC PAIN MANAGEMENT:    DIAGNOSIS:      1. Chronic midline low back pain without sciatica    2. Achondroplasia    3. Pain medication agreement 3/19/18, 3/13/19    4. " Benign essential hypertension    5. Depression with anxiety    6. Generalized anxiety disorder        PAIN CLINIC EVALUATION:(NO)      PAIN MEDICATION AGREEMENT: (YES)    DATE SIGNED: 4/28/16,3/22/17, 3/19/18, 3/13/19      NON-MEDICATION MODALITIES MAXIMIZED? (YES)  Better after knee replacement surgery.      NEUROPATHIC PAIN: (NO)  ON GABAPENTIN/LYRICA/TCA/SNRI: (YES)       NOCICEPTIVE PAIN: (YES)      HISTORY OF CD: (NO)      MENTAL HEALTH DIAGNOSIS: (YES)  DIAGNOSIS: Anxiety and restless leg syndrome    ON BENZODIAZEPINES: (YES). Klonopin 0.5-1 mg at bedtime.    DISCLOSURE REGARDING RISK OF CONCOMITANT USE WITH OPIOIDS DISCUSSED: (YES)  DATE DISCUSSED: 4/28/16,06/15/16, 9/6/16, 12/20/16, 3/22/17, 9/15/17, 3/13/19      MEDICATION: hydrocodone/APAP 5/325 #200/month, max 7 per day      ORAL MORPHINE EQUIVALENTS: 30/d      LAST TAPER TRIAL: underway.       QUERY TODAY: (YES)  APPROPRIATE?:         (YES)      TOXASSURE TODAY: (NO)  IF NO, DATE OF LAST TOXASURE: 9/6/16, 3/22/17, 3/8/18, 9/19/18, 3/13/19, 8/13/19      PAIN SCORE FLOWSHEET REVIEWED: (YES)  STABLE OR IMPROVED: (YES)                 Past Medical, Family, and Social History reviewed and updated as noted below.   ROS is negative except as noted above       Allergies   Allergen Reactions     Penicillins Unknown   ,   Family History   Problem Relation Age of Onset     Other - See Comments Mother         Achondroplasia.     Breast Cancer Mother         Cancer-breast,breast cancer     Breast Cancer Maternal Grandmother 70        Cancer-breast, breast cancer     Unknown/Adopted Father         Unknown     Family History Negative Daughter         Good Health     Family History Negative Son         Good Health     Family History Negative Other         Good Health     Family History Negative Son         Good Health,(stepson)7/24/91   ,   Current Outpatient Medications   Medication     buPROPion (WELLBUTRIN XL) 300 MG 24 hr tablet     clonazePAM (KLONOPIN) 0.5 MG  tablet     ferrous sulfate (FEROSUL) 325 (65 Fe) MG tablet     gabapentin (NEURONTIN) 300 MG capsule     HYDROcodone-acetaminophen (NORCO) 5-325 MG tablet     ibuprofen (ADVIL/MOTRIN) 600 MG tablet     lisinopril (PRINIVIL/ZESTRIL) 10 MG tablet     pramipexole (MIRAPEX) 0.5 MG tablet     sertraline (ZOLOFT) 100 MG tablet     No current facility-administered medications for this visit.    ,   Past Medical History:   Diagnosis Date     Achondroplasia     No Comments Provided     Family history of malignant neoplasm of breast     No Comments Provided     Hyperlipidemia     not currently on treatment     Other intervertebral disc degeneration, lumbar region     No Comments Provided     Other specified anxiety disorders     No Comments Provided     Overactive bladder     2014     Personal history of other medical treatment (CODE)      3, Para 2-0-1-2.     Primary osteoarthritis of one knee     5/3/2012     Restless legs syndrome     No Comments Provided     Zoster without complications     2006,left side of neck.   ,   Patient Active Problem List    Diagnosis Date Noted     Benign essential hypertension 2018     Priority: Medium     Depression with anxiety 2018     Priority: Medium     Family history of breast cancer in female 2018     Priority: Medium     Hyperlipidemia 2018     Priority: Medium     Overview:   not currently on treatment       Restless leg syndrome 2018     Priority: Medium     Dependent edema 09/15/2017     Priority: Medium     Chronic midline low back pain without sciatica 2016     Priority: Medium     Achondroplasia 06/15/2016     Priority: Medium     Pain medication agreement 3/19/18, 3/13/19 2016     Priority: Medium     Osteoarthritis of knee, unilateral 2012     Priority: Medium   ,   Past Surgical History:   Procedure Laterality Date     ADENOIDECTOMY      as a child      SECTION      1996,Primary low transverse   section. Repeat      LAPAROSCOPIC TUBAL LIGATION           OTHER SURGICAL HISTORY      [id number],OSTEOTOMY,Limb lengthening procedures.     OTHER SURGICAL HISTORY      10/16,,,10/17,[id number],OTHER,Right     OTHER SURGICAL HISTORY      2017,[id number],OTHER,Right,fluorscopically guided. no improvement.     right knee total arthroplasty Right 2018    Dr. Magaña, [facility]    and   Social History     Tobacco Use     Smoking status: Never Smoker     Smokeless tobacco: Never Used   Substance Use Topics     Alcohol use: No     Alcohol/week: 0.0 standard drinks     OBJECTIVE:  BP (!) 136/98   Pulse 100   Temp 97.7  F (36.5  C) (Temporal)   Resp 18   Wt 82.6 kg (182 lb)   LMP 2019   SpO2 95%   Breastfeeding? No   BMI 49.20 kg/m     EXAM:  Alert and cooperative but tearful when discussing her anxiety and panic.  HEENT is normal without scleral icterus or JVD.  Lungs are clear, no rales rhonchi or wheezes are heard.  Cardiac RRR without murmur.  Abdomen is soft and nontender.  ASSESSMENT/Plan :    Wilmer was seen today for recheck medication.    Diagnoses and all orders for this visit:    Chronic midline low back pain without sciatica    Achondroplasia    Pain medication agreement 3/19/18, 3/13/19    Benign essential hypertension    Depression with anxiety  -     MENTAL HEALTH REFERRAL  - Adult; Psychiatry and Medication Management; Psychiatry; Other: Not Listed - Enter Referral Details in Scheduling Comments Below; Patient call to schedule    Generalized anxiety disorder  -     MENTAL HEALTH REFERRAL  - Adult; Psychiatry and Medication Management; Psychiatry; Other: Not Listed - Enter Referral Details in Scheduling Comments Below; Patient call to schedule      This point, it seems likely that she has significant anxiety and depressive symptoms.  Some panic.  We had a discussion with regard to possible psychiatric referral and possible medical genomic testing and feel that this  would be reasonable.  She is interested in pursuing this.  Also discussed referring to another counselor for therapy as well and she will discuss this with her psychiatry practitioner once she is seen.  We will leave her medications the same for now.  She will keep in touch with her progress.     query is appropriate.  Renewal on hydrocodone 5/325 #200 x2.    A total of 25 minutes was spent with the patient, greater than 50% of the time was spent in counseling/discussion of the aforementioned concerns.     Neptali French MD

## 2019-10-10 ASSESSMENT — ANXIETY QUESTIONNAIRES: GAD7 TOTAL SCORE: 6

## 2019-10-13 DIAGNOSIS — F41.8 DEPRESSION WITH ANXIETY: ICD-10-CM

## 2019-10-14 ENCOUNTER — MYC REFILL (OUTPATIENT)
Dept: FAMILY MEDICINE | Facility: OTHER | Age: 49
End: 2019-10-14

## 2019-10-14 DIAGNOSIS — F41.8 DEPRESSION WITH ANXIETY: ICD-10-CM

## 2019-10-14 RX ORDER — CLONAZEPAM 0.5 MG/1
TABLET ORAL
Qty: 90 TABLET | Refills: 5 | Status: SHIPPED | OUTPATIENT
Start: 2019-10-14 | End: 2020-05-04

## 2019-10-14 RX ORDER — CLONAZEPAM 0.5 MG/1
TABLET ORAL
Qty: 90 TABLET | Refills: 5 | Status: CANCELLED | OUTPATIENT
Start: 2019-10-14

## 2019-10-14 NOTE — TELEPHONE ENCOUNTER
Routing refill request to provider for review/approval because:  Drug not on the [facility] refill protocol     LOV: 10/9/19  Gavi Fowler RN on 10/14/2019 at 4:11 PM

## 2019-10-15 RX ORDER — CLONAZEPAM 0.5 MG/1
TABLET ORAL
Qty: 90 TABLET | Refills: 5 | OUTPATIENT
Start: 2019-10-15

## 2019-12-03 ENCOUNTER — OFFICE VISIT (OUTPATIENT)
Dept: FAMILY MEDICINE | Facility: OTHER | Age: 49
End: 2019-12-03
Attending: FAMILY MEDICINE
Payer: COMMERCIAL

## 2019-12-03 VITALS
SYSTOLIC BLOOD PRESSURE: 122 MMHG | RESPIRATION RATE: 16 BRPM | HEART RATE: 92 BPM | OXYGEN SATURATION: 98 % | HEIGHT: 55 IN | WEIGHT: 184.4 LBS | BODY MASS INDEX: 42.67 KG/M2 | TEMPERATURE: 99.5 F | DIASTOLIC BLOOD PRESSURE: 78 MMHG

## 2019-12-03 DIAGNOSIS — Z02.89 PAIN MEDICATION AGREEMENT COMPLETED: ICD-10-CM

## 2019-12-03 DIAGNOSIS — M17.10 OSTEOARTHRITIS OF KNEE, UNILATERAL: ICD-10-CM

## 2019-12-03 DIAGNOSIS — G89.29 CHRONIC MIDLINE LOW BACK PAIN WITHOUT SCIATICA: ICD-10-CM

## 2019-12-03 DIAGNOSIS — Z63.79 STRESS DUE TO ILLNESS OF FAMILY MEMBER: ICD-10-CM

## 2019-12-03 DIAGNOSIS — M54.50 CHRONIC MIDLINE LOW BACK PAIN WITHOUT SCIATICA: ICD-10-CM

## 2019-12-03 DIAGNOSIS — I10 BENIGN ESSENTIAL HYPERTENSION: ICD-10-CM

## 2019-12-03 DIAGNOSIS — G25.81 RESTLESS LEG SYNDROME: ICD-10-CM

## 2019-12-03 DIAGNOSIS — Q77.4 ACHONDROPLASIA: Primary | ICD-10-CM

## 2019-12-03 DIAGNOSIS — F41.8 DEPRESSION WITH ANXIETY: ICD-10-CM

## 2019-12-03 PROCEDURE — 99214 OFFICE O/P EST MOD 30 MIN: CPT | Performed by: FAMILY MEDICINE

## 2019-12-03 RX ORDER — HYDROCODONE BITARTRATE AND ACETAMINOPHEN 5; 325 MG/1; MG/1
2 TABLET ORAL 3 TIMES DAILY PRN
Qty: 200 TABLET | Refills: 0 | Status: SHIPPED | OUTPATIENT
Start: 2019-12-03 | End: 2020-01-27

## 2019-12-03 RX ORDER — HYDROCODONE BITARTRATE AND ACETAMINOPHEN 5; 325 MG/1; MG/1
TABLET ORAL
Qty: 200 TABLET | Refills: 0 | Status: SHIPPED | OUTPATIENT
Start: 2019-12-03 | End: 2020-01-27

## 2019-12-03 ASSESSMENT — ANXIETY QUESTIONNAIRES
2. NOT BEING ABLE TO STOP OR CONTROL WORRYING: SEVERAL DAYS
6. BECOMING EASILY ANNOYED OR IRRITABLE: NOT AT ALL
5. BEING SO RESTLESS THAT IT IS HARD TO SIT STILL: NOT AT ALL
1. FEELING NERVOUS, ANXIOUS, OR ON EDGE: SEVERAL DAYS
3. WORRYING TOO MUCH ABOUT DIFFERENT THINGS: SEVERAL DAYS
IF YOU CHECKED OFF ANY PROBLEMS ON THIS QUESTIONNAIRE, HOW DIFFICULT HAVE THESE PROBLEMS MADE IT FOR YOU TO DO YOUR WORK, TAKE CARE OF THINGS AT HOME, OR GET ALONG WITH OTHER PEOPLE: SOMEWHAT DIFFICULT
GAD7 TOTAL SCORE: 3
7. FEELING AFRAID AS IF SOMETHING AWFUL MIGHT HAPPEN: NOT AT ALL

## 2019-12-03 ASSESSMENT — PAIN SCALES - GENERAL: PAINLEVEL: SEVERE PAIN (6)

## 2019-12-03 ASSESSMENT — PATIENT HEALTH QUESTIONNAIRE - PHQ9
5. POOR APPETITE OR OVEREATING: NOT AT ALL
SUM OF ALL RESPONSES TO PHQ QUESTIONS 1-9: 2

## 2019-12-03 ASSESSMENT — MIFFLIN-ST. JEOR: SCORE: 1250.06

## 2019-12-03 NOTE — NURSING NOTE
"Chief Complaint   Patient presents with     Recheck Medication       Initial /78   Pulse 92   Temp 99.5  F (37.5  C) (Temporal)   Resp 16   Ht 1.295 m (4' 3\")   Wt 83.6 kg (184 lb 6.4 oz)   LMP 11/25/2019 (Exact Date)   SpO2 98%   Breastfeeding No   BMI 49.85 kg/m   Estimated body mass index is 49.85 kg/m  as calculated from the following:    Height as of this encounter: 1.295 m (4' 3\").    Weight as of this encounter: 83.6 kg (184 lb 6.4 oz).  Medication Reconciliation: complete    Meredith Khan LPN    "
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
## 2019-12-03 NOTE — LETTER
My Depression Action Plan  Name: Wilmer Morse   Date of Birth 1970  Date: 12/3/2019    My doctor: Neptali French   My clinic: [facility]  [address]  [place] [id number] [phone]          GREEN    ZONE   Good Control    What it looks like:     Things are going generally well. You have normal up s and down s. You may even feel depressed from time to time, but bad moods usually last less than a day.   What you need to do:  1. Continue to care for yourself (see self care plan)  2. Check your depression survival kit and update it as needed  3. Follow your physician s recommendations including any medication.  4. Do not stop taking medication unless you consult with your physician first.           YELLOW         ZONE Getting Worse    What it looks like:     Depression is starting to interfere with your life.     It may be hard to get out of bed; you may be starting to isolate yourself from others.    Symptoms of depression are starting to last most all day and this has happened for several days.     You may have suicidal thoughts but they are not constant.   What you need to do:     1. Call your care team, your response to treatment will improve if you keep your care team informed of your progress. Yellow periods are signs an adjustment may need to be made.     2. Continue your self-care, even if you have to fake it!    3. Talk to someone in your support network    4. Open up your depression survival kit           RED    ZONE Medical Alert - Get Help    What it looks like:     Depression is seriously interfering with your life.     You may experience these or other symptoms: You can t get out of bed most days, can t work or engage in other necessary activities, you have trouble taking care of basic hygiene, or basic responsibilities, thoughts of suicide or death that will not go away, self-injurious behavior.     What you need to do:  1. Call your care  team and request a same-day appointment. If they are not available (weekends or after hours) call your local crisis line, emergency room or 911.            Depression Self Care Plan / Survival Kit    Self-Care for Depression  Here s the deal. Your body and mind are really not as separate as most people think.  What you do and think affects how you feel and how you feel influences what you do and think. This means if you do things that people who feel good do, it will help you feel better.  Sometimes this is all it takes.  There is also a place for medication and therapy depending on how severe your depression is, so be sure to consult with your medical provider and/ or Behavioral Health Consultant if your symptoms are worsening or not improving.     In order to better manage my stress, I will:    Exercise  Get some form of exercise, every day. This will help reduce pain and release endorphins, the  feel good  chemicals in your brain. This is almost as good as taking antidepressants!  This is not the same as joining a gym and then never going! (they count on that by the way ) It can be as simple as just going for a walk or doing some gardening, anything that will get you moving.      Hygiene   Maintain good hygiene (Get out of bed in the morning, Make your bed, Brush your teeth, Take a shower, and Get dressed like you were going to work, even if you are unemployed).  If your clothes don't fit try to get ones that do.    Diet  I will strive to eat foods that are good for me, drink plenty of water, and avoid excessive sugar, caffeine, alcohol, and other mood-altering substances.  Some foods that are helpful in depression are: complex carbohydrates, B vitamins, flaxseed, fish or fish oil, fresh fruits and vegetables.    Psychotherapy  I agree to participate in Individual Therapy (if recommended).    Medication  If prescribed medications, I agree to take them.  Missing doses can result in serious side effects.  I  understand that drinking alcohol, or other illicit drug use, may cause potential side effects.  I will not stop my medication abruptly without first discussing it with my provider.    Staying Connected With Others  I will stay in touch with my friends, family members, and my primary care provider/team.    Use your imagination  Be creative.  We all have a creative side; it doesn t matter if it s oil painting, sand castles, or mud pies! This will also kick up the endorphins.    Witness Beauty  (AKA stop and smell the roses) Take a look outside, even in mid-winter. Notice colors, textures. Watch the squirrels and birds.     Service to others  Be of service to others.  There is always someone else in need.  By helping others we can  get out of ourselves  and remember the really important things.  This also provides opportunities for practicing all the other parts of the program.    Humor  Laugh and be silly!  Adjust your TV habits for less news and crime-drama and more comedy.    Control your stress  Try breathing deep, massage therapy, biofeedback, and meditation. Find time to relax each day.     My support system    Clinic Contact:  Phone number:    Contact 1:  Phone number:    Contact 2:  Phone number:    Muslim/:  Phone number:    Therapist:  Phone number:    Local crisis center:    Phone number:    Other community support:  Phone number:

## 2019-12-04 ASSESSMENT — ANXIETY QUESTIONNAIRES: GAD7 TOTAL SCORE: 3

## 2020-01-23 RX ORDER — GABAPENTIN 300 MG/1
300 CAPSULE ORAL 3 TIMES DAILY
Qty: 90 CAPSULE | Refills: 11 | Status: CANCELLED | OUTPATIENT
Start: 2020-01-23

## 2020-01-27 ENCOUNTER — OFFICE VISIT (OUTPATIENT)
Dept: FAMILY MEDICINE | Facility: OTHER | Age: 50
End: 2020-01-27
Attending: FAMILY MEDICINE
Payer: COMMERCIAL

## 2020-01-27 VITALS
WEIGHT: 183.6 LBS | OXYGEN SATURATION: 99 % | DIASTOLIC BLOOD PRESSURE: 84 MMHG | TEMPERATURE: 98.1 F | SYSTOLIC BLOOD PRESSURE: 134 MMHG | RESPIRATION RATE: 16 BRPM | BODY MASS INDEX: 49.63 KG/M2 | HEART RATE: 88 BPM

## 2020-01-27 DIAGNOSIS — Z02.89 PAIN MEDICATION AGREEMENT COMPLETED: ICD-10-CM

## 2020-01-27 DIAGNOSIS — F41.8 DEPRESSION WITH ANXIETY: ICD-10-CM

## 2020-01-27 DIAGNOSIS — M54.50 CHRONIC MIDLINE LOW BACK PAIN WITHOUT SCIATICA: ICD-10-CM

## 2020-01-27 DIAGNOSIS — M17.10 OSTEOARTHRITIS OF KNEE, UNILATERAL: ICD-10-CM

## 2020-01-27 DIAGNOSIS — G89.29 CHRONIC MIDLINE LOW BACK PAIN WITHOUT SCIATICA: ICD-10-CM

## 2020-01-27 DIAGNOSIS — Q77.4 ACHONDROPLASIA: ICD-10-CM

## 2020-01-27 DIAGNOSIS — I10 BENIGN ESSENTIAL HYPERTENSION: ICD-10-CM

## 2020-01-27 DIAGNOSIS — M54.42 ACUTE LEFT-SIDED LOW BACK PAIN WITH LEFT-SIDED SCIATICA: ICD-10-CM

## 2020-01-27 PROCEDURE — 99213 OFFICE O/P EST LOW 20 MIN: CPT | Performed by: FAMILY MEDICINE

## 2020-01-27 RX ORDER — BUPROPION HYDROCHLORIDE 300 MG/1
300 TABLET ORAL EVERY MORNING
Qty: 90 TABLET | Refills: 11 | Status: SHIPPED | OUTPATIENT
Start: 2020-01-27 | End: 2021-03-02

## 2020-01-27 RX ORDER — SERTRALINE HYDROCHLORIDE 100 MG/1
200 TABLET, FILM COATED ORAL DAILY
Qty: 180 TABLET | Refills: 11 | Status: SHIPPED | OUTPATIENT
Start: 2020-01-27 | End: 2021-03-02

## 2020-01-27 RX ORDER — LISINOPRIL 10 MG/1
10 TABLET ORAL DAILY
Qty: 90 TABLET | Refills: 11 | Status: SHIPPED | OUTPATIENT
Start: 2020-01-27 | End: 2021-03-02

## 2020-01-27 RX ORDER — HYDROCODONE BITARTRATE AND ACETAMINOPHEN 5; 325 MG/1; MG/1
TABLET ORAL
Qty: 200 TABLET | Refills: 0 | Status: SHIPPED | OUTPATIENT
Start: 2020-01-27 | End: 2020-03-20

## 2020-01-27 RX ORDER — HYDROCODONE BITARTRATE AND ACETAMINOPHEN 5; 325 MG/1; MG/1
2 TABLET ORAL 3 TIMES DAILY PRN
Qty: 200 TABLET | Refills: 0 | Status: SHIPPED | OUTPATIENT
Start: 2020-01-27 | End: 2020-03-20

## 2020-01-27 ASSESSMENT — ANXIETY QUESTIONNAIRES
2. NOT BEING ABLE TO STOP OR CONTROL WORRYING: SEVERAL DAYS
5. BEING SO RESTLESS THAT IT IS HARD TO SIT STILL: NOT AT ALL
6. BECOMING EASILY ANNOYED OR IRRITABLE: NOT AT ALL
GAD7 TOTAL SCORE: 4
7. FEELING AFRAID AS IF SOMETHING AWFUL MIGHT HAPPEN: NOT AT ALL
IF YOU CHECKED OFF ANY PROBLEMS ON THIS QUESTIONNAIRE, HOW DIFFICULT HAVE THESE PROBLEMS MADE IT FOR YOU TO DO YOUR WORK, TAKE CARE OF THINGS AT HOME, OR GET ALONG WITH OTHER PEOPLE: NOT DIFFICULT AT ALL
3. WORRYING TOO MUCH ABOUT DIFFERENT THINGS: SEVERAL DAYS
1. FEELING NERVOUS, ANXIOUS, OR ON EDGE: SEVERAL DAYS

## 2020-01-27 ASSESSMENT — PATIENT HEALTH QUESTIONNAIRE - PHQ9
5. POOR APPETITE OR OVEREATING: SEVERAL DAYS
SUM OF ALL RESPONSES TO PHQ QUESTIONS 1-9: 1

## 2020-01-27 ASSESSMENT — PAIN SCALES - GENERAL: PAINLEVEL: MODERATE PAIN (4)

## 2020-01-27 NOTE — NURSING NOTE
"Chief Complaint   Patient presents with     Recheck Medication       Initial /84   Pulse 88   Temp 98.1  F (36.7  C) (Tympanic)   Resp 16   Wt 83.3 kg (183 lb 9.6 oz)   LMP 01/18/2020   SpO2 99%   Breastfeeding No   BMI 49.63 kg/m   Estimated body mass index is 49.63 kg/m  as calculated from the following:    Height as of 12/3/19: 1.295 m (4' 3\").    Weight as of this encounter: 83.3 kg (183 lb 9.6 oz).  Medication Reconciliation: complete    Ruth Salazar LPN  "

## 2020-01-27 NOTE — PROGRESS NOTES
"Nursing Notes:   Ruth Salazar LPN  1/27/2020  3:08 PM  Signed  Chief Complaint   Patient presents with     Recheck Medication       Initial /84   Pulse 88   Temp 98.1  F (36.7  C) (Tympanic)   Resp 16   Wt 83.3 kg (183 lb 9.6 oz)   LMP 01/18/2020   SpO2 99%   Breastfeeding No   BMI 49.63 kg/m    Estimated body mass index is 49.63 kg/m  as calculated from the following:    Height as of 12/3/19: 1.295 m (4' 3\").    Weight as of this encounter: 83.3 kg (183 lb 9.6 oz).  Medication Reconciliation: complete    Ruth Salazar LPN      SUBJECTIVE:  Wilmer Morse  is a 49 year old female who comes in today for follow-up.  I last saw her a couple months ago.  She was having some significant anxiety and we discussed referring her for psychiatry consultation. She has not really gone yet. Her son-in-law is recovering from his burns and doing well.    She is doing exercising and watching carbs.  She has lost 4 pounds.     PHQ-9 SCORE 10/9/2019 12/3/2019 1/27/2020   PHQ-9 Total Score 5 2 1     YESI-7 SCORE 10/9/2019 12/3/2019 1/27/2020   Total Score 6 3 4       She is backing off gabapentin and quit 2 weeks ago.  She was foggy headed    CHRONIC PAIN MANAGEMENT:    DIAGNOSIS:      1. Depression with anxiety    2. Pain medication agreement completed    3. Osteoarthritis of knee, unilateral    4. Acute left-sided low back pain with left-sided sciatica    5. Benign essential hypertension    6. Achondroplasia    7. Chronic midline low back pain without sciatica    8. Pain medication agreement 3/19/18, 3/13/19        PAIN CLINIC EVALUATION:(NO)      PAIN MEDICATION AGREEMENT: (YES)    DATE SIGNED: 4/28/16,3/22/17, 3/19/18, 3/13/19, 8/13/19      NON-MEDICATION MODALITIES MAXIMIZED? (YES)  Better after knee replacement surgery.      NEUROPATHIC PAIN: (NO)  ON GABAPENTIN/LYRICA/TCA/SNRI: (YES)       NOCICEPTIVE PAIN: (YES)      HISTORY OF CD: (NO)      MENTAL HEALTH DIAGNOSIS: (YES)  DIAGNOSIS: Anxiety and " restless leg syndrome    ON BENZODIAZEPINES: (YES). Klonopin 0.5-1 mg at bedtime.    DISCLOSURE REGARDING RISK OF CONCOMITANT USE WITH OPIOIDS DISCUSSED: (YES)  DATE DISCUSSED: 4/28/16,06/15/16, 9/6/16, 12/20/16, 3/22/17, 9/15/17, 3/13/19      MEDICATION: hydrocodone/APAP 5/325 #200/month, max 7 per day      ORAL MORPHINE EQUIVALENTS: 30/d      LAST TAPER TRIAL: underway.       QUERY TODAY: (YES)  APPROPRIATE?:         (YES)      TOXASSURE TODAY: (NO)  IF NO, DATE OF LAST TOXASURE: 9/6/16, 3/22/17, 3/8/18, 9/19/18, 3/13/19, 8/13/19      PAIN SCORE FLOWSHEET REVIEWED: (YES)  STABLE OR IMPROVED: (YES)                  Past Medical, Family, and Social History reviewed and updated as noted below.   ROS is negative except as noted above       Allergies   Allergen Reactions     Penicillins Unknown   ,   Family History   Problem Relation Age of Onset     Other - See Comments Mother         Achondroplasia.     Breast Cancer Mother         Cancer-breast,breast cancer     Breast Cancer Maternal Grandmother 70        Cancer-breast, breast cancer     Unknown/Adopted Father         Unknown     Family History Negative Daughter         Good Health     Family History Negative Son         Good Health     Family History Negative Other         Good Health     Family History Negative Son         Good Health,(stepson)7/24/91   ,   Current Outpatient Medications   Medication     buPROPion (WELLBUTRIN XL) 300 MG 24 hr tablet     clonazePAM (KLONOPIN) 0.5 MG tablet     HYDROcodone-acetaminophen (NORCO) 5-325 MG tablet     HYDROcodone-acetaminophen (NORCO) 5-325 MG tablet     ibuprofen (ADVIL/MOTRIN) 600 MG tablet     lisinopril (PRINIVIL/ZESTRIL) 10 MG tablet     pramipexole (MIRAPEX) 0.5 MG tablet     sertraline (ZOLOFT) 100 MG tablet     No current facility-administered medications for this visit.    ,   Past Medical History:   Diagnosis Date     Achondroplasia     No Comments Provided     Family history of malignant neoplasm of  breast     No Comments Provided     Hyperlipidemia     not currently on treatment     Other intervertebral disc degeneration, lumbar region     No Comments Provided     Other specified anxiety disorders     No Comments Provided     Overactive bladder     2014     Personal history of other medical treatment (CODE)      3, Para 2-0-1-2.     Primary osteoarthritis of one knee     5/3/2012     Restless legs syndrome     No Comments Provided     Zoster without complications     2006,left side of neck.   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 Repeat      LAPAROSCOPIC TUBAL LIGATION           OTHER SURGICAL HISTORY      [id number],OSTEOTOMY,Limb lengthening procedures.     OTHER SURGICAL HISTORY      10/16,,,10/17,681013,OTHER,Right     OTHER SURGICAL HISTORY      2017,[id number],OTHER,Right,fluorscopically guided. no improvement.     right knee total arthroplasty Right 2018    Dr. Magaña, [facility]'s    and   Social History     Tobacco Use     Smoking status: Never Smoker     Smokeless tobacco: Never Used   Substance Use  Topics     Alcohol use: No     Alcohol/week: 0.0 standard drinks     OBJECTIVE:  /84   Pulse 88   Temp 98.1  F (36.7  C) (Tympanic)   Resp 16   Wt 83.3 kg (183 lb 9.6 oz)   LMP 01/18/2020   SpO2 99%   Breastfeeding No   BMI 49.63 kg/m     EXAM:  Alert and cooperative, no distress.  Affect is broad ranging and appropriate.  Anxiety seems to be better.  ASSESSMENT/Plan :    Wilmer was seen today for recheck medication.    Diagnoses and all orders for this visit:    Depression with anxiety  -     buPROPion (WELLBUTRIN XL) 300 MG 24 hr tablet; Take 1 tablet (300 mg) by mouth every morning  -     sertraline (ZOLOFT) 100 MG tablet; Take 2 tablets (200 mg) by mouth daily    Pain medication agreement completed  -     HYDROcodone-acetaminophen (NORCO) 5-325 MG tablet; Take 2 tablets by mouth 3 times daily as needed for pain . May take a maximum of 7 tablets per day.  -     HYDROcodone-acetaminophen (NORCO) 5-325 MG tablet; 2 tab po tid as needed for pain. . May take a maximum of 7 tablets per day.    Osteoarthritis of knee, unilateral  -     HYDROcodone-acetaminophen (NORCO) 5-325 MG tablet; Take 2 tablets by mouth 3 times daily as needed for pain . May take a maximum of 7 tablets per day.    Acute left-sided low back pain with left-sided sciatica    Benign essential hypertension  -     lisinopril (PRINIVIL/ZESTRIL) 10 MG tablet; Take 1 tablet (10 mg) by mouth daily    Achondroplasia  -     HYDROcodone-acetaminophen (NORCO) 5-325 MG tablet; 2 tab po tid as needed for pain. . May take a maximum of 7 tablets per day.    Chronic midline low back pain without sciatica  -     HYDROcodone-acetaminophen (NORCO) 5-325 MG tablet; 2 tab po tid as needed for pain. . May take a maximum of 7 tablets per day.    Pain medication agreement 3/19/18, 3/13/19  -     HYDROcodone-acetaminophen (NORCO) 5-325 MG tablet; Take 2 tablets by mouth 3 times daily as needed for pain . May take a maximum of 7 tablets per day.  -      HYDROcodone-acetaminophen (NORCO) 5-325 MG tablet; 2 tab po tid as needed for pain. . May take a maximum of 7 tablets per day.      Renewal on lisinopril.    Congratulated on weight loss and exercise.     query is appropriate.  Renewal on hydrocodone 5/325#200×2.  Follow-up in March for her annual exam.  She will need mammogram and labs at that time.  Discussed referral to Dr. Pinto for medical weight management but she is doing well at this time so we will hold off.    A total of 15 minutes was spent with the patient, greater than 50% of the time was spent in counseling/discussion of the aforementioned concerns.     Neptali French MD

## 2020-01-28 ASSESSMENT — ANXIETY QUESTIONNAIRES: GAD7 TOTAL SCORE: 4

## 2020-02-27 DIAGNOSIS — F41.8 DEPRESSION WITH ANXIETY: ICD-10-CM

## 2020-02-28 RX ORDER — BUPROPION HYDROCHLORIDE 300 MG/1
TABLET ORAL
Qty: 90 TABLET | Refills: 11 | OUTPATIENT
Start: 2020-02-28

## 2020-02-28 NOTE — TELEPHONE ENCOUNTER
[facility] sent Rx request for the following:      Bupropion 300 mg 24 hr tablet      Last Prescription Date:   1/27/2020  Last Fill Qty/Refills:         90, R-11    Last Office Visit:              1/27/2020   Future Office visit:           3/20/2020    eRx to [facility] on 1/27/2020 for #90 and 11R.  Too soon for additional refill.  Request refused  Mary Shaw RN............................ 2/28/2020 11:59 AM

## 2020-03-11 ENCOUNTER — HEALTH MAINTENANCE LETTER (OUTPATIENT)
Age: 50
End: 2020-03-11

## 2020-03-16 ENCOUNTER — MYC MEDICAL ADVICE (OUTPATIENT)
Dept: FAMILY MEDICINE | Facility: OTHER | Age: 50
End: 2020-03-16

## 2020-03-18 ENCOUNTER — MYC REFILL (OUTPATIENT)
Dept: FAMILY MEDICINE | Facility: OTHER | Age: 50
End: 2020-03-18

## 2020-03-18 DIAGNOSIS — Z02.89 PAIN MEDICATION AGREEMENT COMPLETED: ICD-10-CM

## 2020-03-18 DIAGNOSIS — M17.10 OSTEOARTHRITIS OF KNEE, UNILATERAL: ICD-10-CM

## 2020-03-18 RX ORDER — HYDROCODONE BITARTRATE AND ACETAMINOPHEN 5; 325 MG/1; MG/1
2 TABLET ORAL 3 TIMES DAILY PRN
Qty: 200 TABLET | Refills: 0 | Status: CANCELLED | OUTPATIENT
Start: 2020-03-18

## 2020-03-20 ENCOUNTER — VIRTUAL VISIT (OUTPATIENT)
Dept: FAMILY MEDICINE | Facility: OTHER | Age: 50
End: 2020-03-20
Attending: FAMILY MEDICINE
Payer: COMMERCIAL

## 2020-03-20 VITALS
HEIGHT: 55 IN | WEIGHT: 184 LBS | BODY MASS INDEX: 42.58 KG/M2 | DIASTOLIC BLOOD PRESSURE: 95 MMHG | SYSTOLIC BLOOD PRESSURE: 130 MMHG

## 2020-03-20 DIAGNOSIS — Z12.31 VISIT FOR SCREENING MAMMOGRAM: ICD-10-CM

## 2020-03-20 DIAGNOSIS — Z02.89 PAIN MEDICATION AGREEMENT COMPLETED: ICD-10-CM

## 2020-03-20 DIAGNOSIS — M54.50 CHRONIC MIDLINE LOW BACK PAIN WITHOUT SCIATICA: Primary | ICD-10-CM

## 2020-03-20 DIAGNOSIS — F41.8 DEPRESSION WITH ANXIETY: ICD-10-CM

## 2020-03-20 DIAGNOSIS — I10 BENIGN ESSENTIAL HYPERTENSION: ICD-10-CM

## 2020-03-20 DIAGNOSIS — Q77.4 ACHONDROPLASIA: ICD-10-CM

## 2020-03-20 DIAGNOSIS — G89.29 CHRONIC MIDLINE LOW BACK PAIN WITHOUT SCIATICA: Primary | ICD-10-CM

## 2020-03-20 PROCEDURE — 99213 OFFICE O/P EST LOW 20 MIN: CPT | Mod: TEL | Performed by: FAMILY MEDICINE

## 2020-03-20 RX ORDER — HYDROCODONE BITARTRATE AND ACETAMINOPHEN 5; 325 MG/1; MG/1
2 TABLET ORAL 3 TIMES DAILY PRN
Qty: 200 TABLET | Refills: 0 | Status: SHIPPED | OUTPATIENT
Start: 2020-03-20 | End: 2020-05-14

## 2020-03-20 RX ORDER — GABAPENTIN 300 MG/1
300 CAPSULE ORAL DAILY
COMMUNITY
Start: 2020-02-23 | End: 2021-12-01

## 2020-03-20 RX ORDER — HYDROCODONE BITARTRATE AND ACETAMINOPHEN 5; 325 MG/1; MG/1
TABLET ORAL
Qty: 200 TABLET | Refills: 0 | Status: SHIPPED | OUTPATIENT
Start: 2020-03-20 | End: 2020-05-14

## 2020-03-20 RX ORDER — FERROUS SULFATE 325(65) MG
325 TABLET ORAL 2 TIMES DAILY
Refills: 1 | COMMUNITY
Start: 2019-09-01 | End: 2020-05-14

## 2020-03-20 ASSESSMENT — ANXIETY QUESTIONNAIRES
6. BECOMING EASILY ANNOYED OR IRRITABLE: NOT AT ALL
7. FEELING AFRAID AS IF SOMETHING AWFUL MIGHT HAPPEN: NOT AT ALL
1. FEELING NERVOUS, ANXIOUS, OR ON EDGE: NEARLY EVERY DAY
2. NOT BEING ABLE TO STOP OR CONTROL WORRYING: NEARLY EVERY DAY
GAD7 TOTAL SCORE: 9
5. BEING SO RESTLESS THAT IT IS HARD TO SIT STILL: NOT AT ALL
3. WORRYING TOO MUCH ABOUT DIFFERENT THINGS: NEARLY EVERY DAY

## 2020-03-20 ASSESSMENT — PATIENT HEALTH QUESTIONNAIRE - PHQ9
5. POOR APPETITE OR OVEREATING: NOT AT ALL
SUM OF ALL RESPONSES TO PHQ QUESTIONS 1-9: 3

## 2020-03-20 ASSESSMENT — MIFFLIN-ST. JEOR: SCORE: 1227.37

## 2020-03-20 ASSESSMENT — PAIN SCALES - GENERAL: PAINLEVEL: SEVERE PAIN (7)

## 2020-03-21 ASSESSMENT — ANXIETY QUESTIONNAIRES: GAD7 TOTAL SCORE: 9

## 2020-04-06 DIAGNOSIS — I10 BENIGN ESSENTIAL HYPERTENSION: ICD-10-CM

## 2020-04-06 DIAGNOSIS — F41.8 DEPRESSION WITH ANXIETY: ICD-10-CM

## 2020-04-07 RX ORDER — SERTRALINE HYDROCHLORIDE 100 MG/1
TABLET, FILM COATED ORAL
Qty: 180 TABLET | Refills: 11 | OUTPATIENT
Start: 2020-04-07

## 2020-04-07 RX ORDER — LISINOPRIL 10 MG/1
TABLET ORAL
Qty: 90 TABLET | Refills: 11 | OUTPATIENT
Start: 2020-04-07

## 2020-04-07 NOTE — TELEPHONE ENCOUNTER
Lisinopril refilled on 1/27/2020 #90 x 11 refills  Sertraline reffilled on 1/27/2020 #180 x 11 refills to [facility].  Gavi Fowler RN on 4/7/2020 at 10:27 AM

## 2020-04-21 DIAGNOSIS — F41.8 DEPRESSION WITH ANXIETY: ICD-10-CM

## 2020-04-22 RX ORDER — BUPROPION HYDROCHLORIDE 300 MG/1
TABLET ORAL
Qty: 90 TABLET | Refills: 11 | OUTPATIENT
Start: 2020-04-22

## 2020-04-22 NOTE — TELEPHONE ENCOUNTER
Disp  Refills  Start  End  WHITNEY    buPROPion (WELLBUTRIN XL) 300 MG 24 hr tablet  90 tablet  11  1/27/2020   No    Sig - Route: Take 1 tablet (300 mg) by mouth every morning - Oral      Script was sent to [facility] [place]. Denying request as the patient should have refills available.    Unable to complete prescription refill per RN Medication Refill Policy.................... Sandra Holt RN ....................  4/22/2020   7:16 AM

## 2020-05-01 ENCOUNTER — MYC REFILL (OUTPATIENT)
Dept: FAMILY MEDICINE | Facility: OTHER | Age: 50
End: 2020-05-01

## 2020-05-01 ENCOUNTER — E-VISIT (OUTPATIENT)
Dept: FAMILY MEDICINE | Facility: OTHER | Age: 50
End: 2020-05-01
Payer: COMMERCIAL

## 2020-05-01 DIAGNOSIS — F41.8 DEPRESSION WITH ANXIETY: ICD-10-CM

## 2020-05-01 DIAGNOSIS — F41.1 GENERALIZED ANXIETY DISORDER: ICD-10-CM

## 2020-05-01 DIAGNOSIS — F33.1 MAJOR DEPRESSIVE DISORDER, RECURRENT EPISODE, MODERATE (H): ICD-10-CM

## 2020-05-01 PROCEDURE — 99421 OL DIG E/M SVC 5-10 MIN: CPT | Performed by: FAMILY MEDICINE

## 2020-05-01 RX ORDER — CLONAZEPAM 0.5 MG/1
TABLET ORAL
Qty: 90 TABLET | Refills: 5 | Status: CANCELLED | OUTPATIENT
Start: 2020-05-01

## 2020-05-01 ASSESSMENT — PATIENT HEALTH QUESTIONNAIRE - PHQ9
SUM OF ALL RESPONSES TO PHQ QUESTIONS 1-9: 3
10. IF YOU CHECKED OFF ANY PROBLEMS, HOW DIFFICULT HAVE THESE PROBLEMS MADE IT FOR YOU TO DO YOUR WORK, TAKE CARE OF THINGS AT HOME, OR GET ALONG WITH OTHER PEOPLE: SOMEWHAT DIFFICULT
SUM OF ALL RESPONSES TO PHQ QUESTIONS 1-9: 3

## 2020-05-01 ASSESSMENT — ANXIETY QUESTIONNAIRES
7. FEELING AFRAID AS IF SOMETHING AWFUL MIGHT HAPPEN: NOT AT ALL
7. FEELING AFRAID AS IF SOMETHING AWFUL MIGHT HAPPEN: NOT AT ALL
GAD7 TOTAL SCORE: 5
4. TROUBLE RELAXING: NOT AT ALL
2. NOT BEING ABLE TO STOP OR CONTROL WORRYING: SEVERAL DAYS
GAD7 TOTAL SCORE: 5
3. WORRYING TOO MUCH ABOUT DIFFERENT THINGS: SEVERAL DAYS
6. BECOMING EASILY ANNOYED OR IRRITABLE: NOT AT ALL
1. FEELING NERVOUS, ANXIOUS, OR ON EDGE: NEARLY EVERY DAY
GAD7 TOTAL SCORE: 5
5. BEING SO RESTLESS THAT IT IS HARD TO SIT STILL: NOT AT ALL

## 2020-05-02 ASSESSMENT — ANXIETY QUESTIONNAIRES: GAD7 TOTAL SCORE: 5

## 2020-05-02 ASSESSMENT — PATIENT HEALTH QUESTIONNAIRE - PHQ9: SUM OF ALL RESPONSES TO PHQ QUESTIONS 1-9: 3

## 2020-05-04 RX ORDER — CLONAZEPAM 0.5 MG/1
TABLET ORAL
Qty: 90 TABLET | Refills: 5 | Status: SHIPPED | OUTPATIENT
Start: 2020-05-04 | End: 2020-11-24

## 2020-05-04 RX ORDER — CLONAZEPAM 0.5 MG/1
TABLET ORAL
Qty: 90 TABLET | OUTPATIENT
Start: 2020-05-04

## 2020-05-04 NOTE — TELEPHONE ENCOUNTER
Patient's chart was accessed to determine the status of a refill request - nothing needed at this time as the request was previously addressed.    ClonazePAM (KLONOPIN) 0.5 MG tablet  90 tablet  5  5/4/2020   No    Sig: TAKE 1 TABLET BY MOUTH THREE TIMES DAILY AS NEEDED    Sent to pharmacy as: clonazePAM 0.5 MG Oral Tablet (klonoPIN)    Class: E-Prescribe    Order: [id number]    E-Prescribing Status: Receipt confirmed by pharmacy (5/4/2020  7:57 AM CDT)      [id number] - [place] - [address]      Unable to complete prescription refill per RN Medication Refill Policy. Mary Jose RN .............. 5/4/2020  12:41 PM

## 2020-05-14 ENCOUNTER — VIRTUAL VISIT (OUTPATIENT)
Dept: FAMILY MEDICINE | Facility: OTHER | Age: 50
End: 2020-05-14
Attending: FAMILY MEDICINE
Payer: COMMERCIAL

## 2020-05-14 DIAGNOSIS — G25.81 RESTLESS LEG SYNDROME: ICD-10-CM

## 2020-05-14 DIAGNOSIS — F43.21 GRIEF REACTION: ICD-10-CM

## 2020-05-14 DIAGNOSIS — G89.29 CHRONIC MIDLINE LOW BACK PAIN WITHOUT SCIATICA: ICD-10-CM

## 2020-05-14 DIAGNOSIS — E61.1 IRON DEFICIENCY: ICD-10-CM

## 2020-05-14 DIAGNOSIS — M54.50 CHRONIC MIDLINE LOW BACK PAIN WITHOUT SCIATICA: ICD-10-CM

## 2020-05-14 DIAGNOSIS — F41.8 DEPRESSION WITH ANXIETY: Primary | ICD-10-CM

## 2020-05-14 DIAGNOSIS — Q77.4 ACHONDROPLASIA: ICD-10-CM

## 2020-05-14 DIAGNOSIS — Z02.89 PAIN MEDICATION AGREEMENT COMPLETED: ICD-10-CM

## 2020-05-14 PROCEDURE — 99213 OFFICE O/P EST LOW 20 MIN: CPT | Mod: 95 | Performed by: FAMILY MEDICINE

## 2020-05-14 RX ORDER — PRAMIPEXOLE DIHYDROCHLORIDE 0.5 MG/1
0.5 TABLET ORAL AT BEDTIME
Qty: 90 TABLET | Refills: 11 | Status: SHIPPED | OUTPATIENT
Start: 2020-05-14 | End: 2021-04-28

## 2020-05-14 RX ORDER — HYDROCODONE BITARTRATE AND ACETAMINOPHEN 5; 325 MG/1; MG/1
2 TABLET ORAL 3 TIMES DAILY PRN
Qty: 200 TABLET | Refills: 0 | Status: SHIPPED | OUTPATIENT
Start: 2020-05-14 | End: 2020-07-06

## 2020-05-14 RX ORDER — HYDROCODONE BITARTRATE AND ACETAMINOPHEN 5; 325 MG/1; MG/1
TABLET ORAL
Qty: 200 TABLET | Refills: 0 | Status: SHIPPED | OUTPATIENT
Start: 2020-05-14 | End: 2020-07-06

## 2020-05-14 RX ORDER — HYDROCODONE BITARTRATE AND ACETAMINOPHEN 5; 325 MG/1; MG/1
TABLET ORAL
Qty: 200 TABLET | Status: CANCELLED | OUTPATIENT
Start: 2020-05-14

## 2020-05-14 RX ORDER — FERROUS SULFATE 325(65) MG
325 TABLET ORAL 2 TIMES DAILY
Qty: 60 TABLET | Refills: 11 | Status: SHIPPED | OUTPATIENT
Start: 2020-05-14 | End: 2021-06-22

## 2020-05-14 ASSESSMENT — PAIN SCALES - GENERAL: PAINLEVEL: SEVERE PAIN (6)

## 2020-05-14 NOTE — PROGRESS NOTES
"Wilmer Morse is a 49 year old female who is being evaluated via a billable telephone visit.      The patient has been notified of following: Brianda Ribeiro LPN .......  5/14/2020  2:18 PM      \"This telephone visit will be conducted via a call between you and your physician/provider. We have found that certain health care needs can be provided without the need for a physical exam.  This service lets us provide the care you need with a short phone conversation.  If a prescription is necessary we can send it directly to your pharmacy.  If lab work is needed we can place an order for that and you can then stop by our lab to have the test done at a later time.    Telephone visits are billed at different rates depending on your insurance coverage. During this emergency period, for some insurers they may be billed the same as an in-person visit.  Please reach out to your insurance provider with any questions.    If during the course of the call the physician/provider feels a telephone visit is not appropriate, you will not be charged for this service.\"    Patient has given verbal consent for Telephone visit?  Yes    What phone number would you like to be contacted at? [phone]    How would you like to obtain your AVS? [facility]     Nursing Notes:   Brianda Ribeiro LPN  5/14/2020  2:24 PM  Signed  Chief Complaint   Patient presents with     Refill Request     Patient is needing a refill on her pain medication today and her iron.    Initial LMP 05/08/2020   Breastfeeding No  Estimated body mass index is 51.75 kg/m  as calculated from the following:    Height as of 3/20/20: 1.27 m (4' 2\").    Weight as of 3/20/20: 83.5 kg (184 lb).    Medication Reconciliation: complete      Brianda Ribeiro LPN        Subjective     Wilmer Morse is a 49 year old female who presents to clinic today for the following health issues:    HPI  Patient calls today for telephone visit for follow-up.  She has a medication " agreement for which she takes hydrocodone for her chronic back and musculoskeletal issues due to her achondroplasia.   query is appropriate.    She has significant anxiety.  Her father recently passed away from metastatic cancer.  She sent a note on 1 May.  We encouraged you to follow-up with a virtual visit.    PHQ 2020 3/20/2020 2020   PHQ-9 Total Score 1 3 3   Q9: Thoughts of better off dead/self-harm past 2 weeks Not at all Not at all Not at all     YESI-7 SCORE 2020 3/20/2020 2020   Total Score - - 5 (mild anxiety)   Total Score 4 9 5           Patient Active Problem List   Diagnosis     Achondroplasia     Chronic midline low back pain without sciatica     Dependent edema     Depression with anxiety     Family history of breast cancer in female     Hyperlipidemia     Osteoarthritis of knee, unilateral     Pain medication agreement 3/19/18, 3/13/19     Restless leg syndrome     Benign essential hypertension     Past Surgical History:   Procedure Laterality Date     ADENOIDECTOMY      as a child      SECTION      1996,Primary low transverse  section. Repeat      LAPAROSCOPIC TUBAL LIGATION           OTHER SURGICAL HISTORY      [id number],OSTEOTOMY,Limb lengthening procedures.     OTHER SURGICAL HISTORY      10/16,,,10/17,719168,OTHER,Right     OTHER SURGICAL HISTORY      2017,306102,OTHER,Right,fluorscopically guided. no improvement.     right knee total arthroplasty Right 2018    Dr. Magaña, [facility]       Social History     Tobacco Use     Smoking status: Never Smoker     Smokeless tobacco: Never Used   Substance Use Topics     Alcohol use: No     Alcohol/week: 0.0 standard drinks     Family History   Problem Relation Age of Onset     Other - See Comments Mother         Achondroplasia.     Breast Cancer Mother         Cancer-breast,breast cancer     Breast Cancer Maternal Grandmother 70        Cancer-breast, breast cancer      Unknown/Adopted Father         Unknown     Family History Negative Daughter         Good Health     Family History Negative Son         Good Health     Family History Negative Other         Good Health     Family History Negative Son         Good Health,(stepson)7/24/91         Current Outpatient Medications   Medication Sig Dispense Refill     buPROPion (WELLBUTRIN XL) 300 MG 24 hr tablet Take 1 tablet (300 mg) by mouth every morning 90 tablet 11     clonazePAM (KLONOPIN) 0.5 MG tablet TAKE 1 TABLET BY MOUTH THREE TIMES DAILY AS NEEDED 90 tablet 5     FEROSUL 325 (65 Fe) MG tablet Take 1 tablet (325 mg) by mouth 2 times daily 60 tablet 11     gabapentin (NEURONTIN) 300 MG capsule Take 300 mg by mouth daily       HYDROcodone-acetaminophen (NORCO) 5-325 MG tablet 2 tab po tid as needed for pain. . May take a maximum of 7 tablets per day. 200 tablet 0     HYDROcodone-acetaminophen (NORCO) 5-325 MG tablet Take 2 tablets by mouth 3 times daily as needed for pain . May take a maximum of 7 tablets per day. 200 tablet 0     ibuprofen (ADVIL/MOTRIN) 600 MG tablet Take 600 mg by mouth 3 times daily as needed . Maximum of 3200 mg in 24 hours.       lisinopril (PRINIVIL/ZESTRIL) 10 MG tablet Take 1 tablet (10 mg) by mouth daily 90 tablet 11     pramipexole (MIRAPEX) 0.5 MG tablet Take 1 tablet (0.5 mg) by mouth At Bedtime 90 tablet 11     sertraline (ZOLOFT) 100 MG tablet Take 2 tablets (200 mg) by mouth daily 180 tablet 11     Allergies   Allergen Reactions     Penicillins Unknown       Reviewed and updated as needed this visit by Provider         Review of Systems   ROS is negative except as noted above          Objective   Reported vitals:  LMP 05/08/2020   Breastfeeding No    healthy, alert and no distress  PSYCH: Alert and oriented times 3; coherent speech, normal   rate and volume, able to articulate logical thoughts, able   to abstract reason, no tangential thoughts, no hallucinations   or delusions  Her affect is  normal  RESP: No cough, no audible wheezing, able to talk in full sentences  Remainder of exam unable to be completed due to telephone visits    Diagnostic Test Results:  Labs reviewed in Epic        Assessment/Plan:  Wilmer was seen today for refill request.    Diagnoses and all orders for this visit:    Depression with anxiety    Achondroplasia  -     HYDROcodone-acetaminophen (NORCO) 5-325 MG tablet; 2 tab po tid as needed for pain. . May take a maximum of 7 tablets per day.  -     HYDROcodone-acetaminophen (NORCO) 5-325 MG tablet; Take 2 tablets by mouth 3 times daily as needed for pain . May take a maximum of 7 tablets per day.    Chronic midline low back pain without sciatica  -     HYDROcodone-acetaminophen (NORCO) 5-325 MG tablet; 2 tab po tid as needed for pain. . May take a maximum of 7 tablets per day.  -     HYDROcodone-acetaminophen (NORCO) 5-325 MG tablet; Take 2 tablets by mouth 3 times daily as needed for pain . May take a maximum of 7 tablets per day.    Pain medication agreement 3/19/18, 3/13/19  -     HYDROcodone-acetaminophen (NORCO) 5-325 MG tablet; 2 tab po tid as needed for pain. . May take a maximum of 7 tablets per day.  -     HYDROcodone-acetaminophen (NORCO) 5-325 MG tablet; Take 2 tablets by mouth 3 times daily as needed for pain . May take a maximum of 7 tablets per day.    Iron deficiency  -     FEROSUL 325 (65 Fe) MG tablet; Take 1 tablet (325 mg) by mouth 2 times daily    Restless leg syndrome  -     pramipexole (MIRAPEX) 0.5 MG tablet; Take 1 tablet (0.5 mg) by mouth At Bedtime    Grief reaction      Support encouragement offered.   query is appropriate.  Renewal on hydrocodone pursuant to her contract.  Renewed her iron.  Renewed her Mirapex.  Follow-up in 2 months, sooner if needed    No follow-ups on file.      Phone call duration:  13 minutes    Neptali French MD

## 2020-05-14 NOTE — NURSING NOTE
"Chief Complaint   Patient presents with     Refill Request     Patient is needing a refill on her pain medication today and her iron.    Initial LMP 05/08/2020   Breastfeeding No  Estimated body mass index is 51.75 kg/m  as calculated from the following:    Height as of 3/20/20: 1.27 m (4' 2\").    Weight as of 3/20/20: 83.5 kg (184 lb).    Medication Reconciliation: complete      Brianda Ribeiro LPN  "

## 2020-07-06 ENCOUNTER — OFFICE VISIT (OUTPATIENT)
Dept: FAMILY MEDICINE | Facility: OTHER | Age: 50
End: 2020-07-06
Attending: FAMILY MEDICINE
Payer: COMMERCIAL

## 2020-07-06 VITALS
HEIGHT: 55 IN | DIASTOLIC BLOOD PRESSURE: 88 MMHG | TEMPERATURE: 99.2 F | WEIGHT: 196.4 LBS | BODY MASS INDEX: 45.45 KG/M2 | SYSTOLIC BLOOD PRESSURE: 132 MMHG | RESPIRATION RATE: 16 BRPM | HEART RATE: 84 BPM

## 2020-07-06 DIAGNOSIS — M54.50 CHRONIC MIDLINE LOW BACK PAIN WITHOUT SCIATICA: ICD-10-CM

## 2020-07-06 DIAGNOSIS — G89.29 CHRONIC MIDLINE LOW BACK PAIN WITHOUT SCIATICA: ICD-10-CM

## 2020-07-06 DIAGNOSIS — E61.1 IRON DEFICIENCY: ICD-10-CM

## 2020-07-06 DIAGNOSIS — Z63.79 STRESS DUE TO ILLNESS OF FAMILY MEMBER: ICD-10-CM

## 2020-07-06 DIAGNOSIS — Q77.4 ACHONDROPLASIA: Primary | ICD-10-CM

## 2020-07-06 DIAGNOSIS — F41.8 DEPRESSION WITH ANXIETY: ICD-10-CM

## 2020-07-06 DIAGNOSIS — E78.5 HYPERLIPIDEMIA, UNSPECIFIED HYPERLIPIDEMIA TYPE: ICD-10-CM

## 2020-07-06 DIAGNOSIS — I10 BENIGN ESSENTIAL HYPERTENSION: ICD-10-CM

## 2020-07-06 DIAGNOSIS — Z02.89 PAIN MEDICATION AGREEMENT COMPLETED: ICD-10-CM

## 2020-07-06 LAB
ALBUMIN SERPL-MCNC: 4.1 G/DL (ref 3.5–5.7)
ALP SERPL-CCNC: 59 U/L (ref 34–104)
ALT SERPL W P-5'-P-CCNC: 9 U/L (ref 7–52)
ANION GAP SERPL CALCULATED.3IONS-SCNC: 8 MMOL/L (ref 3–14)
AST SERPL W P-5'-P-CCNC: 12 U/L (ref 13–39)
BASOPHILS # BLD AUTO: 0 10E9/L (ref 0–0.2)
BASOPHILS NFR BLD AUTO: 0.3 %
BILIRUB SERPL-MCNC: 0.4 MG/DL (ref 0.3–1)
BUN SERPL-MCNC: 13 MG/DL (ref 7–25)
CALCIUM SERPL-MCNC: 9.5 MG/DL (ref 8.6–10.3)
CHLORIDE SERPL-SCNC: 105 MMOL/L (ref 98–107)
CHOLEST SERPL-MCNC: 192 MG/DL
CO2 SERPL-SCNC: 23 MMOL/L (ref 21–31)
CREAT SERPL-MCNC: 0.55 MG/DL (ref 0.6–1.2)
DIFFERENTIAL METHOD BLD: NORMAL
EOSINOPHIL # BLD AUTO: 0.2 10E9/L (ref 0–0.7)
EOSINOPHIL NFR BLD AUTO: 2.1 %
ERYTHROCYTE [DISTWIDTH] IN BLOOD BY AUTOMATED COUNT: 12.7 % (ref 10–15)
GFR SERPL CREATININE-BSD FRML MDRD: >90 ML/MIN/{1.73_M2}
GLUCOSE SERPL-MCNC: 88 MG/DL (ref 70–105)
HCT VFR BLD AUTO: 41.5 % (ref 35–47)
HDLC SERPL-MCNC: 43 MG/DL (ref 23–92)
HGB BLD-MCNC: 14 G/DL (ref 11.7–15.7)
IMM GRANULOCYTES # BLD: 0 10E9/L (ref 0–0.4)
IMM GRANULOCYTES NFR BLD: 0.1 %
IRON SATN MFR SERPL: 12 % (ref 20–55)
IRON SERPL-MCNC: 48 UG/DL (ref 50–212)
LDLC SERPL CALC-MCNC: 111 MG/DL
LYMPHOCYTES # BLD AUTO: 2.1 10E9/L (ref 0.8–5.3)
LYMPHOCYTES NFR BLD AUTO: 27.9 %
MCH RBC QN AUTO: 30.1 PG (ref 26.5–33)
MCHC RBC AUTO-ENTMCNC: 33.7 G/DL (ref 31.5–36.5)
MCV RBC AUTO: 89 FL (ref 78–100)
MONOCYTES # BLD AUTO: 0.7 10E9/L (ref 0–1.3)
MONOCYTES NFR BLD AUTO: 9.3 %
NEUTROPHILS # BLD AUTO: 4.6 10E9/L (ref 1.6–8.3)
NEUTROPHILS NFR BLD AUTO: 60.3 %
NONHDLC SERPL-MCNC: 149 MG/DL
PLATELET # BLD AUTO: 279 10E9/L (ref 150–450)
POTASSIUM SERPL-SCNC: 4.3 MMOL/L (ref 3.5–5.1)
PROT SERPL-MCNC: 7 G/DL (ref 6.4–8.9)
RBC # BLD AUTO: 4.65 10E12/L (ref 3.8–5.2)
SODIUM SERPL-SCNC: 136 MMOL/L (ref 134–144)
TIBC SERPL-MCNC: 386.4 UG/DL (ref 245–400)
TRIGL SERPL-MCNC: 192 MG/DL
UIBC (UNSATURATED): 338.4 MG/DL
WBC # BLD AUTO: 7.6 10E9/L (ref 4–11)

## 2020-07-06 PROCEDURE — 80307 DRUG TEST PRSMV CHEM ANLYZR: CPT | Mod: ZL | Performed by: FAMILY MEDICINE

## 2020-07-06 PROCEDURE — 80053 COMPREHEN METABOLIC PANEL: CPT | Mod: ZL | Performed by: FAMILY MEDICINE

## 2020-07-06 PROCEDURE — 85025 COMPLETE CBC W/AUTO DIFF WBC: CPT | Mod: ZL | Performed by: FAMILY MEDICINE

## 2020-07-06 PROCEDURE — 80061 LIPID PANEL: CPT | Mod: ZL | Performed by: FAMILY MEDICINE

## 2020-07-06 PROCEDURE — 36415 COLL VENOUS BLD VENIPUNCTURE: CPT | Mod: ZL | Performed by: FAMILY MEDICINE

## 2020-07-06 PROCEDURE — 83550 IRON BINDING TEST: CPT | Mod: ZL | Performed by: FAMILY MEDICINE

## 2020-07-06 PROCEDURE — 83540 ASSAY OF IRON: CPT | Mod: ZL | Performed by: FAMILY MEDICINE

## 2020-07-06 PROCEDURE — 99214 OFFICE O/P EST MOD 30 MIN: CPT | Performed by: FAMILY MEDICINE

## 2020-07-06 RX ORDER — HYDROCODONE BITARTRATE AND ACETAMINOPHEN 5; 325 MG/1; MG/1
2 TABLET ORAL 3 TIMES DAILY PRN
Qty: 200 TABLET | Refills: 0 | Status: SHIPPED | OUTPATIENT
Start: 2020-07-06 | End: 2020-09-08

## 2020-07-06 RX ORDER — HYDROCODONE BITARTRATE AND ACETAMINOPHEN 5; 325 MG/1; MG/1
TABLET ORAL
Qty: 200 TABLET | Refills: 0 | Status: SHIPPED | OUTPATIENT
Start: 2020-07-06 | End: 2020-09-08

## 2020-07-06 RX ORDER — HYDROCODONE BITARTRATE AND ACETAMINOPHEN 5; 325 MG/1; MG/1
2 TABLET ORAL 3 TIMES DAILY PRN
Qty: 200 TABLET | Refills: 0 | Status: CANCELLED | OUTPATIENT
Start: 2020-07-06

## 2020-07-06 ASSESSMENT — ANXIETY QUESTIONNAIRES
1. FEELING NERVOUS, ANXIOUS, OR ON EDGE: SEVERAL DAYS
7. FEELING AFRAID AS IF SOMETHING AWFUL MIGHT HAPPEN: NOT AT ALL
IF YOU CHECKED OFF ANY PROBLEMS ON THIS QUESTIONNAIRE, HOW DIFFICULT HAVE THESE PROBLEMS MADE IT FOR YOU TO DO YOUR WORK, TAKE CARE OF THINGS AT HOME, OR GET ALONG WITH OTHER PEOPLE: SOMEWHAT DIFFICULT
GAD7 TOTAL SCORE: 5
6. BECOMING EASILY ANNOYED OR IRRITABLE: MORE THAN HALF THE DAYS
5. BEING SO RESTLESS THAT IT IS HARD TO SIT STILL: NOT AT ALL
3. WORRYING TOO MUCH ABOUT DIFFERENT THINGS: SEVERAL DAYS
2. NOT BEING ABLE TO STOP OR CONTROL WORRYING: SEVERAL DAYS

## 2020-07-06 ASSESSMENT — PATIENT HEALTH QUESTIONNAIRE - PHQ9
5. POOR APPETITE OR OVEREATING: NOT AT ALL
SUM OF ALL RESPONSES TO PHQ QUESTIONS 1-9: 1

## 2020-07-06 ASSESSMENT — PAIN SCALES - GENERAL: PAINLEVEL: MODERATE PAIN (5)

## 2020-07-06 ASSESSMENT — MIFFLIN-ST. JEOR: SCORE: 1299.23

## 2020-07-06 NOTE — NURSING NOTE
"Patient presents to the clinic today for a medication check and anxiety concerns.  Tiara Bourgeois LPN 7/6/2020   2:30 PM    Chief Complaint   Patient presents with     Recheck Medication     Anxiety       Initial /88 (BP Location: Right arm, Patient Position: Sitting, Cuff Size: Adult Large)   Pulse 84   Temp 99.2  F (37.3  C) (Tympanic)   Resp 16   Ht 1.295 m (4' 2.98\")   Wt 89.1 kg (196 lb 6.4 oz)   LMP 07/06/2020 (Exact Date)   Breastfeeding No   BMI 53.12 kg/m   Estimated body mass index is 53.12 kg/m  as calculated from the following:    Height as of this encounter: 1.295 m (4' 2.98\").    Weight as of this encounter: 89.1 kg (196 lb 6.4 oz).  Medication Reconciliation: complete  Tiara Bourgeois LPN    "

## 2020-07-06 NOTE — PROGRESS NOTES
"Nursing Notes:   Tiara Bourgeois LPN  7/6/2020  2:30 PM  Signed  Patient presents to the clinic today for a medication check and anxiety concerns.  Tiara Bourgeois LPN 7/6/2020   2:30 PM    Chief Complaint   Patient presents with     Recheck Medication     Anxiety       Initial /88 (BP Location: Right arm, Patient Position: Sitting, Cuff Size: Adult Large)   Pulse 84   Temp 99.2  F (37.3  C) (Tympanic)   Resp 16   Ht 1.295 m (4' 2.98\")   Wt 89.1 kg (196 lb 6.4 oz)   LMP 07/06/2020 (Exact Date)   Breastfeeding No   BMI 53.12 kg/m   Estimated body mass index is 53.12 kg/m  as calculated from the following:    Height as of this encounter: 1.295 m (4' 2.98\").    Weight as of this encounter: 89.1 kg (196 lb 6.4 oz).  Medication Reconciliation: complete  Tiara Bourgeois LPN      SUBJECTIVE:  Wilmer Morse  is a 49 year old female Who comes in today for follow-up and medication management. She had a virtual visit on May 14.  She had just lost her father to metastatic cancer.  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  She has generalized anxiety.  She has been having a bit more anxiety of late and thinks maybe she has some perimenopausal symptoms. She is having vasomotor instability. She is still having periods. She has some emotional lability.  Her  just lost his thigh and a fireworks accident this last week so there is been a significant amount of increased stress at home.  He seems to be doing well and is recovering from his surgery.    She is due for ToxAssure.  Her pain medication agreement is good through August.      PHQ 3/20/2020 5/1/2020 7/6/2020   PHQ-9 Total Score 3 3 1   Q9: Thoughts of better off dead/self-harm past 2 weeks Not at all Not at all Not at all     YESI-7 SCORE 3/20/2020 5/1/2020 7/6/2020   Total Score - 5 (mild anxiety) -   Total Score 9 5 5           CHRONIC PAIN MANAGEMENT:    DIAGNOSIS:      1. Achondroplasia  "   2. Chronic midline low back pain without sciatica    3. Pain medication agreement 3/19/18, 3/13/19    4. Benign essential hypertension    5. Depression with anxiety    6. Iron deficiency    7. Hyperlipidemia, unspecified hyperlipidemia type    8. Stress due to illness of family member        PAIN CLINIC EVALUATION:(NO)      PAIN MEDICATION AGREEMENT: (YES)    DATE SIGNED: 4/28/16,3/22/17, 3/19/18, 3/13/19, 7/6/2020      NON-MEDICATION MODALITIES MAXIMIZED? (YES)  Better after knee replacement surgery.      NEUROPATHIC PAIN: (NO)  ON GABAPENTIN/LYRICA/TCA/SNRI: (YES)       NOCICEPTIVE PAIN: (YES)      HISTORY OF CD: (NO)      MENTAL HEALTH DIAGNOSIS: (YES)  DIAGNOSIS: Anxiety and restless leg syndrome    ON BENZODIAZEPINES: (YES). Klonopin 0.5-1 mg at bedtime.    DISCLOSURE REGARDING RISK OF CONCOMITANT USE WITH OPIOIDS DISCUSSED: (YES)  DATE DISCUSSED: 4/28/16,06/15/16, 9/6/16, 12/20/16, 3/22/17, 9/15/17, 3/13/19, 7/6/2020      MEDICATION: hydrocodone/APAP 5/325 #200/month, max 7 per day      ORAL MORPHINE EQUIVALENTS: 30/d      LAST TAPER TRIAL: underway.       QUERY TODAY: (YES)  APPROPRIATE?:         (YES)      TOXASSURE TODAY: (YES)  IF NO, DATE OF LAST TOXASURE: 9/6/16, 3/22/17, 3/8/18, 9/19/18, 3/13/19, 8/13/19      PAIN SCORE FLOWSHEET REVIEWED: (YES)  STABLE OR IMPROVED: (YES)                 Past Medical, Family, and Social History reviewed and updated as noted below.   ROS is negative except as noted above       Allergies   Allergen Reactions     Penicillins Unknown   ,   Family History   Problem Relation Age of Onset     Other - See Comments Mother         Achondroplasia.     Breast Cancer Mother         Cancer-breast,breast cancer     Breast Cancer Maternal Grandmother 70        Cancer-breast, breast cancer     Unknown/Adopted Father         Unknown     Family History Negative Daughter         Good Health     Family History Negative Son         Good Health     Family History Negative Other          Good Health     Family History Negative Son         Good Health,(mariana)91   ,   Current Outpatient Medications   Medication     buPROPion (WELLBUTRIN XL) 300 MG 24 hr tablet     clonazePAM (KLONOPIN) 0.5 MG tablet     FEROSUL 325 (65 Fe) MG tablet     gabapentin (NEURONTIN) 300 MG capsule     HYDROcodone-acetaminophen (NORCO) 5-325 MG tablet     HYDROcodone-acetaminophen (NORCO) 5-325 MG tablet     ibuprofen (ADVIL/MOTRIN) 600 MG tablet     lisinopril (PRINIVIL/ZESTRIL) 10 MG tablet     pramipexole (MIRAPEX) 0.5 MG tablet     sertraline (ZOLOFT) 100 MG tablet     No current facility-administered medications for this visit.    ,   Past Medical History:   Diagnosis Date     Achondroplasia     No Comments Provided     Family history of malignant neoplasm of breast     No Comments Provided     Hyperlipidemia     not currently on treatment     Other intervertebral disc degeneration, lumbar region     No Comments Provided     Other specified anxiety disorders     No Comments Provided     Overactive bladder     2014     Personal history of other medical treatment (CODE)      3, Para 2-0-1-2.     Primary osteoarthritis of one knee     5/3/2012     Restless legs syndrome     No Comments Provided     Zoster without complications     2006,left side of neck.   ,   Patient Active Problem List    Diagnosis Date Noted     Benign essential hypertension 2018     Priority: Medium     Depression with anxiety 2018     Priority: Medium     Family history of breast cancer in female 2018     Priority: Medium     Hyperlipidemia 2018     Priority: Medium     Overview:   not currently on treatment       Restless leg syndrome 2018     Priority: Medium     Dependent edema 09/15/2017     Priority: Medium     Chronic midline low back pain without sciatica 2016     Priority: Medium     Achondroplasia 06/15/2016     Priority: Medium     Pain medication agreement 3/19/18, 3/13/19 2016  "    Priority: Medium     Osteoarthritis of knee, unilateral 2012     Priority: Medium   ,   Past Surgical History:   Procedure Laterality Date     ADENOIDECTOMY      as a child      SECTION      1996,Primary low transverse  section. Repeat      LAPAROSCOPIC TUBAL LIGATION           OTHER SURGICAL HISTORY      [id number],OSTEOTOMY,Limb lengthening procedures.     OTHER SURGICAL HISTORY      10/16,,,10/17,[id number],OTHER,Right     OTHER SURGICAL HISTORY      2017,[id number],OTHER,Right,fluorscopically guided. no improvement.     right knee total arthroplasty Right 2018    Dr. Magaña, [facility]    and   Social History     Tobacco Use     Smoking status: Never Smoker     Smokeless tobacco: Never Used   Substance Use Topics     Alcohol use: No     Alcohol/week: 0.0 standard drinks     OBJECTIVE:  /88 (BP Location: Right arm, Patient Position: Sitting, Cuff Size: Adult Large)   Pulse 84   Temp 99.2  F (37.3  C) (Tympanic)   Resp 16   Ht 1.295 m (4' 2.98\")   Wt 89.1 kg (196 lb 6.4 oz)   LMP 2020 (Exact Date)   Breastfeeding No   BMI 53.12 kg/m     EXAM:  Alert and cooperative, no distress.  Affect is appropriately tearful and anxious given her circumstance but overall no formal thought disorder no homicidal or suicidal ideations.    Results for orders placed or performed in visit on 20   Iron Binding Panel     Status: Abnormal   Result Value Ref Range    Iron 48 (L) 50 - 212 ug/dL    UIBC (Unsaturated) 338.40 mg/dL    Iron Binding Capacity 386.40 245.00 - 400.00 ug/dL    Iron Saturation 12 (L) 20 - 55 %   Lipid Profile     Status: Abnormal   Result Value Ref Range    Cholesterol 192 <200 mg/dL    Triglycerides 192 (H) <150 mg/dL    HDL Cholesterol 43 23 - 92 mg/dL    LDL Cholesterol Calculated 111 (H) <100 mg/dL    Non HDL Cholesterol 149 (H) <130 mg/dL   Comprehensive metabolic panel     Status: Abnormal   Result Value Ref Range    Sodium 136 134 " - 144 mmol/L    Potassium 4.3 3.5 - 5.1 mmol/L    Chloride 105 98 - 107 mmol/L    Carbon Dioxide 23 21 - 31 mmol/L    Anion Gap 8 3 - 14 mmol/L    Glucose 88 70 - 105 mg/dL    Urea Nitrogen 13 7 - 25 mg/dL    Creatinine 0.55 (L) 0.60 - 1.20 mg/dL    GFR Estimate >90 >60 mL/min/[1.73_m2]    GFR Estimate If Black >90 >60 mL/min/[1.73_m2]    Calcium 9.5 8.6 - 10.3 mg/dL    Bilirubin Total 0.4 0.3 - 1.0 mg/dL    Albumin 4.1 3.5 - 5.7 g/dL    Protein Total 7.0 6.4 - 8.9 g/dL    Alkaline Phosphatase 59 34 - 104 U/L    ALT 9 7 - 52 U/L    AST 12 (L) 13 - 39 U/L   CBC with platelets differential     Status: None   Result Value Ref Range    WBC 7.6 4.0 - 11.0 10e9/L    RBC Count 4.65 3.8 - 5.2 10e12/L    Hemoglobin 14.0 11.7 - 15.7 g/dL    Hematocrit 41.5 35.0 - 47.0 %    MCV 89 78 - 100 fl    MCH 30.1 26.5 - 33.0 pg    MCHC 33.7 31.5 - 36.5 g/dL    RDW 12.7 10.0 - 15.0 %    Platelet Count 279 150 - 450 10e9/L    Diff Method Automated Method     % Neutrophils 60.3 %    % Lymphocytes 27.9 %    % Monocytes 9.3 %    % Eosinophils 2.1 %    % Basophils 0.3 %    % Immature Granulocytes 0.1 %    Absolute Neutrophil 4.6 1.6 - 8.3 10e9/L    Absolute Lymphocytes 2.1 0.8 - 5.3 10e9/L    Absolute Monocytes 0.7 0.0 - 1.3 10e9/L    Absolute Eosinophils 0.2 0.0 - 0.7 10e9/L    Absolute Basophils 0.0 0.0 - 0.2 10e9/L    Abs Immature Granulocytes 0.0 0 - 0.4 10e9/L      ASSESSMENT/Plan :    Wilmer was seen today for recheck medication and anxiety.    Diagnoses and all orders for this visit:    Achondroplasia  -     Drug  Screen Comprehensive , Urine with Reported Meds (MedTox) (Pain Care Package)  -     HYDROcodone-acetaminophen (NORCO) 5-325 MG tablet; 2 tab po tid as needed for pain. . May take a maximum of 7 tablets per day.  -     HYDROcodone-acetaminophen (NORCO) 5-325 MG tablet; Take 2 tablets by mouth 3 times daily as needed for pain . May take a maximum of 7 tablets per day.    Chronic midline low back pain without sciatica  -      Drug  Screen Comprehensive , Urine with Reported Meds (MedTox) (Pain Care Package)  -     HYDROcodone-acetaminophen (NORCO) 5-325 MG tablet; 2 tab po tid as needed for pain. . May take a maximum of 7 tablets per day.  -     HYDROcodone-acetaminophen (NORCO) 5-325 MG tablet; Take 2 tablets by mouth 3 times daily as needed for pain . May take a maximum of 7 tablets per day.    Pain medication agreement 3/19/18, 3/13/19  -     Drug  Screen Comprehensive , Urine with Reported Meds (MedTox) (Pain Care Package)  -     HYDROcodone-acetaminophen (NORCO) 5-325 MG tablet; 2 tab po tid as needed for pain. . May take a maximum of 7 tablets per day.  -     HYDROcodone-acetaminophen (NORCO) 5-325 MG tablet; Take 2 tablets by mouth 3 times daily as needed for pain . May take a maximum of 7 tablets per day.    Benign essential hypertension    Depression with anxiety    Iron deficiency  -     CBC with platelets differential; Future  -     Iron Binding Panel; Future  -     Iron Binding Panel  -     CBC with platelets differential    Hyperlipidemia, unspecified hyperlipidemia type  -     Comprehensive metabolic panel; Future  -     Lipid Profile; Future  -     Lipid Profile  -     Comprehensive metabolic panel    Stress due to illness of family member      Will notify of lab results when available. Discussed diet, exercise and healthy lifestyle changes.     Discussed her perimenopausal symptoms.  Discussed with health initiative and the risk of breast cancer.  At this point since she is having regular menses and her symptoms are only a few days a week, we elected not to initiate estrogen and progesterone.    Discussed more of a rescue medication for anxiety and use of occasional Klonopin during the day with 1/2-1 tab as needed basis.  She will continue with her scheduled dose at night which helps her with sleep.  We again reviewed the risk of use of a benzodiazepine with her hydrocodone and she does not take them together.  ToxAssure.   Medication agreement updated.  Renewal on hydrocodone #200×2.    Support encouragement offered with regard to her multiple psychosocial stressors.    A total of 25 minutes was spent with the patient, greater than 50% of the time was spent in counseling/discussion of the aforementioned concerns.     Neptali French MD

## 2020-07-07 ASSESSMENT — ANXIETY QUESTIONNAIRES: GAD7 TOTAL SCORE: 5

## 2020-07-09 LAB — PAIN DRUG SCR UR W RPTD MEDS: NORMAL

## 2020-07-13 ENCOUNTER — HOSPITAL ENCOUNTER (OUTPATIENT)
Dept: MAMMOGRAPHY | Facility: OTHER | Age: 50
Discharge: HOME | End: 2020-07-13
Attending: FAMILY MEDICINE | Admitting: FAMILY MEDICINE
Payer: COMMERCIAL

## 2020-07-13 DIAGNOSIS — Z12.31 VISIT FOR SCREENING MAMMOGRAM: ICD-10-CM

## 2020-07-13 PROCEDURE — 77067 SCR MAMMO BI INCL CAD: CPT

## 2020-07-14 ENCOUNTER — MYC MEDICAL ADVICE (OUTPATIENT)
Dept: FAMILY MEDICINE | Facility: OTHER | Age: 50
End: 2020-07-14

## 2020-07-14 NOTE — TELEPHONE ENCOUNTER
The patient was read the letter about her mamogram results.  Ruth Salazar LPN..................7/14/2020   3:02 PM

## 2020-08-29 ENCOUNTER — MYC MEDICAL ADVICE (OUTPATIENT)
Dept: FAMILY MEDICINE | Facility: OTHER | Age: 50
End: 2020-08-29

## 2020-08-31 NOTE — TELEPHONE ENCOUNTER
Left detailed message about appointment for 9/8/2020 with J @ 1020 for medication management.  Nupur Asif LPN ....................  8/31/2020   8:26 AM

## 2020-09-08 ENCOUNTER — OFFICE VISIT (OUTPATIENT)
Dept: FAMILY MEDICINE | Facility: OTHER | Age: 50
End: 2020-09-08
Attending: FAMILY MEDICINE
Payer: COMMERCIAL

## 2020-09-08 VITALS
WEIGHT: 197.2 LBS | DIASTOLIC BLOOD PRESSURE: 100 MMHG | RESPIRATION RATE: 14 BRPM | BODY MASS INDEX: 53.34 KG/M2 | TEMPERATURE: 98.9 F | HEART RATE: 94 BPM | OXYGEN SATURATION: 98 % | SYSTOLIC BLOOD PRESSURE: 150 MMHG

## 2020-09-08 DIAGNOSIS — Q77.4 ACHONDROPLASIA: ICD-10-CM

## 2020-09-08 DIAGNOSIS — M54.50 CHRONIC MIDLINE LOW BACK PAIN WITHOUT SCIATICA: Primary | ICD-10-CM

## 2020-09-08 DIAGNOSIS — F41.8 DEPRESSION WITH ANXIETY: ICD-10-CM

## 2020-09-08 DIAGNOSIS — I10 BENIGN ESSENTIAL HYPERTENSION: ICD-10-CM

## 2020-09-08 DIAGNOSIS — G89.29 CHRONIC MIDLINE LOW BACK PAIN WITHOUT SCIATICA: Primary | ICD-10-CM

## 2020-09-08 DIAGNOSIS — Z02.89 PAIN MEDICATION AGREEMENT COMPLETED: ICD-10-CM

## 2020-09-08 PROCEDURE — 99213 OFFICE O/P EST LOW 20 MIN: CPT | Performed by: FAMILY MEDICINE

## 2020-09-08 RX ORDER — HYDROCODONE BITARTRATE AND ACETAMINOPHEN 5; 325 MG/1; MG/1
TABLET ORAL
Qty: 200 TABLET | Refills: 0 | Status: SHIPPED | OUTPATIENT
Start: 2020-09-08 | End: 2020-11-02

## 2020-09-08 RX ORDER — HYDROCODONE BITARTRATE AND ACETAMINOPHEN 5; 325 MG/1; MG/1
2 TABLET ORAL 3 TIMES DAILY PRN
Qty: 200 TABLET | Refills: 0 | Status: SHIPPED | OUTPATIENT
Start: 2020-09-08 | End: 2020-11-02

## 2020-09-08 ASSESSMENT — ANXIETY QUESTIONNAIRES
2. NOT BEING ABLE TO STOP OR CONTROL WORRYING: SEVERAL DAYS
IF YOU CHECKED OFF ANY PROBLEMS ON THIS QUESTIONNAIRE, HOW DIFFICULT HAVE THESE PROBLEMS MADE IT FOR YOU TO DO YOUR WORK, TAKE CARE OF THINGS AT HOME, OR GET ALONG WITH OTHER PEOPLE: SOMEWHAT DIFFICULT
3. WORRYING TOO MUCH ABOUT DIFFERENT THINGS: NOT AT ALL
GAD7 TOTAL SCORE: 2
5. BEING SO RESTLESS THAT IT IS HARD TO SIT STILL: NOT AT ALL
1. FEELING NERVOUS, ANXIOUS, OR ON EDGE: SEVERAL DAYS
7. FEELING AFRAID AS IF SOMETHING AWFUL MIGHT HAPPEN: NOT AT ALL
6. BECOMING EASILY ANNOYED OR IRRITABLE: NOT AT ALL

## 2020-09-08 ASSESSMENT — PATIENT HEALTH QUESTIONNAIRE - PHQ9
5. POOR APPETITE OR OVEREATING: NOT AT ALL
SUM OF ALL RESPONSES TO PHQ QUESTIONS 1-9: 0

## 2020-09-08 ASSESSMENT — PAIN SCALES - GENERAL: PAINLEVEL: MODERATE PAIN (5)

## 2020-09-08 NOTE — PROGRESS NOTES
Nursing Notes:   Octavio Farr LPN  9/8/2020 10:59 AM  Signed  patient here for med refill.       Medication Reconciliation: honorio Farr LPN  9/8/2020 10:26 AM    SUBJECTIVE:  Wilmer Morse  is a 49 year old female who comes in today for follow-up and medication management.  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  She has generalized anxiety disorder.  She is also having some vasomotor instability and emotional lability.  She had some significant stressors which we discussed.  We discussed an occasional Klonopin during the day as a rescue medicine but elected not to initiate estrogen replacement because of her mother's history of breast cancer.    PHQ 5/1/2020 7/6/2020 9/8/2020   PHQ-9 Total Score 3 1 0   Q9: Thoughts of better off dead/self-harm past 2 weeks Not at all Not at all Not at all     YESI-7 SCORE 5/1/2020 7/6/2020 9/8/2020   Total Score 5 (mild anxiety) - -   Total Score 5 5 2       CHRONIC PAIN MANAGEMENT:    DIAGNOSIS:      1. Chronic midline low back pain without sciatica    2. Achondroplasia    3. Pain medication agreement 3/19/18, 3/13/19, 7/6/2020    4. Depression with anxiety    5. Benign essential hypertension        PAIN CLINIC EVALUATION:(NO)      PAIN MEDICATION AGREEMENT: (YES)    DATE SIGNED: 4/28/16,3/22/17, 3/19/18, 3/13/19, 7/6/2020      NON-MEDICATION MODALITIES MAXIMIZED? (YES)  Better after knee replacement surgery.      NEUROPATHIC PAIN: (NO)  ON GABAPENTIN/LYRICA/TCA/SNRI: (YES)       NOCICEPTIVE PAIN: (YES)      HISTORY OF CD: (NO)      MENTAL HEALTH DIAGNOSIS: (YES)  DIAGNOSIS: Anxiety and restless leg syndrome    ON BENZODIAZEPINES: (YES). Klonopin 0.5-1 mg at bedtime.    DISCLOSURE REGARDING RISK OF CONCOMITANT USE WITH OPIOIDS DISCUSSED: (YES)  DATE DISCUSSED: 4/28/16,06/15/16, 9/6/16, 12/20/16, 3/22/17, 9/15/17, 3/13/19, 7/6/2020      MEDICATION: hydrocodone/APAP  5/325 #200/month, max 7 per day      ORAL MORPHINE EQUIVALENTS: 30/d      LAST TAPER TRIAL: underway.       QUERY TODAY: (YES)  APPROPRIATE?:         (YES)      TOXASSURE TODAY: (NO)  IF NO, DATE OF LAST TOXASURE: 9/6/16, 3/22/17, 3/8/18, 9/19/18, 3/13/19, 8/13/19, 7/6/2020      PAIN SCORE FLOWSHEET REVIEWED: (YES)  STABLE OR IMPROVED: (YES)                Past Medical, Family, and Social History reviewed and updated as noted below.   ROS is negative except as noted above       Allergies   Allergen Reactions     Penicillins Unknown   ,   Family History   Problem Relation Age of Onset     Other - See Comments Mother         Achondroplasia.     Breast Cancer Mother         Cancer-breast,breast cancer     Breast Cancer Maternal Grandmother 70        Cancer-breast, breast cancer     Unknown/Adopted Father         Unknown     Family History Negative Daughter         Good Health     Family History Negative Son         Good Health     Family History Negative Other         Good Health     Family History Negative Son         Good Health,(stepson)7/24/91   ,   Current Outpatient Medications   Medication     buPROPion (WELLBUTRIN XL) 300 MG 24 hr tablet     clonazePAM (KLONOPIN) 0.5 MG tablet     FEROSUL 325 (65 Fe) MG tablet     gabapentin (NEURONTIN) 300 MG capsule     HYDROcodone-acetaminophen (NORCO) 5-325 MG tablet     HYDROcodone-acetaminophen (NORCO) 5-325 MG tablet     ibuprofen (ADVIL/MOTRIN) 600 MG tablet     lisinopril (PRINIVIL/ZESTRIL) 10 MG tablet     pramipexole (MIRAPEX) 0.5 MG tablet     sertraline (ZOLOFT) 100 MG tablet     No current facility-administered medications for this visit.    ,   Past Medical History:   Diagnosis Date     Achondroplasia     No Comments Provided     Family history of malignant neoplasm of breast     No Comments Provided     Hyperlipidemia     not currently on treatment     Other intervertebral disc degeneration, lumbar region     No Comments Provided     Other specified anxiety  disorders     No Comments Provided     Overactive bladder     2014     Personal history of other medical treatment (CODE)      3, Para 2-0-1-2.     Primary osteoarthritis of one knee     5/3/2012     Restless legs syndrome     No Comments Provided     Zoster without complications     2006,left side of neck.   ,   Patient Active Problem List    Diagnosis Date Noted     Benign essential hypertension 2018     Priority: Medium     Depression with anxiety 2018     Priority: Medium     Family history of breast cancer in female 2018     Priority: Medium     Hyperlipidemia 2018     Priority: Medium     Overview:   not currently on treatment       Restless leg syndrome 2018     Priority: Medium     Dependent edema 09/15/2017     Priority: Medium     Chronic midline low back pain without sciatica 2016     Priority: Medium     Achondroplasia 06/15/2016     Priority: Medium     Pain medication agreement completed, 3/19/18, 3/13/19, 2016     Priority: Medium     Osteoarthritis of knee, unilateral 2012     Priority: Medium   ,   Past Surgical History:   Procedure Laterality Date     ADENOIDECTOMY      as a child      SECTION      1996,Primary low transverse  section. Repeat      LAPAROSCOPIC TUBAL LIGATION           OTHER SURGICAL HISTORY      [id number],OSTEOTOMY,Limb lengthening procedures.     OTHER SURGICAL HISTORY      10/16,,,10/17,[id number],OTHER,Right     OTHER SURGICAL HISTORY      2017,[id number],OTHER,Right,fluorscopically guided. no improvement.     right knee total arthroplasty Right 2018    Dr. Magaña, [facility]    and   Social History     Tobacco Use     Smoking status: Never Smoker     Smokeless tobacco: Never Used   Substance Use Topics     Alcohol use: No     Alcohol/week: 0.0 standard drinks     OBJECTIVE:  BP (!) 150/100 (BP Location: Right arm, Patient Position: Sitting, Cuff Size: Adult Large)    Pulse 94   Temp 98.9  F (37.2  C) (Tympanic)   Resp 14   Wt 89.4 kg (197 lb 3.2 oz)   LMP 08/07/2020   SpO2 98%   BMI 53.34 kg/m     EXAM:    ASSESSMENT/Plan :    Wilmer was seen today for medication refill.    Diagnoses and all orders for this visit:    Chronic midline low back pain without sciatica  -     HYDROcodone-acetaminophen (NORCO) 5-325 MG tablet; Take 2 tablets by mouth 3 times daily as needed for pain . May take a maximum of 7 tablets per day.  -     HYDROcodone-acetaminophen (NORCO) 5-325 MG tablet; 2 tab po tid as needed for pain. . May take a maximum of 7 tablets per day.    Achondroplasia  -     HYDROcodone-acetaminophen (NORCO) 5-325 MG tablet; Take 2 tablets by mouth 3 times daily as needed for pain . May take a maximum of 7 tablets per day.  -     HYDROcodone-acetaminophen (NORCO) 5-325 MG tablet; 2 tab po tid as needed for pain. . May take a maximum of 7 tablets per day.    Pain medication agreement 3/19/18, 3/13/19, 7/6/2020  -     HYDROcodone-acetaminophen (NORCO) 5-325 MG tablet; Take 2 tablets by mouth 3 times daily as needed for pain . May take a maximum of 7 tablets per day.  -     HYDROcodone-acetaminophen (NORCO) 5-325 MG tablet; 2 tab po tid as needed for pain. . May take a maximum of 7 tablets per day.    Depression with anxiety    Benign essential hypertension      PDMP Review       Value Time User    State PDMP site checked  Yes 9/8/2020 11:09 AM Neptali French MD         Renewal on hydrocodone 5/325#200×2.    Support encouragement offered with regard to the multiple psychosocial stressors.  Follow-up in 2 months, sooner if needed    A total of 15 minutes was spent with the patient, greater than 50% of the time was spent in counseling/discussion of the aforementioned concerns.   Neptali French MD

## 2020-09-08 NOTE — NURSING NOTE
patient here for med refill.       Medication Reconciliation: complete    Octavio Farr LPN  9/8/2020 10:26 AM

## 2020-09-09 ASSESSMENT — ANXIETY QUESTIONNAIRES: GAD7 TOTAL SCORE: 2

## 2020-09-10 DIAGNOSIS — G25.81 RESTLESS LEG SYNDROME: ICD-10-CM

## 2020-09-11 RX ORDER — PRAMIPEXOLE DIHYDROCHLORIDE 0.5 MG/1
TABLET ORAL
Qty: 90 TABLET | Refills: 11 | OUTPATIENT
Start: 2020-09-11

## 2020-09-11 NOTE — TELEPHONE ENCOUNTER
Disp  Refills  Start  End  WHITNEY    pramipexole (MIRAPEX) 0.5 MG tablet  90 tablet  11  5/14/2020   No    Sig - Route: Take 1 tablet (0.5 mg) by mouth At Bedtime - Oral      Denying request. Refills available at pharmacy. Sandra Holt RN  ....................  9/11/2020   4:29 PM

## 2020-10-28 ENCOUNTER — MYC MEDICAL ADVICE (OUTPATIENT)
Dept: FAMILY MEDICINE | Facility: OTHER | Age: 50
End: 2020-10-28

## 2020-10-28 NOTE — TELEPHONE ENCOUNTER
The patient wanted to know if she could do a telephone visit on 11/02/2020 instead of an in person visit. I said she could.  Ruth Salazar LPN..................10/28/2020   11:06 AM

## 2020-11-02 ENCOUNTER — VIRTUAL VISIT (OUTPATIENT)
Dept: FAMILY MEDICINE | Facility: OTHER | Age: 50
End: 2020-11-02
Attending: FAMILY MEDICINE
Payer: COMMERCIAL

## 2020-11-02 VITALS — WEIGHT: 198 LBS | BODY MASS INDEX: 53.56 KG/M2

## 2020-11-02 DIAGNOSIS — G25.81 RESTLESS LEG SYNDROME: ICD-10-CM

## 2020-11-02 DIAGNOSIS — G89.29 CHRONIC MIDLINE LOW BACK PAIN WITHOUT SCIATICA: Primary | ICD-10-CM

## 2020-11-02 DIAGNOSIS — I10 BENIGN ESSENTIAL HYPERTENSION: ICD-10-CM

## 2020-11-02 DIAGNOSIS — Z02.89 PAIN MEDICATION AGREEMENT COMPLETED: ICD-10-CM

## 2020-11-02 DIAGNOSIS — Q77.4 ACHONDROPLASIA: ICD-10-CM

## 2020-11-02 DIAGNOSIS — M54.50 CHRONIC MIDLINE LOW BACK PAIN WITHOUT SCIATICA: Primary | ICD-10-CM

## 2020-11-02 DIAGNOSIS — Z63.8 STRESS DUE TO FAMILY TENSION: ICD-10-CM

## 2020-11-02 DIAGNOSIS — F41.8 DEPRESSION WITH ANXIETY: ICD-10-CM

## 2020-11-02 PROCEDURE — 99213 OFFICE O/P EST LOW 20 MIN: CPT | Mod: 95 | Performed by: FAMILY MEDICINE

## 2020-11-02 RX ORDER — HYDROCODONE BITARTRATE AND ACETAMINOPHEN 5; 325 MG/1; MG/1
2 TABLET ORAL 3 TIMES DAILY PRN
Qty: 200 TABLET | Refills: 0 | Status: SHIPPED | OUTPATIENT
Start: 2020-11-02 | End: 2020-11-25

## 2020-11-02 RX ORDER — HYDROCODONE BITARTRATE AND ACETAMINOPHEN 5; 325 MG/1; MG/1
TABLET ORAL
Qty: 200 TABLET | Refills: 0 | Status: SHIPPED | OUTPATIENT
Start: 2020-11-02 | End: 2021-01-04

## 2020-11-02 SDOH — SOCIAL STABILITY - SOCIAL INSECURITY: OTHER SPECIFIED PROBLEMS RELATED TO PRIMARY SUPPORT GROUP: Z63.8

## 2020-11-02 ASSESSMENT — ANXIETY QUESTIONNAIRES
3. WORRYING TOO MUCH ABOUT DIFFERENT THINGS: SEVERAL DAYS
5. BEING SO RESTLESS THAT IT IS HARD TO SIT STILL: NOT AT ALL
IF YOU CHECKED OFF ANY PROBLEMS ON THIS QUESTIONNAIRE, HOW DIFFICULT HAVE THESE PROBLEMS MADE IT FOR YOU TO DO YOUR WORK, TAKE CARE OF THINGS AT HOME, OR GET ALONG WITH OTHER PEOPLE: SOMEWHAT DIFFICULT
2. NOT BEING ABLE TO STOP OR CONTROL WORRYING: SEVERAL DAYS
7. FEELING AFRAID AS IF SOMETHING AWFUL MIGHT HAPPEN: NOT AT ALL
GAD7 TOTAL SCORE: 4
1. FEELING NERVOUS, ANXIOUS, OR ON EDGE: SEVERAL DAYS
6. BECOMING EASILY ANNOYED OR IRRITABLE: SEVERAL DAYS

## 2020-11-02 ASSESSMENT — PATIENT HEALTH QUESTIONNAIRE - PHQ9
SUM OF ALL RESPONSES TO PHQ QUESTIONS 1-9: 6
5. POOR APPETITE OR OVEREATING: NOT AT ALL

## 2020-11-02 ASSESSMENT — PAIN SCALES - GENERAL: PAINLEVEL: MODERATE PAIN (5)

## 2020-11-02 NOTE — PROGRESS NOTES
"Wilmer Morse is a 49 year old female who is being evaluated via a billable telephone visit.      The patient has been notified of following:     \"This telephone visit will be conducted via a call between you and your physician/provider. We have found that certain health care needs can be provided without the need for a physical exam.  This service lets us provide the care you need with a short phone conversation.  If a prescription is necessary we can send it directly to your pharmacy.  If lab work is needed we can place an order for that and you can then stop by our lab to have the test done at a later time.    Telephone visits are billed at different rates depending on your insurance coverage. During this emergency period, for some insurers they may be billed the same as an in-person visit.  Please reach out to your insurance provider with any questions.    If during the course of the call the physician/provider feels a telephone visit is not appropriate, you will not be charged for this service.\"    Patient has given verbal consent for Telephone visit?  Yes    What phone number would you like to be contacted at? [phone]    How would you like to obtain your AVS? [facility]     Nursing Notes:   Ruth Salazar LPN  11/2/2020  2:59 PM  Signed  Chief Complaint   Patient presents with     Recheck Medication       Initial Wt 89.8 kg (198 lb)   LMP 10/20/2020   Breastfeeding No   BMI 53.56 kg/m   Estimated body mass index is 53.56 kg/m  as calculated from the following:    Height as of 7/6/20: 1.295 m (4' 2.98\").    Weight as of this encounter: 89.8 kg (198 lb).  Medication Reconciliation: complete    Ruth Salazar LPN        Subjective     Wilmer Morse is a 49 year old female who presents via phone visit today for the following health issues:    HPI     Patient is having a telephone visit for follow-up and medication management.  Last saw her 2 months ago.  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  She has generalized anxiety disorder.  Is having some vasomotor instability but because of her mother's history of breast cancer we elected not to institute hormone replacement therapy.    Her last fill of hydrocodone was on October 7.  Has shown no evidence of increased use or abuse of medication.  She takes clonazepam at bedtime does not take the 2 medications together.   PDMP Review       Value Time User    State PDMP site checked  Yes 11/2/2020  3:34 PM Neptali French MD         She is down in [place] visiting her cousin.  She is taking a break from home.  She can feel she is less stressed.  She feels her depression is a bit better. She still has her son Emerson living with her and her .  She had some conflict with her daughter Alyssa and they had a heated discussion.  She and her    CHRONIC PAIN MANAGEMENT:    DIAGNOSIS:      1. Chronic midline low back pain without sciatica    2. Achondroplasia    3. Pain medication agreement completed, 3/19/18, 3/13/19, 7/6/2020    4. Depression with anxiety    5. Restless leg syndrome    6. Benign essential hypertension    7. Pain medication agreement 3/19/18, 3/13/19, 7/6/2020    8. Stress due to family tension        PAIN CLINIC EVALUATION:(NO)      PAIN MEDICATION AGREEMENT: (YES)    DATE SIGNED: 4/28/16,3/22/17, 3/19/18, 3/13/19, 7/6/2020      NON-MEDICATION MODALITIES MAXIMIZED? (YES)  Better after knee replacement surgery.      NEUROPATHIC PAIN: (NO)  ON GABAPENTIN/LYRICA/TCA/SNRI: (YES)       NOCICEPTIVE PAIN: (YES)      HISTORY OF CD: (NO)      MENTAL HEALTH DIAGNOSIS: (YES)  DIAGNOSIS: Anxiety and restless leg syndrome    ON BENZODIAZEPINES: (YES). Klonopin 0.5-1 mg at bedtime.    DISCLOSURE REGARDING RISK OF CONCOMITANT USE WITH OPIOIDS DISCUSSED: (YES)  DATE DISCUSSED: 4/28/16,06/15/16, 9/6/16, 12/20/16, 3/22/17, 9/15/17, 3/13/19, 7/6/2020      MEDICATION:  hydrocodone/APAP 5/325 #200/month, max 7 per day      ORAL MORPHINE EQUIVALENTS: 30/d      LAST TAPER TRIAL: underway.       QUERY TODAY: (YES)  APPROPRIATE?:         (YES)      TOXASSURE TODAY: (NO)  IF NO, DATE OF LAST TOXASURE: 9/6/16, 3/22/17, 3/8/18, 9/19/18, 3/13/19, 8/13/19, 7/6/2020      PAIN SCORE FLOWSHEET REVIEWED: (YES)  STABLE OR IMPROVED: (YES)                Review of Systems   ROS is negative except as noted above          Objective   Vitals - Patient Reported  Pain Score: Moderate Pain (5)  Pain Loc: Other - see comment(both legs)        healthy, alert and no distress  PSYCH: Alert and oriented times 3; coherent speech, normal   rate and volume, able to articulate logical thoughts, able   to abstract reason, no tangential thoughts, no hallucinations   or delusions  Her affect is normal  RESP: No cough, no audible wheezing, able to talk in full sentences  Remainder of exam unable to be completed due to telephone visits            Assessment/Plan:    Wilmer was seen today for recheck medication.    Diagnoses and all orders for this visit:    Chronic midline low back pain without sciatica  -     HYDROcodone-acetaminophen (NORCO) 5-325 MG tablet; Take 2 tablets by mouth 3 times daily as needed for pain . May take a maximum of 7 tablets per day.  -     HYDROcodone-acetaminophen (NORCO) 5-325 MG tablet; 2 tab po tid as needed for pain. . May take a maximum of 7 tablets per day.    Achondroplasia  -     HYDROcodone-acetaminophen (NORCO) 5-325 MG tablet; Take 2 tablets by mouth 3 times daily as needed for pain . May take a maximum of 7 tablets per day.  -     HYDROcodone-acetaminophen (NORCO) 5-325 MG tablet; 2 tab po tid as needed for pain. . May take a maximum of 7 tablets per day.    Pain medication agreement completed, 3/19/18, 3/13/19, 7/6/2020  -     HYDROcodone-acetaminophen (NORCO) 5-325 MG tablet; Take 2 tablets by mouth 3 times daily as needed for pain . May take a maximum of 7 tablets per  day.  -     HYDROcodone-acetaminophen (NORCO) 5-325 MG tablet; 2 tab po tid as needed for pain. . May take a maximum of 7 tablets per day.    Depression with anxiety    Restless leg syndrome    Benign essential hypertension    Pain medication agreement 3/19/18, 3/13/19, 7/6/2020  -     HYDROcodone-acetaminophen (NORCO) 5-325 MG tablet; Take 2 tablets by mouth 3 times daily as needed for pain . May take a maximum of 7 tablets per day.  -     HYDROcodone-acetaminophen (NORCO) 5-325 MG tablet; 2 tab po tid as needed for pain. . May take a maximum of 7 tablets per day.    Stress due to family tension      Support encouragement offered.  Renewal of her medication to the [facility] pursuant to her contract.  She will let us know if she needs it transferred back to [place].  Discussed her getting the book [employer].    Phone call duration:  12 minutes    Neptali French MD

## 2020-11-02 NOTE — NURSING NOTE
"Chief Complaint   Patient presents with     Recheck Medication       Initial Wt 89.8 kg (198 lb)   LMP 10/20/2020   Breastfeeding No   BMI 53.56 kg/m   Estimated body mass index is 53.56 kg/m  as calculated from the following:    Height as of 7/6/20: 1.295 m (4' 2.98\").    Weight as of this encounter: 89.8 kg (198 lb).  Medication Reconciliation: complete    Ruth Salazar LPN  "

## 2020-11-03 ASSESSMENT — ANXIETY QUESTIONNAIRES: GAD7 TOTAL SCORE: 4

## 2020-11-20 DIAGNOSIS — F41.8 DEPRESSION WITH ANXIETY: ICD-10-CM

## 2020-11-23 ENCOUNTER — MYC REFILL (OUTPATIENT)
Dept: FAMILY MEDICINE | Facility: OTHER | Age: 50
End: 2020-11-23

## 2020-11-23 DIAGNOSIS — F41.8 DEPRESSION WITH ANXIETY: ICD-10-CM

## 2020-11-24 RX ORDER — CLONAZEPAM 0.5 MG/1
TABLET ORAL
Qty: 90 TABLET | Refills: 5 | Status: SHIPPED | OUTPATIENT
Start: 2020-11-24 | End: 2021-03-02

## 2020-11-24 NOTE — TELEPHONE ENCOUNTER
Disp Refills Start End WHITNEY   clonazePAM (KLONOPIN) 0.5 MG tablet 90 tablet 5 5/4/2020  No   Sig: TAKE 1 TABLET BY MOUTH THREE TIMES DAILY AS NEEDED       LOV: 11/2/2020  Future Office visit: No future appointment scheduled at this time.     Routing refill request to provider for review/approval because:  Drug not on the [facility], [facility] or [facility] refill protocol or controlled substance    Requested Prescriptions   Pending Prescriptions Disp Refills     clonazePAM (KLONOPIN) 0.5 MG tablet [Pharmacy Med Name: CLONAZEPAM 0.5MG TABLETS] 90 tablet      Sig: TAKE 1 TABLET BY MOUTH THREE TIMES DAILY AS NEEDED       There is no refill protocol information for this order      Unable to complete prescription refill per RN Medication Refill Policy.................... Sandra Holt RN ....................  11/24/2020   8:48 AM

## 2020-11-25 ENCOUNTER — MYC MEDICAL ADVICE (OUTPATIENT)
Dept: FAMILY MEDICINE | Facility: OTHER | Age: 50
End: 2020-11-25

## 2020-11-25 DIAGNOSIS — Z02.89 PAIN MEDICATION AGREEMENT COMPLETED: ICD-10-CM

## 2020-11-25 DIAGNOSIS — G89.29 CHRONIC MIDLINE LOW BACK PAIN WITHOUT SCIATICA: ICD-10-CM

## 2020-11-25 DIAGNOSIS — Q77.4 ACHONDROPLASIA: ICD-10-CM

## 2020-11-25 DIAGNOSIS — M54.50 CHRONIC MIDLINE LOW BACK PAIN WITHOUT SCIATICA: ICD-10-CM

## 2020-11-25 RX ORDER — CLONAZEPAM 0.5 MG/1
TABLET ORAL
Qty: 90 TABLET | Refills: 5 | Status: SHIPPED | OUTPATIENT
Start: 2020-11-25 | End: 2020-11-27

## 2020-11-25 RX ORDER — HYDROCODONE BITARTRATE AND ACETAMINOPHEN 5; 325 MG/1; MG/1
2 TABLET ORAL 3 TIMES DAILY PRN
Qty: 200 TABLET | Refills: 0 | Status: SHIPPED | OUTPATIENT
Start: 2020-11-25 | End: 2021-01-04

## 2020-11-25 NOTE — TELEPHONE ENCOUNTER
She is now home so we changed her prescription from the [facility] to the [facility].  Neptali French MD on 11/25/2020 at 12:35 PM

## 2020-11-25 NOTE — TELEPHONE ENCOUNTER
Disp Refills Start End WHITNEY   clonazePAM (KLONOPIN) 0.5 MG tablet 90 tablet 5 11/24/2020  No   Sig: TAKE 1 TABLET BY MOUTH THREE TIMES DAILY AS NEEDED     Prescription refused.  This is a duplicate request.  Sandra Holt RN.......11/25/2020 4:29 PM

## 2020-11-27 RX ORDER — CLONAZEPAM 0.5 MG/1
TABLET ORAL
Qty: 90 TABLET | Refills: 5 | OUTPATIENT
Start: 2020-11-27

## 2020-11-27 RX ORDER — CLONAZEPAM 0.5 MG/1
TABLET ORAL
Qty: 90 TABLET | Refills: 5 | Status: SHIPPED | OUTPATIENT
Start: 2020-11-27 | End: 2021-04-28

## 2020-11-27 NOTE — TELEPHONE ENCOUNTER
11/25/20 1708 Neptali Mccarty MD Reorder from Order:[id number]     clonazePAM (KLONOPIN) 0.5 MG tablet 90 tablet 5 11/25/2020  No   Sig: TAKE 1 TABLET BY MOUTH THREE TIMES DAILY AS NEEDED   Sent to pharmacy as: clonazePAM 0.5 MG Oral Tablet (klonoPIN)   Class: E-Prescribe   Order: [id number]   E-Prescribing Status: Transmission to pharmacy failed (11/25/2020  5:24 PM CST)     [id number] - [place], [place] - [address]     Routing to [facility] to re-send to pharmacy, per above written order. Dr. French is out of the clinic today.     Mary Jose RN .............. 11/27/2020  9:12 AM

## 2020-12-27 ENCOUNTER — HEALTH MAINTENANCE LETTER (OUTPATIENT)
Age: 50
End: 2020-12-27

## 2021-01-04 ENCOUNTER — VIRTUAL VISIT (OUTPATIENT)
Dept: FAMILY MEDICINE | Facility: OTHER | Age: 51
End: 2021-01-04
Attending: FAMILY MEDICINE
Payer: COMMERCIAL

## 2021-01-04 DIAGNOSIS — M54.50 CHRONIC MIDLINE LOW BACK PAIN WITHOUT SCIATICA: ICD-10-CM

## 2021-01-04 DIAGNOSIS — Q77.4 ACHONDROPLASIA: ICD-10-CM

## 2021-01-04 DIAGNOSIS — G25.81 RESTLESS LEG SYNDROME: Primary | ICD-10-CM

## 2021-01-04 DIAGNOSIS — G89.29 CHRONIC MIDLINE LOW BACK PAIN WITHOUT SCIATICA: ICD-10-CM

## 2021-01-04 DIAGNOSIS — Z02.89 PAIN MEDICATION AGREEMENT COMPLETED: ICD-10-CM

## 2021-01-04 DIAGNOSIS — F41.8 DEPRESSION WITH ANXIETY: ICD-10-CM

## 2021-01-04 DIAGNOSIS — I10 BENIGN ESSENTIAL HYPERTENSION: ICD-10-CM

## 2021-01-04 PROCEDURE — 99212 OFFICE O/P EST SF 10 MIN: CPT | Mod: 95 | Performed by: FAMILY MEDICINE

## 2021-01-04 RX ORDER — HYDROCODONE BITARTRATE AND ACETAMINOPHEN 5; 325 MG/1; MG/1
TABLET ORAL
Qty: 200 TABLET | Refills: 0 | Status: CANCELLED | OUTPATIENT
Start: 2021-01-04

## 2021-01-04 RX ORDER — HYDROCODONE BITARTRATE AND ACETAMINOPHEN 5; 325 MG/1; MG/1
2 TABLET ORAL 3 TIMES DAILY PRN
Qty: 200 TABLET | Refills: 0 | Status: SHIPPED | OUTPATIENT
Start: 2021-01-04 | End: 2021-03-02

## 2021-01-04 RX ORDER — HYDROCODONE BITARTRATE AND ACETAMINOPHEN 5; 325 MG/1; MG/1
TABLET ORAL
Qty: 200 TABLET | Refills: 0 | Status: SHIPPED | OUTPATIENT
Start: 2021-01-04 | End: 2021-03-02

## 2021-01-04 ASSESSMENT — PAIN SCALES - GENERAL: PAINLEVEL: MODERATE PAIN (5)

## 2021-01-04 NOTE — PROGRESS NOTES
Wilmer Morse is a 50 year old female who is being evaluated via a billable telephone visit.      What phone number would you like to be contacted at? [phone]  How would you like to obtain your AVS? Josh Kim     Wilmer Morse is a 50 year old female who presents to clinic today for a phone visit. I called her at 4:08 pm and 4:11 pm and it went straight to [employer].  This happened several other times also when she sent her a text message from my cell phone and had her call my office phone to complete the visit.    HPI     Wilmer needs medication refills.  I last saw her virtually 2 months ago.  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  She has generalized anxiety disorder.  She had spent some time down in [place].  She is now back at home.  Her last refill of hydrocodone was on December 8. Has shown no evidence of increased use or abuse of medication.  She takes clonazepam at bedtime does not take the 2 medications together.    PDMP Review       Value Time User    State PDMP site checked  Yes 1/4/2021  4:07 PM Neptali French MD          Her  got Covid. He was exposed on November 5 and was positive on November 20. She quarantined away from him and didn't get it. She had some mild symptoms around Thanksgiving with fatigue for 3 days.   He is better but still has loss of taste or smell.     Her marital stressors have improved and things seem to be going reasonably well.     CHRONIC PAIN MANAGEMENT:    DIAGNOSIS:      1. Restless leg syndrome    2. Achondroplasia    3. Chronic midline low back pain without sciatica    4. Pain medication agreement 3/19/18, 3/13/19, 7/6/2020    5. Benign essential hypertension    6. Depression with anxiety        PAIN CLINIC EVALUATION:(NO)      PAIN MEDICATION AGREEMENT: (YES)    DATE SIGNED: 4/28/16,3/22/17, 3/19/18, 3/13/19, 7/6/2020      NON-MEDICATION MODALITIES  MAXIMIZED? (YES)  Better after knee replacement surgery.      NEUROPATHIC PAIN: (NO)  ON GABAPENTIN/LYRICA/TCA/SNRI: (YES)       NOCICEPTIVE PAIN: (YES)      HISTORY OF CD: (NO)      MENTAL HEALTH DIAGNOSIS: (YES)  DIAGNOSIS: Anxiety and restless leg syndrome    ON BENZODIAZEPINES: (YES). Klonopin 0.5-1 mg at bedtime.    DISCLOSURE REGARDING RISK OF CONCOMITANT USE WITH OPIOIDS DISCUSSED: (YES)  DATE DISCUSSED: 4/28/16,06/15/16, 9/6/16, 12/20/16, 3/22/17, 9/15/17, 3/13/19, 7/6/2020      MEDICATION: hydrocodone/APAP 5/325 #200/month, max 7 per day      ORAL MORPHINE EQUIVALENTS: 30/d      LAST TAPER TRIAL: underway.       QUERY TODAY: (YES)  APPROPRIATE?:         (YES)      TOXASSURE TODAY: (NO)  IF NO, DATE OF LAST TOXASURE: 9/6/16, 3/22/17, 3/8/18, 9/19/18, 3/13/19, 8/13/19, 7/6/2020      PAIN SCORE FLOWSHEET REVIEWED: (YES)  STABLE OR IMPROVED: (YES)            Review of Systems   ROS is negative except as noted above         Objective    Vitals - Patient Reported  Pain Score: Moderate Pain (5)(bilat legs )        Physical Exam   healthy, alert and no distress  PSYCH: Alert and oriented times 3; coherent speech, normal   rate and volume, able to articulate logical thoughts, able   to abstract reason, no tangential thoughts, no hallucinations   or delusions  Her affect is normal  RESP: No cough, no audible wheezing, able to talk in full sentences  Remainder of exam unable to be completed due to telephone visits        Wilmer was seen today for refill request.    Diagnoses and all orders for this visit:    Restless leg syndrome    Achondroplasia  -     HYDROcodone-acetaminophen (NORCO) 5-325 MG tablet; 2 tab po tid as needed for pain. . May take a maximum of 7 tablets per day.  -     HYDROcodone-acetaminophen (NORCO) 5-325 MG tablet; Take 2 tablets by mouth 3 times daily as needed for pain . May take a maximum of 7 tablets per day.    Chronic midline low back pain without sciatica  -     HYDROcodone-acetaminophen  (NORCO) 5-325 MG tablet; 2 tab po tid as needed for pain. . May take a maximum of 7 tablets per day.  -     HYDROcodone-acetaminophen (NORCO) 5-325 MG tablet; Take 2 tablets by mouth 3 times daily as needed for pain . May take a maximum of 7 tablets per day.    Pain medication agreement 3/19/18, 3/13/19, 7/6/2020  -     HYDROcodone-acetaminophen (NORCO) 5-325 MG tablet; 2 tab po tid as needed for pain. . May take a maximum of 7 tablets per day.  -     HYDROcodone-acetaminophen (NORCO) 5-325 MG tablet; Take 2 tablets by mouth 3 times daily as needed for pain . May take a maximum of 7 tablets per day.    Benign essential hypertension    Depression with anxiety             Medications renewed pursuant to her contract.  Recommend follow-up in 2 months, sooner if needed  Phone call duration: 12 minutes    Neptali French MD

## 2021-01-04 NOTE — NURSING NOTE
Chief Complaint   Patient presents with     Refill Request         Medication Reconciliation: complete    Kathy Hsu, LPN

## 2021-03-02 ENCOUNTER — OFFICE VISIT (OUTPATIENT)
Dept: FAMILY MEDICINE | Facility: OTHER | Age: 51
End: 2021-03-02
Attending: FAMILY MEDICINE
Payer: COMMERCIAL

## 2021-03-02 VITALS
SYSTOLIC BLOOD PRESSURE: 136 MMHG | RESPIRATION RATE: 16 BRPM | HEIGHT: 55 IN | WEIGHT: 199 LBS | BODY MASS INDEX: 46.05 KG/M2 | DIASTOLIC BLOOD PRESSURE: 86 MMHG | HEART RATE: 100 BPM | TEMPERATURE: 97.3 F | OXYGEN SATURATION: 98 %

## 2021-03-02 DIAGNOSIS — Z02.89 PAIN MEDICATION AGREEMENT COMPLETED: ICD-10-CM

## 2021-03-02 DIAGNOSIS — G25.81 RESTLESS LEG SYNDROME: ICD-10-CM

## 2021-03-02 DIAGNOSIS — I10 BENIGN ESSENTIAL HYPERTENSION: ICD-10-CM

## 2021-03-02 DIAGNOSIS — F41.8 DEPRESSION WITH ANXIETY: ICD-10-CM

## 2021-03-02 DIAGNOSIS — M54.50 CHRONIC MIDLINE LOW BACK PAIN WITHOUT SCIATICA: Primary | ICD-10-CM

## 2021-03-02 DIAGNOSIS — Z20.822 EXPOSURE TO COVID-19 VIRUS: ICD-10-CM

## 2021-03-02 DIAGNOSIS — G89.29 CHRONIC MIDLINE LOW BACK PAIN WITHOUT SCIATICA: Primary | ICD-10-CM

## 2021-03-02 DIAGNOSIS — Q77.4 ACHONDROPLASIA: ICD-10-CM

## 2021-03-02 DIAGNOSIS — Z96.651 HISTORY OF TOTAL KNEE ARTHROPLASTY, RIGHT: ICD-10-CM

## 2021-03-02 PROCEDURE — 99215 OFFICE O/P EST HI 40 MIN: CPT | Performed by: FAMILY MEDICINE

## 2021-03-02 PROCEDURE — 36415 COLL VENOUS BLD VENIPUNCTURE: CPT | Mod: ZL | Performed by: FAMILY MEDICINE

## 2021-03-02 PROCEDURE — 86769 SARS-COV-2 COVID-19 ANTIBODY: CPT | Performed by: FAMILY MEDICINE

## 2021-03-02 PROCEDURE — 80307 DRUG TEST PRSMV CHEM ANLYZR: CPT | Mod: ZL | Performed by: FAMILY MEDICINE

## 2021-03-02 RX ORDER — SERTRALINE HYDROCHLORIDE 100 MG/1
200 TABLET, FILM COATED ORAL DAILY
Qty: 180 TABLET | Refills: 11 | Status: SHIPPED | OUTPATIENT
Start: 2021-03-02 | End: 2022-01-31

## 2021-03-02 RX ORDER — LISINOPRIL 10 MG/1
10 TABLET ORAL DAILY
Qty: 90 TABLET | Refills: 11 | Status: SHIPPED | OUTPATIENT
Start: 2021-03-02 | End: 2022-01-31

## 2021-03-02 RX ORDER — HYDROCODONE BITARTRATE AND ACETAMINOPHEN 5; 325 MG/1; MG/1
2 TABLET ORAL 3 TIMES DAILY PRN
Qty: 200 TABLET | Refills: 0 | Status: SHIPPED | OUTPATIENT
Start: 2021-03-02 | End: 2021-04-28

## 2021-03-02 RX ORDER — BUPROPION HYDROCHLORIDE 300 MG/1
300 TABLET ORAL EVERY MORNING
Qty: 90 TABLET | Refills: 11 | Status: SHIPPED | OUTPATIENT
Start: 2021-03-02 | End: 2022-03-24

## 2021-03-02 RX ORDER — HYDROCODONE BITARTRATE AND ACETAMINOPHEN 5; 325 MG/1; MG/1
TABLET ORAL
Qty: 200 TABLET | Refills: 0 | Status: SHIPPED | OUTPATIENT
Start: 2021-03-02 | End: 2021-04-28

## 2021-03-02 RX ORDER — CLONAZEPAM 0.5 MG/1
TABLET ORAL
Qty: 90 TABLET | Refills: 5 | Status: SHIPPED | OUTPATIENT
Start: 2021-03-02 | End: 2021-06-22

## 2021-03-02 ASSESSMENT — ANXIETY QUESTIONNAIRES
1. FEELING NERVOUS, ANXIOUS, OR ON EDGE: NOT AT ALL
IF YOU CHECKED OFF ANY PROBLEMS ON THIS QUESTIONNAIRE, HOW DIFFICULT HAVE THESE PROBLEMS MADE IT FOR YOU TO DO YOUR WORK, TAKE CARE OF THINGS AT HOME, OR GET ALONG WITH OTHER PEOPLE: NOT DIFFICULT AT ALL
3. WORRYING TOO MUCH ABOUT DIFFERENT THINGS: NOT AT ALL
2. NOT BEING ABLE TO STOP OR CONTROL WORRYING: NOT AT ALL
GAD7 TOTAL SCORE: 0
5. BEING SO RESTLESS THAT IT IS HARD TO SIT STILL: NOT AT ALL
6. BECOMING EASILY ANNOYED OR IRRITABLE: NOT AT ALL
7. FEELING AFRAID AS IF SOMETHING AWFUL MIGHT HAPPEN: NOT AT ALL

## 2021-03-02 ASSESSMENT — PATIENT HEALTH QUESTIONNAIRE - PHQ9
SUM OF ALL RESPONSES TO PHQ QUESTIONS 1-9: 0
5. POOR APPETITE OR OVEREATING: NOT AT ALL

## 2021-03-02 ASSESSMENT — PAIN SCALES - GENERAL: PAINLEVEL: MILD PAIN (3)

## 2021-03-02 ASSESSMENT — MIFFLIN-ST. JEOR: SCORE: 1322.16

## 2021-03-02 NOTE — NURSING NOTE
"Chief Complaint   Patient presents with     Recheck Medication       Initial /86   Pulse 100   Temp 97.3  F (36.3  C) (Temporal)   Resp 16   Ht 1.321 m (4' 4\")   Wt 90.3 kg (199 lb)   LMP 02/22/2021   SpO2 98%   Breastfeeding No   BMI 51.74 kg/m   Estimated body mass index is 51.74 kg/m  as calculated from the following:    Height as of this encounter: 1.321 m (4' 4\").    Weight as of this encounter: 90.3 kg (199 lb).  Medication Reconciliation: complete    Ruth Salazar LPN  "

## 2021-03-02 NOTE — PROGRESS NOTES
"Nursing Notes:   Ruth Salazar LPN  3/2/2021 10:50 AM  Signed  Chief Complaint   Patient presents with     Recheck Medication       Initial /86   Pulse 100   Temp 97.3  F (36.3  C) (Temporal)   Resp 16   Ht 1.321 m (4' 4\")   Wt 90.3 kg (199 lb)   LMP 02/22/2021   SpO2 98%   Breastfeeding No   BMI 51.74 kg/m   Estimated body mass index is 51.74 kg/m  as calculated from the following:    Height as of this encounter: 1.321 m (4' 4\").    Weight as of this encounter: 90.3 kg (199 lb).  Medication Reconciliation: complete    Ruth Salazar LPN    SUBJECTIVE:  Wilmer Morse  is a 50 year old female who comes in today for follow-up and medication management.  I saw her virtually 2 months ago. 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  She has generalized anxiety disorder. Has shown no evidence of increased use or abuse of medication.  She takes clonazepam at bedtime does not take the 2 medications together. Her last fill was 2/2/21.    She has lost 9# in the last month by limiting carbs. Her anxiety has been much better of late.    PHQ 9/8/2020 11/2/2020 3/2/2021   PHQ-9 Total Score 0 6 0   Q9: Thoughts of better off dead/self-harm past 2 weeks Not at all Not at all Not at all     YESI-7 SCORE 9/8/2020 11/2/2020 3/2/2021   Total Score - - -   Total Score 2 4 0           CHRONIC PAIN MANAGEMENT:    DIAGNOSIS:      1. Chronic midline low back pain without sciatica    2. Achondroplasia    3. Restless leg syndrome    4. Depression with anxiety    5. Benign essential hypertension    6. History of total knee arthroplasty, right    7. Exposure to COVID-19 virus    8. Pain medication agreement 3/19/18, 3/13/19, 7/6/2020        PAIN CLINIC EVALUATION:(NO)      PAIN MEDICATION AGREEMENT: (YES)    DATE SIGNED: 4/28/16,3/22/17, 3/19/18, 3/13/19, 7/6/2020      NON-MEDICATION MODALITIES MAXIMIZED? (YES)  Better after knee replacement " surgery.      NEUROPATHIC PAIN: (NO)  ON GABAPENTIN/LYRICA/TCA/SNRI: (YES)       NOCICEPTIVE PAIN: (YES)      HISTORY OF CD: (NO)      MENTAL HEALTH DIAGNOSIS: (YES)  DIAGNOSIS: Anxiety and restless leg syndrome    ON BENZODIAZEPINES: (YES). Klonopin 0.5-1 mg at bedtime.    DISCLOSURE REGARDING RISK OF CONCOMITANT USE WITH OPIOIDS DISCUSSED: (YES)  DATE DISCUSSED: 4/28/16,06/15/16, 9/6/16, 12/20/16, 3/22/17, 9/15/17, 3/13/19, 7/6/2020      MEDICATION: hydrocodone/APAP 5/325 #200/month, max 7 per day      ORAL MORPHINE EQUIVALENTS: 30/d      LAST TAPER TRIAL: underway.       QUERY TODAY: (YES)  APPROPRIATE?:         (YES)      TOXASSURE TODAY: (YES)  IF NO, DATE OF LAST TOXASURE: 9/6/16, 3/22/17, 3/8/18, 9/19/18, 3/13/19, 8/13/19, 7/6/2020      PAIN SCORE FLOWSHEET REVIEWED: (YES)  STABLE OR IMPROVED: (YES)      PDMP Review       Value Time User    State PDMP site checked  Yes 3/2/2021 11:08 AM Neptali French MD                  Past Medical, Family, and Social History reviewed and updated as noted below.   ROS is negative except as noted above       Allergies   Allergen Reactions     Penicillins Unknown   ,   Family History   Problem Relation Age of Onset     Other - See Comments Mother         Achondroplasia.     Breast Cancer Mother         Cancer-breast,breast cancer     Breast Cancer Maternal Grandmother 70        Cancer-breast, breast cancer     Unknown/Adopted Father         Unknown     Family History Negative Daughter         Good Health     Family History Negative Son         Good Health     Family History Negative Other         Good Health     Family History Negative Son         Good Health,(stepson)7/24/91   ,   Current Outpatient Medications   Medication     buPROPion (WELLBUTRIN XL) 300 MG 24 hr tablet     clonazePAM (KLONOPIN) 0.5 MG tablet     clonazePAM (KLONOPIN) 0.5 MG tablet     FEROSUL 325 (65 Fe) MG tablet     gabapentin (NEURONTIN) 300 MG capsule     HYDROcodone-acetaminophen (NORCO) 5-325  MG tablet     HYDROcodone-acetaminophen (NORCO) 5-325 MG tablet     ibuprofen (ADVIL/MOTRIN) 600 MG tablet     lisinopril (ZESTRIL) 10 MG tablet     pramipexole (MIRAPEX) 0.5 MG tablet     sertraline (ZOLOFT) 100 MG tablet     No current facility-administered medications for this visit.    ,   Past Medical History:   Diagnosis Date     Achondroplasia     No Comments Provided     Family history of malignant neoplasm of breast     No Comments Provided     Hyperlipidemia     not currently on treatment     Other intervertebral disc degeneration, lumbar region     No Comments Provided     Other specified anxiety disorders     No Comments Provided     Overactive bladder     2014     Personal history of other medical treatment (CODE)      3, Para 2-0-1-2.     Primary osteoarthritis of one knee     5/3/2012     Restless legs syndrome     No Comments Provided     Zoster without complications     2006,left side of neck.   ,   Patient Active Problem List    Diagnosis Date Noted     Benign essential hypertension 2018     Priority: Medium     Depression with anxiety 2018     Priority: Medium     Family history of breast cancer in female 2018     Priority: Medium     Hyperlipidemia 2018     Priority: Medium     Overview:   not currently on treatment       Restless leg syndrome 2018     Priority: Medium     Dependent edema 09/15/2017     Priority: Medium     Chronic midline low back pain without sciatica 2016     Priority: Medium     Achondroplasia 06/15/2016     Priority: Medium     Pain medication agreement completed, 3/19/18, 3/13/19, 2016     Priority: Medium     Osteoarthritis of knee, unilateral 2012     Priority: Medium   ,   Past Surgical History:   Procedure Laterality Date     ADENOIDECTOMY      as a child      SECTION      1996,Primary low transverse  section. Repeat      LAPAROSCOPIC TUBAL LIGATION           OTHER  "SURGICAL HISTORY      [id number],OSTEOTOMY,Limb lengthening procedures.     OTHER SURGICAL HISTORY      10/16,4/17,7/17,10/17,326326,OTHER,Right     OTHER SURGICAL HISTORY      09/13/2017,399934,OTHER,Right,fluorscopically guided. no improvement.     right knee total arthroplasty Right 05/01/2018    Dr. Magaña, [facility]    and   Social History     Tobacco Use     Smoking status: Never Smoker     Smokeless tobacco: Never Used   Substance Use Topics     Alcohol use: No     Alcohol/week: 0.0 standard drinks     OBJECTIVE:  /86   Pulse 100   Temp 97.3  F (36.3  C) (Temporal)   Resp 16   Ht 1.321 m (4' 4\")   Wt 90.3 kg (199 lb)   LMP 02/22/2021   SpO2 98%   Breastfeeding No   BMI 51.74 kg/m     EXAM:  Alert and cooperative, no distress.  Affect is broad ranging and appropriate.  She is more relaxed and less anxious today.  She is happy about her weight loss and overall feels better.  ASSESSMENT/Plan :    Wilmer was seen today for recheck medication.    Diagnoses and all orders for this visit:    Chronic midline low back pain without sciatica  -     Drug Confirmation Panel Urine with Creat  -     HYDROcodone-acetaminophen (NORCO) 5-325 MG tablet; 2 tab po tid as needed for pain. . May take a maximum of 7 tablets per day.  -     HYDROcodone-acetaminophen (NORCO) 5-325 MG tablet; Take 2 tablets by mouth 3 times daily as needed for pain . May take a maximum of 7 tablets per day.    Achondroplasia  -     HYDROcodone-acetaminophen (NORCO) 5-325 MG tablet; 2 tab po tid as needed for pain. . May take a maximum of 7 tablets per day.  -     HYDROcodone-acetaminophen (NORCO) 5-325 MG tablet; Take 2 tablets by mouth 3 times daily as needed for pain . May take a maximum of 7 tablets per day.    Restless leg syndrome    Depression with anxiety  -     clonazePAM (KLONOPIN) 0.5 MG tablet; TAKE 1 TABLET BY MOUTH THREE TIMES DAILY AS NEEDED  -     sertraline (ZOLOFT) 100 MG tablet; Take 2 tablets (200 mg) by mouth daily  - "     buPROPion (WELLBUTRIN XL) 300 MG 24 hr tablet; Take 1 tablet (300 mg) by mouth every morning    Benign essential hypertension  -     lisinopril (ZESTRIL) 10 MG tablet; Take 1 tablet (10 mg) by mouth daily    History of total knee arthroplasty, right    Exposure to COVID-19 virus  -     COVID-19 Virus (Coronavirus) Antibody & Titer Reflex; Future  -     COVID-19 Virus (Coronavirus) Antibody & Titer Reflex    Pain medication agreement 3/19/18, 3/13/19, 7/6/2020  -     Drug Confirmation Panel Urine with Creat  -     HYDROcodone-acetaminophen (NORCO) 5-325 MG tablet; 2 tab po tid as needed for pain. . May take a maximum of 7 tablets per day.  -     HYDROcodone-acetaminophen (NORCO) 5-325 MG tablet; Take 2 tablets by mouth 3 times daily as needed for pain . May take a maximum of 7 tablets per day.      [employer] today.  Renewed hydrocodone pursuant to her contract.  Congratulated on her weight loss and healthy choices.    We will check Covid antibodies as her  had the disease and she was relatively asymptomatic but wonders whether she has had it.  Will notify of those results when available.     Support and encouragement offered with regard to various and multiple psychosocial stressors. A total of 45 minutes was spent with the patient, reviewing records, tests, ordering medications, tests or procedures and documenting clinical information in the EHR in addition to counseling and discussion with regard to the aforementioned concerns.    Neptali French MD

## 2021-03-03 LAB
SARS-COV-2 AB PNL SERPL IA: NEGATIVE
SARS-COV-2 IGG SERPL IA-ACNC: NORMAL

## 2021-03-03 ASSESSMENT — ANXIETY QUESTIONNAIRES: GAD7 TOTAL SCORE: 0

## 2021-03-05 LAB
7AMINOCLONAZEPAM UR QL CFM: PRESENT
CREAT UR-MCNC: 107 MG/DL
DHC UR CFM-MCNC: 940 NG/ML
DHC/CREAT UR: 879 NG/MG{CREAT}
HYDROCODONE UR CFM-MCNC: 1400 NG/ML
HYDROCODONE/CREAT UR: 1308 NG/MG{CREAT}
HYDROMORPHONE UR CFM-MCNC: 220 NG/ML
HYDROMORPHONE/CREAT UR: 206 NG/MG{CREAT}
RPT COMMENT: ABNORMAL

## 2021-04-02 DIAGNOSIS — F41.8 DEPRESSION WITH ANXIETY: ICD-10-CM

## 2021-04-02 DIAGNOSIS — I10 BENIGN ESSENTIAL HYPERTENSION: ICD-10-CM

## 2021-04-02 RX ORDER — BUPROPION HYDROCHLORIDE 300 MG/1
TABLET ORAL
Qty: 90 TABLET | Refills: 11 | OUTPATIENT
Start: 2021-04-02

## 2021-04-02 RX ORDER — SERTRALINE HYDROCHLORIDE 100 MG/1
TABLET, FILM COATED ORAL
Qty: 180 TABLET | Refills: 11 | OUTPATIENT
Start: 2021-04-02

## 2021-04-02 RX ORDER — LISINOPRIL 10 MG/1
TABLET ORAL
Qty: 90 TABLET | Refills: 11 | OUTPATIENT
Start: 2021-04-02

## 2021-04-02 NOTE — TELEPHONE ENCOUNTER
Disp Refills Start End WHITNEY   lisinopril (ZESTRIL) 10 MG tablet 90 tablet 11 3/2/2021  No   Sig - Route: Take 1 tablet (10 mg) by mouth daily - Oral      Disp Refills Start End WHITNEY   sertraline (ZOLOFT) 100 MG tablet 180 tablet 11 3/2/2021  No   Sig - Route: Take 2 tablets (200 mg) by mouth daily - Oral      Disp Refills Start End WHITNEY   buPROPion (WELLBUTRIN XL) 300 MG 24 hr tablet 90 tablet 11 3/2/2021  No   Sig - Route: Take 1 tablet (300 mg) by mouth every morning - Oral     Redundant refill request refused: Too soon: Should have refills available at pharmacy. Sandra Holt RN  ....................  4/2/2021   12:13 PM

## 2021-04-28 ENCOUNTER — VIRTUAL VISIT (OUTPATIENT)
Dept: FAMILY MEDICINE | Facility: OTHER | Age: 51
End: 2021-04-28
Attending: FAMILY MEDICINE
Payer: COMMERCIAL

## 2021-04-28 VITALS — WEIGHT: 197 LBS | BODY MASS INDEX: 51.22 KG/M2

## 2021-04-28 DIAGNOSIS — I10 BENIGN ESSENTIAL HYPERTENSION: ICD-10-CM

## 2021-04-28 DIAGNOSIS — F41.8 DEPRESSION WITH ANXIETY: ICD-10-CM

## 2021-04-28 DIAGNOSIS — M54.50 CHRONIC MIDLINE LOW BACK PAIN WITHOUT SCIATICA: Primary | ICD-10-CM

## 2021-04-28 DIAGNOSIS — Q77.4 ACHONDROPLASIA: ICD-10-CM

## 2021-04-28 DIAGNOSIS — G25.81 RESTLESS LEG SYNDROME: ICD-10-CM

## 2021-04-28 DIAGNOSIS — G89.29 CHRONIC MIDLINE LOW BACK PAIN WITHOUT SCIATICA: Primary | ICD-10-CM

## 2021-04-28 DIAGNOSIS — Z02.89 PAIN MEDICATION AGREEMENT COMPLETED: ICD-10-CM

## 2021-04-28 PROCEDURE — 99212 OFFICE O/P EST SF 10 MIN: CPT | Performed by: FAMILY MEDICINE

## 2021-04-28 RX ORDER — HYDROCODONE BITARTRATE AND ACETAMINOPHEN 5; 325 MG/1; MG/1
2 TABLET ORAL 3 TIMES DAILY PRN
Qty: 200 TABLET | Refills: 0 | Status: SHIPPED | OUTPATIENT
Start: 2021-04-28 | End: 2021-06-22

## 2021-04-28 RX ORDER — PRAMIPEXOLE DIHYDROCHLORIDE 0.5 MG/1
0.5 TABLET ORAL AT BEDTIME
Qty: 90 TABLET | Refills: 11 | Status: SHIPPED | OUTPATIENT
Start: 2021-04-28 | End: 2022-03-24

## 2021-04-28 RX ORDER — HYDROCODONE BITARTRATE AND ACETAMINOPHEN 5; 325 MG/1; MG/1
TABLET ORAL
Qty: 200 TABLET | Refills: 0 | Status: SHIPPED | OUTPATIENT
Start: 2021-04-28 | End: 2021-06-22

## 2021-04-28 ASSESSMENT — ANXIETY QUESTIONNAIRES
IF YOU CHECKED OFF ANY PROBLEMS ON THIS QUESTIONNAIRE, HOW DIFFICULT HAVE THESE PROBLEMS MADE IT FOR YOU TO DO YOUR WORK, TAKE CARE OF THINGS AT HOME, OR GET ALONG WITH OTHER PEOPLE: NOT DIFFICULT AT ALL
6. BECOMING EASILY ANNOYED OR IRRITABLE: NOT AT ALL
1. FEELING NERVOUS, ANXIOUS, OR ON EDGE: NOT AT ALL
3. WORRYING TOO MUCH ABOUT DIFFERENT THINGS: NOT AT ALL
2. NOT BEING ABLE TO STOP OR CONTROL WORRYING: NOT AT ALL
5. BEING SO RESTLESS THAT IT IS HARD TO SIT STILL: NOT AT ALL

## 2021-04-28 ASSESSMENT — PATIENT HEALTH QUESTIONNAIRE - PHQ9
5. POOR APPETITE OR OVEREATING: NOT AT ALL
SUM OF ALL RESPONSES TO PHQ QUESTIONS 1-9: 0

## 2021-04-28 ASSESSMENT — PAIN SCALES - GENERAL: PAINLEVEL: MODERATE PAIN (5)

## 2021-04-28 NOTE — PROGRESS NOTES
Wilmer is a 50 year old who is being evaluated via a billable telephone visit.      What phone number would you like to be contacted at? [phone]  How would you like to obtain your AVS? Josh Guillen is a 50 year old who presents for telephone visit for renewal of medications.  I saw her in person about 2 months ago. 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  She has generalized anxiety disorder. Has shown no evidence of increased use or abuse of medication.  She takes clonazepam at bedtime does not take the 2 medications together. Her last fill was 3/30/21.   PDMP Review       Value Time User    State PDMP site checked  Yes 4/28/2021  1:46 PM Neptali French MD         As of her last visit, she was working on losing weight.  We checked her for Covid antibodies because her  had had the disease and her antibodies were negative.  She has not had the vaccine yet.    HPI     CHRONIC PAIN MANAGEMENT:    DIAGNOSIS:      1. Chronic midline low back pain without sciatica    2. Benign essential hypertension    3. Achondroplasia    4. Pain medication agreement completed, 3/19/18, 3/13/19, 7/6/2020    5. Restless leg syndrome    6. Depression with anxiety    7. Pain medication agreement 3/19/18, 3/13/19, 7/6/2020        PAIN CLINIC EVALUATION:(NO)      PAIN MEDICATION AGREEMENT: (YES)    DATE SIGNED: 4/28/16,3/22/17, 3/19/18, 3/13/19, 7/6/2020      NON-MEDICATION MODALITIES MAXIMIZED? (YES)  Better after knee replacement surgery.      NEUROPATHIC PAIN: (NO)  ON GABAPENTIN/LYRICA/TCA/SNRI: (YES)       NOCICEPTIVE PAIN: (YES)      HISTORY OF CD: (NO)      MENTAL HEALTH DIAGNOSIS: (YES)  DIAGNOSIS: Anxiety and restless leg syndrome    ON BENZODIAZEPINES: (YES). Klonopin 0.5-1 mg at bedtime.    DISCLOSURE REGARDING RISK OF CONCOMITANT USE WITH OPIOIDS DISCUSSED: (YES)  DATE DISCUSSED: 4/28/16,06/15/16, 9/6/16, 12/20/16,  3/22/17, 9/15/17, 3/13/19, 7/6/2020      MEDICATION: hydrocodone/APAP 5/325 #200/month, max 7 per day      ORAL MORPHINE EQUIVALENTS: 30/d      LAST TAPER TRIAL: underway.       QUERY TODAY: (YES)  APPROPRIATE?:         (YES)      TOXASSURE TODAY: (NO)  IF NO, DATE OF LAST TOXASURE: 9/6/16, 3/22/17, 3/8/18, 9/19/18, 3/13/19, 8/13/19, 7/6/2020, 3/2/2021      PAIN SCORE FLOWSHEET REVIEWED: (YES)  STABLE OR IMPROVED: (YES)                       Review of Systems         Objective    Vitals - Patient Reported  Pain Score: Moderate Pain (5)  Pain Loc: Other - see comment(both legs)        Physical Exam   healthy, alert and no distress  PSYCH: Alert and oriented times 3; coherent speech, normal   rate and volume, able to articulate logical thoughts, able   to abstract reason, no tangential thoughts, no hallucinations   or delusions  Her affect is normal  RESP: No cough, no audible wheezing, able to talk in full sentences  Remainder of exam unable to be completed due to telephone visits      Wilmer was seen today for recheck medication.    Diagnoses and all orders for this visit:    Chronic midline low back pain without sciatica  -     HYDROcodone-acetaminophen (NORCO) 5-325 MG tablet; 2 tab po tid as needed for pain. . May take a maximum of 7 tablets per day.  -     HYDROcodone-acetaminophen (NORCO) 5-325 MG tablet; Take 2 tablets by mouth 3 times daily as needed for pain . May take a maximum of 7 tablets per day.    Benign essential hypertension    Achondroplasia  -     HYDROcodone-acetaminophen (NORCO) 5-325 MG tablet; 2 tab po tid as needed for pain. . May take a maximum of 7 tablets per day.  -     HYDROcodone-acetaminophen (NORCO) 5-325 MG tablet; Take 2 tablets by mouth 3 times daily as needed for pain . May take a maximum of 7 tablets per day.    Pain medication agreement completed, 3/19/18, 3/13/19, 7/6/2020  -     HYDROcodone-acetaminophen (NORCO) 5-325 MG tablet; 2 tab po tid as needed for pain. . May take a  maximum of 7 tablets per day.  -     HYDROcodone-acetaminophen (NORCO) 5-325 MG tablet; Take 2 tablets by mouth 3 times daily as needed for pain . May take a maximum of 7 tablets per day.    Restless leg syndrome  -     pramipexole (MIRAPEX) 0.5 MG tablet; Take 1 tablet (0.5 mg) by mouth At Bedtime    Depression with anxiety    Pain medication agreement 3/19/18, 3/13/19, 7/6/2020  -     HYDROcodone-acetaminophen (NORCO) 5-325 MG tablet; 2 tab po tid as needed for pain. . May take a maximum of 7 tablets per day.  -     HYDROcodone-acetaminophen (NORCO) 5-325 MG tablet; Take 2 tablets by mouth 3 times daily as needed for pain . May take a maximum of 7 tablets per day.         Discussed Covid vaccine and encouraged today sign up for that and she will do that via [employer].    Renewed Mirapex.    Renewed hydrocodone pursuant to her contract.  Advise follow-up in 2 months, sooner if needed        Phone call duration: 10 minutes    Neptali French MD

## 2021-04-28 NOTE — NURSING NOTE
"Chief Complaint   Patient presents with     Recheck Medication       Initial Wt 89.4 kg (197 lb)   LMP 04/05/2021   Breastfeeding No   BMI 51.22 kg/m   Estimated body mass index is 51.22 kg/m  as calculated from the following:    Height as of 3/2/21: 1.321 m (4' 4\").    Weight as of this encounter: 89.4 kg (197 lb).  Medication Reconciliation: complete    Ruth Salazar LPN  "

## 2021-06-16 ENCOUNTER — MYC MEDICAL ADVICE (OUTPATIENT)
Dept: FAMILY MEDICINE | Facility: OTHER | Age: 51
End: 2021-06-16

## 2021-06-16 DIAGNOSIS — R32 URINARY INCONTINENCE, UNSPECIFIED TYPE: Primary | ICD-10-CM

## 2021-06-16 RX ORDER — OXYBUTYNIN CHLORIDE 5 MG/1
5 TABLET, EXTENDED RELEASE ORAL DAILY
Qty: 30 TABLET | Refills: 1 | Status: SHIPPED | OUTPATIENT
Start: 2021-06-16 | End: 2021-07-08

## 2021-06-16 NOTE — TELEPHONE ENCOUNTER
She states she has been having trouble with urinary incontinence. She is incontinent 1-3 times a day. She is wondering if she could get a medication prescribed for this. She is going to a family wedding this weekend . She does have an appopintment with you on June 22 nd.  Ruth Salazar LPN..................6/16/2021   9:00 AM

## 2021-06-16 NOTE — TELEPHONE ENCOUNTER
After the patient's name and date of birth was verified, the patient was told the below information.  Ruth Salazar LPN..................6/16/2021   1:28 PM

## 2021-06-22 ENCOUNTER — OFFICE VISIT (OUTPATIENT)
Dept: FAMILY MEDICINE | Facility: OTHER | Age: 51
End: 2021-06-22
Attending: FAMILY MEDICINE
Payer: COMMERCIAL

## 2021-06-22 VITALS
TEMPERATURE: 97.1 F | BODY MASS INDEX: 46.65 KG/M2 | OXYGEN SATURATION: 98 % | HEIGHT: 55 IN | HEART RATE: 79 BPM | WEIGHT: 201.6 LBS | RESPIRATION RATE: 18 BRPM | DIASTOLIC BLOOD PRESSURE: 80 MMHG | SYSTOLIC BLOOD PRESSURE: 126 MMHG

## 2021-06-22 DIAGNOSIS — E78.5 HYPERLIPIDEMIA, UNSPECIFIED HYPERLIPIDEMIA TYPE: ICD-10-CM

## 2021-06-22 DIAGNOSIS — R20.2 LEFT HAND PARESTHESIA: ICD-10-CM

## 2021-06-22 DIAGNOSIS — F41.8 DEPRESSION WITH ANXIETY: ICD-10-CM

## 2021-06-22 DIAGNOSIS — G25.81 RESTLESS LEG SYNDROME: ICD-10-CM

## 2021-06-22 DIAGNOSIS — Z02.89 PAIN MEDICATION AGREEMENT COMPLETED: ICD-10-CM

## 2021-06-22 DIAGNOSIS — Q77.4 ACHONDROPLASIA: ICD-10-CM

## 2021-06-22 DIAGNOSIS — Z96.651 HISTORY OF TOTAL KNEE ARTHROPLASTY, RIGHT: ICD-10-CM

## 2021-06-22 DIAGNOSIS — G89.29 CHRONIC MIDLINE LOW BACK PAIN WITHOUT SCIATICA: Primary | ICD-10-CM

## 2021-06-22 DIAGNOSIS — Z12.31 VISIT FOR SCREENING MAMMOGRAM: ICD-10-CM

## 2021-06-22 DIAGNOSIS — F41.1 GENERALIZED ANXIETY DISORDER: ICD-10-CM

## 2021-06-22 DIAGNOSIS — M54.50 CHRONIC MIDLINE LOW BACK PAIN WITHOUT SCIATICA: Primary | ICD-10-CM

## 2021-06-22 DIAGNOSIS — E61.1 IRON DEFICIENCY: ICD-10-CM

## 2021-06-22 DIAGNOSIS — I10 BENIGN ESSENTIAL HYPERTENSION: ICD-10-CM

## 2021-06-22 LAB
ALBUMIN SERPL-MCNC: 4.2 G/DL (ref 3.5–5.7)
ALP SERPL-CCNC: 65 U/L (ref 34–104)
ALT SERPL W P-5'-P-CCNC: 10 U/L (ref 7–52)
ANION GAP SERPL CALCULATED.3IONS-SCNC: 9 MMOL/L (ref 3–14)
AST SERPL W P-5'-P-CCNC: 13 U/L (ref 13–39)
BASOPHILS # BLD AUTO: 0 10E9/L (ref 0–0.2)
BASOPHILS NFR BLD AUTO: 0.3 %
BILIRUB SERPL-MCNC: 0.4 MG/DL (ref 0.3–1)
BUN SERPL-MCNC: 12 MG/DL (ref 7–25)
CALCIUM SERPL-MCNC: 9.6 MG/DL (ref 8.6–10.3)
CHLORIDE SERPL-SCNC: 104 MMOL/L (ref 98–107)
CHOLEST SERPL-MCNC: 206 MG/DL
CO2 SERPL-SCNC: 23 MMOL/L (ref 21–31)
CREAT SERPL-MCNC: 0.59 MG/DL (ref 0.6–1.2)
DIFFERENTIAL METHOD BLD: NORMAL
EOSINOPHIL # BLD AUTO: 0.2 10E9/L (ref 0–0.7)
EOSINOPHIL NFR BLD AUTO: 1.7 %
ERYTHROCYTE [DISTWIDTH] IN BLOOD BY AUTOMATED COUNT: 12.6 % (ref 10–15)
GFR SERPL CREATININE-BSD FRML MDRD: >90 ML/MIN/{1.73_M2}
GLUCOSE SERPL-MCNC: 92 MG/DL (ref 70–105)
HCT VFR BLD AUTO: 43.7 % (ref 35–47)
HDLC SERPL-MCNC: 51 MG/DL (ref 23–92)
HGB BLD-MCNC: 14.8 G/DL (ref 11.7–15.7)
IMM GRANULOCYTES # BLD: 0 10E9/L (ref 0–0.4)
IMM GRANULOCYTES NFR BLD: 0.3 %
IRON SATN MFR SERPL: 11 % (ref 20–55)
IRON SERPL-MCNC: 53 UG/DL (ref 50–212)
LDLC SERPL CALC-MCNC: 120 MG/DL
LYMPHOCYTES # BLD AUTO: 2.7 10E9/L (ref 0.8–5.3)
LYMPHOCYTES NFR BLD AUTO: 29.5 %
MCH RBC QN AUTO: 29.4 PG (ref 26.5–33)
MCHC RBC AUTO-ENTMCNC: 33.9 G/DL (ref 31.5–36.5)
MCV RBC AUTO: 87 FL (ref 78–100)
MONOCYTES # BLD AUTO: 0.7 10E9/L (ref 0–1.3)
MONOCYTES NFR BLD AUTO: 7.3 %
NEUTROPHILS # BLD AUTO: 5.6 10E9/L (ref 1.6–8.3)
NEUTROPHILS NFR BLD AUTO: 60.9 %
NONHDLC SERPL-MCNC: 155 MG/DL
PLATELET # BLD AUTO: 309 10E9/L (ref 150–450)
POTASSIUM SERPL-SCNC: 3.9 MMOL/L (ref 3.5–5.1)
PROT SERPL-MCNC: 7.8 G/DL (ref 6.4–8.9)
RBC # BLD AUTO: 5.03 10E12/L (ref 3.8–5.2)
SODIUM SERPL-SCNC: 136 MMOL/L (ref 134–144)
TIBC SERPL-MCNC: 478.8 UG/DL (ref 245–400)
TRIGL SERPL-MCNC: 173 MG/DL
TSH SERPL DL<=0.05 MIU/L-ACNC: 2.46 IU/ML (ref 0.34–5.6)
UIBC (UNSATURATED): 425.8 MG/DL
WBC # BLD AUTO: 9.2 10E9/L (ref 4–11)

## 2021-06-22 PROCEDURE — 80061 LIPID PANEL: CPT | Mod: ZL | Performed by: FAMILY MEDICINE

## 2021-06-22 PROCEDURE — 83550 IRON BINDING TEST: CPT | Mod: ZL | Performed by: FAMILY MEDICINE

## 2021-06-22 PROCEDURE — 80053 COMPREHEN METABOLIC PANEL: CPT | Mod: ZL | Performed by: FAMILY MEDICINE

## 2021-06-22 PROCEDURE — 83540 ASSAY OF IRON: CPT | Mod: ZL | Performed by: FAMILY MEDICINE

## 2021-06-22 PROCEDURE — 99396 PREV VISIT EST AGE 40-64: CPT | Performed by: FAMILY MEDICINE

## 2021-06-22 PROCEDURE — 85025 COMPLETE CBC W/AUTO DIFF WBC: CPT | Mod: ZL | Performed by: FAMILY MEDICINE

## 2021-06-22 PROCEDURE — 80307 DRUG TEST PRSMV CHEM ANLYZR: CPT | Mod: ZL | Performed by: FAMILY MEDICINE

## 2021-06-22 PROCEDURE — 36415 COLL VENOUS BLD VENIPUNCTURE: CPT | Mod: ZL | Performed by: FAMILY MEDICINE

## 2021-06-22 PROCEDURE — 84443 ASSAY THYROID STIM HORMONE: CPT | Mod: ZL | Performed by: FAMILY MEDICINE

## 2021-06-22 RX ORDER — HYDROCODONE BITARTRATE AND ACETAMINOPHEN 5; 325 MG/1; MG/1
TABLET ORAL
Qty: 200 TABLET | Refills: 0 | Status: SHIPPED | OUTPATIENT
Start: 2021-06-22 | End: 2021-08-11

## 2021-06-22 RX ORDER — CLONAZEPAM 0.5 MG/1
TABLET ORAL
Qty: 90 TABLET | Refills: 5 | Status: SHIPPED | OUTPATIENT
Start: 2021-06-22 | End: 2021-12-01

## 2021-06-22 RX ORDER — FERROUS SULFATE 325(65) MG
325 TABLET ORAL 2 TIMES DAILY
Qty: 60 TABLET | Refills: 11 | Status: SHIPPED | OUTPATIENT
Start: 2021-06-22 | End: 2022-05-19

## 2021-06-22 RX ORDER — HYDROCODONE BITARTRATE AND ACETAMINOPHEN 5; 325 MG/1; MG/1
2 TABLET ORAL 3 TIMES DAILY PRN
Qty: 200 TABLET | Refills: 0 | Status: SHIPPED | OUTPATIENT
Start: 2021-06-22 | End: 2021-08-11

## 2021-06-22 ASSESSMENT — PATIENT HEALTH QUESTIONNAIRE - PHQ9
SUM OF ALL RESPONSES TO PHQ QUESTIONS 1-9: 0
5. POOR APPETITE OR OVEREATING: NOT AT ALL

## 2021-06-22 ASSESSMENT — PAIN SCALES - GENERAL: PAINLEVEL: MODERATE PAIN (5)

## 2021-06-22 ASSESSMENT — ANXIETY QUESTIONNAIRES
2. NOT BEING ABLE TO STOP OR CONTROL WORRYING: SEVERAL DAYS
5. BEING SO RESTLESS THAT IT IS HARD TO SIT STILL: NOT AT ALL
IF YOU CHECKED OFF ANY PROBLEMS ON THIS QUESTIONNAIRE, HOW DIFFICULT HAVE THESE PROBLEMS MADE IT FOR YOU TO DO YOUR WORK, TAKE CARE OF THINGS AT HOME, OR GET ALONG WITH OTHER PEOPLE: NOT DIFFICULT AT ALL
GAD7 TOTAL SCORE: 3
7. FEELING AFRAID AS IF SOMETHING AWFUL MIGHT HAPPEN: NOT AT ALL
3. WORRYING TOO MUCH ABOUT DIFFERENT THINGS: SEVERAL DAYS
1. FEELING NERVOUS, ANXIOUS, OR ON EDGE: SEVERAL DAYS
6. BECOMING EASILY ANNOYED OR IRRITABLE: NOT AT ALL

## 2021-06-22 ASSESSMENT — MIFFLIN-ST. JEOR: SCORE: 1318.08

## 2021-06-22 NOTE — NURSING NOTE
"Chief Complaint   Patient presents with     Recheck Medication   Pt here for a med follow-up and has questions about the covid-19 vaccination. Pt has questions about her bladder and tingling in her right arm and left foot.    Initial BP (!) 150/96   Pulse 79   Temp 97.1  F (36.2  C) (Tympanic)   Resp 18   Ht 1.295 m (4' 3\")   Wt 91.4 kg (201 lb 9.6 oz)   LMP 05/11/2021 (Exact Date)   SpO2 98%   Breastfeeding No   BMI 54.49 kg/m   Estimated body mass index is 54.49 kg/m  as calculated from the following:    Height as of this encounter: 1.295 m (4' 3\").    Weight as of this encounter: 91.4 kg (201 lb 9.6 oz).  Medication Reconciliation: complete    Solitario Worrell, FLOYD  "

## 2021-06-22 NOTE — PROGRESS NOTES
"Nursing Notes:   Solitario Worrell LPN  6/22/2021  1:29 PM  Signed  Chief Complaint   Patient presents with     Recheck Medication   Pt here for a med follow-up and has questions about the covid-19 vaccination. Pt has questions about her bladder and tingling in her right arm and left foot.    Initial BP (!) 150/96   Pulse 79   Temp 97.1  F (36.2  C) (Tympanic)   Resp 18   Ht 1.295 m (4' 3\")   Wt 91.4 kg (201 lb 9.6 oz)   LMP 05/11/2021 (Exact Date)   SpO2 98%   Breastfeeding No   BMI 54.49 kg/m   Estimated body mass index is 54.49 kg/m  as calculated from the following:    Height as of this encounter: 1.295 m (4' 3\").    Weight as of this encounter: 91.4 kg (201 lb 9.6 oz).  Medication Reconciliation: complete    Solitario Worrell LPN      SUBJECTIVE:  Wilmer Morse  is a 50 year old female who comes in today for complete evaluation and review of her medical problems.    She has chronic back issues due to achondroplasia.  She is on a medication agreement for hydrocodone. Her last fill was on May 26.    [facility]       Value Time User    State PDMP site checked  Yes 6/22/2021  1:38 PM Neptali French MD        She has done better with her knee since her knee replacement.  She has significant anxiety which has been reasonably well controlled on sertraline and nightly clonazepam.  She is also on Wellbutrin.    She has restless legs for which she uses Mirapex and also Klonopin is helpful in that regard.  She has been on gabapentin for some time which helps to mitigate her neuropathic pain.  She has hypertension for which she takes lisinopril.      She also uses Ditropan XL 5 mg for urinary incontinence that we just started last week before a trip. It's helping some.  She has urgency.     She has some numbness that will come and go with her left hand.  Her thumb and index and middle fingers of her left hand.  It is random and not positional.  It comes up from her hand. She will also get some numbness " off and on in her right foot in the toes and the bottom.     She is up-to-date on immunizations but has not had COVID-19 vaccine.  Her  had the illness and she thought she probably was immune but we checked her antibodies and she was negative.  She is otherwise up-to-date on health maintenance issues.  She turns 51 this year and we discussed colon cancer screening.    CHRONIC PAIN MANAGEMENT:    DIAGNOSIS:      1. Chronic midline low back pain without sciatica    2. Achondroplasia    3. Benign essential hypertension    4. Pain medication agreement completed, 3/19/18, 3/13/19, 7/6/2020    5. Restless leg syndrome    6. Depression with anxiety    7. History of total knee arthroplasty, right    8. Iron deficiency    9. Hyperlipidemia, unspecified hyperlipidemia type    10. Generalized anxiety disorder    11. Visit for screening mammogram    12. Left hand paresthesia    13. Pain medication agreement 3/19/18, 3/13/19, 7/6/2020        PAIN CLINIC EVALUATION:(NO)      PAIN MEDICATION AGREEMENT: (YES)    DATE SIGNED: 4/28/16,3/22/17, 3/19/18, 3/13/19, 7/6/2020, 6/22/21      NON-MEDICATION MODALITIES MAXIMIZED? (YES)  Better after knee replacement surgery.      NEUROPATHIC PAIN: (NO)  ON GABAPENTIN/LYRICA/TCA/SNRI: (YES)       NOCICEPTIVE PAIN: (YES)      HISTORY OF CD: (NO)      MENTAL HEALTH DIAGNOSIS: (YES)  DIAGNOSIS: Anxiety and restless leg syndrome    ON BENZODIAZEPINES: (YES). Klonopin 0.5-1 mg at bedtime.    DISCLOSURE REGARDING RISK OF CONCOMITANT USE WITH OPIOIDS DISCUSSED: (YES)  DATE DISCUSSED: 4/28/16,06/15/16, 9/6/16, 12/20/16, 3/22/17, 9/15/17, 3/13/19, 7/6/2020      MEDICATION: hydrocodone/APAP 5/325 #200/month, max 7 per day      ORAL MORPHINE EQUIVALENTS: 30/d      LAST TAPER TRIAL: underway.       QUERY TODAY: (YES)  APPROPRIATE?:         (YES)      TOXASSURE TODAY: (YES)  IF NO, DATE OF LAST TOXASURE: 9/6/16, 3/22/17, 3/8/18, 9/19/18, 3/13/19, 8/13/19, 7/6/2020, 3/2/2021      PAIN SCORE  FLOWSHEET REVIEWED: (YES)  STABLE OR IMPROVED: (YES)                 Past Medical, Family, and Social History reviewed and updated as noted below.   ROS is negative except as noted above       Allergies   Allergen Reactions     Penicillins Unknown   ,   Family History   Problem Relation Age of Onset     Other - See Comments Mother         Achondroplasia.     Breast Cancer Mother         Cancer-breast,breast cancer     Breast Cancer Maternal Grandmother 70        Cancer-breast, breast cancer     Unknown/Adopted Father         Unknown     Family History Negative Daughter         Good Health     Family History Negative Son         Good Health     Family History Negative Other         Good Health     Family History Negative Son         Good Health,(stepson)91   ,   Current Outpatient Medications   Medication     buPROPion (WELLBUTRIN XL) 300 MG 24 hr tablet     clonazePAM (KLONOPIN) 0.5 MG tablet     FEROSUL 325 (65 Fe) MG tablet     HYDROcodone-acetaminophen (NORCO) 5-325 MG tablet     HYDROcodone-acetaminophen (NORCO) 5-325 MG tablet     ibuprofen (ADVIL/MOTRIN) 600 MG tablet     lisinopril (ZESTRIL) 10 MG tablet     oxybutynin ER (DITROPAN-XL) 5 MG 24 hr tablet     pramipexole (MIRAPEX) 0.5 MG tablet     sertraline (ZOLOFT) 100 MG tablet     gabapentin (NEURONTIN) 300 MG capsule     No current facility-administered medications for this visit.    ,   Past Medical History:   Diagnosis Date     Achondroplasia     No Comments Provided     Family history of malignant neoplasm of breast     No Comments Provided     Hyperlipidemia     not currently on treatment     Other intervertebral disc degeneration, lumbar region     No Comments Provided     Other specified anxiety disorders     No Comments Provided     Overactive bladder     2014     Personal history of other medical treatment (CODE)      3, Para 2-0-1-2.     Primary osteoarthritis of one knee     5/3/2012     Restless legs syndrome     No Comments  "Provided     Zoster without complications     2006,left side of neck.   ,   Patient Active Problem List    Diagnosis Date Noted     Benign essential hypertension 2018     Priority: Medium     Depression with anxiety 2018     Priority: Medium     Family history of breast cancer in female 2018     Priority: Medium     Hyperlipidemia 2018     Priority: Medium     Overview:   not currently on treatment       Restless leg syndrome 2018     Priority: Medium     Dependent edema 09/15/2017     Priority: Medium     Chronic midline low back pain without sciatica 2016     Priority: Medium     Achondroplasia 06/15/2016     Priority: Medium     Pain medication agreement completed, 3/19/18, 3/13/19, 2016     Priority: Medium     Osteoarthritis of knee, unilateral 2012     Priority: Medium   ,   Past Surgical History:   Procedure Laterality Date     ADENOIDECTOMY      as a child      SECTION      , ,Primary low transverse  section. Repeat      LAPAROSCOPIC TUBAL LIGATION           OTHER SURGICAL HISTORY      [id number],OSTEOTOMY,Limb lengthening procedures.     OTHER SURGICAL HISTORY      10/16,,,10/17,379058,OTHER,Right     OTHER SURGICAL HISTORY      2017,215089,OTHER,Right,fluorscopically guided. no improvement.     right knee total arthroplasty Right 2018    Dr. Magaña, [facility]    and   Social History     Tobacco Use     Smoking status: Never Smoker     Smokeless tobacco: Never Used   Substance Use Topics     Alcohol use: No     Alcohol/week: 0.0 standard drinks     OBJECTIVE:  /80   Pulse 79   Temp 97.1  F (36.2  C) (Tympanic)   Resp 18   Ht 1.295 m (4' 3\")   Wt 91.4 kg (201 lb 9.6 oz)   LMP 2021 (Exact Date)   SpO2 98%   Breastfeeding No   BMI 54.49 kg/m     EXAM:  General Appearance: Pleasant, alert, appropriate appearance for age. No acute distress  Head Exam: Normal. Normocephalic, " atraumatic.  Eye Exam: PERRLA, EOMI, conjunctivae, sclerae normal.  Ear Exam: Normal TM's bilaterally. Normal auditory canals and external ears. Non-tender.  Nose Exam: Normal external nose, mucus membranes, and septum.  OroPharynx Exam:  Dental hygiene adequate. Normal buccal mucosa. Normal pharynx.  Neck Exam:  Supple, no masses or nodes. No bruits  Thyroid Exam: No nodules or enlargement.  Chest/Respiratory Exam: Normal chest wall and respirations. Clear to auscultation.  Cardiovascular Exam: Regular rate and rhythm. S1, S2, no murmur, click, gallop, or rubs.  Gastrointestinal Exam: Soft, non-tender, no masses or organomegaly.  Lymphatic Exam: Non-palpable nodes in neck,clavicular, axillary, or inguinal regions.  Musculoskeletal Exam: Back is straight and non-tender, full ROM of upper and lower extremities.  Foot Exam: Left and right foot: good pedal pulses.   Skin: no rash or abnormalities  Neurologic Exam:  normal gross motor, tone coordination and no tremor.  She has negative Tinel's and mildly positive Phalen sign on the left.  Psychiatric Exam: Alert and oriented - appropriate affect.     Results for orders placed or performed in visit on 06/22/21   Iron Binding Panel     Status: Abnormal   Result Value Ref Range    Iron 53 50 - 212 ug/dL    UIBC (Unsaturated) 425.80 mg/dL    Iron Binding Capacity 478.80 (H) 245.00 - 400.00 ug/dL    Iron Saturation 11 (L) 20 - 55 %   Thyrotropin GH     Status: None   Result Value Ref Range    Thyrotropin 2.46 0.34 - 5.60 IU/mL   Lipid Profile     Status: Abnormal   Result Value Ref Range    Cholesterol 206 (H) <200 mg/dL    Triglycerides 173 (H) <150 mg/dL    HDL Cholesterol 51 23 - 92 mg/dL    LDL Cholesterol Calculated 120 (H) <100 mg/dL    Non HDL Cholesterol 155 (H) <130 mg/dL   Comprehensive metabolic panel     Status: Abnormal   Result Value Ref Range    Sodium 136 134 - 144 mmol/L    Potassium 3.9 3.5 - 5.1 mmol/L    Chloride 104 98 - 107 mmol/L    Carbon Dioxide 23  21 - 31 mmol/L    Anion Gap 9 3 - 14 mmol/L    Glucose 92 70 - 105 mg/dL    Urea Nitrogen 12 7 - 25 mg/dL    Creatinine 0.59 (L) 0.60 - 1.20 mg/dL    GFR Estimate >90 >60 mL/min/[1.73_m2]    GFR Estimate If Black >90 >60 mL/min/[1.73_m2]    Calcium 9.6 8.6 - 10.3 mg/dL    Bilirubin Total 0.4 0.3 - 1.0 mg/dL    Albumin 4.2 3.5 - 5.7 g/dL    Protein Total 7.8 6.4 - 8.9 g/dL    Alkaline Phosphatase 65 34 - 104 U/L    ALT 10 7 - 52 U/L    AST 13 13 - 39 U/L   CBC with platelets differential     Status: None   Result Value Ref Range    WBC 9.2 4.0 - 11.0 10e9/L    RBC Count 5.03 3.8 - 5.2 10e12/L    Hemoglobin 14.8 11.7 - 15.7 g/dL    Hematocrit 43.7 35.0 - 47.0 %    MCV 87 78 - 100 fl    MCH 29.4 26.5 - 33.0 pg    MCHC 33.9 31.5 - 36.5 g/dL    RDW 12.6 10.0 - 15.0 %    Platelet Count 309 150 - 450 10e9/L    Diff Method Automated Method     % Neutrophils 60.9 %    % Lymphocytes 29.5 %    % Monocytes 7.3 %    % Eosinophils 1.7 %    % Basophils 0.3 %    % Immature Granulocytes 0.3 %    Absolute Neutrophil 5.6 1.6 - 8.3 10e9/L    Absolute Lymphocytes 2.7 0.8 - 5.3 10e9/L    Absolute Monocytes 0.7 0.0 - 1.3 10e9/L    Absolute Eosinophils 0.2 0.0 - 0.7 10e9/L    Absolute Basophils 0.0 0.0 - 0.2 10e9/L    Abs Immature Granulocytes 0.0 0 - 0.4 10e9/L      ASSESSMENT/Plan :    Wilmer was seen today for recheck medication.    Diagnoses and all orders for this visit:    Chronic midline low back pain without sciatica  -     Drug Confirmation Panel Urine with Creat  -     HYDROcodone-acetaminophen (NORCO) 5-325 MG tablet; 2 tab po tid as needed for pain. . May take a maximum of 7 tablets per day.  -     HYDROcodone-acetaminophen (NORCO) 5-325 MG tablet; Take 2 tablets by mouth 3 times daily as needed for pain . May take a maximum of 7 tablets per day.    Achondroplasia  -     Drug Confirmation Panel Urine with Creat  -     HYDROcodone-acetaminophen (NORCO) 5-325 MG tablet; 2 tab po tid as needed for pain. . May take a maximum of 7  tablets per day.  -     HYDROcodone-acetaminophen (NORCO) 5-325 MG tablet; Take 2 tablets by mouth 3 times daily as needed for pain . May take a maximum of 7 tablets per day.    Benign essential hypertension  -     Comprehensive metabolic panel; Future  -     Comprehensive metabolic panel    Pain medication agreement completed, 3/19/18, 3/13/19, 7/6/2020  -     Drug Confirmation Panel Urine with Creat  -     HYDROcodone-acetaminophen (NORCO) 5-325 MG tablet; 2 tab po tid as needed for pain. . May take a maximum of 7 tablets per day.  -     HYDROcodone-acetaminophen (NORCO) 5-325 MG tablet; Take 2 tablets by mouth 3 times daily as needed for pain . May take a maximum of 7 tablets per day.    Restless leg syndrome    Depression with anxiety  -     clonazePAM (KLONOPIN) 0.5 MG tablet; TAKE 1 TABLET BY MOUTH THREE TIMES DAILY AS NEEDED    History of total knee arthroplasty, right    Iron deficiency  -     CBC with platelets differential; Future  -     Iron Binding Panel; Future  -     FEROSUL 325 (65 Fe) MG tablet; Take 1 tablet (325 mg) by mouth 2 times daily  -     Iron Binding Panel  -     CBC with platelets differential    Hyperlipidemia, unspecified hyperlipidemia type  -     Comprehensive metabolic panel; Future  -     Lipid Profile; Future  -     Thyrotropin GH; Future  -     Thyrotropin GH  -     Lipid Profile  -     Comprehensive metabolic panel    Generalized anxiety disorder    Visit for screening mammogram  -     MA Screen Bilateral w/Dillon; Future    Left hand paresthesia  -     NEUROLOGY ADULT REFERRAL    Pain medication agreement 3/19/18, 3/13/19, 7/6/2020  -     Drug Confirmation Panel Urine with Creat  -     HYDROcodone-acetaminophen (NORCO) 5-325 MG tablet; 2 tab po tid as needed for pain. . May take a maximum of 7 tablets per day.  -     HYDROcodone-acetaminophen (NORCO) 5-325 MG tablet; Take 2 tablets by mouth 3 times daily as needed for pain . May take a maximum of 7 tablets per day.      Will notify  of lab results when available. Discussed diet, exercise and healthy lifestyle changes. Mammogram scheduled. Continue current medications.     Her hand paresthesias do not fit classically with carpal tunnel distribution but a little more with radial nerve issues but I think EMG is in order in order to try and delineate this further.  It sounds as if the symptoms in her right foot may be more of a compressive issue and seem to be less bothersome so we will not pursue that at this time.    Continue with iron as her iron binding capacity slightly elevated and her percent saturation is a little low.    [employer] today.  Renewed medication agreement.  Renewed hydrocodone pursuant to her contract.  She knows not to take clonazepam at the same time as her hydrocodone.    Neptali French MD

## 2021-06-23 ASSESSMENT — ANXIETY QUESTIONNAIRES: GAD7 TOTAL SCORE: 3

## 2021-06-25 LAB
7AMINOCLONAZEPAM UR QL CFM: PRESENT
CREAT UR-MCNC: 95 MG/DL
DHC UR CFM-MCNC: 1640 NG/ML
DHC/CREAT UR: 1726 NG/MG{CREAT}
GABAPENTIN UR QL CFM: PRESENT
HYDROCODONE UR CFM-MCNC: 4240 NG/ML
HYDROCODONE/CREAT UR: 4463 NG/MG{CREAT}
HYDROMORPHONE UR CFM-MCNC: 240 NG/ML
HYDROMORPHONE/CREAT UR: 253 NG/MG{CREAT}
RPT COMMENT: ABNORMAL

## 2021-07-08 ENCOUNTER — MYC MEDICAL ADVICE (OUTPATIENT)
Dept: FAMILY MEDICINE | Facility: OTHER | Age: 51
End: 2021-07-08

## 2021-07-08 DIAGNOSIS — R32 URINARY INCONTINENCE, UNSPECIFIED TYPE: ICD-10-CM

## 2021-07-08 RX ORDER — OXYBUTYNIN CHLORIDE 5 MG/1
5 TABLET, EXTENDED RELEASE ORAL 2 TIMES DAILY
Qty: 60 TABLET | Refills: 3 | Status: SHIPPED | OUTPATIENT
Start: 2021-07-08 | End: 2021-11-10

## 2021-07-08 NOTE — TELEPHONE ENCOUNTER
Neptali French MD is out of the office until 7/19/21. Will send to another covering provider. It is pending with the twice a day directions.   Roseann Medina LPN, LPN  7/8/2021  2:44 PM

## 2021-07-14 ENCOUNTER — HOSPITAL ENCOUNTER (OUTPATIENT)
Dept: MAMMOGRAPHY | Facility: OTHER | Age: 51
Discharge: HOME | End: 2021-07-14
Attending: FAMILY MEDICINE | Admitting: FAMILY MEDICINE
Payer: COMMERCIAL

## 2021-07-14 DIAGNOSIS — Z12.31 VISIT FOR SCREENING MAMMOGRAM: ICD-10-CM

## 2021-07-14 PROCEDURE — 77063 BREAST TOMOSYNTHESIS BI: CPT

## 2021-08-11 ENCOUNTER — VIRTUAL VISIT (OUTPATIENT)
Dept: FAMILY MEDICINE | Facility: OTHER | Age: 51
End: 2021-08-11
Attending: FAMILY MEDICINE
Payer: COMMERCIAL

## 2021-08-11 VITALS — WEIGHT: 197 LBS | BODY MASS INDEX: 53.25 KG/M2

## 2021-08-11 DIAGNOSIS — M54.50 CHRONIC MIDLINE LOW BACK PAIN WITHOUT SCIATICA: ICD-10-CM

## 2021-08-11 DIAGNOSIS — G25.81 RESTLESS LEG SYNDROME: ICD-10-CM

## 2021-08-11 DIAGNOSIS — Z02.89 PAIN MEDICATION AGREEMENT COMPLETED: ICD-10-CM

## 2021-08-11 DIAGNOSIS — G89.29 CHRONIC MIDLINE LOW BACK PAIN WITHOUT SCIATICA: ICD-10-CM

## 2021-08-11 DIAGNOSIS — Q77.4 ACHONDROPLASIA: Primary | ICD-10-CM

## 2021-08-11 PROCEDURE — 99212 OFFICE O/P EST SF 10 MIN: CPT | Mod: 95 | Performed by: FAMILY MEDICINE

## 2021-08-11 RX ORDER — HYDROCODONE BITARTRATE AND ACETAMINOPHEN 5; 325 MG/1; MG/1
2 TABLET ORAL 3 TIMES DAILY PRN
Qty: 200 TABLET | Refills: 0 | Status: SHIPPED | OUTPATIENT
Start: 2021-08-11 | End: 2021-10-13

## 2021-08-11 RX ORDER — HYDROCODONE BITARTRATE AND ACETAMINOPHEN 5; 325 MG/1; MG/1
TABLET ORAL
Qty: 200 TABLET | Refills: 0 | Status: SHIPPED | OUTPATIENT
Start: 2021-08-11 | End: 2021-10-13

## 2021-08-11 ASSESSMENT — ANXIETY QUESTIONNAIRES
7. FEELING AFRAID AS IF SOMETHING AWFUL MIGHT HAPPEN: NOT AT ALL
6. BECOMING EASILY ANNOYED OR IRRITABLE: NOT AT ALL
3. WORRYING TOO MUCH ABOUT DIFFERENT THINGS: SEVERAL DAYS
1. FEELING NERVOUS, ANXIOUS, OR ON EDGE: SEVERAL DAYS
2. NOT BEING ABLE TO STOP OR CONTROL WORRYING: SEVERAL DAYS
IF YOU CHECKED OFF ANY PROBLEMS ON THIS QUESTIONNAIRE, HOW DIFFICULT HAVE THESE PROBLEMS MADE IT FOR YOU TO DO YOUR WORK, TAKE CARE OF THINGS AT HOME, OR GET ALONG WITH OTHER PEOPLE: NOT DIFFICULT AT ALL
GAD7 TOTAL SCORE: 3
5. BEING SO RESTLESS THAT IT IS HARD TO SIT STILL: NOT AT ALL

## 2021-08-11 ASSESSMENT — PATIENT HEALTH QUESTIONNAIRE - PHQ9
SUM OF ALL RESPONSES TO PHQ QUESTIONS 1-9: 0
5. POOR APPETITE OR OVEREATING: NOT AT ALL

## 2021-08-11 ASSESSMENT — PAIN SCALES - GENERAL: PAINLEVEL: SEVERE PAIN (6)

## 2021-08-11 NOTE — NURSING NOTE
"Chief Complaint   Patient presents with     Recheck Medication       Initial Wt 89.4 kg (197 lb)   LMP 07/25/2021   Breastfeeding No   BMI 53.25 kg/m   Estimated body mass index is 53.25 kg/m  as calculated from the following:    Height as of 6/22/21: 1.295 m (4' 3\").    Weight as of this encounter: 89.4 kg (197 lb).  Medication Reconciliation: complete    FOOD SECURITY SCREENING QUESTIONS  Hunger Vital Signs:  Within the past 12 months we worried whether our food would run out before we got money to buy more. Never  Within the past 12 months the food we bought just didn't last and we didn't have money to get more. Never    Ruth Salazar LPN  "

## 2021-08-11 NOTE — PROGRESS NOTES
Wilmer is a 50 year old who is being evaluated via a billable telephone visit.      What phone number would you like to be contacted at? [phone]  How would you like to obtain your AVS? Josh Guillen is a 50 year old who presents for telephone visit for medication refill.    HPI I last saw her 2 months ago for a physical.  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  Her last fill was on July 21.  When she called to get an appointment, she was not able to get one prior to her next fill date of August 18.   PDMP Review       Value Time User    State PDMP site checked  Yes 8/11/2021  9:44 AM Neptali French MD        She has still not gotten the Covid vaccine.  We encouraged her to do so.    CHRONIC PAIN MANAGEMENT:    DIAGNOSIS:      1. Achondroplasia    2. Restless leg syndrome    3. Chronic midline low back pain without sciatica    4. Pain medication agreement completed, 3/19/18, 3/13/19, 7/6/2020, 6/22/21        PAIN CLINIC EVALUATION:(NO)      PAIN MEDICATION AGREEMENT: (YES)    DATE SIGNED: 4/28/16,3/22/17, 3/19/18, 3/13/19, 7/6/2020, 6/22/21      NON-MEDICATION MODALITIES MAXIMIZED? (YES)  Better after knee replacement surgery.      NEUROPATHIC PAIN: (NO)  ON GABAPENTIN/LYRICA/TCA/SNRI: (YES)       NOCICEPTIVE PAIN: (YES)      HISTORY OF CD: (NO)      MENTAL HEALTH DIAGNOSIS: (YES)  DIAGNOSIS: Anxiety and restless leg syndrome    ON BENZODIAZEPINES: (YES). Klonopin 0.5-1 mg at bedtime.    DISCLOSURE REGARDING RISK OF CONCOMITANT USE WITH OPIOIDS DISCUSSED: (YES)  DATE DISCUSSED: 4/28/16,06/15/16, 9/6/16, 12/20/16, 3/22/17, 9/15/17, 3/13/19, 7/6/2020,6/22/21      MEDICATION: hydrocodone/APAP 5/325 #200/month, max 7 per day      ORAL MORPHINE EQUIVALENTS: 30/d      LAST TAPER TRIAL: underway.       QUERY TODAY: (YES)  APPROPRIATE?:         (YES)      TOXASSURE TODAY: (NO)  IF NO, DATE OF LAST TOXASURE: 9/6/16, 3/22/17, 3/8/18,  9/19/18, 3/13/19, 8/13/19, 7/6/2020, 3/2/2021, 6/22/21      PAIN SCORE FLOWSHEET REVIEWED: (YES)  STABLE OR IMPROVED: (YES)               Review of Systems   ROS is negative except as noted above         Objective    Vitals - Patient Reported  Pain Score: Severe Pain (6)  Pain Loc: Other - see comment (legs)        Physical Exam   healthy, alert and no distress  PSYCH: Alert and oriented times 3; coherent speech, normal   rate and volume, able to articulate logical thoughts, able   to abstract reason, no tangential thoughts, no hallucinations   or delusions  Her affect is normal  RESP: No cough, no audible wheezing, able to talk in full sentences  Remainder of exam unable to be completed due to telephone visits      Wilmer was seen today for recheck medication.    Diagnoses and all orders for this visit:    Achondroplasia    Restless leg syndrome    Chronic midline low back pain without sciatica    Pain medication agreement completed, 3/19/18, 3/13/19, 7/6/2020, 6/22/21       Renewed medications pursuant to her contract.  Follow-up in 2 months, sooner if needed        Phone call duration: 10 minutes    Neptali French MD

## 2021-08-12 ASSESSMENT — ANXIETY QUESTIONNAIRES: GAD7 TOTAL SCORE: 3

## 2021-10-09 ENCOUNTER — HEALTH MAINTENANCE LETTER (OUTPATIENT)
Age: 51
End: 2021-10-09

## 2021-10-13 ENCOUNTER — VIRTUAL VISIT (OUTPATIENT)
Dept: FAMILY MEDICINE | Facility: OTHER | Age: 51
End: 2021-10-13
Attending: FAMILY MEDICINE
Payer: COMMERCIAL

## 2021-10-13 VITALS — WEIGHT: 201 LBS | BODY MASS INDEX: 54.33 KG/M2

## 2021-10-13 DIAGNOSIS — G89.29 CHRONIC MIDLINE LOW BACK PAIN WITHOUT SCIATICA: ICD-10-CM

## 2021-10-13 DIAGNOSIS — Q77.4 ACHONDROPLASIA: Primary | ICD-10-CM

## 2021-10-13 DIAGNOSIS — Z02.89 PAIN MEDICATION AGREEMENT COMPLETED: ICD-10-CM

## 2021-10-13 DIAGNOSIS — M54.50 CHRONIC MIDLINE LOW BACK PAIN WITHOUT SCIATICA: ICD-10-CM

## 2021-10-13 DIAGNOSIS — Z63.5 MARITAL CONFLICT INVOLVING ESTRANGEMENT: ICD-10-CM

## 2021-10-13 PROCEDURE — 99212 OFFICE O/P EST SF 10 MIN: CPT | Mod: 95 | Performed by: FAMILY MEDICINE

## 2021-10-13 RX ORDER — HYDROCODONE BITARTRATE AND ACETAMINOPHEN 5; 325 MG/1; MG/1
TABLET ORAL
Qty: 200 TABLET | Refills: 0 | Status: SHIPPED | OUTPATIENT
Start: 2021-10-13 | End: 2021-12-01

## 2021-10-13 RX ORDER — HYDROCODONE BITARTRATE AND ACETAMINOPHEN 5; 325 MG/1; MG/1
2 TABLET ORAL 3 TIMES DAILY PRN
Qty: 200 TABLET | Refills: 0 | Status: SHIPPED | OUTPATIENT
Start: 2021-10-13 | End: 2021-12-01

## 2021-10-13 SDOH — SOCIAL STABILITY - SOCIAL INSECURITY: DISRUPTION OF FAMILY BY SEPARATION AND DIVORCE: Z63.5

## 2021-10-13 ASSESSMENT — PAIN SCALES - GENERAL: PAINLEVEL: SEVERE PAIN (6)

## 2021-10-13 NOTE — PROGRESS NOTES
"Nursing Notes:   Ruth Salazar LPN  10/13/2021 10:45 AM  Signed  Chief Complaint   Patient presents with     Recheck Medication       Initial Wt 91.2 kg (201 lb)   LMP 09/25/2021   Breastfeeding No   BMI 54.33 kg/m   Estimated body mass index is 54.33 kg/m  as calculated from the following:    Height as of 6/22/21: 1.295 m (4' 3\").    Weight as of this encounter: 91.2 kg (201 lb).  Medication Reconciliation: complete    FOOD SECURITY SCREENING QUESTIONS  Hunger Vital Signs:  Within the past 12 months we worried whether our food would run out before we got money to buy more. Never  Within the past 12 months the food we bought just didn't last and we didn't have money to get more. Never    Ruth Salazar LPN    Wilmer is a 50 year old who is being evaluated via a billable telephone visit.      What phone number would you like to be contacted at? [phone]  How would you like to obtain your AVS? Josh Guillen is a 50 year old who presents for telephone visit for follow up of medications.     HPI I last saw her virtually 2 months ago. 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 Her last fill was September 17, 2021.   PDMP Review       Value Time User    State PDMP site checked  Yes 10/13/2021 11:16 AM Neptali French MD         She has been afraid to get the Covid vaccine.  We had a discussion at her last visit.  She wrote me a note to talk about her fears and her anxiety.    She is having some marital discord. She is  from her .  She in some support groups.  Her  has moved out.  She is dealing with codependency, his narcissism. She is working through things and seems to be getting along ok.   CHRONIC PAIN MANAGEMENT:    DIAGNOSIS:      1. Achondroplasia    2. Chronic midline low back pain without sciatica    3. Pain medication agreement completed, 3/19/18, 3/13/19, 7/6/2020, 6/22/21    4. Marital conflict " involving estrangement          PAIN CLINIC EVALUATION:(NO)      PAIN MEDICATION AGREEMENT: (YES)    DATE SIGNED: 4/28/16,3/22/17, 3/19/18, 3/13/19, 7/6/2020, 6/22/21      NON-MEDICATION MODALITIES MAXIMIZED? (YES)  Better after knee replacement surgery.      NEUROPATHIC PAIN: (NO)  ON GABAPENTIN/LYRICA/TCA/SNRI: (YES)       NOCICEPTIVE PAIN: (YES)      HISTORY OF CD: (NO)      MENTAL HEALTH DIAGNOSIS: (YES)  DIAGNOSIS: Anxiety and restless leg syndrome    ON BENZODIAZEPINES: (YES). Klonopin 0.5-1 mg at bedtime.    DISCLOSURE REGARDING RISK OF CONCOMITANT USE WITH OPIOIDS DISCUSSED: (YES)  DATE DISCUSSED: 4/28/16,06/15/16, 9/6/16, 12/20/16, 3/22/17, 9/15/17, 3/13/19, 7/6/2020,6/22/21      MEDICATION: hydrocodone/APAP 5/325 #200/month, max 7 per day      ORAL MORPHINE EQUIVALENTS: 30/d      LAST TAPER TRIAL: underway.       QUERY TODAY: (YES)  APPROPRIATE?:         (YES)      TOXASSURE TODAY: (NO)  IF NO, DATE OF LAST TOXASURE: 9/6/16, 3/22/17, 3/8/18, 9/19/18, 3/13/19, 8/13/19, 7/6/2020, 3/2/2021, 6/22/21      PAIN SCORE FLOWSHEET REVIEWED: (YES)  STABLE OR IMPROVED: (YES)                     Review of Systems   ROS is negative except as noted above           Objective    Vitals - Patient Reported  Pain Score: Severe Pain (6)  Pain Loc: Other - see comment (both legs)        Physical Exam   healthy, alert and no distress  PSYCH: Alert and oriented times 3; coherent speech, normal   rate and volume, able to articulate logical thoughts, able   to abstract reason, no tangential thoughts, no hallucinations   or delusions  Her affect is anxious, sad and fearful  RESP: No cough, no audible wheezing, able to talk in full sentences  Remainder of exam unable to be completed due to telephone visits        Wilmer was seen today for recheck medication.    Diagnoses and all orders for this visit:    Achondroplasia  -     HYDROcodone-acetaminophen (NORCO) 5-325 MG tablet; 2 tab po tid as needed for pain. . May take a maximum  of 7 tablets per day.  -     HYDROcodone-acetaminophen (NORCO) 5-325 MG tablet; Take 2 tablets by mouth 3 times daily as needed for pain . May take a maximum of 7 tablets per day.    Chronic midline low back pain without sciatica  -     HYDROcodone-acetaminophen (NORCO) 5-325 MG tablet; 2 tab po tid as needed for pain. . May take a maximum of 7 tablets per day.  -     HYDROcodone-acetaminophen (NORCO) 5-325 MG tablet; Take 2 tablets by mouth 3 times daily as needed for pain . May take a maximum of 7 tablets per day.    Pain medication agreement completed, 3/19/18, 3/13/19, 7/6/2020, 6/22/21  -     HYDROcodone-acetaminophen (NORCO) 5-325 MG tablet; 2 tab po tid as needed for pain. . May take a maximum of 7 tablets per day.  -     HYDROcodone-acetaminophen (NORCO) 5-325 MG tablet; Take 2 tablets by mouth 3 times daily as needed for pain . May take a maximum of 7 tablets per day.    Marital conflict involving estrangement       Support encouragement offered.  Medications renewed.  Follow-up in 2 months, sooner if needed        Phone call duration: 36 minutes    Neptali French MD

## 2021-10-13 NOTE — NURSING NOTE
"Chief Complaint   Patient presents with     Recheck Medication       Initial Wt 91.2 kg (201 lb)   LMP 09/25/2021   Breastfeeding No   BMI 54.33 kg/m   Estimated body mass index is 54.33 kg/m  as calculated from the following:    Height as of 6/22/21: 1.295 m (4' 3\").    Weight as of this encounter: 91.2 kg (201 lb).  Medication Reconciliation: complete    FOOD SECURITY SCREENING QUESTIONS  Hunger Vital Signs:  Within the past 12 months we worried whether our food would run out before we got money to buy more. Never  Within the past 12 months the food we bought just didn't last and we didn't have money to get more. Never    Ruth Salazar LPN  "

## 2021-10-20 ENCOUNTER — ALLIED HEALTH/NURSE VISIT (OUTPATIENT)
Dept: FAMILY MEDICINE | Facility: OTHER | Age: 51
End: 2021-10-20
Attending: FAMILY MEDICINE
Payer: COMMERCIAL

## 2021-10-20 DIAGNOSIS — Z20.822 COVID-19 RULED OUT: Primary | ICD-10-CM

## 2021-10-20 PROCEDURE — C9803 HOPD COVID-19 SPEC COLLECT: HCPCS

## 2021-10-20 PROCEDURE — U0005 INFEC AGEN DETEC AMPLI PROBE: HCPCS | Mod: ZL

## 2021-10-22 LAB — SARS-COV-2 RNA RESP QL NAA+PROBE: NEGATIVE

## 2021-11-10 DIAGNOSIS — R32 URINARY INCONTINENCE, UNSPECIFIED TYPE: ICD-10-CM

## 2021-11-10 RX ORDER — OXYBUTYNIN CHLORIDE 5 MG/1
TABLET, EXTENDED RELEASE ORAL
Qty: 60 TABLET | Refills: 11 | Status: SHIPPED | OUTPATIENT
Start: 2021-11-10 | End: 2022-11-03

## 2021-12-01 ENCOUNTER — VIRTUAL VISIT (OUTPATIENT)
Dept: FAMILY MEDICINE | Facility: OTHER | Age: 51
End: 2021-12-01
Attending: FAMILY MEDICINE
Payer: COMMERCIAL

## 2021-12-01 VITALS — WEIGHT: 201 LBS | BODY MASS INDEX: 54.33 KG/M2

## 2021-12-01 DIAGNOSIS — M54.50 CHRONIC MIDLINE LOW BACK PAIN WITHOUT SCIATICA: Primary | ICD-10-CM

## 2021-12-01 DIAGNOSIS — Z63.0 MARITAL DYSFUNCTION: ICD-10-CM

## 2021-12-01 DIAGNOSIS — R13.10 FOOD STICKS ON SWALLOWING: ICD-10-CM

## 2021-12-01 DIAGNOSIS — Q77.4 ACHONDROPLASIA: ICD-10-CM

## 2021-12-01 DIAGNOSIS — Z02.89 PAIN MEDICATION AGREEMENT COMPLETED: ICD-10-CM

## 2021-12-01 DIAGNOSIS — M79.2 NEUROPATHIC PAIN: ICD-10-CM

## 2021-12-01 DIAGNOSIS — F41.8 DEPRESSION WITH ANXIETY: ICD-10-CM

## 2021-12-01 DIAGNOSIS — K21.00 GASTROESOPHAGEAL REFLUX DISEASE WITH ESOPHAGITIS WITHOUT HEMORRHAGE: ICD-10-CM

## 2021-12-01 DIAGNOSIS — G89.29 CHRONIC MIDLINE LOW BACK PAIN WITHOUT SCIATICA: Primary | ICD-10-CM

## 2021-12-01 DIAGNOSIS — G25.81 RESTLESS LEG SYNDROME: ICD-10-CM

## 2021-12-01 PROCEDURE — 99213 OFFICE O/P EST LOW 20 MIN: CPT | Mod: 95 | Performed by: FAMILY MEDICINE

## 2021-12-01 RX ORDER — HYDROCODONE BITARTRATE AND ACETAMINOPHEN 5; 325 MG/1; MG/1
2 TABLET ORAL 3 TIMES DAILY PRN
Qty: 200 TABLET | Refills: 0 | Status: SHIPPED | OUTPATIENT
Start: 2021-12-01 | End: 2022-01-31

## 2021-12-01 RX ORDER — CLONAZEPAM 0.5 MG/1
TABLET ORAL
Qty: 90 TABLET | Refills: 5 | Status: SHIPPED | OUTPATIENT
Start: 2021-12-01 | End: 2022-05-19

## 2021-12-01 RX ORDER — GABAPENTIN 300 MG/1
300 CAPSULE ORAL 3 TIMES DAILY
Qty: 90 CAPSULE | Refills: 11 | Status: SHIPPED | OUTPATIENT
Start: 2021-12-01 | End: 2023-01-05

## 2021-12-01 SDOH — SOCIAL STABILITY - SOCIAL INSECURITY: PROBLEMS IN RELATIONSHIP WITH SPOUSE OR PARTNER: Z63.0

## 2021-12-01 ASSESSMENT — ANXIETY QUESTIONNAIRES
7. FEELING AFRAID AS IF SOMETHING AWFUL MIGHT HAPPEN: NOT AT ALL
2. NOT BEING ABLE TO STOP OR CONTROL WORRYING: NOT AT ALL
IF YOU CHECKED OFF ANY PROBLEMS ON THIS QUESTIONNAIRE, HOW DIFFICULT HAVE THESE PROBLEMS MADE IT FOR YOU TO DO YOUR WORK, TAKE CARE OF THINGS AT HOME, OR GET ALONG WITH OTHER PEOPLE: NOT DIFFICULT AT ALL
6. BECOMING EASILY ANNOYED OR IRRITABLE: NOT AT ALL
3. WORRYING TOO MUCH ABOUT DIFFERENT THINGS: NOT AT ALL
1. FEELING NERVOUS, ANXIOUS, OR ON EDGE: NOT AT ALL
5. BEING SO RESTLESS THAT IT IS HARD TO SIT STILL: NOT AT ALL
GAD7 TOTAL SCORE: 0

## 2021-12-01 ASSESSMENT — PATIENT HEALTH QUESTIONNAIRE - PHQ9: 5. POOR APPETITE OR OVEREATING: NOT AT ALL

## 2021-12-01 ASSESSMENT — PAIN SCALES - GENERAL: PAINLEVEL: SEVERE PAIN (7)

## 2021-12-01 NOTE — NURSING NOTE
"Chief Complaint   Patient presents with     Recheck Medication       Initial Wt 91.2 kg (201 lb)   LMP 11/15/2021   Breastfeeding No   BMI 54.33 kg/m   Estimated body mass index is 54.33 kg/m  as calculated from the following:    Height as of 6/22/21: 1.295 m (4' 3\").    Weight as of this encounter: 91.2 kg (201 lb).  Medication Reconciliation: complete    FOOD SECURITY SCREENING QUESTIONS  Hunger Vital Signs:  Within the past 12 months we worried whether our food would run out before we got money to buy more. Never  Within the past 12 months the food we bought just didn't last and we didn't have money to get more. Never    Ruth Salazar LPN  "

## 2021-12-01 NOTE — PROGRESS NOTES
Wilmer is a 50 year old who is being evaluated via a billable telephone visit.      What phone number would you like to be contacted at? [phone]  How would you like to obtain your AVS? Josh Guillen is a 50 year old who presents for telephone visit for follow-up of her medications.      HPI  I last saw her virtually 2 months ago. 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 Her last fill was November 9.  She continues on clonazepam as well but does not take them together with her pain medications and she is aware of the increased risk of those 2 medicines together.   PDMP Review       Value Time User    State PDMP site checked  Yes 12/1/2021  1:22 PM Neptali French MD         She was having marital discord at our last visit and had  from her . They are still in the same house but are civil and things are ok.     PHQ 6/22/2021 8/11/2021 12/1/2021   PHQ-9 Total Score 0 0 0   Q9: Thoughts of better off dead/self-harm past 2 weeks Not at all Not at all Not at all     YESI-7 SCORE 6/22/2021 8/11/2021 12/1/2021   Total Score - - -   Total Score 3 3 0       Last summer, she had a choking episode where food seemed to get stuck and she had to make herself throw up.  It has happened a couple of times since. She is not on any acid blockade. They are now happening weekly. She gets occasional heartburn.     Her back has flared recently and she had more nerve pain. She bumped up on gabapentin.    CHRONIC PAIN MANAGEMENT:    DIAGNOSIS:      1. Chronic midline low back pain without sciatica    2. Achondroplasia    3. Pain medication agreement completed 3/19/18, 3/13/19, 7/6/20, 6/22/21    4. Restless leg syndrome    5. Depression with anxiety    6. Pain medication agreement completed, 3/19/18, 3/13/19, 7/6/2020, 6/22/21    7. Food sticks on swallowing    8. Gastroesophageal reflux disease with esophagitis without hemorrhage    9.  Neuropathic pain    10. Marital dysfunction          PAIN CLINIC EVALUATION:(NO)      PAIN MEDICATION AGREEMENT: (YES)    DATE SIGNED: 4/28/16,3/22/17, 3/19/18, 3/13/19, 7/6/2020, 6/22/21      NON-MEDICATION MODALITIES MAXIMIZED? (YES)  Better after knee replacement surgery.      NEUROPATHIC PAIN: (NO)  ON GABAPENTIN/LYRICA/TCA/SNRI: (YES)       NOCICEPTIVE PAIN: (YES)      HISTORY OF CD: (NO)      MENTAL HEALTH DIAGNOSIS: (YES)  DIAGNOSIS: Anxiety and restless leg syndrome    ON BENZODIAZEPINES: (YES). Klonopin 0.5-1 mg at bedtime.    DISCLOSURE REGARDING RISK OF CONCOMITANT USE WITH OPIOIDS DISCUSSED: (YES)  DATE DISCUSSED: 4/28/16,06/15/16, 9/6/16, 12/20/16, 3/22/17, 9/15/17, 3/13/19, 7/6/2020,6/22/21, 12/1/21      MEDICATION: hydrocodone/APAP 5/325 #200/month, max 7 per day      ORAL MORPHINE EQUIVALENTS: 30/d      LAST TAPER TRIAL: underway.       QUERY TODAY: (YES)  APPROPRIATE?:         (YES)      TOXASSURE TODAY: (NO)  IF NO, DATE OF LAST TOXASURE: 9/6/16, 3/22/17, 3/8/18, 9/19/18, 3/13/19, 8/13/19, 7/6/2020, 3/2/2021, 6/22/21      PAIN SCORE FLOWSHEET REVIEWED: (YES)  STABLE OR IMPROVED: (YES)                     Review of Systems   ROS is negative except as noted above           Objective    Vitals - Patient Reported  Pain Score: Severe Pain (7)  Pain Loc: Low Back (and legsc are a 5)        Physical Exam   healthy, alert and no distress  PSYCH: Alert and oriented times 3; coherent speech, normal   rate and volume, able to articulate logical thoughts, able   to abstract reason, no tangential thoughts, no hallucinations   or delusions  Her affect is normal  RESP: No cough, no audible wheezing, able to talk in full sentences  Remainder of exam unable to be completed due to telephone visits    Wilmer was seen today for recheck medication.    Diagnoses and all orders for this visit:    Chronic midline low back pain without sciatica  -     HYDROcodone-acetaminophen (NORCO) 5-325 MG tablet; Take 2 tablets by  mouth 3 times daily as needed for severe pain May take a maximum of 7 tablets per day.  -     HYDROcodone-acetaminophen (NORCO) 5-325 MG tablet; Take 2 tablets by mouth 3 times daily as needed for pain . May take a maximum of 7 tablets per day.    Achondroplasia  -     HYDROcodone-acetaminophen (NORCO) 5-325 MG tablet; Take 2 tablets by mouth 3 times daily as needed for severe pain May take a maximum of 7 tablets per day.  -     HYDROcodone-acetaminophen (NORCO) 5-325 MG tablet; Take 2 tablets by mouth 3 times daily as needed for pain . May take a maximum of 7 tablets per day.    Pain medication agreement completed 3/19/18, 3/13/19, 7/6/20, 6/22/21  -     HYDROcodone-acetaminophen (NORCO) 5-325 MG tablet; Take 2 tablets by mouth 3 times daily as needed for severe pain May take a maximum of 7 tablets per day.  -     HYDROcodone-acetaminophen (NORCO) 5-325 MG tablet; Take 2 tablets by mouth 3 times daily as needed for pain . May take a maximum of 7 tablets per day.    Restless leg syndrome    Depression with anxiety  -     clonazePAM (KLONOPIN) 0.5 MG tablet; TAKE 1 TABLET BY MOUTH THREE TIMES DAILY AS NEEDED    Pain medication agreement completed, 3/19/18, 3/13/19, 7/6/2020, 6/22/21  -     HYDROcodone-acetaminophen (NORCO) 5-325 MG tablet; Take 2 tablets by mouth 3 times daily as needed for severe pain May take a maximum of 7 tablets per day.  -     HYDROcodone-acetaminophen (NORCO) 5-325 MG tablet; Take 2 tablets by mouth 3 times daily as needed for pain . May take a maximum of 7 tablets per day.    Food sticks on swallowing  -     omeprazole (PRILOSEC) 20 MG DR capsule; Take 1 capsule (20 mg) by mouth daily  -     Adult Gastro Ref - Procedure Only; Future    Gastroesophageal reflux disease with esophagitis without hemorrhage  -     omeprazole (PRILOSEC) 20 MG DR capsule; Take 1 capsule (20 mg) by mouth daily  -     Adult Gastro Ref - Procedure Only; Future    Neuropathic pain  -     gabapentin (NEURONTIN) 300 MG  capsule; Take 1 capsule (300 mg) by mouth 3 times daily    Marital dysfunction       Renewed gabapentin for nerve pain.  Renewed hydrocodone pursuant to her contract.    Sounds as if she is having some dysphagia with food sticking.  Placed on omeprazole 20 mg daily.  Discussed chewing her food and using a little bit of water or liquid to soften it a bit more and eating more slowly.  If she continues to have symptoms after a PPI for a couple weeks, would then proceed with upper endoscopy.  Referral sent for same.    Support and encouragement offered regarding her psychosocial stressors.    She has scheduled her appointment for Covid vaccine.        Phone call duration: 21 minutes    Neptali French MD

## 2021-12-02 ASSESSMENT — PATIENT HEALTH QUESTIONNAIRE - PHQ9: SUM OF ALL RESPONSES TO PHQ QUESTIONS 1-9: 0

## 2021-12-02 ASSESSMENT — ANXIETY QUESTIONNAIRES: GAD7 TOTAL SCORE: 0

## 2021-12-21 ENCOUNTER — HOSPITAL ENCOUNTER (OUTPATIENT)
Facility: OTHER | Age: 51
End: 2021-12-21
Attending: SURGERY | Admitting: SURGERY
Payer: COMMERCIAL

## 2022-01-04 ENCOUNTER — IMMUNIZATION (OUTPATIENT)
Dept: FAMILY MEDICINE | Facility: OTHER | Age: 52
End: 2022-01-04
Attending: FAMILY MEDICINE
Payer: COMMERCIAL

## 2022-01-04 ENCOUNTER — MYC MEDICAL ADVICE (OUTPATIENT)
Dept: FAMILY MEDICINE | Facility: OTHER | Age: 52
End: 2022-01-04

## 2022-01-04 PROCEDURE — 91300 PR COVID VAC PFIZER DIL RECON 30 MCG/0.3 ML IM: CPT

## 2022-01-04 PROCEDURE — 0001A PR COVID VAC PFIZER DIL RECON 30 MCG/0.3 ML IM: CPT

## 2022-01-04 NOTE — TELEPHONE ENCOUNTER
Patient is scheduled for covid vaccine today.  Gemma Smith LPN .............1/4/2022     10:19 AM

## 2022-01-26 ASSESSMENT — ANXIETY QUESTIONNAIRES
4. TROUBLE RELAXING: NOT AT ALL
GAD7 TOTAL SCORE: 2
2. NOT BEING ABLE TO STOP OR CONTROL WORRYING: SEVERAL DAYS
GAD7 TOTAL SCORE: 2
GAD7 TOTAL SCORE: 2
5. BEING SO RESTLESS THAT IT IS HARD TO SIT STILL: NOT AT ALL
7. FEELING AFRAID AS IF SOMETHING AWFUL MIGHT HAPPEN: NOT AT ALL
6. BECOMING EASILY ANNOYED OR IRRITABLE: NOT AT ALL
7. FEELING AFRAID AS IF SOMETHING AWFUL MIGHT HAPPEN: NOT AT ALL
3. WORRYING TOO MUCH ABOUT DIFFERENT THINGS: SEVERAL DAYS
1. FEELING NERVOUS, ANXIOUS, OR ON EDGE: NOT AT ALL

## 2022-01-26 ASSESSMENT — PATIENT HEALTH QUESTIONNAIRE - PHQ9
SUM OF ALL RESPONSES TO PHQ QUESTIONS 1-9: 1
SUM OF ALL RESPONSES TO PHQ QUESTIONS 1-9: 1
10. IF YOU CHECKED OFF ANY PROBLEMS, HOW DIFFICULT HAVE THESE PROBLEMS MADE IT FOR YOU TO DO YOUR WORK, TAKE CARE OF THINGS AT HOME, OR GET ALONG WITH OTHER PEOPLE: SOMEWHAT DIFFICULT

## 2022-01-27 ENCOUNTER — IMMUNIZATION (OUTPATIENT)
Dept: FAMILY MEDICINE | Facility: OTHER | Age: 52
End: 2022-01-27
Attending: FAMILY MEDICINE
Payer: COMMERCIAL

## 2022-01-27 PROCEDURE — 91300 PR COVID VAC PFIZER DIL RECON 30 MCG/0.3 ML IM: CPT

## 2022-01-27 PROCEDURE — 0002A PR COVID VAC PFIZER DIL RECON 30 MCG/0.3 ML IM: CPT

## 2022-01-27 ASSESSMENT — ANXIETY QUESTIONNAIRES: GAD7 TOTAL SCORE: 2

## 2022-01-31 ENCOUNTER — OFFICE VISIT (OUTPATIENT)
Dept: FAMILY MEDICINE | Facility: OTHER | Age: 52
End: 2022-01-31
Attending: FAMILY MEDICINE
Payer: COMMERCIAL

## 2022-01-31 VITALS
TEMPERATURE: 96.9 F | DIASTOLIC BLOOD PRESSURE: 88 MMHG | HEART RATE: 101 BPM | RESPIRATION RATE: 16 BRPM | HEIGHT: 55 IN | SYSTOLIC BLOOD PRESSURE: 142 MMHG | WEIGHT: 207.8 LBS | BODY MASS INDEX: 48.09 KG/M2 | OXYGEN SATURATION: 98 %

## 2022-01-31 DIAGNOSIS — F41.8 DEPRESSION WITH ANXIETY: ICD-10-CM

## 2022-01-31 DIAGNOSIS — G25.81 RESTLESS LEG SYNDROME: ICD-10-CM

## 2022-01-31 DIAGNOSIS — Z02.89 PAIN MEDICATION AGREEMENT COMPLETED: ICD-10-CM

## 2022-01-31 DIAGNOSIS — I10 BENIGN ESSENTIAL HYPERTENSION: ICD-10-CM

## 2022-01-31 DIAGNOSIS — M54.50 CHRONIC MIDLINE LOW BACK PAIN WITHOUT SCIATICA: Primary | ICD-10-CM

## 2022-01-31 DIAGNOSIS — G89.29 CHRONIC MIDLINE LOW BACK PAIN WITHOUT SCIATICA: Primary | ICD-10-CM

## 2022-01-31 DIAGNOSIS — Q77.4 ACHONDROPLASIA: ICD-10-CM

## 2022-01-31 PROCEDURE — 99213 OFFICE O/P EST LOW 20 MIN: CPT | Performed by: FAMILY MEDICINE

## 2022-01-31 RX ORDER — HYDROCODONE BITARTRATE AND ACETAMINOPHEN 5; 325 MG/1; MG/1
2 TABLET ORAL 3 TIMES DAILY PRN
Qty: 200 TABLET | Refills: 0 | Status: SHIPPED | OUTPATIENT
Start: 2022-01-31 | End: 2022-03-24

## 2022-01-31 RX ORDER — SERTRALINE HYDROCHLORIDE 100 MG/1
200 TABLET, FILM COATED ORAL DAILY
Qty: 180 TABLET | Refills: 11 | Status: SHIPPED | OUTPATIENT
Start: 2022-01-31 | End: 2022-11-03

## 2022-01-31 RX ORDER — LISINOPRIL 10 MG/1
10 TABLET ORAL DAILY
Qty: 90 TABLET | Refills: 11 | Status: SHIPPED | OUTPATIENT
Start: 2022-01-31 | End: 2022-11-03

## 2022-01-31 ASSESSMENT — PATIENT HEALTH QUESTIONNAIRE - PHQ9: SUM OF ALL RESPONSES TO PHQ QUESTIONS 1-9: 1

## 2022-01-31 ASSESSMENT — PAIN SCALES - GENERAL: PAINLEVEL: MODERATE PAIN (4)

## 2022-01-31 ASSESSMENT — MIFFLIN-ST. JEOR: SCORE: 1341.2

## 2022-01-31 NOTE — NURSING NOTE
"Chief Complaint   Patient presents with     Recheck Medication       Initial BP (!) 146/100   Pulse 101   Temp 96.9  F (36.1  C) (Temporal)   Resp 16   Ht 1.295 m (4' 3\")   Wt 94.3 kg (207 lb 12.8 oz)   LMP 01/30/2022   SpO2 98%   Breastfeeding No   BMI 56.17 kg/m   Estimated body mass index is 56.17 kg/m  as calculated from the following:    Height as of this encounter: 1.295 m (4' 3\").    Weight as of this encounter: 94.3 kg (207 lb 12.8 oz).  Medication Reconciliation: complete    FOOD SECURITY SCREENING QUESTIONS  Hunger Vital Signs:  Within the past 12 months we worried whether our food would run out before we got money to buy more. Never  Within the past 12 months the food we bought just didn't last and we didn't have money to get more. Never    Ruth Salazar, FLOYD  "

## 2022-01-31 NOTE — PROGRESS NOTES
"Nursing Notes:   Ruth Salazar LPN  1/31/2022  2:51 PM  Signed  Chief Complaint   Patient presents with     Recheck Medication       Initial BP (!) 146/100   Pulse 101   Temp 96.9  F (36.1  C) (Temporal)   Resp 16   Ht 1.295 m (4' 3\")   Wt 94.3 kg (207 lb 12.8 oz)   LMP 01/30/2022   SpO2 98%   Breastfeeding No   BMI 56.17 kg/m   Estimated body mass index is 56.17 kg/m  as calculated from the following:    Height as of this encounter: 1.295 m (4' 3\").    Weight as of this encounter: 94.3 kg (207 lb 12.8 oz).  Medication Reconciliation: complete    FOOD SECURITY SCREENING QUESTIONS  Hunger Vital Signs:  Within the past 12 months we worried whether our food would run out before we got money to buy more. Never  Within the past 12 months the food we bought just didn't last and we didn't have money to get more. Never    Ruth Salazar LPN      SUBJECTIVE:  Wilmer Morse  is a 51 year old female who comes in today for follow-up and medication management.    Her medication agreement is up-to-date and she is up-to-date on her ToxAssure.  Her last fill of hydrocodone was on January 5.  She continues on gabapentin.  She takes clonazepam at bedtime.  She does not take them at the same time as her hydrocodone.  She has tolerated this without difficulty.   PDMP Review       Value Time User    State PDMP site checked  Yes 1/31/2022  1:08 PM Neptali French MD           She was having episodes where food seem to be getting stuck and at her last visit, we put her on an acid blocker and referred her for EGD.  That was scheduled for tomorrow but it was canceled.  She has been on omeprazole 20 mg daily. She has had no symptoms since starting the medication.         She just completed her Covid-19 vaccination series last week.    CHRONIC PAIN MANAGEMENT:    DIAGNOSIS:      1. Chronic midline low back pain without sciatica    2. Achondroplasia    3. Pain medication agreement completed 3/19/18, 3/13/19, 7/6/20, " 6/22/21    4. Depression with anxiety    5. Restless leg syndrome    6. Pain medication agreement completed, 3/19/18, 3/13/19, 7/6/2020, 6/22/21    7. Benign essential hypertension          PAIN CLINIC EVALUATION:(NO)      PAIN MEDICATION AGREEMENT: (YES)    DATE SIGNED: 4/28/16,3/22/17, 3/19/18, 3/13/19, 7/6/2020, 6/22/21      NON-MEDICATION MODALITIES MAXIMIZED? (YES)  Better after knee replacement surgery.      NEUROPATHIC PAIN: (NO)  ON GABAPENTIN/LYRICA/TCA/SNRI: (YES)       NOCICEPTIVE PAIN: (YES)      HISTORY OF CD: (NO)      MENTAL HEALTH DIAGNOSIS: (YES)  DIAGNOSIS: Anxiety and restless leg syndrome    ON BENZODIAZEPINES: (YES). Klonopin 0.5-1 mg at bedtime.    DISCLOSURE REGARDING RISK OF CONCOMITANT USE WITH OPIOIDS DISCUSSED: (YES)  DATE DISCUSSED: 4/28/16,06/15/16, 9/6/16, 12/20/16, 3/22/17, 9/15/17, 3/13/19, 7/6/2020,6/22/21, 12/1/21      MEDICATION: hydrocodone/APAP 5/325 #200/month, max 7 per day      ORAL MORPHINE EQUIVALENTS: 30/d      LAST TAPER TRIAL: underway.       QUERY TODAY: (YES)  APPROPRIATE?:         (YES)      TOXASSURE TODAY: (NO)  IF NO, DATE OF LAST TOXASURE: 9/6/16, 3/22/17, 3/8/18, 9/19/18, 3/13/19, 8/13/19, 7/6/2020, 3/2/2021, 6/22/21      PAIN SCORE FLOWSHEET REVIEWED: (YES)  STABLE OR IMPROVED: (YES)      Things are better at home. They are going to [place] in a couple of weeks.            Past Medical, Family, and Social History reviewed and updated as noted below.   ROS is negative except as noted above       Allergies   Allergen Reactions     Penicillins Unknown   ,   Family History   Problem Relation Age of Onset     Other - See Comments Mother         Achondroplasia.     Breast Cancer Mother         Cancer-breast,breast cancer     Breast Cancer Maternal Grandmother 70        Cancer-breast, breast cancer     Unknown/Adopted Father         Unknown     Family History Negative Daughter         Good Health     Family History Negative Son         Good Health     Family History  Negative Other         Good Health     Family History Negative Son         Good Health,(stepson)91   ,   Current Outpatient Medications   Medication     buPROPion (WELLBUTRIN XL) 300 MG 24 hr tablet     clonazePAM (KLONOPIN) 0.5 MG tablet     FEROSUL 325 (65 Fe) MG tablet     gabapentin (NEURONTIN) 300 MG capsule     HYDROcodone-acetaminophen (NORCO) 5-325 MG tablet     HYDROcodone-acetaminophen (NORCO) 5-325 MG tablet     ibuprofen (ADVIL/MOTRIN) 600 MG tablet     lisinopril (ZESTRIL) 10 MG tablet     omeprazole (PRILOSEC) 20 MG DR capsule     oxybutynin ER (DITROPAN-XL) 5 MG 24 hr tablet     pramipexole (MIRAPEX) 0.5 MG tablet     sertraline (ZOLOFT) 100 MG tablet     No current facility-administered medications for this visit.   ,   Past Medical History:   Diagnosis Date     Achondroplasia     No Comments Provided     Family history of malignant neoplasm of breast     No Comments Provided     Hyperlipidemia     not currently on treatment     Other intervertebral disc degeneration, lumbar region     No Comments Provided     Other specified anxiety disorders     No Comments Provided     Overactive bladder     2014     Personal history of other medical treatment (CODE)      3, Para 2-0-1-2.     Primary osteoarthritis of one knee     5/3/2012     Restless legs syndrome     No Comments Provided     Zoster without complications     2006,left side of neck.   ,   Patient Active Problem List    Diagnosis Date Noted     Benign essential hypertension 2018     Priority: Medium     Depression with anxiety 2018     Priority: Medium     Family history of breast cancer in female 2018     Priority: Medium     Hyperlipidemia 2018     Priority: Medium     Overview:   not currently on treatment       Restless leg syndrome 2018     Priority: Medium     Dependent edema 09/15/2017     Priority: Medium     Chronic midline low back pain without sciatica 2016     Priority: Medium  "    Achondroplasia 06/15/2016     Priority: Medium     Pain medication agreement completed 3/19/18, 3/13/19, 20, 2016     Priority: Medium     Osteoarthritis of knee, unilateral 2012     Priority: Medium   ,   Past Surgical History:   Procedure Laterality Date     ADENOIDECTOMY      as a child      SECTION      1996,Primary low transverse  section. Repeat      LAPAROSCOPIC TUBAL LIGATION           OTHER SURGICAL HISTORY      [id number],OSTEOTOMY,Limb lengthening procedures.     OTHER SURGICAL HISTORY      10/16,,,10/17,[id number],OTHER,Right     OTHER SURGICAL HISTORY      2017,[id number],OTHER,Right,fluorscopically guided. no improvement.     right knee total arthroplasty Right 2018    Dr. Magaña, [facility]    and   Social History     Tobacco Use     Smoking status: Never Smoker     Smokeless tobacco: Never Used   Substance Use Topics     Alcohol use: No     Alcohol/week: 0.0 standard drinks     OBJECTIVE:  BP (!) 142/88   Pulse 101   Temp 96.9  F (36.1  C) (Temporal)   Resp 16   Ht 1.295 m (4' 3\")   Wt 94.3 kg (207 lb 12.8 oz)   LMP 2022   SpO2 98%   Breastfeeding No   BMI 56.17 kg/m     EXAM:  Alert cooperative, no distress.  Repeat blood pressure is improved.  Affect is broad ranging and appropriate.  ASSESSMENT/Plan :    Wilmer was seen today for recheck medication.    Diagnoses and all orders for this visit:    Chronic midline low back pain without sciatica  -     HYDROcodone-acetaminophen (NORCO) 5-325 MG tablet; Take 2 tablets by mouth 3 times daily as needed for severe pain May take a maximum of 7 tablets per day.  -     HYDROcodone-acetaminophen (NORCO) 5-325 MG tablet; Take 2 tablets by mouth 3 times daily as needed for pain . May take a maximum of 7 tablets per day.    Achondroplasia  -     HYDROcodone-acetaminophen (NORCO) 5-325 MG tablet; Take 2 tablets by mouth 3 times daily as needed for severe pain May take a maximum " of 7 tablets per day.  -     HYDROcodone-acetaminophen (NORCO) 5-325 MG tablet; Take 2 tablets by mouth 3 times daily as needed for pain . May take a maximum of 7 tablets per day.    Pain medication agreement completed 3/19/18, 3/13/19, 7/6/20, 6/22/21  -     HYDROcodone-acetaminophen (NORCO) 5-325 MG tablet; Take 2 tablets by mouth 3 times daily as needed for severe pain May take a maximum of 7 tablets per day.  -     HYDROcodone-acetaminophen (NORCO) 5-325 MG tablet; Take 2 tablets by mouth 3 times daily as needed for pain . May take a maximum of 7 tablets per day.    Depression with anxiety  -     sertraline (ZOLOFT) 100 MG tablet; Take 2 tablets (200 mg) by mouth daily    Restless leg syndrome    Pain medication agreement completed, 3/19/18, 3/13/19, 7/6/2020, 6/22/21  -     HYDROcodone-acetaminophen (NORCO) 5-325 MG tablet; Take 2 tablets by mouth 3 times daily as needed for severe pain May take a maximum of 7 tablets per day.  -     HYDROcodone-acetaminophen (NORCO) 5-325 MG tablet; Take 2 tablets by mouth 3 times daily as needed for pain . May take a maximum of 7 tablets per day.    Benign essential hypertension  -     lisinopril (ZESTRIL) 10 MG tablet; Take 1 tablet (10 mg) by mouth daily      Renewed her medications.  Plan to do ToxAssure at her next visit. Discussed diet, exercise and healthy lifestyle changes.     Neptali French MD      Answers for HPI/ROS submitted by the patient on 1/26/2022  If you checked off any problems, how difficult have these problems made it for you to do your work, take care of things at home, or get along with other people?: Somewhat difficult  PHQ9 TOTAL SCORE: 1  YESI 7 TOTAL SCORE: 2  How many servings of fruits and vegetables do you eat daily?: 2-3  On average, how many sweetened beverages do you drink each day (Examples: soda, juice, sweet tea, etc.  Do NOT count diet or artificially sweetened beverages)?: 1  How many minutes a day do you exercise enough to make your  heart beat faster?: 10 to 19  How many days a week do you exercise enough to make your heart beat faster?: 5  How many days per week do you miss taking your medication?: 0

## 2022-03-12 DIAGNOSIS — K21.00 GASTROESOPHAGEAL REFLUX DISEASE WITH ESOPHAGITIS WITHOUT HEMORRHAGE: ICD-10-CM

## 2022-03-12 DIAGNOSIS — R13.10 FOOD STICKS ON SWALLOWING: ICD-10-CM

## 2022-03-14 NOTE — TELEPHONE ENCOUNTER
" Disp Refills Start End WHITNEY   omeprazole (PRILOSEC) 20 MG DR capsule 30 capsule 3 12/1/2021  --   Sig - Route: Take 1 capsule (20 mg) by mouth daily - Oral       LOV: 1/31/2022  Future Office visit: No future appointment scheduled at this time.      Routing refill request to provider for review/approval.    Requested Prescriptions   Pending Prescriptions Disp Refills     omeprazole (PRILOSEC) 20 MG DR capsule [Pharmacy Med Name: OMEPRAZOLE 20MG CAPSULES] 30 capsule 3     Sig: TAKE 1 CAPSULE(20 MG) BY MOUTH DAILY       PPI Protocol Passed - 3/12/2022  8:06 AM        Passed - Not on Clopidogrel (unless Pantoprazole ordered)        Passed - No diagnosis of osteoporosis on record        Passed - Recent (12 mo) or future (30 days) visit within the authorizing provider's specialty     Patient has had an office visit with the authorizing provider or a provider within the authorizing providers department within the previous 12 mos or has a future within next 30 days. See \"Patient Info\" tab in inbasket, or \"Choose Columns\" in Meds & Orders section of the refill encounter.              Passed - Medication is active on med list        Passed - Patient is age 18 or older        Passed - No active pregnacy on record        Passed - No positive pregnancy test in past 12 months         Routed to provider for review and consideration. Sandra Holt RN  ....................  3/14/2022   4:24 PM      "

## 2022-03-19 ENCOUNTER — MYC MEDICAL ADVICE (OUTPATIENT)
Dept: FAMILY MEDICINE | Facility: OTHER | Age: 52
End: 2022-03-19
Payer: COMMERCIAL

## 2022-03-24 ENCOUNTER — VIRTUAL VISIT (OUTPATIENT)
Dept: FAMILY MEDICINE | Facility: OTHER | Age: 52
End: 2022-03-24
Attending: FAMILY MEDICINE
Payer: COMMERCIAL

## 2022-03-24 VITALS — BODY MASS INDEX: 53.79 KG/M2 | WEIGHT: 199 LBS

## 2022-03-24 DIAGNOSIS — Z63.5 MARITAL CONFLICT INVOLVING ESTRANGEMENT: ICD-10-CM

## 2022-03-24 DIAGNOSIS — G89.29 CHRONIC MIDLINE LOW BACK PAIN WITHOUT SCIATICA: Primary | ICD-10-CM

## 2022-03-24 DIAGNOSIS — I10 BENIGN ESSENTIAL HYPERTENSION: ICD-10-CM

## 2022-03-24 DIAGNOSIS — G25.81 RESTLESS LEG SYNDROME: ICD-10-CM

## 2022-03-24 DIAGNOSIS — Q77.4 ACHONDROPLASIA: ICD-10-CM

## 2022-03-24 DIAGNOSIS — Z02.89 PAIN MEDICATION AGREEMENT COMPLETED: ICD-10-CM

## 2022-03-24 DIAGNOSIS — F41.8 DEPRESSION WITH ANXIETY: ICD-10-CM

## 2022-03-24 DIAGNOSIS — M54.50 CHRONIC MIDLINE LOW BACK PAIN WITHOUT SCIATICA: Primary | ICD-10-CM

## 2022-03-24 PROCEDURE — 99213 OFFICE O/P EST LOW 20 MIN: CPT | Mod: 95 | Performed by: FAMILY MEDICINE

## 2022-03-24 RX ORDER — BUPROPION HYDROCHLORIDE 300 MG/1
300 TABLET ORAL EVERY MORNING
Qty: 90 TABLET | Refills: 11 | Status: SHIPPED | OUTPATIENT
Start: 2022-03-24 | End: 2023-04-25

## 2022-03-24 RX ORDER — PRAMIPEXOLE DIHYDROCHLORIDE 0.5 MG/1
0.5 TABLET ORAL AT BEDTIME
Qty: 90 TABLET | Refills: 11 | Status: SHIPPED | OUTPATIENT
Start: 2022-03-24 | End: 2022-05-12

## 2022-03-24 RX ORDER — HYDROCODONE BITARTRATE AND ACETAMINOPHEN 5; 325 MG/1; MG/1
2 TABLET ORAL 3 TIMES DAILY PRN
Qty: 200 TABLET | Refills: 0 | Status: SHIPPED | OUTPATIENT
Start: 2022-03-24 | End: 2022-05-19

## 2022-03-24 SDOH — SOCIAL STABILITY - SOCIAL INSECURITY: DISRUPTION OF FAMILY BY SEPARATION AND DIVORCE: Z63.5

## 2022-03-24 ASSESSMENT — PAIN SCALES - GENERAL: PAINLEVEL: SEVERE PAIN (6)

## 2022-03-24 ASSESSMENT — PATIENT HEALTH QUESTIONNAIRE - PHQ9: SUM OF ALL RESPONSES TO PHQ QUESTIONS 1-9: 5

## 2022-03-24 NOTE — PROGRESS NOTES
Wilmer is a 51 year old who is being evaluated via a billable telephone visit.      What phone number would you like to be contacted at? [phone]  How would you like to obtain your AVS? Josh Guillen is a 51 year old who presents for telephone visit for follow up and medication management. I last saw her in January.  Her medication agreement is up-to-date and she is up-to-date on her ToxAssure.  Her last fill of hydrocodone was on March 1.  She continues on gabapentin.  She takes clonazepam at bedtime.  She does not take them at the same time as her hydrocodone.  She has tolerated this without difficulty.     PDMP Review       Value Time User    State PDMP site checked  Yes 3/24/2022 12:02 PM Neptali French MD           HPI  She has been doing ok. They are not getting along again.  She has been in [place]. They didn't go to [place].  On March 4, she left and went to stay. It sounds like they are going to divorce.  She is looking for an apartment.      CHRONIC PAIN MANAGEMENT:    DIAGNOSIS:      1. Chronic midline low back pain without sciatica    2. Achondroplasia    3. Benign essential hypertension    4. Pain medication agreement completed 3/19/18, 3/13/19, 7/6/20, 6/22/21    5. Pain medication agreement completed, 3/19/18, 3/13/19, 7/6/2020, 6/22/21    6. Depression with anxiety    7. Restless leg syndrome    8. Marital conflict involving estrangement          PAIN CLINIC EVALUATION:(NO)      PAIN MEDICATION AGREEMENT: (YES)    DATE SIGNED: 4/28/16,3/22/17, 3/19/18, 3/13/19, 7/6/2020, 6/22/21      NON-MEDICATION MODALITIES MAXIMIZED? (YES)  Better after knee replacement surgery.      NEUROPATHIC PAIN: (NO)  ON GABAPENTIN/LYRICA/TCA/SNRI: (YES)       NOCICEPTIVE PAIN: (YES)      HISTORY OF CD: (NO)      MENTAL HEALTH DIAGNOSIS: (YES)  DIAGNOSIS: Anxiety and restless leg syndrome    ON BENZODIAZEPINES: (YES). Klonopin 0.5-1 mg at bedtime.    DISCLOSURE REGARDING RISK OF CONCOMITANT USE WITH  OPIOIDS DISCUSSED: (YES)  DATE DISCUSSED: 4/28/16,06/15/16, 9/6/16, 12/20/16, 3/22/17, 9/15/17, 3/13/19, 7/6/2020,6/22/21, 12/1/21      MEDICATION: hydrocodone/APAP 5/325 #200/month, max 7 per day      ORAL MORPHINE EQUIVALENTS: 30/d      LAST TAPER TRIAL: underway.       QUERY TODAY: (YES)  APPROPRIATE?:         (YES)      TOXASSURE TODAY: (NO)  IF NO, DATE OF LAST TOXASURE: 9/6/16, 3/22/17, 3/8/18, 9/19/18, 3/13/19, 8/13/19, 7/6/2020, 3/2/2021, 6/22/21      PAIN SCORE FLOWSHEET REVIEWED: (YES)  STABLE OR IMPROVED: (YES)        Review of Systems   ROS is negative except as noted above         Objective    Vitals - Patient Reported  Pain Score: Severe Pain (6)  Pain Loc: Other - see comment (both legs)        Physical Exam   healthy, alert and no distress  PSYCH: Alert and oriented times 3; coherent speech, normal   rate and volume, able to articulate logical thoughts, able   to abstract reason, no tangential thoughts, no hallucinations   or delusions  Her affect is normal  RESP: No cough, no audible wheezing, able to talk in full sentences  Remainder of exam unable to be completed due to telephone visits        Wilmer was seen today for recheck medication.    Diagnoses and all orders for this visit:    Chronic midline low back pain without sciatica  -     HYDROcodone-acetaminophen (NORCO) 5-325 MG tablet; Take 2 tablets by mouth 3 times daily as needed for severe pain May take a maximum of 7 tablets per day.  -     HYDROcodone-acetaminophen (NORCO) 5-325 MG tablet; Take 2 tablets by mouth 3 times daily as needed for pain . May take a maximum of 7 tablets per day.    Achondroplasia  -     HYDROcodone-acetaminophen (NORCO) 5-325 MG tablet; Take 2 tablets by mouth 3 times daily as needed for severe pain May take a maximum of 7 tablets per day.  -     HYDROcodone-acetaminophen (NORCO) 5-325 MG tablet; Take 2 tablets by mouth 3 times daily as needed for pain . May take a maximum of 7 tablets per day.    Benign  essential hypertension    Pain medication agreement completed 3/19/18, 3/13/19, 7/6/20, 6/22/21  -     HYDROcodone-acetaminophen (NORCO) 5-325 MG tablet; Take 2 tablets by mouth 3 times daily as needed for severe pain May take a maximum of 7 tablets per day.  -     HYDROcodone-acetaminophen (NORCO) 5-325 MG tablet; Take 2 tablets by mouth 3 times daily as needed for pain . May take a maximum of 7 tablets per day.    Pain medication agreement completed, 3/19/18, 3/13/19, 7/6/2020, 6/22/21  -     HYDROcodone-acetaminophen (NORCO) 5-325 MG tablet; Take 2 tablets by mouth 3 times daily as needed for severe pain May take a maximum of 7 tablets per day.  -     HYDROcodone-acetaminophen (NORCO) 5-325 MG tablet; Take 2 tablets by mouth 3 times daily as needed for pain . May take a maximum of 7 tablets per day.    Depression with anxiety  -     buPROPion (WELLBUTRIN XL) 300 MG 24 hr tablet; Take 1 tablet (300 mg) by mouth every morning    Restless leg syndrome  -     pramipexole (MIRAPEX) 0.5 MG tablet; Take 1 tablet (0.5 mg) by mouth At Bedtime    Marital conflict involving estrangement      Support and encouragement offered.  Meds renewed.        Phone call duration: 20 minutes    Neptali French MD

## 2022-03-24 NOTE — NURSING NOTE
"Chief Complaint   Patient presents with     Recheck Medication       Initial Wt 90.3 kg (199 lb)   LMP 03/06/2022   Breastfeeding No   BMI 53.79 kg/m   Estimated body mass index is 53.79 kg/m  as calculated from the following:    Height as of 1/31/22: 1.295 m (4' 3\").    Weight as of this encounter: 90.3 kg (199 lb).  Medication Reconciliation: complete    FOOD SECURITY SCREENING QUESTIONS  Hunger Vital Signs:  Within the past 12 months we worried whether our food would run out before we got money to buy more. Never  Within the past 12 months the food we bought just didn't last and we didn't have money to get more. Never        Advance care directive on file? no      Ruth Salazar LPN  "

## 2022-03-30 ENCOUNTER — TELEPHONE (OUTPATIENT)
Dept: FAMILY MEDICINE | Facility: OTHER | Age: 52
End: 2022-03-30
Payer: COMMERCIAL

## 2022-03-30 DIAGNOSIS — M54.50 CHRONIC MIDLINE LOW BACK PAIN WITHOUT SCIATICA: ICD-10-CM

## 2022-03-30 DIAGNOSIS — G89.29 CHRONIC MIDLINE LOW BACK PAIN WITHOUT SCIATICA: ICD-10-CM

## 2022-03-30 DIAGNOSIS — Z02.89 PAIN MEDICATION AGREEMENT COMPLETED: ICD-10-CM

## 2022-03-30 DIAGNOSIS — Q77.4 ACHONDROPLASIA: ICD-10-CM

## 2022-03-30 RX ORDER — HYDROCODONE BITARTRATE AND ACETAMINOPHEN 5; 325 MG/1; MG/1
2 TABLET ORAL 3 TIMES DAILY PRN
Qty: 200 TABLET | Refills: 0 | OUTPATIENT
Start: 2022-03-30

## 2022-03-30 NOTE — TELEPHONE ENCOUNTER
Disregard prescription transfer request.  Patient no longer wants it transferred.  Carmen Gilbert PA-C.......... 3/30/2022 12:53 PM

## 2022-03-30 NOTE — TELEPHONE ENCOUNTER
"Refer to note below. Contacted patient who states, \" I need my Norco prescription sent to [facility] in [place], [place] on [address]. I  thought I would be back in [place], [place] but I am stuck in [place], [place] . Patient is aware PCP is out of clinic this week and is asking covering Provider consideration to refill. Unable to complete prescription refill per RNMedication Refill Policy.................... Angy Velásquez RN ....................  3/30/2022   10:07 AM            03/24/22 1215 Neptali Mccarty MD Reorder from Order:[id number]      Disp Refills Start End WHITNEY   HYDROcodone-acetaminophen (NORCO) 5-325 MG tablet 200 tablet 0 3/24/2022  No   Sig - Route: Take 2 tablets by mouth 3 times daily as needed for pain . May take a maximum of 7 tablets per day. - Oral   Sent to pharmacy as: HYDROcodone-Acetaminophen 5-325 MG Oral Tablet (NORCO)   Class: E-Prescribe   Earliest Fill Date: 3/24/2022   Notes to Pharmacy: Fill on March 29, 2022   Order: [id number]   E-Prescribing Status: Receipt confirmed by pharmacy (3/24/2022 12:15 PM CDT)       "

## 2022-03-30 NOTE — TELEPHONE ENCOUNTER
Patient needs a RX for her Lortab to [facility], pt is out and out of town due to weather.  There is an RX in [place], but they will not fill it, they state she needs a new order, they will not transfer RX.       Daphne Foley on 3/30/2022 at 9:52 AM

## 2022-04-08 DIAGNOSIS — I10 BENIGN ESSENTIAL HYPERTENSION: ICD-10-CM

## 2022-04-08 RX ORDER — LISINOPRIL 10 MG/1
TABLET ORAL
Qty: 90 TABLET | Refills: 11 | OUTPATIENT
Start: 2022-04-08

## 2022-04-08 NOTE — TELEPHONE ENCOUNTER
[facility] sent Rx request for the following:      LISINOPRIL 10MG TABLETS      Last Prescription Date:   1/31/2022  Last Fill Qty/Refills:         90, R-11        Redundant refill request refused: Too soon:    Golden Montero RN, BSN  ....................  4/8/2022   2:52 PM

## 2022-04-09 DIAGNOSIS — F41.8 DEPRESSION WITH ANXIETY: ICD-10-CM

## 2022-04-11 RX ORDER — SERTRALINE HYDROCHLORIDE 100 MG/1
TABLET, FILM COATED ORAL
Qty: 180 TABLET | Refills: 11 | OUTPATIENT
Start: 2022-04-11

## 2022-04-11 NOTE — TELEPHONE ENCOUNTER
Disp Refills Start End WHITNEY   sertraline (ZOLOFT) 100 MG tablet 180 tablet 11 1/31/2022  No   Sig - Route: Take 2 tablets (200 mg) by mouth daily - Oral       LOV: 3/24/2022  Future Office visit:    Next 5 appointments (look out 90 days)    May 19, 2022  1:00 PM  [facility] with Neptali GIRON MD  [facility] [address]   [place] [facility] [id number] [phone]          Denied request. Pharmacy should have refill on hand. Sandra Holt RN  ....................  4/11/2022   10:30 AM

## 2022-05-11 ENCOUNTER — MYC MEDICAL ADVICE (OUTPATIENT)
Dept: FAMILY MEDICINE | Facility: OTHER | Age: 52
End: 2022-05-11
Payer: COMMERCIAL

## 2022-05-11 DIAGNOSIS — G25.81 RESTLESS LEG SYNDROME: ICD-10-CM

## 2022-05-12 RX ORDER — PRAMIPEXOLE DIHYDROCHLORIDE 0.5 MG/1
0.75 TABLET ORAL AT BEDTIME
Qty: 145 TABLET | Refills: 11 | Status: SHIPPED | OUTPATIENT
Start: 2022-05-12 | End: 2023-07-11

## 2022-05-12 NOTE — TELEPHONE ENCOUNTER
The patient is moving to [facility] and has done a lot of packing and has been very busy. Her legs have been really restless because she has been doing a lot. She took 1.5 of her Mirapex and it helped a lot. She now has ran out of it. Is there a way you could send in an Rx for her to take 1.5 of them?  Ruth Salazar LPN..................5/12/2022   9:36 AM

## 2022-05-19 ENCOUNTER — OFFICE VISIT (OUTPATIENT)
Dept: FAMILY MEDICINE | Facility: OTHER | Age: 52
End: 2022-05-19
Attending: FAMILY MEDICINE
Payer: COMMERCIAL

## 2022-05-19 VITALS
SYSTOLIC BLOOD PRESSURE: 150 MMHG | WEIGHT: 196 LBS | HEIGHT: 55 IN | OXYGEN SATURATION: 98 % | TEMPERATURE: 98.5 F | RESPIRATION RATE: 16 BRPM | BODY MASS INDEX: 45.36 KG/M2 | HEART RATE: 95 BPM | DIASTOLIC BLOOD PRESSURE: 100 MMHG

## 2022-05-19 DIAGNOSIS — F41.1 GENERALIZED ANXIETY DISORDER: ICD-10-CM

## 2022-05-19 DIAGNOSIS — Z13.228 SCREENING FOR METABOLIC DISORDER: ICD-10-CM

## 2022-05-19 DIAGNOSIS — K21.00 GASTROESOPHAGEAL REFLUX DISEASE WITH ESOPHAGITIS WITHOUT HEMORRHAGE: ICD-10-CM

## 2022-05-19 DIAGNOSIS — M54.50 CHRONIC MIDLINE LOW BACK PAIN WITHOUT SCIATICA: ICD-10-CM

## 2022-05-19 DIAGNOSIS — E78.5 HYPERLIPIDEMIA, UNSPECIFIED HYPERLIPIDEMIA TYPE: ICD-10-CM

## 2022-05-19 DIAGNOSIS — Z63.5 MARITAL CONFLICT INVOLVING ESTRANGEMENT: ICD-10-CM

## 2022-05-19 DIAGNOSIS — G89.29 CHRONIC MIDLINE LOW BACK PAIN WITHOUT SCIATICA: ICD-10-CM

## 2022-05-19 DIAGNOSIS — E61.1 IRON DEFICIENCY: ICD-10-CM

## 2022-05-19 DIAGNOSIS — Z00.00 VISIT FOR PREVENTIVE HEALTH EXAMINATION: Primary | ICD-10-CM

## 2022-05-19 DIAGNOSIS — Z13.1 SCREENING FOR DIABETES MELLITUS: ICD-10-CM

## 2022-05-19 DIAGNOSIS — Z13.29 SCREENING FOR HYPOTHYROIDISM: ICD-10-CM

## 2022-05-19 DIAGNOSIS — Z02.89 PAIN MEDICATION AGREEMENT COMPLETED: ICD-10-CM

## 2022-05-19 DIAGNOSIS — G25.81 RESTLESS LEG SYNDROME: ICD-10-CM

## 2022-05-19 DIAGNOSIS — F41.8 DEPRESSION WITH ANXIETY: ICD-10-CM

## 2022-05-19 DIAGNOSIS — I10 BENIGN ESSENTIAL HYPERTENSION: ICD-10-CM

## 2022-05-19 DIAGNOSIS — Q77.4 ACHONDROPLASIA: ICD-10-CM

## 2022-05-19 LAB
ALBUMIN SERPL-MCNC: 4.4 G/DL (ref 3.5–5.7)
ALP SERPL-CCNC: 67 U/L (ref 34–104)
ALT SERPL W P-5'-P-CCNC: 12 U/L (ref 7–52)
ANION GAP SERPL CALCULATED.3IONS-SCNC: 10 MMOL/L (ref 3–14)
AST SERPL W P-5'-P-CCNC: 14 U/L (ref 13–39)
BASOPHILS # BLD AUTO: 0 10E3/UL (ref 0–0.2)
BASOPHILS NFR BLD AUTO: 0 %
BILIRUB SERPL-MCNC: 0.5 MG/DL (ref 0.3–1)
BUN SERPL-MCNC: 12 MG/DL (ref 7–25)
CALCIUM SERPL-MCNC: 9.6 MG/DL (ref 8.6–10.3)
CHLORIDE BLD-SCNC: 103 MMOL/L (ref 98–107)
CHOLEST SERPL-MCNC: 254 MG/DL
CO2 SERPL-SCNC: 22 MMOL/L (ref 21–31)
CREAT SERPL-MCNC: 0.58 MG/DL (ref 0.6–1.2)
CREAT UR-MCNC: 86 MG/DL
EOSINOPHIL # BLD AUTO: 0.1 10E3/UL (ref 0–0.7)
EOSINOPHIL NFR BLD AUTO: 1 %
ERYTHROCYTE [DISTWIDTH] IN BLOOD BY AUTOMATED COUNT: 13.8 % (ref 10–15)
FASTING STATUS PATIENT QL REPORTED: NO
GFR SERPL CREATININE-BSD FRML MDRD: >90 ML/MIN/1.73M2
GLUCOSE BLD-MCNC: 93 MG/DL (ref 70–105)
HBA1C MFR BLD: 5.4 % (ref 4–6.2)
HCT VFR BLD AUTO: 43.3 % (ref 35–47)
HDLC SERPL-MCNC: 57 MG/DL (ref 23–92)
HGB BLD-MCNC: 14.2 G/DL (ref 11.7–15.7)
IMM GRANULOCYTES # BLD: 0 10E3/UL
IMM GRANULOCYTES NFR BLD: 0 %
IRON SATN MFR SERPL: 8 % (ref 20–55)
IRON SERPL-MCNC: 38 UG/DL (ref 50–212)
LDLC SERPL CALC-MCNC: 159 MG/DL
LYMPHOCYTES # BLD AUTO: 1.9 10E3/UL (ref 0.8–5.3)
LYMPHOCYTES NFR BLD AUTO: 22 %
MCH RBC QN AUTO: 27.8 PG (ref 26.5–33)
MCHC RBC AUTO-ENTMCNC: 32.8 G/DL (ref 31.5–36.5)
MCV RBC AUTO: 85 FL (ref 78–100)
MONOCYTES # BLD AUTO: 0.6 10E3/UL (ref 0–1.3)
MONOCYTES NFR BLD AUTO: 6 %
NEUTROPHILS # BLD AUTO: 6.1 10E3/UL (ref 1.6–8.3)
NEUTROPHILS NFR BLD AUTO: 71 %
NONHDLC SERPL-MCNC: 197 MG/DL
NRBC # BLD AUTO: 0 10E3/UL
NRBC BLD AUTO-RTO: 0 /100
PLATELET # BLD AUTO: 327 10E3/UL (ref 150–450)
POTASSIUM BLD-SCNC: 4.1 MMOL/L (ref 3.5–5.1)
PROT SERPL-MCNC: 8.2 G/DL (ref 6.4–8.9)
RBC # BLD AUTO: 5.1 10E6/UL (ref 3.8–5.2)
SODIUM SERPL-SCNC: 135 MMOL/L (ref 134–144)
TIBC SERPL-MCNC: 492.8 UG/DL (ref 245–400)
TRANSFERRIN SERPL-MCNC: 352 MG/DL (ref 203–362)
TRIGL SERPL-MCNC: 192 MG/DL
TSH SERPL DL<=0.005 MIU/L-ACNC: 1.27 MU/L (ref 0.4–4)
UIBC (UNSATURATED): 454.8 MG/DL
WBC # BLD AUTO: 8.7 10E3/UL (ref 4–11)

## 2022-05-19 PROCEDURE — 83036 HEMOGLOBIN GLYCOSYLATED A1C: CPT | Mod: ZL | Performed by: FAMILY MEDICINE

## 2022-05-19 PROCEDURE — 83550 IRON BINDING TEST: CPT | Mod: ZL | Performed by: FAMILY MEDICINE

## 2022-05-19 PROCEDURE — 99396 PREV VISIT EST AGE 40-64: CPT | Performed by: FAMILY MEDICINE

## 2022-05-19 PROCEDURE — 36415 COLL VENOUS BLD VENIPUNCTURE: CPT | Mod: ZL | Performed by: FAMILY MEDICINE

## 2022-05-19 PROCEDURE — 80061 LIPID PANEL: CPT | Mod: ZL | Performed by: FAMILY MEDICINE

## 2022-05-19 PROCEDURE — 84443 ASSAY THYROID STIM HORMONE: CPT | Mod: ZL | Performed by: FAMILY MEDICINE

## 2022-05-19 PROCEDURE — 80053 COMPREHEN METABOLIC PANEL: CPT | Mod: ZL | Performed by: FAMILY MEDICINE

## 2022-05-19 PROCEDURE — 80307 DRUG TEST PRSMV CHEM ANLYZR: CPT | Mod: ZL | Performed by: FAMILY MEDICINE

## 2022-05-19 PROCEDURE — 85025 COMPLETE CBC W/AUTO DIFF WBC: CPT | Mod: ZL | Performed by: FAMILY MEDICINE

## 2022-05-19 RX ORDER — CLONAZEPAM 0.5 MG/1
TABLET ORAL
Qty: 90 TABLET | Refills: 5 | Status: SHIPPED | OUTPATIENT
Start: 2022-05-19 | End: 2022-06-23

## 2022-05-19 RX ORDER — HYDROCODONE BITARTRATE AND ACETAMINOPHEN 5; 325 MG/1; MG/1
2 TABLET ORAL 3 TIMES DAILY PRN
Qty: 200 TABLET | Refills: 0 | Status: SHIPPED | OUTPATIENT
Start: 2022-05-19 | End: 2022-06-20

## 2022-05-19 RX ORDER — HYDROCODONE BITARTRATE AND ACETAMINOPHEN 5; 325 MG/1; MG/1
2 TABLET ORAL 3 TIMES DAILY PRN
Qty: 200 TABLET | Refills: 0 | Status: SHIPPED | OUTPATIENT
Start: 2022-05-19 | End: 2022-07-13

## 2022-05-19 SDOH — SOCIAL STABILITY - SOCIAL INSECURITY: DISRUPTION OF FAMILY BY SEPARATION AND DIVORCE: Z63.5

## 2022-05-19 ASSESSMENT — ANXIETY QUESTIONNAIRES
2. NOT BEING ABLE TO STOP OR CONTROL WORRYING: SEVERAL DAYS
8. IF YOU CHECKED OFF ANY PROBLEMS, HOW DIFFICULT HAVE THESE MADE IT FOR YOU TO DO YOUR WORK, TAKE CARE OF THINGS AT HOME, OR GET ALONG WITH OTHER PEOPLE?: NOT DIFFICULT AT ALL
6. BECOMING EASILY ANNOYED OR IRRITABLE: NOT AT ALL
GAD7 TOTAL SCORE: 2
4. TROUBLE RELAXING: NOT AT ALL
5. BEING SO RESTLESS THAT IT IS HARD TO SIT STILL: NOT AT ALL
3. WORRYING TOO MUCH ABOUT DIFFERENT THINGS: NOT AT ALL
1. FEELING NERVOUS, ANXIOUS, OR ON EDGE: SEVERAL DAYS
7. FEELING AFRAID AS IF SOMETHING AWFUL MIGHT HAPPEN: NOT AT ALL
7. FEELING AFRAID AS IF SOMETHING AWFUL MIGHT HAPPEN: NOT AT ALL

## 2022-05-19 ASSESSMENT — PATIENT HEALTH QUESTIONNAIRE - PHQ9
SUM OF ALL RESPONSES TO PHQ QUESTIONS 1-9: 0
10. IF YOU CHECKED OFF ANY PROBLEMS, HOW DIFFICULT HAVE THESE PROBLEMS MADE IT FOR YOU TO DO YOUR WORK, TAKE CARE OF THINGS AT HOME, OR GET ALONG WITH OTHER PEOPLE: NOT DIFFICULT AT ALL
SUM OF ALL RESPONSES TO PHQ QUESTIONS 1-9: 0

## 2022-05-19 ASSESSMENT — PAIN SCALES - GENERAL: PAINLEVEL: MODERATE PAIN (4)

## 2022-05-19 NOTE — NURSING NOTE
"Chief Complaint   Patient presents with     Physical       Initial BP (!) 160/108   Pulse 95   Temp 98.5  F (36.9  C) (Temporal)   Resp 16   Ht 1.295 m (4' 3\")   Wt 88.9 kg (196 lb)   LMP 05/19/2022   SpO2 98%   Breastfeeding No   BMI 52.98 kg/m   Estimated body mass index is 52.98 kg/m  as calculated from the following:    Height as of this encounter: 1.295 m (4' 3\").    Weight as of this encounter: 88.9 kg (196 lb).  Medication Reconciliation: complete    FOOD SECURITY SCREENING QUESTIONS  Hunger Vital Signs:  Within the past 12 months we worried whether our food would run out before we got money to buy more. Never  Within the past 12 months the food we bought just didn't last and we didn't have money to get more. Never        Advance care directive on file? no  Advance care directive provided to patient? declined     Ruth Salazar LPN  "

## 2022-05-19 NOTE — PROGRESS NOTES
SUBJECTIVE:   CC: Wilmer Morse is an 51 year old woman who presents for preventive health visit.     Patient comes in for a physical today and follow-up of her medical problems.    She has hypertension for which she takes lisinopril 10 mg daily.  She has reflux for which she uses omeprazole 20 mg daily.  She has Ditropan XL 5 mg twice daily for overactive bladder.  She is on gabapentin for neuritic pain from her back.  She is on Wellbutrin 300 mg daily and uses clonazepam at bedtime for anxiety and restless legs in addition to pramipexole.  She is also on sertraline.    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  Her last refill was on April 27.   PDMP Review       Value Time User    State PDMP site checked  Yes 5/19/2022  1:31 PM Neptali French MD         She is moving to [place] on June 12.  Her cousin lives in [place], [place]. She will be in an apartment.  She is excited to move on. The divorce process is underway.     Patient has been advised of split billing requirements and indicates understanding: Yes  Healthy Habits:     Getting at least 3 servings of Calcium per day:  Yes    Bi-annual eye exam:  Yes    Dental care twice a year:  NO    Sleep apnea or symptoms of sleep apnea:  None    Diet:  Regular (no restrictions)    Frequency of exercise:  4-5 days/week    Duration of exercise:  Greater than 60 minutes    Taking medications regularly:  Yes    Medication side effects:  None    PHQ-2 Total Score: 0    Additional concerns today:  No        Today's PHQ-2 Score:   PHQ-2 ( 1999 Pfizer) 5/19/2022   Q1: Little interest or pleasure in doing things 0   Q2: Feeling down, depressed or hopeless 0   PHQ-2 Score 0   PHQ-2 Total Score (12-17 Years)- Positive if 3 or more points; Administer PHQ-A if positive -   Q1: Little interest or pleasure in doing things Not at all   Q2: Feeling down, depressed or hopeless Not at all   PHQ-2  Score 0       Abuse: Current or Past (Physical, Sexual or Emotional) - No  Do you feel safe in your environment? Yes    Have you ever done Advance Care Planning? (For example, a Health Directive, POLST, or a discussion with a medical provider or your loved ones about your wishes): No, advance care planning information given to patient to review.  Patient declined advance care planning discussion at this time.    Social History     Tobacco Use     Smoking status: Never Smoker     Smokeless tobacco: Never Used   Substance Use Topics     Alcohol use: No     Alcohol/week: 0.0 standard drinks     If you drink alcohol do you typically have >3 drinks per day or >7 drinks per week? No    Alcohol Use 5/19/2022   Prescreen: >3 drinks/day or >7 drinks/week? No   Prescreen: >3 drinks/day or >7 drinks/week? -       Reviewed orders with patient.  Reviewed health maintenance and updated orders accordingly - Yes      Breast Cancer Screening:  Any new diagnosis of family breast, ovarian, or bowel cancer? No    FHS-7:   Breast CA Risk Assessment (FHS-7) 7/14/2021   Did any of your first-degree relatives have breast or ovarian cancer? Yes   Did any of your relatives have bilateral breast cancer? No   Did any man in your family have breast cancer? No   Did any woman in your family have breast and ovarian cancer? No   Did any woman in your family have breast cancer before age 50 y? No   Do you have 2 or more relatives with breast and/or ovarian cancer? Yes         Pertinent mammograms are reviewed under the imaging tab.    History of abnormal Pap smear: NO - age 30-65 PAP every 5 years with negative HPV co-testing recommended  PAP / HPV Latest Ref Rng & Units 3/13/2019   PAP (Historical) - NIL   HPV16 NEG:Negative Negative   HPV18 NEG:Negative Negative   HRHPV NEG:Negative Negative     Reviewed and updated as needed this visit by clinical staff   Tobacco  Allergies  Meds   Med Hx  Surg Hx  Fam Hx  Soc Hx          Reviewed and  updated as needed this visit by Provider                   Past Medical History:   Diagnosis Date     Achondroplasia     No Comments Provided     Family history of malignant neoplasm of breast     No Comments Provided     Hyperlipidemia     not currently on treatment     Other intervertebral disc degeneration, lumbar region     No Comments Provided     Other specified anxiety disorders     No Comments Provided     Overactive bladder     2014     Personal history of other medical treatment (CODE)      3, Para 2-0-1-2.     Primary osteoarthritis of one knee     5/3/2012     Restless legs syndrome     No Comments Provided     Zoster without complications     2006,left side of neck.      Past Surgical History:   Procedure Laterality Date     ADENOIDECTOMY      as a child      SECTION      1996,Primary low transverse  section. Repeat      LAPAROSCOPIC TUBAL LIGATION           OTHER SURGICAL HISTORY      [id number],OSTEOTOMY,Limb lengthening procedures.     OTHER SURGICAL HISTORY      10/16,,,10/17,448025,OTHER,Right     OTHER SURGICAL HISTORY      2017,131161,OTHER,Right,fluorscopically guided. no improvement.     right knee total arthroplasty Right 2018    Dr. Magaña, [facility]'s       Review of Systems  CONSTITUTIONAL: NEGATIVE for fever, chills, change in weight  INTEGUMENTARU/SKIN: NEGATIVE for worrisome rashes, moles or lesions  EYES: NEGATIVE for vision changes or irritation  ENT: NEGATIVE for ear, mouth and throat problems  RESP: NEGATIVE for significant cough or SOB  BREAST: NEGATIVE for masses, tenderness or discharge  CV: NEGATIVE for chest pain, palpitations or peripheral edema  GI: NEGATIVE for nausea, abdominal pain, heartburn, or change in bowel habits  : NEGATIVE for unusual urinary or vaginal symptoms. Periods are regular.  MUSCULOSKELETAL: see HPI  NEURO: NEGATIVE for weakness, dizziness or paresthesias  PSYCHIATRIC: NEGATIVE for changes  "in mood or affect     OBJECTIVE:   BP (!) 150/100   Pulse 95   Temp 98.5  F (36.9  C) (Temporal)   Resp 16   Ht 1.295 m (4' 3\")   Wt 88.9 kg (196 lb)   LMP 05/19/2022   SpO2 98%   Breastfeeding No   BMI 52.98 kg/m    Physical Exam  General Appearance: Pleasant, alert, appropriate appearance for age. No acute distress.  Appears to be her normal self.  Head Exam: Normal. Normocephalic, atraumatic.  Eye Exam: PERRLA, EOMI, conjunctivae, sclerae normal.  Ear Exam: Normal TM's bilaterally. Normal auditory canals and external ears. Non-tender.  Nose Exam: Normal external nose, mucus membranes, and septum.  OroPharynx Exam:  Dental hygiene adequate. Normal buccal mucosa. Normal pharynx.  Neck Exam:  Supple, no masses or nodes. No bruits  Thyroid Exam: No nodules or enlargement.  Chest/Respiratory Exam: Normal chest wall and respirations. Clear to auscultation.  Breast Exam: No dimpling, nipple retraction or discharge. No masses or nodes.  Cardiovascular Exam: Regular rate and rhythm. S1, S2, no murmur, click, gallop, or rubs.  Gastrointestinal Exam: Soft, non-tender, no masses or organomegaly.  Lymphatic Exam: Non-palpable nodes in neck,clavicular, axillary, or inguinal regions.  Musculoskeletal Exam: Back is straight and non-tender, full ROM of upper and lower extremities.  Well-healed right total knee arthroplasty scar.  Foot Exam: Left and right foot: good pedal pulses, no lesions, nail hygiene good.   Skin: no rash or abnormalities  Neurologic Exam:  normal gross motor, tone coordination and no tremor.  Psychiatric Exam: Alert and oriented - appropriate affect.     Results for orders placed or performed in visit on 05/19/22   Comprehensive metabolic panel     Status: Abnormal   Result Value Ref Range    Sodium 135 134 - 144 mmol/L    Potassium 4.1 3.5 - 5.1 mmol/L    Chloride 103 98 - 107 mmol/L    Carbon Dioxide (CO2) 22 21 - 31 mmol/L    Anion Gap 10 3 - 14 mmol/L    Urea Nitrogen 12 7 - 25 mg/dL    " Creatinine 0.58 (L) 0.60 - 1.20 mg/dL    Calcium 9.6 8.6 - 10.3 mg/dL    Glucose 93 70 - 105 mg/dL    Alkaline Phosphatase 67 34 - 104 U/L    AST 14 13 - 39 U/L    ALT 12 7 - 52 U/L    Protein Total 8.2 6.4 - 8.9 g/dL    Albumin 4.4 3.5 - 5.7 g/dL    Bilirubin Total 0.5 0.3 - 1.0 mg/dL    GFR Estimate >90 >60 mL/min/1.73m2   Lipid Profile     Status: Abnormal   Result Value Ref Range    Cholesterol 254 (H) <200 mg/dL    Triglycerides 192 (H) <150 mg/dL    Direct Measure HDL 57 23 - 92 mg/dL    LDL Cholesterol Calculated 159 (H) <=100 mg/dL    Non HDL Cholesterol 197 (H) <130 mg/dL    Patient Fasting > 8hrs? No     Narrative    Cholesterol  Desirable:  <200 mg/dL    Triglycerides  Normal:  Less than 150 mg/dL  Borderline High:  150-199 mg/dL  High:  200-499 mg/dL  Very High:  Greater than or equal to 500 mg/dL    Direct Measure HDL  Female:  Greater than or equal to 50 mg/dL   Male:  Greater than or equal to 40 mg/dL    LDL Cholesterol  Desirable:  <100mg/dL  Above Desirable:  100-129 mg/dL   Borderline High:  130-159 mg/dL   High:  160-189 mg/dL   Very High:  >= 190 mg/dL    Non HDL Cholesterol  Desirable:  130 mg/dL  Above Desirable:  130-159 mg/dL  Borderline High:  160-189 mg/dL  High:  190-219 mg/dL  Very High:  Greater than or equal to 220 mg/dL   Iron binding panel     Status: Abnormal   Result Value Ref Range    Transferrin 352 203 - 362 mg/dL    Iron 38 (L) 50 - 212 ug/dL    UIBC (Unsaturated) 454.80 mg/dL    Iron Binding Capacity 492.80 (H) 245.00 - 400.00 ug/dL    Iron Saturation 8 (L) 20 - 55 %   TSH Reflex GH     Status: Normal   Result Value Ref Range    TSH 1.27 0.40 - 4.00 mU/L   Hemoglobin A1c     Status: Normal   Result Value Ref Range    Hemoglobin A1C 5.4 4.0 - 6.2 %   CBC with platelets and differential     Status: None   Result Value Ref Range    WBC Count 8.7 4.0 - 11.0 10e3/uL    RBC Count 5.10 3.80 - 5.20 10e6/uL    Hemoglobin 14.2 11.7 - 15.7 g/dL    Hematocrit 43.3 35.0 - 47.0 %    MCV 85  78 - 100 fL    MCH 27.8 26.5 - 33.0 pg    MCHC 32.8 31.5 - 36.5 g/dL    RDW 13.8 10.0 - 15.0 %    Platelet Count 327 150 - 450 10e3/uL    % Neutrophils 71 %    % Lymphocytes 22 %    % Monocytes 6 %    % Eosinophils 1 %    % Basophils 0 %    % Immature Granulocytes 0 %    NRBCs per 100 WBC 0 <1 /100    Absolute Neutrophils 6.1 1.6 - 8.3 10e3/uL    Absolute Lymphocytes 1.9 0.8 - 5.3 10e3/uL    Absolute Monocytes 0.6 0.0 - 1.3 10e3/uL    Absolute Eosinophils 0.1 0.0 - 0.7 10e3/uL    Absolute Basophils 0.0 0.0 - 0.2 10e3/uL    Absolute Immature Granulocytes 0.0 <=0.4 10e3/uL    Absolute NRBCs 0.0 10e3/uL   Urine Creatinine for Drug Screen Panel     Status: None   Result Value Ref Range    Creatinine Urine for Drug Screen 86 mg/dL   CBC with Platelets & Differential     Status: None    Narrative    The following orders were created for panel order CBC with Platelets & Differential.  Procedure                               Abnormality         Status                     ---------                               -----------         ------                     CBC with platelets and d...[[id number]]                      Final result                 Please view results for these tests on the individual orders.   Drug Confirmation Panel Urine with Creat     Status: None (In process)    Narrative    The following orders were created for panel order Drug Confirmation Panel Urine with Creat.  Procedure                               Abnormality         Status                     ---------                               -----------         ------                     Urine Drug Confirmation ...[[id number]]                      In process                 Urine Creatinine for [place]...[[id number]]                      Final result                 Please view results for these tests on the individual orders.        ASSESSMENT/PLAN:   Wilmer was seen today for physical.    Diagnoses and all orders for this visit:    Visit for preventive health  examination    Restless leg syndrome    Depression with anxiety  -     clonazePAM (KLONOPIN) 0.5 MG tablet; TAKE 1 TABLET BY MOUTH THREE TIMES DAILY AS NEEDED    Benign essential hypertension    Chronic midline low back pain without sciatica  -     HYDROcodone-acetaminophen (NORCO) 5-325 MG tablet; Take 2 tablets by mouth 3 times daily as needed for severe pain May take a maximum of 7 tablets per day.  -     HYDROcodone-acetaminophen (NORCO) 5-325 MG tablet; Take 2 tablets by mouth 3 times daily as needed for pain . May take a maximum of 7 tablets per day.  -     Drug Confirmation Panel Urine with Creat; Future  -     Drug Confirmation Panel Urine with Creat    Achondroplasia  -     HYDROcodone-acetaminophen (NORCO) 5-325 MG tablet; Take 2 tablets by mouth 3 times daily as needed for severe pain May take a maximum of 7 tablets per day.  -     HYDROcodone-acetaminophen (NORCO) 5-325 MG tablet; Take 2 tablets by mouth 3 times daily as needed for pain . May take a maximum of 7 tablets per day.    Pain medication agreement completed  -     HYDROcodone-acetaminophen (NORCO) 5-325 MG tablet; Take 2 tablets by mouth 3 times daily as needed for severe pain May take a maximum of 7 tablets per day.  -     HYDROcodone-acetaminophen (NORCO) 5-325 MG tablet; Take 2 tablets by mouth 3 times daily as needed for pain . May take a maximum of 7 tablets per day.  -     Drug Confirmation Panel Urine with Creat; Future  -     Drug Confirmation Panel Urine with Creat    Marital conflict involving estrangement    Gastroesophageal reflux disease with esophagitis without hemorrhage    Iron deficiency  -     CBC with Platelets & Differential; Future  -     Iron binding panel; Future  -     CBC with Platelets & Differential  -     Iron binding panel    Hyperlipidemia, unspecified hyperlipidemia type  -     Comprehensive metabolic panel; Future  -     Lipid Profile; Future  -     Comprehensive metabolic panel  -     Lipid  "Profile    Generalized anxiety disorder    Screening for metabolic disorder    Screening for diabetes mellitus  -     Hemoglobin A1c; Future  -     Hemoglobin A1c    Screening for hypothyroidism  -     TSH Reflex GH; Future  -     TSH Reflex GH    Pain medication agreement completed, 3/19/18, 3/13/19, 7/6/2020, 6/22/21  -     HYDROcodone-acetaminophen (NORCO) 5-325 MG tablet; Take 2 tablets by mouth 3 times daily as needed for severe pain May take a maximum of 7 tablets per day.  -     HYDROcodone-acetaminophen (NORCO) 5-325 MG tablet; Take 2 tablets by mouth 3 times daily as needed for pain . May take a maximum of 7 tablets per day.  -     Drug Confirmation Panel Urine with Creat; Future  -     Drug Confirmation Panel Urine with Creat      Will notify of lab results when available. Discussed diet, exercise and healthy lifestyle changes. Continue current medications.  Support and encouragement offered.  Discussed colon cancer screening.  Renewed medications pursuant to her contract.  ToxAssure today.  Follow-up in 2 months, sooner if needed okay to do virtual visit next time.    Patient has been advised of split billing requirements and indicates understanding: Yes    COUNSELING:  Reviewed preventive health counseling, as reflected in patient instructions       Regular exercise       Healthy diet/nutrition       Colorectal Cancer Screening    Estimated body mass index is 52.98 kg/m  as calculated from the following:    Height as of this encounter: 1.295 m (4' 3\").    Weight as of this encounter: 88.9 kg (196 lb).    Weight management plan: Discussed healthy diet and exercise guidelines    She reports that she has never smoked. She has never used smokeless tobacco.      Counseling Resources:  ATP IV Guidelines  Pooled Cohorts Equation Calculator  Breast Cancer Risk Calculator  BRCA-Related Cancer Risk Assessment: FHS-7 Tool  FRAX Risk Assessment  ICSI Preventive Guidelines  Dietary Guidelines for Americans, " 2010  USDA's MyPlate  ASA Prophylaxis  Lung CA Screening    Neptali French MD  [facility]  Answers for HPI/ROS submitted by the patient on 5/19/2022  If you checked off any problems, how difficult have these problems made it for you to do your work, take care of things at home, or get along with other people?: Not difficult at all  PHQ9 TOTAL SCORE: 0  YESI 7 TOTAL SCORE: 2

## 2022-05-24 LAB
7AMINOCLONAZEPAM UR QL CFM: PRESENT
DHC UR CFM-MCNC: 1200 NG/ML
DHC/CREAT UR: 1395 NG/MG {CREAT}
HYDROCODONE UR CFM-MCNC: 2840 NG/ML
HYDROCODONE/CREAT UR: 3302 NG/MG {CREAT}
HYDROMORPHONE UR CFM-MCNC: 76 NG/ML
HYDROMORPHONE/CREAT UR: 88 NG/MG {CREAT}

## 2022-05-25 ENCOUNTER — MYC MEDICAL ADVICE (OUTPATIENT)
Dept: FAMILY MEDICINE | Facility: OTHER | Age: 52
End: 2022-05-25
Payer: COMMERCIAL

## 2022-05-25 DIAGNOSIS — E61.1 IRON DEFICIENCY: ICD-10-CM

## 2022-05-27 RX ORDER — FERROUS SULFATE 325(65) MG
325 TABLET ORAL 2 TIMES DAILY
Qty: 180 TABLET | Refills: 3 | Status: SHIPPED | OUTPATIENT
Start: 2022-05-27

## 2022-05-27 NOTE — TELEPHONE ENCOUNTER
"Per 5/19/2022 lab results: \"Your iron is okay but I think you should stay on it as it looks like your iron stores are still a little bit low.\" No RX was sent. Medication order/ refill pending.     Kathy Cox CMA on 5/27/2022 at 11:10 AM      "

## 2022-06-23 ENCOUNTER — TELEPHONE (OUTPATIENT)
Dept: FAMILY MEDICINE | Facility: OTHER | Age: 52
End: 2022-06-23

## 2022-06-23 DIAGNOSIS — F41.8 DEPRESSION WITH ANXIETY: ICD-10-CM

## 2022-06-23 RX ORDER — CLONAZEPAM 0.5 MG/1
TABLET ORAL
Qty: 90 TABLET | Refills: 5 | Status: SHIPPED | OUTPATIENT
Start: 2022-06-23 | End: 2023-01-05

## 2022-06-23 RX ORDER — CLONAZEPAM 0.5 MG/1
TABLET ORAL
Qty: 90 TABLET | OUTPATIENT
Start: 2022-06-23

## 2022-06-23 NOTE — TELEPHONE ENCOUNTER
Please call the patient.  She needs her RX - Clonazepam (sp?) sent to [facility] in [place] on [facility].  She needs this today.      Carmen Ballard on 6/23/2022 at 3:40 PM

## 2022-06-23 NOTE — TELEPHONE ENCOUNTER
Refused to soon, script filled on 5/19/22, 90 tables with 5 refills  Mary Anne Holliday RN on 6/23/2022 at 3:07 PM     Requested Prescriptions   Pending Prescriptions Disp Refills     clonazePAM (KLONOPIN) 0.5 MG tablet [Pharmacy Med Name: CLONAZEPAM 0.5MG TABLETS] 90 tablet      Sig: TAKE 1 TABLET BY MOUTH THREE TIMES DAILY AS NEEDED       There is no refill protocol information for this order

## 2022-06-23 NOTE — TELEPHONE ENCOUNTER
She needs her RX - Clonazepam (sp?) sent to [facility] in [place] on [facility].  She needs this today. Please call Patient with status.    clonazePAM (KLONOPIN) 0.5 MG tablet 90 tablet 5 5/19/2022  No   Sig: TAKE 1 TABLET BY MOUTH THREE TIMES DAILY AS NEEDED   Sent to pharmacy as: clonazePAM 0.5 MG Oral Tablet (klonoPIN)   Class: E-Prescribe   Order: [id number]   E-Prescribing Status: Receipt confirmed by pharmacy (5/19/2022  2:02 PM CDT)     [id number] - [place], [place] - [address]     [facility] 1st fill from 5/19/22 prescription was never filled in [place]. Patient will need new prescription sent to [place].    In clinical absence of patient's primary, Neptali French, patient is requesting that this message be sent to the covering provider for consideration please.    Mary Jose RN .............. 6/23/2022  3:50 PM

## 2022-06-24 NOTE — TELEPHONE ENCOUNTER
Attempted to contact patient. States call cannot be completed at this time.     Kathy Cox, PABLO on 6/24/2022 at 9:13 AM

## 2022-06-27 NOTE — TELEPHONE ENCOUNTER
called to see if patient saw rx was refilled. Patient states she refilled it and says her thanks.  ...Asher Beach LPN on 6/27/2022 at 2:07 PM

## 2022-07-13 ENCOUNTER — VIRTUAL VISIT (OUTPATIENT)
Dept: FAMILY MEDICINE | Facility: OTHER | Age: 52
End: 2022-07-13
Attending: FAMILY MEDICINE
Payer: COMMERCIAL

## 2022-07-13 VITALS — WEIGHT: 193 LBS | BODY MASS INDEX: 52.17 KG/M2

## 2022-07-13 DIAGNOSIS — Q77.4 ACHONDROPLASIA: ICD-10-CM

## 2022-07-13 DIAGNOSIS — M54.50 CHRONIC MIDLINE LOW BACK PAIN WITHOUT SCIATICA: ICD-10-CM

## 2022-07-13 DIAGNOSIS — G89.29 CHRONIC MIDLINE LOW BACK PAIN WITHOUT SCIATICA: ICD-10-CM

## 2022-07-13 DIAGNOSIS — Z02.89 PAIN MEDICATION AGREEMENT COMPLETED: ICD-10-CM

## 2022-07-13 PROCEDURE — 99212 OFFICE O/P EST SF 10 MIN: CPT | Mod: 95 | Performed by: FAMILY MEDICINE

## 2022-07-13 RX ORDER — HYDROCODONE BITARTRATE AND ACETAMINOPHEN 5; 325 MG/1; MG/1
2 TABLET ORAL 3 TIMES DAILY PRN
Qty: 200 TABLET | Refills: 0 | Status: SHIPPED | OUTPATIENT
Start: 2022-07-13 | End: 2022-09-14

## 2022-07-13 ASSESSMENT — PAIN SCALES - GENERAL: PAINLEVEL: SEVERE PAIN (6)

## 2022-07-13 ASSESSMENT — ANXIETY QUESTIONNAIRES
GAD7 TOTAL SCORE: 0
6. BECOMING EASILY ANNOYED OR IRRITABLE: NOT AT ALL
7. FEELING AFRAID AS IF SOMETHING AWFUL MIGHT HAPPEN: NOT AT ALL
GAD7 TOTAL SCORE: 0
1. FEELING NERVOUS, ANXIOUS, OR ON EDGE: NOT AT ALL
5. BEING SO RESTLESS THAT IT IS HARD TO SIT STILL: NOT AT ALL
IF YOU CHECKED OFF ANY PROBLEMS ON THIS QUESTIONNAIRE, HOW DIFFICULT HAVE THESE PROBLEMS MADE IT FOR YOU TO DO YOUR WORK, TAKE CARE OF THINGS AT HOME, OR GET ALONG WITH OTHER PEOPLE: NOT DIFFICULT AT ALL
3. WORRYING TOO MUCH ABOUT DIFFERENT THINGS: NOT AT ALL
2. NOT BEING ABLE TO STOP OR CONTROL WORRYING: NOT AT ALL

## 2022-07-13 ASSESSMENT — PATIENT HEALTH QUESTIONNAIRE - PHQ9
5. POOR APPETITE OR OVEREATING: NOT AT ALL
SUM OF ALL RESPONSES TO PHQ QUESTIONS 1-9: 0

## 2022-07-13 NOTE — NURSING NOTE
"Chief Complaint   Patient presents with     Recheck Medication       Initial Wt 87.5 kg (193 lb)   LMP 07/12/2022   Breastfeeding No   BMI 52.17 kg/m   Estimated body mass index is 52.17 kg/m  as calculated from the following:    Height as of 5/19/22: 1.295 m (4' 3\").    Weight as of this encounter: 87.5 kg (193 lb).  Medication Reconciliation: complete    FOOD SECURITY SCREENING QUESTIONS  Hunger Vital Signs:  Within the past 12 months we worried whether our food would run out before we got money to buy more. Never  Within the past 12 months the food we bought just didn't last and we didn't have money to get more. Never        Advance care directive on file? no      Ruth Salazar LPN  "

## 2022-07-13 NOTE — PROGRESS NOTES
"Nursing Notes:   Ruth Salazar LPN  7/13/2022 10:20 AM  Signed  Chief Complaint   Patient presents with     Recheck Medication       Initial Wt 87.5 kg (193 lb)   LMP 07/12/2022   Breastfeeding No   BMI 52.17 kg/m   Estimated body mass index is 52.17 kg/m  as calculated from the following:    Height as of 5/19/22: 1.295 m (4' 3\").    Weight as of this encounter: 87.5 kg (193 lb).  Medication Reconciliation: complete    FOOD SECURITY SCREENING QUESTIONS  Hunger Vital Signs:  Within the past 12 months we worried whether our food would run out before we got money to buy more. Never  Within the past 12 months the food we bought just didn't last and we didn't have money to get more. Never        Advance care directive on file? no      Ruth Salazar LPN      Wilmer is a 51 year old who is being evaluated via a billable telephone visit.      What phone number would you like to be contacted at? [phone]  How would you like to obtain your AVS? Josh Guillen is a 51 year old, presenting for the following health issues:  Recheck Medication      HPI having a telephone visit for renewal of medications.  I last saw her 2 months ago for a physical.  She moved to [place] in mid June.  She is in the process of a divorce.  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  She takes clonazepam at bedtime for restless legs and anxiety and has been on it for a long time with good relief.  She understands the risks of taking this with her other medication.  Her last fill was on June 22.  Last ToxAssure was May 19.  PDMP Review       Value Time User    State PDMP site checked  Yes 7/13/2022 12:04 PM Neptali French MD         Things are going well for her.  She is settling into her new apartment.  She has made some friends.  The divorce is not yet final.  Overall she feels she is doing well.    Review of Systems   ROS is negative except as noted above "           Objective    Vitals - Patient Reported  Pain Score: Severe Pain (6)  Pain Loc: Other - see comment (legs)        Physical Exam   healthy, alert and no distress  PSYCH: Alert and oriented times 3; coherent speech, normal   rate and volume, able to articulate logical thoughts, able   to abstract reason, no tangential thoughts, no hallucinations   or delusions  Her affect is normal  RESP: No cough, no audible wheezing, able to talk in full sentences  Remainder of exam unable to be completed due to telephone visits        Wilmer was seen today for recheck medication.    Diagnoses and all orders for this visit:    Chronic midline low back pain without sciatica  -     HYDROcodone-acetaminophen (NORCO) 5-325 MG tablet; Take 2 tablets by mouth 3 times daily as needed for pain . May take a maximum of 7 tablets per day.  -     HYDROcodone-acetaminophen (NORCO) 5-325 MG tablet; Take 2 tablets by mouth 3 times daily as needed for severe pain May take a maximum of 7 tablets per day.    Achondroplasia  -     HYDROcodone-acetaminophen (NORCO) 5-325 MG tablet; Take 2 tablets by mouth 3 times daily as needed for pain . May take a maximum of 7 tablets per day.  -     HYDROcodone-acetaminophen (NORCO) 5-325 MG tablet; Take 2 tablets by mouth 3 times daily as needed for severe pain May take a maximum of 7 tablets per day.    Pain medication agreement completed, 3/19/18, 3/13/19, 7/6/2020, 6/22/21  -     HYDROcodone-acetaminophen (NORCO) 5-325 MG tablet; Take 2 tablets by mouth 3 times daily as needed for pain . May take a maximum of 7 tablets per day.  -     HYDROcodone-acetaminophen (NORCO) 5-325 MG tablet; Take 2 tablets by mouth 3 times daily as needed for severe pain May take a maximum of 7 tablets per day.             Phone call duration: 10 minutes    Neptali French MD     .  ..

## 2022-08-30 DIAGNOSIS — R13.10 FOOD STICKS ON SWALLOWING: ICD-10-CM

## 2022-08-30 DIAGNOSIS — K21.00 GASTROESOPHAGEAL REFLUX DISEASE WITH ESOPHAGITIS WITHOUT HEMORRHAGE: ICD-10-CM

## 2022-08-31 NOTE — TELEPHONE ENCOUNTER
[facility] #[id number] sent Rx request for the following:      OMEPRAZOLE 20MG CAPSULES    Last Prescription Date:   3/14/22  Last Fill Qty/Refills:         30, R-3    Last Office Visit:              7/13/22   Future Office visit:           9/14/22    Unable to complete prescription refill per RN Medication Refill Policy.     Sobia Wills RN .............. 8/31/2022  4:05 PM

## 2022-09-14 ENCOUNTER — VIRTUAL VISIT (OUTPATIENT)
Dept: FAMILY MEDICINE | Facility: OTHER | Age: 52
End: 2022-09-14
Attending: FAMILY MEDICINE
Payer: COMMERCIAL

## 2022-09-14 DIAGNOSIS — G89.29 CHRONIC MIDLINE LOW BACK PAIN WITHOUT SCIATICA: Primary | ICD-10-CM

## 2022-09-14 DIAGNOSIS — Q77.4 ACHONDROPLASIA: ICD-10-CM

## 2022-09-14 DIAGNOSIS — M54.50 CHRONIC MIDLINE LOW BACK PAIN WITHOUT SCIATICA: Primary | ICD-10-CM

## 2022-09-14 DIAGNOSIS — Z12.31 VISIT FOR SCREENING MAMMOGRAM: ICD-10-CM

## 2022-09-14 DIAGNOSIS — Z02.89 PAIN MEDICATION AGREEMENT COMPLETED: ICD-10-CM

## 2022-09-14 PROCEDURE — 99212 OFFICE O/P EST SF 10 MIN: CPT | Mod: 95 | Performed by: FAMILY MEDICINE

## 2022-09-14 RX ORDER — HYDROCODONE BITARTRATE AND ACETAMINOPHEN 5; 325 MG/1; MG/1
2 TABLET ORAL 3 TIMES DAILY PRN
Qty: 200 TABLET | Refills: 0 | Status: SHIPPED | OUTPATIENT
Start: 2022-09-14 | End: 2022-11-03

## 2022-09-14 RX ORDER — HYDROCODONE BITARTRATE AND ACETAMINOPHEN 5; 325 MG/1; MG/1
2 TABLET ORAL 3 TIMES DAILY PRN
Qty: 200 TABLET | Refills: 0 | Status: SHIPPED | OUTPATIENT
Start: 2022-10-12 | End: 2022-11-03

## 2022-09-14 ASSESSMENT — PATIENT HEALTH QUESTIONNAIRE - PHQ9: SUM OF ALL RESPONSES TO PHQ QUESTIONS 1-9: 0

## 2022-09-14 NOTE — PROGRESS NOTES
Nursing Notes:   Gemma Smith LPN  9/14/2022 12:57 PM  Signed  Patient is having a virtual visit for medication check for refills.     Gemma Smith LPN .............9/14/2022     12:55 PM        Wilmer is a 51 year old who is being evaluated via a billable telephone visit.      What phone number would you like to be contacted at? [phone]  How would you like to obtain your AVS? Brooklynoctaviajosselyn Guillen is a 51 year old female, presenting for the following health issues:  Recheck Medication    I last saw her virtually 2 months ago.  She is still living in the [facility].  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  She takes clonazepam at bedtime for restless legs and anxiety and has been on it for a long time with good relief.  She does not take that at the same time as her pain medication.  Her last fill of hydrocodone was on August 17.  Last fill of clonazepam was on August 8.   PDMP Review       Value Time User    State PDMP site checked  Yes 9/14/2022  1:13 PM Neptali French MD         Last [employer] was on May 19.    She is due for her mammogram and orders placed for same.  She will be coming home for her birthday party next month.  We talked about an in person visit whenever it is convenient for her.    History of Present Illness       Reason for visit:  Followup    She eats 2-3 servings of fruits and vegetables daily.She consumes 1 sweetened beverage(s) daily.She exercises with enough effort to increase her heart rate 30 to 60 minutes per day.  She exercises with enough effort to increase her heart rate 5 days per week.   She is taking medications regularly.         Review of Systems   ROS is negative except as noted above           Objective    Vitals - Patient Reported  Pain Score: Extreme Pain (8)  Pain Loc: Other - see comment (legs)        Physical Exam   healthy, alert and no distress  PSYCH: Alert and oriented times 3;  coherent speech, normal   rate and volume, able to articulate logical thoughts, able   to abstract reason, no tangential thoughts, no hallucinations   or delusions  Her affect is normal  RESP: No cough, no audible wheezing, able to talk in full sentences  Remainder of exam unable to be completed due to telephone visits      Wilmer was seen today for recheck medication.    Diagnoses and all orders for this visit:    Chronic midline low back pain without sciatica  -     HYDROcodone-acetaminophen (NORCO) 5-325 MG tablet; Take 2 tablets by mouth 3 times daily as needed for pain . May take a maximum of 7 tablets per day.  -     HYDROcodone-acetaminophen (NORCO) 5-325 MG tablet; Take 2 tablets by mouth 3 times daily as needed for severe pain May take a maximum of 7 tablets per day.    Achondroplasia  -     HYDROcodone-acetaminophen (NORCO) 5-325 MG tablet; Take 2 tablets by mouth 3 times daily as needed for pain . May take a maximum of 7 tablets per day.  -     HYDROcodone-acetaminophen (NORCO) 5-325 MG tablet; Take 2 tablets by mouth 3 times daily as needed for severe pain May take a maximum of 7 tablets per day.    Pain medication agreement completed, 3/19/18, 3/13/19, 7/6/2020, 6/22/21, 5/19/22  -     HYDROcodone-acetaminophen (NORCO) 5-325 MG tablet; Take 2 tablets by mouth 3 times daily as needed for pain . May take a maximum of 7 tablets per day.  -     HYDROcodone-acetaminophen (NORCO) 5-325 MG tablet; Take 2 tablets by mouth 3 times daily as needed for severe pain May take a maximum of 7 tablets per day.    Visit for screening mammogram  -     MA Screening Bilateral w/ [place]; Future    Medications renewed pursuant to her contract.          Phone call duration: 10 minutes

## 2022-09-14 NOTE — NURSING NOTE
Patient is having a virtual visit for medication check for refills.     Gemma Smith LPN .............9/14/2022     12:55 PM

## 2022-10-10 ENCOUNTER — HOSPITAL ENCOUNTER (OUTPATIENT)
Dept: MAMMOGRAPHY | Facility: OTHER | Age: 52
Discharge: HOME | End: 2022-10-10
Attending: FAMILY MEDICINE | Admitting: FAMILY MEDICINE
Payer: COMMERCIAL

## 2022-10-10 DIAGNOSIS — Z12.31 VISIT FOR SCREENING MAMMOGRAM: ICD-10-CM

## 2022-10-10 PROCEDURE — 77067 SCR MAMMO BI INCL CAD: CPT

## 2022-11-01 ASSESSMENT — ANXIETY QUESTIONNAIRES
GAD7 TOTAL SCORE: 2
7. FEELING AFRAID AS IF SOMETHING AWFUL MIGHT HAPPEN: NOT AT ALL
4. TROUBLE RELAXING: NOT AT ALL
2. NOT BEING ABLE TO STOP OR CONTROL WORRYING: SEVERAL DAYS
5. BEING SO RESTLESS THAT IT IS HARD TO SIT STILL: NOT AT ALL
8. IF YOU CHECKED OFF ANY PROBLEMS, HOW DIFFICULT HAVE THESE MADE IT FOR YOU TO DO YOUR WORK, TAKE CARE OF THINGS AT HOME, OR GET ALONG WITH OTHER PEOPLE?: SOMEWHAT DIFFICULT
1. FEELING NERVOUS, ANXIOUS, OR ON EDGE: NOT AT ALL
3. WORRYING TOO MUCH ABOUT DIFFERENT THINGS: SEVERAL DAYS
6. BECOMING EASILY ANNOYED OR IRRITABLE: NOT AT ALL
GAD7 TOTAL SCORE: 2
GAD7 TOTAL SCORE: 2
IF YOU CHECKED OFF ANY PROBLEMS ON THIS QUESTIONNAIRE, HOW DIFFICULT HAVE THESE PROBLEMS MADE IT FOR YOU TO DO YOUR WORK, TAKE CARE OF THINGS AT HOME, OR GET ALONG WITH OTHER PEOPLE: SOMEWHAT DIFFICULT
7. FEELING AFRAID AS IF SOMETHING AWFUL MIGHT HAPPEN: NOT AT ALL

## 2022-11-01 ASSESSMENT — ENCOUNTER SYMPTOMS
PALPITATIONS: 0
HEADACHES: 0
CHILLS: 0
PARESTHESIAS: 0
NAUSEA: 0
FEVER: 0
DIARRHEA: 0
MYALGIAS: 1
NERVOUS/ANXIOUS: 1
FREQUENCY: 0
SORE THROAT: 0
SHORTNESS OF BREATH: 0
HEMATOCHEZIA: 0
DIZZINESS: 0
CONSTIPATION: 0
HEARTBURN: 0
ARTHRALGIAS: 1
ABDOMINAL PAIN: 0
HEMATURIA: 0
JOINT SWELLING: 1
EYE PAIN: 0
DYSURIA: 0
WEAKNESS: 0
COUGH: 1
BREAST MASS: 0

## 2022-11-01 ASSESSMENT — PATIENT HEALTH QUESTIONNAIRE - PHQ9
10. IF YOU CHECKED OFF ANY PROBLEMS, HOW DIFFICULT HAVE THESE PROBLEMS MADE IT FOR YOU TO DO YOUR WORK, TAKE CARE OF THINGS AT HOME, OR GET ALONG WITH OTHER PEOPLE: SOMEWHAT DIFFICULT
SUM OF ALL RESPONSES TO PHQ QUESTIONS 1-9: 1
SUM OF ALL RESPONSES TO PHQ QUESTIONS 1-9: 1

## 2022-11-03 ENCOUNTER — OFFICE VISIT (OUTPATIENT)
Dept: FAMILY MEDICINE | Facility: OTHER | Age: 52
End: 2022-11-03
Attending: FAMILY MEDICINE
Payer: COMMERCIAL

## 2022-11-03 VITALS
HEART RATE: 102 BPM | HEIGHT: 55 IN | WEIGHT: 196.4 LBS | TEMPERATURE: 99.6 F | BODY MASS INDEX: 45.45 KG/M2 | DIASTOLIC BLOOD PRESSURE: 90 MMHG | OXYGEN SATURATION: 96 % | RESPIRATION RATE: 20 BRPM | SYSTOLIC BLOOD PRESSURE: 136 MMHG

## 2022-11-03 DIAGNOSIS — K21.00 GASTROESOPHAGEAL REFLUX DISEASE WITH ESOPHAGITIS WITHOUT HEMORRHAGE: ICD-10-CM

## 2022-11-03 DIAGNOSIS — Z23 HIGH PRIORITY FOR 2019-NCOV VACCINE: ICD-10-CM

## 2022-11-03 DIAGNOSIS — M54.50 CHRONIC MIDLINE LOW BACK PAIN WITHOUT SCIATICA: ICD-10-CM

## 2022-11-03 DIAGNOSIS — Z12.11 SPECIAL SCREENING FOR MALIGNANT NEOPLASMS, COLON: ICD-10-CM

## 2022-11-03 DIAGNOSIS — F41.1 GENERALIZED ANXIETY DISORDER: ICD-10-CM

## 2022-11-03 DIAGNOSIS — R32 URINARY INCONTINENCE, UNSPECIFIED TYPE: ICD-10-CM

## 2022-11-03 DIAGNOSIS — F41.8 DEPRESSION WITH ANXIETY: ICD-10-CM

## 2022-11-03 DIAGNOSIS — G89.29 CHRONIC MIDLINE LOW BACK PAIN WITHOUT SCIATICA: ICD-10-CM

## 2022-11-03 DIAGNOSIS — Z02.89 PAIN MEDICATION AGREEMENT COMPLETED: ICD-10-CM

## 2022-11-03 DIAGNOSIS — Q77.4 ACHONDROPLASIA: Primary | ICD-10-CM

## 2022-11-03 DIAGNOSIS — E61.1 IRON DEFICIENCY: ICD-10-CM

## 2022-11-03 DIAGNOSIS — I10 BENIGN ESSENTIAL HYPERTENSION: ICD-10-CM

## 2022-11-03 LAB
BASOPHILS # BLD AUTO: 0.1 10E3/UL (ref 0–0.2)
BASOPHILS NFR BLD AUTO: 1 %
CREAT UR-MCNC: 176 MG/DL
EOSINOPHIL # BLD AUTO: 0.2 10E3/UL (ref 0–0.7)
EOSINOPHIL NFR BLD AUTO: 2 %
ERYTHROCYTE [DISTWIDTH] IN BLOOD BY AUTOMATED COUNT: 15.1 % (ref 10–15)
HCT VFR BLD AUTO: 40.3 % (ref 35–47)
HGB BLD-MCNC: 13.3 G/DL (ref 11.7–15.7)
IMM GRANULOCYTES # BLD: 0.1 10E3/UL
IMM GRANULOCYTES NFR BLD: 1 %
IRON SATN MFR SERPL: 6 % (ref 20–55)
IRON SERPL-MCNC: 30 UG/DL (ref 50–212)
LYMPHOCYTES # BLD AUTO: 2.6 10E3/UL (ref 0.8–5.3)
LYMPHOCYTES NFR BLD AUTO: 26 %
MCH RBC QN AUTO: 26.2 PG (ref 26.5–33)
MCHC RBC AUTO-ENTMCNC: 33 G/DL (ref 31.5–36.5)
MCV RBC AUTO: 79 FL (ref 78–100)
MONOCYTES # BLD AUTO: 0.8 10E3/UL (ref 0–1.3)
MONOCYTES NFR BLD AUTO: 8 %
NEUTROPHILS # BLD AUTO: 6.5 10E3/UL (ref 1.6–8.3)
NEUTROPHILS NFR BLD AUTO: 62 %
NRBC # BLD AUTO: 0 10E3/UL
NRBC BLD AUTO-RTO: 0 /100
PLATELET # BLD AUTO: 437 10E3/UL (ref 150–450)
RBC # BLD AUTO: 5.08 10E6/UL (ref 3.8–5.2)
TIBC SERPL-MCNC: 498.4 UG/DL (ref 245–400)
TRANSFERRIN SERPL-MCNC: 356 MG/DL (ref 203–362)
UIBC (UNSATURATED): 468.4 MG/DL
WBC # BLD AUTO: 10.3 10E3/UL (ref 4–11)

## 2022-11-03 PROCEDURE — 85025 COMPLETE CBC W/AUTO DIFF WBC: CPT | Mod: ZL | Performed by: FAMILY MEDICINE

## 2022-11-03 PROCEDURE — 91312 COVID-19,PF,PFIZER BOOSTER BIVALENT: CPT | Performed by: FAMILY MEDICINE

## 2022-11-03 PROCEDURE — 80307 DRUG TEST PRSMV CHEM ANLYZR: CPT | Mod: ZL | Performed by: FAMILY MEDICINE

## 2022-11-03 PROCEDURE — 36415 COLL VENOUS BLD VENIPUNCTURE: CPT | Mod: ZL | Performed by: FAMILY MEDICINE

## 2022-11-03 PROCEDURE — 0124A COVID-19,PF,PFIZER BOOSTER BIVALENT: CPT | Performed by: FAMILY MEDICINE

## 2022-11-03 PROCEDURE — 83550 IRON BINDING TEST: CPT | Mod: ZL | Performed by: FAMILY MEDICINE

## 2022-11-03 PROCEDURE — 99214 OFFICE O/P EST MOD 30 MIN: CPT | Mod: 25 | Performed by: FAMILY MEDICINE

## 2022-11-03 RX ORDER — SERTRALINE HYDROCHLORIDE 100 MG/1
200 TABLET, FILM COATED ORAL DAILY
Qty: 180 TABLET | Refills: 11 | Status: SHIPPED | OUTPATIENT
Start: 2022-11-03 | End: 2023-12-08

## 2022-11-03 RX ORDER — HYDROCODONE BITARTRATE AND ACETAMINOPHEN 5; 325 MG/1; MG/1
2 TABLET ORAL 3 TIMES DAILY PRN
Qty: 200 TABLET | Refills: 0 | Status: SHIPPED | OUTPATIENT
Start: 2022-11-03 | End: 2023-01-05

## 2022-11-03 RX ORDER — LISINOPRIL 10 MG/1
10 TABLET ORAL DAILY
Qty: 90 TABLET | Refills: 11 | Status: SHIPPED | OUTPATIENT
Start: 2022-11-03 | End: 2023-12-06

## 2022-11-03 RX ORDER — OXYBUTYNIN CHLORIDE 5 MG/1
5 TABLET, EXTENDED RELEASE ORAL 2 TIMES DAILY
Qty: 60 TABLET | Refills: 11 | Status: SHIPPED | OUTPATIENT
Start: 2022-11-03 | End: 2023-11-29

## 2022-11-03 ASSESSMENT — PAIN SCALES - GENERAL: PAINLEVEL: SEVERE PAIN (7)

## 2022-11-03 NOTE — NURSING NOTE
"Chief Complaint   Patient presents with     Physical     Yearly  follow-up       Initial BP (!) 140/96 (BP Location: Right arm, Patient Position: Sitting, Cuff Size: Adult Large)   Pulse 102   Temp 99.6  F (37.6  C) (Tympanic)   Resp 20   Ht 1.295 m (4' 3\")   Wt 89.1 kg (196 lb 6.4 oz)   LMP 10/11/2022 (Approximate)   SpO2 96%   Breastfeeding No   BMI 53.09 kg/m   Estimated body mass index is 53.09 kg/m  as calculated from the following:    Height as of this encounter: 1.295 m (4' 3\").    Weight as of this encounter: 89.1 kg (196 lb 6.4 oz).  Medication Reconciliation: complete       FOOD SECURITY SCREENING QUESTIONS:    The next two questions are to help us understand your food security.  If you are feeling you need any assistance in this area, we have resources available to support you today.    Hunger Vital Signs:  Within the past 12 months we worried whether our food would run out before we got money to buy more. Never  Within the past 12 months the food we bought just didn't last and we didn't have money to get more. Never  Diamond Lew,VF on 11/3/2022 at 1:08 PM      Diamond Lew    Advance Care Directive reviewed    "

## 2022-11-03 NOTE — PROGRESS NOTES
SUBJECTIVE:   CC: Wilmer is an 51 year old who presents for preventive health visit.       Patient has been advised of split billing requirements and indicates understanding: Yes  Healthy Habits:     Getting at least 3 servings of Calcium per day:  Yes    Bi-annual eye exam:  Yes    Dental care twice a year:  Yes    Sleep apnea or symptoms of sleep apnea:  Daytime drowsiness    Diet:  Regular (no restrictions)    Frequency of exercise:  4-5 days/week    Duration of exercise:  45-60 minutes    Taking medications regularly:  Yes    Medication side effects:  Other    PHQ-2 Total Score: 0    Additional concerns today:  No              Today's PHQ-2 Score:   PHQ-2 ( 1999 [employer]) 11/1/2022   Q1: Little interest or pleasure in doing things 0   Q2: Feeling down, depressed or hopeless 0   PHQ-2 Score 0   PHQ-2 Total Score (12-17 Years)- Positive if 3 or more points; Administer PHQ-A if positive -   Q1: Little interest or pleasure in doing things Not at all   Q2: Feeling down, depressed or hopeless Not at all   PHQ-2 Score 0       Abuse: Current or Past (Physical, Sexual or Emotional) - No  Do you feel safe in your environment? Yes        Social History     Tobacco Use     Smoking status: Never     Smokeless tobacco: Never   Substance Use Topics     Alcohol use: No     Alcohol/week: 0.0 standard drinks         Alcohol Use 11/1/2022   Prescreen: >3 drinks/day or >7 drinks/week? No   Prescreen: >3 drinks/day or >7 drinks/week? -       Reviewed orders with patient.  Reviewed health maintenance and updated orders accordingly - Yes  Labs reviewed in EPIC    Breast Cancer Screening:  Any new diagnosis of family breast, ovarian, or bowel cancer? No    FHS-7:   Breast CA Risk Assessment (FHS-7) 7/14/2021   Did any of your first-degree relatives have breast or ovarian cancer? Yes   Did any of your relatives have bilateral breast cancer? No   Did any man in your family have breast cancer? No   Did any woman in your family have  "breast and ovarian cancer? No   Did any woman in your family have breast cancer before age 50 y? No   Do you have 2 or more relatives with breast and/or ovarian cancer? Yes         Pertinent mammograms are reviewed under the imaging tab.    History of abnormal Pap smear: NO - age 30-65 PAP every 5 years with negative HPV co-testing recommended  PAP / HPV Latest Ref Rng & Units 3/13/2019   PAP (Historical) - NIL   HPV16 NEG:Negative Negative   HPV18 NEG:Negative Negative   HRHPV NEG:Negative Negative     Reviewed and updated as needed this visit by clinical staff   Tobacco  Allergies  Meds              Reviewed and updated as needed this visit by Provider                     Review of Systems       OBJECTIVE:   BP (!) 140/96 (BP Location: Right arm, Patient Position: Sitting, Cuff Size: Adult Large)   Pulse 102   Temp 99.6  F (37.6  C) (Tympanic)   Resp 20   Ht 1.295 m (4' 3\")   Wt 89.1 kg (196 lb 6.4 oz)   LMP 10/11/2022 (Approximate)   SpO2 96%   Breastfeeding No   BMI 53.09 kg/m    Physical Exam          ASSESSMENT/PLAN:   Wilmer was seen today for physical and imm/inj.    Diagnoses and all orders for this visit:    Achondroplasia  -     HYDROcodone-acetaminophen (NORCO) 5-325 MG tablet; Take 2 tablets by mouth 3 times daily as needed for pain . May take a maximum of 7 tablets per day.  -     HYDROcodone-acetaminophen (NORCO) 5-325 MG tablet; Take 2 tablets by mouth 3 times daily as needed for severe pain May take a maximum of 7 tablets per day.    Chronic midline low back pain without sciatica  -     Drug Confirmation Panel Urine with Creat; Future  -     Drug Confirmation Panel Urine with Creat  -     HYDROcodone-acetaminophen (NORCO) 5-325 MG tablet; Take 2 tablets by mouth 3 times daily as needed for pain . May take a maximum of 7 tablets per day.  -     HYDROcodone-acetaminophen (NORCO) 5-325 MG tablet; Take 2 tablets by mouth 3 times daily as needed for severe pain May take a maximum of 7 tablets " "per day.    Pain medication agreement completed, 3/19/18, 3/13/19, 7/6/2020, 6/22/21, 5/19/22  -     Drug Confirmation Panel Urine with Creat; Future  -     Drug Confirmation Panel Urine with Creat  -     HYDROcodone-acetaminophen (NORCO) 5-325 MG tablet; Take 2 tablets by mouth 3 times daily as needed for pain . May take a maximum of 7 tablets per day.  -     HYDROcodone-acetaminophen (NORCO) 5-325 MG tablet; Take 2 tablets by mouth 3 times daily as needed for severe pain May take a maximum of 7 tablets per day.    Gastroesophageal reflux disease with esophagitis without hemorrhage  -     CBC with Platelets & Differential; Future  -     CBC with Platelets & Differential    Iron deficiency  -     CBC with Platelets & Differential; Future  -     Iron binding panel; Future  -     CBC with Platelets & Differential  -     Iron binding panel    Generalized anxiety disorder    Special screening for malignant neoplasms, colon  -     COLOGUARD(EXACT SCIENCES)    High priority for 2019-nCoV vaccine  -     COVID-19,PF,PFIZER BOOSTER BIVALENT 12+Yrs    Urinary incontinence, unspecified type  -     oxybutynin ER (DITROPAN XL) 5 MG 24 hr tablet; Take 1 tablet (5 mg) by mouth 2 times daily    Benign essential hypertension  -     lisinopril (ZESTRIL) 10 MG tablet; Take 1 tablet (10 mg) by mouth daily    Depression with anxiety  -     sertraline (ZOLOFT) 100 MG tablet; Take 2 tablets (200 mg) by mouth daily              COUNSELING:  Reviewed preventive health counseling, as reflected in patient instructions       Regular exercise       Healthy diet/nutrition       Colorectal Cancer Screening    Estimated body mass index is 53.09 kg/m  as calculated from the following:    Height as of this encounter: 1.295 m (4' 3\").    Weight as of this encounter: 89.1 kg (196 lb 6.4 oz).    Weight management plan: Discussed healthy diet and exercise guidelines    She reports that she has never smoked. She has never used smokeless " tobacco.      Counseling Resources:  ATP IV Guidelines  Pooled Cohorts Equation Calculator  Breast Cancer Risk Calculator  BRCA-Related Cancer Risk Assessment: FHS-7 Tool  FRAX Risk Assessment  ICSI Preventive Guidelines  Dietary Guidelines for Americans, 2010  USDA's MyPlate  ASA Prophylaxis  Lung CA Screening    Neptali French MD  [facility]  Answers for HPI/ROS submitted by the patient on 11/1/2022  If you checked off any problems, how difficult have these problems made it for you to do your work, take care of things at home, or get along with other people?: Somewhat difficult  PHQ9 TOTAL SCORE: 1  YESI 7 TOTAL SCORE: 2

## 2022-11-03 NOTE — PROGRESS NOTES
"Nursing Notes:   Diamond Lew  11/3/2022  1:09 PM  Signed  Chief Complaint   Patient presents with     Physical     Yearly  follow-up       Initial BP (!) 140/96 (BP Location: Right arm, Patient Position: Sitting, Cuff Size: Adult Large)   Pulse 102   Temp 99.6  F (37.6  C) (Tympanic)   Resp 20   Ht 1.295 m (4' 3\")   Wt 89.1 kg (196 lb 6.4 oz)   LMP 10/11/2022 (Approximate)   SpO2 96%   Breastfeeding No   BMI 53.09 kg/m   Estimated body mass index is 53.09 kg/m  as calculated from the following:    Height as of this encounter: 1.295 m (4' 3\").    Weight as of this encounter: 89.1 kg (196 lb 6.4 oz).  Medication Reconciliation: complete       FOOD SECURITY SCREENING QUESTIONS:    The next two questions are to help us understand your food security.  If you are feeling you need any assistance in this area, we have resources available to support you today.    Hunger Vital Signs:  Within the past 12 months we worried whether our food would run out before we got money to buy more. Never  Within the past 12 months the food we bought just didn't last and we didn't have money to get more. Never  Diamond Lew,VF on 11/3/2022 at 1:08 PM      Diamond Lew    Advance Care Directive reviewed        SUBJECTIVE:  Wilmer Morse  is a 51 year old female who comes in today for follow-up and medication management.  I last saw her for a physical in May.  She has some iron deficiency.    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 She last filled 10/14/22.    PDMP Review       Value Time User    State PDMP site checked  Yes 11/3/2022  1:50 PM Neptali French MD           She is immunized against COVID-19 with her second shot being in January of this year.  She has not had the new booster.  She does not typically get a flu shot and is up-to-date on Boostrix.  She just turned 51 and has not had Shingrix.    She is having to have some dental work done.     She just had a " normal mammogram.  She has not had colon cancer screening.        Past Medical, Family, and Social History reviewed and updated as noted below.   ROS is negative except as noted above       Allergies   Allergen Reactions     Penicillins Unknown   ,   Family History   Problem Relation Age of Onset     Other - See Comments Mother         Achondroplasia.     Breast Cancer Mother         Cancer-breast,breast cancer     Breast Cancer Maternal Grandmother 70        Cancer-breast, breast cancer     Unknown/Adopted Father         Unknown     Family History Negative Daughter         Good Health     Family History Negative Son         Good Health     Family History Negative Other         Good Health     Family History Negative Son         Good Health,(stepson)91   ,   Current Outpatient Medications   Medication     buPROPion (WELLBUTRIN XL) 300 MG 24 hr tablet     clonazePAM (KLONOPIN) 0.5 MG tablet     FEROSUL 325 (65 Fe) MG tablet     gabapentin (NEURONTIN) 300 MG capsule     HYDROcodone-acetaminophen (NORCO) 5-325 MG tablet     HYDROcodone-acetaminophen (NORCO) 5-325 MG tablet     ibuprofen (ADVIL/MOTRIN) 600 MG tablet     lisinopril (ZESTRIL) 10 MG tablet     omeprazole (PRILOSEC) 20 MG DR capsule     oxybutynin ER (DITROPAN XL) 5 MG 24 hr tablet     pramipexole (MIRAPEX) 0.5 MG tablet     sertraline (ZOLOFT) 100 MG tablet     No current facility-administered medications for this visit.   ,   Past Medical History:   Diagnosis Date     Achondroplasia     No Comments Provided     Family history of malignant neoplasm of breast     No Comments Provided     Hyperlipidemia     not currently on treatment     Other intervertebral disc degeneration, lumbar region     No Comments Provided     Other specified anxiety disorders     No Comments Provided     Overactive bladder     2014     Personal history of other medical treatment (CODE)      3, Para 2-0-1-2.     Primary osteoarthritis of one knee     5/3/2012      "Restless legs syndrome     No Comments Provided     Zoster without complications     2006,left side of neck.   ,   Patient Active Problem List    Diagnosis Date Noted     Benign essential hypertension 2018     Priority: Medium     Depression with anxiety 2018     Priority: Medium     Family history of breast cancer in female 2018     Priority: Medium     Hyperlipidemia 2018     Priority: Medium     Overview:   not currently on treatment       Restless leg syndrome 2018     Priority: Medium     Dependent edema 09/15/2017     Priority: Medium     Chronic midline low back pain without sciatica 2016     Priority: Medium     Achondroplasia 06/15/2016     Priority: Medium     Pain medication agreement completed 3/19/18, 3/13/19, 20, 2016     Priority: Medium     Osteoarthritis of knee, unilateral 2012     Priority: Medium   ,   Past Surgical History:   Procedure Laterality Date     ADENOIDECTOMY      as a child      SECTION      1996,Primary low transverse  section. Repeat      LAPAROSCOPIC TUBAL LIGATION           OTHER SURGICAL HISTORY      [id number],OSTEOTOMY,Limb lengthening procedures.     OTHER SURGICAL HISTORY      10/16,,,10/17,760392,OTHER,Right     OTHER SURGICAL HISTORY      2017,[id number],OTHER,Right,fluorscopically guided. no improvement.     right knee total arthroplasty Right 2018    Dr. Magaña, [facility]    and   Social History     Tobacco Use     Smoking status: Never     Smokeless tobacco: Never   Substance Use Topics     Alcohol use: No     Alcohol/week: 0.0 standard drinks     OBJECTIVE:  BP (!) 136/90   Pulse 102   Temp 99.6  F (37.6  C) (Tympanic)   Resp 20   Ht 1.295 m (4' 3\")   Wt 89.1 kg (196 lb 6.4 oz)   LMP 10/11/2022 (Approximate)   SpO2 96%   Breastfeeding No   BMI 53.09 kg/m     EXAM:  Appears to be normal self, alert cooperative, no distress.  Lungs are clear, no rales " rhonchi or wheezes are heard.  Cardiac RRR without murmur.  Follow-up labs show ongoing iron deficiency without anemia  Results for orders placed or performed in visit on 11/03/22   Iron binding panel     Status: Abnormal   Result Value Ref Range    Transferrin 356 203 - 362 mg/dL    Iron 30 (L) 50 - 212 ug/dL    UIBC (Unsaturated) 468.40 mg/dL    Iron Binding Capacity 498.40 (H) 245.00 - 400.00 ug/dL    Iron Saturation 6 (L) 20 - 55 %   Urine Creatinine for Drug Screen Panel     Status: None   Result Value Ref Range    Creatinine Urine for Drug Screen 176 mg/dL   CBC with platelets and differential     Status: Abnormal   Result Value Ref Range    WBC Count 10.3 4.0 - 11.0 10e3/uL    RBC Count 5.08 3.80 - 5.20 10e6/uL    Hemoglobin 13.3 11.7 - 15.7 g/dL    Hematocrit 40.3 35.0 - 47.0 %    MCV 79 78 - 100 fL    MCH 26.2 (L) 26.5 - 33.0 pg    MCHC 33.0 31.5 - 36.5 g/dL    RDW 15.1 (H) 10.0 - 15.0 %    Platelet Count 437 150 - 450 10e3/uL    % Neutrophils 62 %    % Lymphocytes 26 %    % Monocytes 8 %    % Eosinophils 2 %    % Basophils 1 %    % Immature Granulocytes 1 %    NRBCs per 100 WBC 0 <1 /100    Absolute Neutrophils 6.5 1.6 - 8.3 10e3/uL    Absolute Lymphocytes 2.6 0.8 - 5.3 10e3/uL    Absolute Monocytes 0.8 0.0 - 1.3 10e3/uL    Absolute Eosinophils 0.2 0.0 - 0.7 10e3/uL    Absolute Basophils 0.1 0.0 - 0.2 10e3/uL    Absolute Immature Granulocytes 0.1 <=0.4 10e3/uL    Absolute NRBCs 0.0 10e3/uL   Drug Confirmation Panel Urine with Creat     Status: None (In process)    Narrative    The following orders were created for panel order Drug Confirmation Panel Urine with Creat.  Procedure                               Abnormality         Status                     ---------                               -----------         ------                     Urine Drug Confirmation ...[[id number]]                      In process                 Urine Creatinine for [place]...[[id number]]                      Final result                  Please view results for these tests on the individual orders.   CBC with Platelets & Differential     Status: Abnormal    Narrative    The following orders were created for panel order CBC with Platelets & Differential.  Procedure                               Abnormality         Status                     ---------                               -----------         ------                     CBC with platelets and d...[[id number]]  Abnormal            Final result                 Please view results for these tests on the individual orders.      ASSESSMENT/Plan :    Wilmer was seen today for physical and imm/inj.    Diagnoses and all orders for this visit:    Achondroplasia  -     HYDROcodone-acetaminophen (NORCO) 5-325 MG tablet; Take 2 tablets by mouth 3 times daily as needed for pain . May take a maximum of 7 tablets per day.  -     HYDROcodone-acetaminophen (NORCO) 5-325 MG tablet; Take 2 tablets by mouth 3 times daily as needed for severe pain May take a maximum of 7 tablets per day.    Chronic midline low back pain without sciatica  -     Drug Confirmation Panel Urine with Creat; Future  -     Drug Confirmation Panel Urine with Creat  -     HYDROcodone-acetaminophen (NORCO) 5-325 MG tablet; Take 2 tablets by mouth 3 times daily as needed for pain . May take a maximum of 7 tablets per day.  -     HYDROcodone-acetaminophen (NORCO) 5-325 MG tablet; Take 2 tablets by mouth 3 times daily as needed for severe pain May take a maximum of 7 tablets per day.    Pain medication agreement completed, 3/19/18, 3/13/19, 7/6/2020, 6/22/21, 5/19/22  -     Drug Confirmation Panel Urine with Creat; Future  -     Drug Confirmation Panel Urine with Creat  -     HYDROcodone-acetaminophen (NORCO) 5-325 MG tablet; Take 2 tablets by mouth 3 times daily as needed for pain . May take a maximum of 7 tablets per day.  -     HYDROcodone-acetaminophen (NORCO) 5-325 MG tablet; Take 2 tablets by mouth 3 times daily as needed for severe  pain May take a maximum of 7 tablets per day.    Gastroesophageal reflux disease with esophagitis without hemorrhage  -     CBC with Platelets & Differential; Future  -     CBC with Platelets & Differential    Iron deficiency  -     CBC with Platelets & Differential; Future  -     Iron binding panel; Future  -     CBC with Platelets & Differential  -     Iron binding panel    Generalized anxiety disorder    Special screening for malignant neoplasms, colon  -     LOVE([employer])    High priority for 2019-nCoV vaccine  -     COVID-19,PF,PFIZER BOOSTER BIVALENT 12+Yrs    Urinary incontinence, unspecified type  -     oxybutynin ER (DITROPAN XL) 5 MG 24 hr tablet; Take 1 tablet (5 mg) by mouth 2 times daily    Benign essential hypertension  -     lisinopril (ZESTRIL) 10 MG tablet; Take 1 tablet (10 mg) by mouth daily    Depression with anxiety  -     sertraline (ZOLOFT) 100 MG tablet; Take 2 tablets (200 mg) by mouth daily      Renewed medications pursuant to her contract.  ToxAssure today.  Renewed her other medications.    Cologuajaleel ordered.    Recommended that she continue with high iron foods and iron supplementation to replenish her iron stores.    COVID booster today.  Declines flu vaccine.    Sounds like she is doing well with major life changes that she has made.  Support and encouragement offered in this regard.    A total of 30 minutes was spent with the patient, reviewing records, tests, ordering medications, tests or procedures and documenting clinical information in the EHR.     Neptali French MD            Answers for HPI/ROS submitted by the patient on 11/1/2022  If you checked off any problems, how difficult have these problems made it for you to do your work, take care of things at home, or get along with other people?: Somewhat difficult  PHQ9 TOTAL SCORE: 1  YESI 7 TOTAL SCORE: 2  Frequency of exercise:: 4-5 days/week  Getting at least 3 servings of Calcium per day:: Yes  Diet:: Regular (no  restrictions)  Taking medications regularly:: Yes  Medication side effects:: Other  Bi-annual eye exam:: Yes  Dental care twice a year:: Yes  Sleep apnea or symptoms of sleep apnea:: Daytime drowsiness  abdominal pain: No  Blood in stool: No  Blood in urine: No  chest pain: No  chills: No  congestion: Yes  constipation: No  cough: Yes  diarrhea: No  dizziness: No  ear pain: No  eye pain: No  nervous/anxious: Yes  fever: No  frequency: No  genital sores: No  headaches: No  hearing loss: No  heartburn: No  arthralgias: Yes  joint swelling: Yes  peripheral edema: Yes  mood changes: No  myalgias: Yes  nausea: No  dysuria: No  palpitations: No  Skin sensation changes: No  sore throat: No  urgency: Yes  rash: No  shortness of breath: No  visual disturbance: No  weakness: No  pelvic pain: No  vaginal bleeding: No  vaginal discharge: No  tenderness: No  breast mass: No  breast discharge: No  Additional concerns today:: No  Duration of exercise:: 45-60 minutes

## 2022-11-10 ENCOUNTER — MYC MEDICAL ADVICE (OUTPATIENT)
Dept: FAMILY MEDICINE | Facility: OTHER | Age: 52
End: 2022-11-10

## 2022-11-10 LAB
7AMINOCLONAZEPAM UR QL CFM: PRESENT
DHC UR CFM-MCNC: 2540 NG/ML
DHC/CREAT UR: 1443 NG/MG {CREAT}
HYDROCODONE UR CFM-MCNC: 4520 NG/ML
HYDROCODONE/CREAT UR: 2568 NG/MG {CREAT}
HYDROMORPHONE UR CFM-MCNC: 200 NG/ML
HYDROMORPHONE/CREAT UR: 114 NG/MG {CREAT}

## 2022-12-11 DIAGNOSIS — R13.10 FOOD STICKS ON SWALLOWING: ICD-10-CM

## 2022-12-11 DIAGNOSIS — K21.00 GASTROESOPHAGEAL REFLUX DISEASE WITH ESOPHAGITIS WITHOUT HEMORRHAGE: ICD-10-CM

## 2022-12-14 NOTE — TELEPHONE ENCOUNTER
"Last Prescription Date: 8/31/2022  Last Qty/Refills: 30 / 3  Last Office Visit: 11/3/2022  Future Office Visit:   Future Appointments 12/14/2022 - 6/12/2023      Date Visit Type Length Department Provider     12/28/2022  7:40 AM [facility] TELEPHONE VISIT LONG 40 min [facility] Neptali French MD    Location Instructions:     [facility] [address]                    Requested Prescriptions   Pending Prescriptions Disp Refills     omeprazole (PRILOSEC) 20 MG DR capsule [Pharmacy Med Name: OMEPRAZOLE 20MG CAPSULES] 30 capsule 3     Sig: TAKE 1 CAPSULE(20 MG) BY MOUTH DAILY       PPI Protocol Passed - 12/11/2022  8:00 AM        Passed - Not on Clopidogrel (unless Pantoprazole ordered)        Passed - No diagnosis of osteoporosis on record        Passed - Recent (12 mo) or future (30 days) visit within the authorizing provider's specialty     Patient has had an office visit with the authorizing provider or a provider within the authorizing providers department within the previous 12 mos or has a future within next 30 days. See \"Patient Info\" tab in inbasket, or \"Choose Columns\" in Meds & Orders section of the refill encounter.              Passed - Medication is active on med list        Passed - Patient is age 18 or older        Passed - No active pregnacy on record        Passed - No positive pregnancy test in past 12 months             Meredith Crews RN on 12/14/2022 at 10:24 AM     "

## 2023-01-04 ASSESSMENT — ANXIETY QUESTIONNAIRES
GAD7 TOTAL SCORE: 2
3. WORRYING TOO MUCH ABOUT DIFFERENT THINGS: SEVERAL DAYS
1. FEELING NERVOUS, ANXIOUS, OR ON EDGE: SEVERAL DAYS
7. FEELING AFRAID AS IF SOMETHING AWFUL MIGHT HAPPEN: NOT AT ALL
2. NOT BEING ABLE TO STOP OR CONTROL WORRYING: NOT AT ALL
GAD7 TOTAL SCORE: 2
IF YOU CHECKED OFF ANY PROBLEMS ON THIS QUESTIONNAIRE, HOW DIFFICULT HAVE THESE PROBLEMS MADE IT FOR YOU TO DO YOUR WORK, TAKE CARE OF THINGS AT HOME, OR GET ALONG WITH OTHER PEOPLE: SOMEWHAT DIFFICULT
7. FEELING AFRAID AS IF SOMETHING AWFUL MIGHT HAPPEN: NOT AT ALL
4. TROUBLE RELAXING: NOT AT ALL
6. BECOMING EASILY ANNOYED OR IRRITABLE: NOT AT ALL
GAD7 TOTAL SCORE: 2
8. IF YOU CHECKED OFF ANY PROBLEMS, HOW DIFFICULT HAVE THESE MADE IT FOR YOU TO DO YOUR WORK, TAKE CARE OF THINGS AT HOME, OR GET ALONG WITH OTHER PEOPLE?: SOMEWHAT DIFFICULT
5. BEING SO RESTLESS THAT IT IS HARD TO SIT STILL: NOT AT ALL

## 2023-01-04 ASSESSMENT — PATIENT HEALTH QUESTIONNAIRE - PHQ9
SUM OF ALL RESPONSES TO PHQ QUESTIONS 1-9: 2
SUM OF ALL RESPONSES TO PHQ QUESTIONS 1-9: 2
10. IF YOU CHECKED OFF ANY PROBLEMS, HOW DIFFICULT HAVE THESE PROBLEMS MADE IT FOR YOU TO DO YOUR WORK, TAKE CARE OF THINGS AT HOME, OR GET ALONG WITH OTHER PEOPLE: SOMEWHAT DIFFICULT

## 2023-01-05 ENCOUNTER — VIRTUAL VISIT (OUTPATIENT)
Dept: FAMILY MEDICINE | Facility: OTHER | Age: 53
End: 2023-01-05
Attending: FAMILY MEDICINE
Payer: COMMERCIAL

## 2023-01-05 DIAGNOSIS — Q77.4 ACHONDROPLASIA: Primary | ICD-10-CM

## 2023-01-05 DIAGNOSIS — G89.29 CHRONIC MIDLINE LOW BACK PAIN WITHOUT SCIATICA: ICD-10-CM

## 2023-01-05 DIAGNOSIS — F41.1 GENERALIZED ANXIETY DISORDER: ICD-10-CM

## 2023-01-05 DIAGNOSIS — Z02.89 PAIN MEDICATION AGREEMENT COMPLETED: ICD-10-CM

## 2023-01-05 DIAGNOSIS — Z79.891 LONG TERM (CURRENT) USE OF OPIATE ANALGESIC: ICD-10-CM

## 2023-01-05 DIAGNOSIS — F41.8 DEPRESSION WITH ANXIETY: ICD-10-CM

## 2023-01-05 DIAGNOSIS — M54.50 CHRONIC MIDLINE LOW BACK PAIN WITHOUT SCIATICA: ICD-10-CM

## 2023-01-05 DIAGNOSIS — M79.2 NEUROPATHIC PAIN: ICD-10-CM

## 2023-01-05 PROCEDURE — 99213 OFFICE O/P EST LOW 20 MIN: CPT | Mod: 95 | Performed by: FAMILY MEDICINE

## 2023-01-05 RX ORDER — HYDROCODONE BITARTRATE AND ACETAMINOPHEN 5; 325 MG/1; MG/1
2 TABLET ORAL 3 TIMES DAILY PRN
Qty: 200 TABLET | Refills: 0 | Status: SHIPPED | OUTPATIENT
Start: 2023-01-05 | End: 2023-03-02

## 2023-01-05 RX ORDER — GABAPENTIN 300 MG/1
300 CAPSULE ORAL 3 TIMES DAILY
Qty: 90 CAPSULE | Refills: 11 | Status: SHIPPED | OUTPATIENT
Start: 2023-01-05 | End: 2024-03-21

## 2023-01-05 RX ORDER — CLONAZEPAM 0.5 MG/1
TABLET ORAL
Qty: 90 TABLET | Refills: 5 | Status: SHIPPED | OUTPATIENT
Start: 2023-01-05 | End: 2023-06-08

## 2023-01-05 NOTE — NURSING NOTE
"Chief Complaint   Patient presents with     Recheck Medication       Initial LMP 12/05/2022 (Exact Date)  Estimated body mass index is 53.09 kg/m  as calculated from the following:    Height as of 11/3/22: 1.295 m (4' 3\").    Weight as of 11/3/22: 89.1 kg (196 lb 6.4 oz).  Medication Reconciliation: complete    FOOD SECURITY SCREENING QUESTIONS  Hunger Vital Signs:  Within the past 12 months we worried whether our food would run out before we got money to buy more. Never  Within the past 12 months the food we bought just didn't last and we didn't have money to get more. Never        Advance care directive on file? no  Advance care directive provided to patient? declined     Ruth Salazar LPN  "

## 2023-01-05 NOTE — PROGRESS NOTES
"Wilmer is a 52 year old who is being evaluated via a billable telephone visit.      Nursing Notes:   Ruth Salazar LPN  1/5/2023  8:31 AM  Signed  Chief Complaint   Patient presents with     Recheck Medication       Initial LMP 12/05/2022 (Exact Date)  Estimated body mass index is 53.09 kg/m  as calculated from the following:    Height as of 11/3/22: 1.295 m (4' 3\").    Weight as of 11/3/22: 89.1 kg (196 lb 6.4 oz).  Medication Reconciliation: complete    FOOD SECURITY SCREENING QUESTIONS  Hunger Vital Signs:  Within the past 12 months we worried whether our food would run out before we got money to buy more. Never  Within the past 12 months the food we bought just didn't last and we didn't have money to get more. Never        Advance care directive on file? no  Advance care directive provided to patient? declined     Ruth Salazar LPN        What phone number would you like to be contacted at? [phone]  How would you like to obtain your AVS? MyChart    Distant Location (provider location):  On-site    Wilmer was seen today for recheck medication.    Diagnoses and all orders for this visit:    Achondroplasia  -     HYDROcodone-acetaminophen (NORCO) 5-325 MG tablet; Take 2 tablets by mouth 3 times daily as needed for pain . May take a maximum of 7 tablets per day.  -     HYDROcodone-acetaminophen (NORCO) 5-325 MG tablet; Take 2 tablets by mouth 3 times daily as needed for severe pain (7-10) May take a maximum of 7 tablets per day.    Chronic midline low back pain without sciatica  -     HYDROcodone-acetaminophen (NORCO) 5-325 MG tablet; Take 2 tablets by mouth 3 times daily as needed for pain . May take a maximum of 7 tablets per day.  -     HYDROcodone-acetaminophen (NORCO) 5-325 MG tablet; Take 2 tablets by mouth 3 times daily as needed for severe pain (7-10) May take a maximum of 7 tablets per day.    Pain medication agreement completed, 3/19/18, 3/13/19, 7/6/2020, 6/22/21, 5/19/22  -     " HYDROcodone-acetaminophen (NORCO) 5-325 MG tablet; Take 2 tablets by mouth 3 times daily as needed for pain . May take a maximum of 7 tablets per day.  -     HYDROcodone-acetaminophen (NORCO) 5-325 MG tablet; Take 2 tablets by mouth 3 times daily as needed for severe pain (7-10) May take a maximum of 7 tablets per day.    Generalized anxiety disorder    Depression with anxiety  -     clonazePAM (KLONOPIN) 0.5 MG tablet; TAKE 1 TABLET BY MOUTH THREE TIMES DAILY AS NEEDED    Neuropathic pain  -     gabapentin (NEURONTIN) 300 MG capsule; Take 1 capsule (300 mg) by mouth 3 times daily    Long term (current) use of opiate analgesic  -     naloxone (NARCAN) 4 MG/0.1ML nasal spray; Spray 1 spray (4 mg) into one nostril alternating nostrils as needed for opioid reversal every 2-3 minutes until assistance arrives           Julio Guillen is a 52 year old, presenting for the following health issues:  Recheck Medication    Answers for HPI/ROS submitted by the patient on 1/4/2023  If you checked off any problems, how difficult have these problems made it for you to do your work, take care of things at home, or get along with other people?: Somewhat difficult  PHQ9 TOTAL SCORE: 2  YESI 7 TOTAL SCORE: 2      HPI Phone visit for medication renewal.     I last saw her 2 months ago for an in person visit.  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  She takes clonazepam at bedtime for anxiety.  She is on hydrocodone for pain and last filled out on December 9.  She does not take them together and understands the risks.    Pain History:  When did you first notice your pain? - Chronic Pain   Have you seen this provider for your pain in the past?   Yes   Where in your body do you have pain? Back and legs  Are you seeing anyone else for your pain? No    PHQ-9 SCORE 9/14/2022 11/1/2022 1/4/2023   PHQ-9 Total Score MyChart - 1 (Minimal depression) 2 (Minimal depression)   PHQ-9 Total Score 0 1 2        YESI-7 SCORE 7/13/2022 11/1/2022 1/4/2023   Total Score - 2 (minimal anxiety) 2 (minimal anxiety)   Total Score 0 2 2               Chronic Pain Follow Up:    Location of pain: back and legs  Analgesia/pain control:    - Recent changes:  Her back pain is getting worse    - Overall control: Comfortably manageable    - Current treatments: Hydrocodone  Adherence:     - Do you ever take more pain medicine than prescribed? No    - When did you take your last dose of pain medicine?  5 am today   Adverse effects: No   PDMP Review       Value Time User    State PDMP site checked  Yes 1/5/2023  9:48 AM Neptali French MD        Last CSA Agreement:   CSA -- Patient Level:    CSA: None found at the patient level.       Last UDS: 11/10/2022          Review of Systems   ROS is negative except as noted above                Objective    Vitals - Patient Reported  Weight (Patient Reported): 90.7 kg (200 lb)  Pain Score: Moderate Pain (5)  Pain Loc: Low Back (and legs)        Physical Exam   healthy, alert and no distress  PSYCH: Alert and oriented times 3; coherent speech, normal   rate and volume, able to articulate logical thoughts, able   to abstract reason, no tangential thoughts, no hallucinations   or delusions  Her affect is normal  RESP: No cough, no audible wheezing, able to talk in full sentences  Remainder of exam unable to be completed due to telephone visits                Phone call duration: 10 minutes

## 2023-01-18 ENCOUNTER — MYC MEDICAL ADVICE (OUTPATIENT)
Dept: FAMILY MEDICINE | Facility: OTHER | Age: 53
End: 2023-01-18
Payer: COMMERCIAL

## 2023-02-02 ENCOUNTER — MYC MEDICAL ADVICE (OUTPATIENT)
Dept: FAMILY MEDICINE | Facility: OTHER | Age: 53
End: 2023-02-02
Payer: COMMERCIAL

## 2023-02-03 ENCOUNTER — TELEPHONE (OUTPATIENT)
Dept: FAMILY MEDICINE | Facility: OTHER | Age: 53
End: 2023-02-03
Payer: COMMERCIAL

## 2023-02-03 PROBLEM — E66.01 CLASS 3 SEVERE OBESITY WITH SERIOUS COMORBIDITY IN ADULT (H): Status: ACTIVE | Noted: 2023-02-03

## 2023-02-03 PROBLEM — G89.4 CHRONIC PAIN SYNDROME: Status: ACTIVE | Noted: 2023-02-03

## 2023-02-03 PROBLEM — Z96.651 S/P TOTAL KNEE ARTHROPLASTY, RIGHT: Status: ACTIVE | Noted: 2023-02-03

## 2023-02-03 PROBLEM — F11.90 CHRONIC, CONTINUOUS USE OF OPIOIDS: Status: ACTIVE | Noted: 2023-02-03

## 2023-02-03 PROBLEM — E66.813 CLASS 3 SEVERE OBESITY WITH SERIOUS COMORBIDITY IN ADULT (H): Status: ACTIVE | Noted: 2023-02-03

## 2023-02-03 NOTE — TELEPHONE ENCOUNTER
The form was mailed to the patient per her request.  Ruth Salazar LPN..................2/3/2023   4:35 PM

## 2023-02-03 NOTE — TELEPHONE ENCOUNTER
Patient states she would like the form mailed to:  [id number]  [place] [address]

## 2023-03-02 ENCOUNTER — VIRTUAL VISIT (OUTPATIENT)
Dept: FAMILY MEDICINE | Facility: OTHER | Age: 53
End: 2023-03-02
Attending: FAMILY MEDICINE
Payer: COMMERCIAL

## 2023-03-02 DIAGNOSIS — F41.1 GENERALIZED ANXIETY DISORDER: ICD-10-CM

## 2023-03-02 DIAGNOSIS — Z02.89 PAIN MEDICATION AGREEMENT COMPLETED: ICD-10-CM

## 2023-03-02 DIAGNOSIS — M79.2 NEUROPATHIC PAIN: ICD-10-CM

## 2023-03-02 DIAGNOSIS — Q77.4 ACHONDROPLASIA: Primary | ICD-10-CM

## 2023-03-02 DIAGNOSIS — F41.8 DEPRESSION WITH ANXIETY: ICD-10-CM

## 2023-03-02 DIAGNOSIS — M54.50 CHRONIC MIDLINE LOW BACK PAIN WITHOUT SCIATICA: ICD-10-CM

## 2023-03-02 DIAGNOSIS — G89.29 CHRONIC MIDLINE LOW BACK PAIN WITHOUT SCIATICA: ICD-10-CM

## 2023-03-02 PROCEDURE — 99213 OFFICE O/P EST LOW 20 MIN: CPT | Mod: 95 | Performed by: FAMILY MEDICINE

## 2023-03-02 RX ORDER — HYDROCODONE BITARTRATE AND ACETAMINOPHEN 5; 325 MG/1; MG/1
2 TABLET ORAL 3 TIMES DAILY PRN
Qty: 200 TABLET | Refills: 0 | Status: SHIPPED | OUTPATIENT
Start: 2023-03-02 | End: 2023-04-25

## 2023-03-02 ASSESSMENT — ANXIETY QUESTIONNAIRES
GAD7 TOTAL SCORE: 2
GAD7 TOTAL SCORE: 2
6. BECOMING EASILY ANNOYED OR IRRITABLE: NOT AT ALL
1. FEELING NERVOUS, ANXIOUS, OR ON EDGE: NOT AT ALL
5. BEING SO RESTLESS THAT IT IS HARD TO SIT STILL: NOT AT ALL
2. NOT BEING ABLE TO STOP OR CONTROL WORRYING: SEVERAL DAYS
3. WORRYING TOO MUCH ABOUT DIFFERENT THINGS: SEVERAL DAYS
IF YOU CHECKED OFF ANY PROBLEMS ON THIS QUESTIONNAIRE, HOW DIFFICULT HAVE THESE PROBLEMS MADE IT FOR YOU TO DO YOUR WORK, TAKE CARE OF THINGS AT HOME, OR GET ALONG WITH OTHER PEOPLE: SOMEWHAT DIFFICULT
7. FEELING AFRAID AS IF SOMETHING AWFUL MIGHT HAPPEN: NOT AT ALL

## 2023-03-02 ASSESSMENT — PATIENT HEALTH QUESTIONNAIRE - PHQ9
SUM OF ALL RESPONSES TO PHQ QUESTIONS 1-9: 3
5. POOR APPETITE OR OVEREATING: NOT AT ALL

## 2023-03-02 NOTE — PROGRESS NOTES
Wilmer is a 52 year old who is being evaluated via a billable telephone visit.      What phone number would you like to be contacted at? [phone]  How would you like to obtain your AVS? [place]    Distant Location (provider location):  On-site    Wilmer was seen today for recheck medication.    Diagnoses and all orders for this visit:    Achondroplasia  -     HYDROcodone-acetaminophen (NORCO) 5-325 MG tablet; Take 2 tablets by mouth 3 times daily as needed for pain . May take a maximum of 7 tablets per day.  -     HYDROcodone-acetaminophen (NORCO) 5-325 MG tablet; Take 2 tablets by mouth 3 times daily as needed for severe pain (7-10) May take a maximum of 7 tablets per day.    Chronic midline low back pain without sciatica  -     HYDROcodone-acetaminophen (NORCO) 5-325 MG tablet; Take 2 tablets by mouth 3 times daily as needed for pain . May take a maximum of 7 tablets per day.  -     HYDROcodone-acetaminophen (NORCO) 5-325 MG tablet; Take 2 tablets by mouth 3 times daily as needed for severe pain (7-10) May take a maximum of 7 tablets per day.    Pain medication agreement completed, 3/19/18, 3/13/19, 7/6/2020, 6/22/21, 5/19/22  -     HYDROcodone-acetaminophen (NORCO) 5-325 MG tablet; Take 2 tablets by mouth 3 times daily as needed for pain . May take a maximum of 7 tablets per day.  -     HYDROcodone-acetaminophen (NORCO) 5-325 MG tablet; Take 2 tablets by mouth 3 times daily as needed for severe pain (7-10) May take a maximum of 7 tablets per day.    Generalized anxiety disorder    Depression with anxiety    Neuropathic pain      Renewed medication pursuant to her contract.  She will follow-up in person for her next visit.    Neptali French MD     Subjective   Wilmer is a 52 year old presenting for the following health issues:  Recheck Medication    She is having follow-up for pain medication management.  I last saw her 2 months ago.  She continues on hydrocodone.  Her last fill was on 2/3/2023.  She is also  on gabapentin.  She takes clonazepam but does not take them together.   PDMP Review       Value Time User    State PDMP site checked  Yes 3/2/2023 12:23 PM Neptali French MD         She is thinking about moving to [place] to be closer to family.    She is waiting to hear on a GLP-1 for weight loss.   History of Present Illness       Reason for visit:  Med Followup    She eats 2-3 servings of fruits and vegetables daily.She consumes 1 sweetened beverage(s) daily.She exercises with enough effort to increase her heart rate 30 to 60 minutes per day.  She exercises with enough effort to increase her heart rate 7 days per week.   She is taking medications regularly.       Pain History:  When did you first notice your pain? - Chronic Pain   Have you seen this provider for your pain in the past?   Yes   Where in your body do you have pain? Lower back and both legs  Are you seeing anyone else for your pain? No    PHQ-9 SCORE 11/1/2022 1/4/2023 3/2/2023   PHQ-9 Total Score MyChart 1 (Minimal depression) 2 (Minimal depression) -   PHQ-9 Total Score 1 2 3       YESI-7 SCORE 11/1/2022 1/4/2023 3/2/2023   Total Score 2 (minimal anxiety) 2 (minimal anxiety) -   Total Score 2 2 2             Chronic Pain Follow Up:    Location of pain: lower back and both legs  Analgesia/pain control:    - Recent changes:  no    - Overall control: Comfortably manageable    - Current treatments: hydrocodone, gabapentin   Adherence:     - Do you ever take more pain medicine than prescribed? No    - When did you take your last dose of pain medicine?  7 am this morning   Adverse effects: No   PDMP Review       Value Time User    State PDMP site checked  Yes 3/2/2023 12:23 PM Neptali French MD        Last CSA Agreement:   CSA -- Patient Level:    CSA: None found at the patient level.       Last UDS: 11/10/2022            Review of Systems   ROS is negative except as noted above         Objective    Vitals - Patient Reported  Weight (Patient  Reported): 88.5 kg (195 lb)  Pain Score: Severe Pain (6)  Pain Loc: Low Back (both legs)        Physical Exam   healthy, alert and no distress  PSYCH: Alert and oriented times 3; coherent speech, normal   rate and volume, able to articulate logical thoughts, able   to abstract reason, no tangential thoughts, no hallucinations   or delusions  Her affect is normal  RESP: No cough, no audible wheezing, able to talk in full sentences  Remainder of exam unable to be completed due to telephone visits                Phone call duration: 10 minutes

## 2023-03-02 NOTE — NURSING NOTE
"Chief Complaint   Patient presents with     Recheck Medication       Initial LMP 03/02/2023 (Exact Date)   Breastfeeding No  Estimated body mass index is 53.09 kg/m  as calculated from the following:    Height as of 11/3/22: 1.295 m (4' 3\").    Weight as of 11/3/22: 89.1 kg (196 lb 6.4 oz).  Medication Reconciliation: complete    FOOD SECURITY SCREENING QUESTIONS  Hunger Vital Signs:  Within the past 12 months we worried whether our food would run out before we got money to buy more. Never  Within the past 12 months the food we bought just didn't last and we didn't have money to get more. Never        Advance care directive on file? no      Ruth Salazar LPN  "

## 2023-03-05 ENCOUNTER — MYC MEDICAL ADVICE (OUTPATIENT)
Dept: FAMILY MEDICINE | Facility: OTHER | Age: 53
End: 2023-03-05
Payer: COMMERCIAL

## 2023-03-06 NOTE — TELEPHONE ENCOUNTER
I spoke to the patient and moved her appointment to 1120 that day.  Ruth Salazar LPN..................3/6/2023   10:05 AM

## 2023-03-14 ENCOUNTER — TELEPHONE (OUTPATIENT)
Dept: FAMILY MEDICINE | Facility: OTHER | Age: 53
End: 2023-03-14
Payer: COMMERCIAL

## 2023-03-14 NOTE — TELEPHONE ENCOUNTER
The patient was given an appointment on March 21 instead.  Ruth Salazar LPN..................3/14/2023   11:56 AM

## 2023-03-14 NOTE — TELEPHONE ENCOUNTER
Reason for call: Patient wanting a work in appointment.    Is the appointment for a Hospital Follow up?  NO     Patient is having the following symptoms:  Weight management program     The patient is requesting an appointment with  [facility]    Was an appointment offered for this call? No- already has appt on 03/16/2023    Preferred method for responding to this message: Telephone Call    Phone number patient can be reached at? Cell number on file:    Telephone Information:   Mobile [phone]       If we can't reach you directly, may we leave a detailed response at the number you provided? Yes    Can this message wait until your PCP/provider returns if unavailable today? Yes     Patient is traveling from [place] and is worried she will not be able to make it to the Thursday appointment due to weather. Patient stated she will cancel the Thursday appointment when she knows she will not be traveling. Patient stated if she cancels the Thursday appointment she doesn't want to wait for March 29th (the next available appointment at the time of the call.)    Taylor Bone on 3/14/2023 at 11:46 AM

## 2023-03-21 ENCOUNTER — OFFICE VISIT (OUTPATIENT)
Dept: FAMILY MEDICINE | Facility: OTHER | Age: 53
End: 2023-03-21
Attending: FAMILY MEDICINE
Payer: COMMERCIAL

## 2023-03-21 VITALS
HEIGHT: 55 IN | BODY MASS INDEX: 50.59 KG/M2 | SYSTOLIC BLOOD PRESSURE: 154 MMHG | WEIGHT: 218.6 LBS | TEMPERATURE: 99.1 F | DIASTOLIC BLOOD PRESSURE: 96 MMHG | OXYGEN SATURATION: 96 % | RESPIRATION RATE: 20 BRPM | HEART RATE: 100 BPM

## 2023-03-21 DIAGNOSIS — K21.00 GASTROESOPHAGEAL REFLUX DISEASE WITH ESOPHAGITIS WITHOUT HEMORRHAGE: ICD-10-CM

## 2023-03-21 DIAGNOSIS — E66.813 CLASS 3 SEVERE OBESITY WITH SERIOUS COMORBIDITY IN ADULT, UNSPECIFIED BMI, UNSPECIFIED OBESITY TYPE (H): Primary | ICD-10-CM

## 2023-03-21 DIAGNOSIS — Q77.4 ACHONDROPLASIA: ICD-10-CM

## 2023-03-21 DIAGNOSIS — I10 BENIGN ESSENTIAL HYPERTENSION: ICD-10-CM

## 2023-03-21 DIAGNOSIS — E66.01 CLASS 3 SEVERE OBESITY WITH SERIOUS COMORBIDITY IN ADULT, UNSPECIFIED BMI, UNSPECIFIED OBESITY TYPE (H): Primary | ICD-10-CM

## 2023-03-21 DIAGNOSIS — E88.810 METABOLIC SYNDROME X: ICD-10-CM

## 2023-03-21 PROCEDURE — 99214 OFFICE O/P EST MOD 30 MIN: CPT | Performed by: FAMILY MEDICINE

## 2023-03-21 ASSESSMENT — ANXIETY QUESTIONNAIRES
3. WORRYING TOO MUCH ABOUT DIFFERENT THINGS: SEVERAL DAYS
7. FEELING AFRAID AS IF SOMETHING AWFUL MIGHT HAPPEN: NOT AT ALL
IF YOU CHECKED OFF ANY PROBLEMS ON THIS QUESTIONNAIRE, HOW DIFFICULT HAVE THESE PROBLEMS MADE IT FOR YOU TO DO YOUR WORK, TAKE CARE OF THINGS AT HOME, OR GET ALONG WITH OTHER PEOPLE: SOMEWHAT DIFFICULT
6. BECOMING EASILY ANNOYED OR IRRITABLE: NOT AT ALL
2. NOT BEING ABLE TO STOP OR CONTROL WORRYING: SEVERAL DAYS
GAD7 TOTAL SCORE: 2
1. FEELING NERVOUS, ANXIOUS, OR ON EDGE: NOT AT ALL
GAD7 TOTAL SCORE: 2
5. BEING SO RESTLESS THAT IT IS HARD TO SIT STILL: NOT AT ALL

## 2023-03-21 ASSESSMENT — PAIN SCALES - GENERAL: PAINLEVEL: SEVERE PAIN (7)

## 2023-03-21 ASSESSMENT — PATIENT HEALTH QUESTIONNAIRE - PHQ9
SUM OF ALL RESPONSES TO PHQ QUESTIONS 1-9: 3
5. POOR APPETITE OR OVEREATING: NOT AT ALL

## 2023-03-21 NOTE — PROGRESS NOTES
"Nursing Notes:   Ruth Salazar LPN  3/21/2023  2:47 PM  Signed  Chief Complaint   Patient presents with     Recheck Medication     Here for an Rx for weight loss medication   She is here to talk about a weight loss medication.  Ruth Salazar LPN..................3/21/2023   2:46 PM      Initial BP (!) 155/91   Pulse 100   Temp 99.1  F (37.3  C) (Temporal)   Resp 20   Ht 1.302 m (4' 3.25\")   Wt 99.2 kg (218 lb 9.6 oz)   LMP 03/02/2023 (Exact Date)   SpO2 96%   Breastfeeding No   BMI 58.51 kg/m   Estimated body mass index is 58.51 kg/m  as calculated from the following:    Height as of this encounter: 1.302 m (4' 3.25\").    Weight as of this encounter: 99.2 kg (218 lb 9.6 oz).  Medication Reconciliation: complete    FOOD SECURITY SCREENING QUESTIONS  Hunger Vital Signs:  Within the past 12 months we worried whether our food would run out before we got money to buy more. Never  Within the past 12 months the food we bought just didn't last and we didn't have money to get more. Never        Advance care directive on file? no  Advance care directive provided to patient? declined     Ruth Salazar LPN    SUBJECTIVE:  Wilmer Morse  is a 52 year old female who comes in today for follow-up.  We had a virtual visit with her about 3 weeks ago.  We renewed her pain medications pursuant to her contract at that time.    She is still having periods.  She is having night sweats about once a week.  Her periods are variable with regard to being heavy.    Prior to that, we had a discussion with regard to trying a GLP-1 for weight loss. Her HSA approved the GLP-1.      Past Medical, Family, and Social History reviewed and updated as noted below.   ROS is negative except as noted above       Allergies   Allergen Reactions     Penicillins Unknown   ,   Family History   Problem Relation Age of Onset     Other - See Comments Mother         Achondroplasia.     Breast Cancer Mother         Cancer-breast,breast " cancer     Breast Cancer Maternal Grandmother 70        Cancer-breast, breast cancer     Unknown/Adopted Father         Unknown     Family History Negative Daughter         Good Health     Family History Negative Son         Good Health     Family History Negative Other         Good Health     Family History Negative Son         Good Health,(stepson)91   ,   Current Outpatient Medications   Medication     buPROPion (WELLBUTRIN XL) 300 MG 24 hr tablet     clonazePAM (KLONOPIN) 0.5 MG tablet     FEROSUL 325 (65 Fe) MG tablet     gabapentin (NEURONTIN) 300 MG capsule     HYDROcodone-acetaminophen (NORCO) 5-325 MG tablet     HYDROcodone-acetaminophen (NORCO) 5-325 MG tablet     ibuprofen (ADVIL/MOTRIN) 600 MG tablet     lisinopril (ZESTRIL) 10 MG tablet     naloxone (NARCAN) 4 MG/0.1ML nasal spray     omeprazole (PRILOSEC) 20 MG DR capsule     oxybutynin ER (DITROPAN XL) 5 MG 24 hr tablet     pramipexole (MIRAPEX) 0.5 MG tablet     Semaglutide-Weight Management (WEGOVY) 0.25 MG/0.5ML pen     Semaglutide-Weight Management (WEGOVY) 0.5 MG/0.5ML pen     sertraline (ZOLOFT) 100 MG tablet     No current facility-administered medications for this visit.   ,   Past Medical History:   Diagnosis Date     Achondroplasia     No Comments Provided     Family history of malignant neoplasm of breast     No Comments Provided     Hyperlipidemia     not currently on treatment     Other intervertebral disc degeneration, lumbar region     No Comments Provided     Other specified anxiety disorders     No Comments Provided     Overactive bladder     2014     Personal history of other medical treatment (CODE)      3, Para 2-0-1-2.     Primary osteoarthritis of one knee     5/3/2012     Restless legs syndrome     No Comments Provided     Zoster without complications     2006,left side of neck.   ,   Patient Active Problem List    Diagnosis Date Noted     Class 3 severe obesity with serious comorbidity in adult (H)  "2023     Priority: Medium     S/P total knee arthroplasty, right 2023     Priority: Medium     Chronic pain syndrome 2023     Priority: Medium     Chronic, continuous use of opioids 2023     Priority: Medium     Benign essential hypertension 2018     Priority: Medium     Depression with anxiety 2018     Priority: Medium     Family history of breast cancer in female 2018     Priority: Medium     Hyperlipidemia 2018     Priority: Medium     Overview:   not currently on treatment       Restless leg syndrome 2018     Priority: Medium     Dependent edema 09/15/2017     Priority: Medium     Chronic midline low back pain without sciatica 2016     Priority: Medium     Achondroplasia 06/15/2016     Priority: Medium     Pain medication agreement completed 3/19/18, 3/13/19, 20, 2016     Priority: Medium     Osteoarthritis of knee, unilateral 2012     Priority: Medium   ,   Past Surgical History:   Procedure Laterality Date     ADENOIDECTOMY      as a child      SECTION      1996,Primary low transverse  section. Repeat      LAPAROSCOPIC TUBAL LIGATION           OTHER SURGICAL HISTORY      [id number],OSTEOTOMY,Limb lengthening procedures.     OTHER SURGICAL HISTORY      10/16,,,10/17,688231,OTHER,Right     OTHER SURGICAL HISTORY      2017,[id number],OTHER,Right,fluorscopically guided. no improvement.     right knee total arthroplasty Right 2018    Dr. Magaña, [facility]    and   Social History     Tobacco Use     Smoking status: Never     Smokeless tobacco: Never   Substance Use Topics     Alcohol use: No     Alcohol/week: 0.0 standard drinks     OBJECTIVE:  BP (!) 154/96   Pulse 100   Temp 99.1  F (37.3  C) (Temporal)   Resp 20   Ht 1.302 m (4' 3.25\")   Wt 99.2 kg (218 lb 9.6 oz)   LMP 2023 (Exact Date)   SpO2 96%   Breastfeeding No   BMI 58.51 kg/m     EXAM:  Alert and cooperative, no " distress.  Affect is broad ranging and appropriate.  ASSESSMENT/Plan :    Wilmer was seen today for recheck medication.    Diagnoses and all orders for this visit:    Class 3 severe obesity with serious comorbidity in adult, unspecified BMI, unspecified obesity type (H)  -     Semaglutide-Weight Management (WEGOVY) 0.25 MG/0.5ML pen; Inject 0.25 mg Subcutaneous once a week  -     Semaglutide-Weight Management (WEGOVY) 0.5 MG/0.5ML pen; Inject 0.5 mg Subcutaneous once a week    Achondroplasia  -     Semaglutide-Weight Management (WEGOVY) 0.25 MG/0.5ML pen; Inject 0.25 mg Subcutaneous once a week  -     Semaglutide-Weight Management (WEGOVY) 0.5 MG/0.5ML pen; Inject 0.5 mg Subcutaneous once a week    Metabolic syndrome X  -     Semaglutide-Weight Management (WEGOVY) 0.25 MG/0.5ML pen; Inject 0.25 mg Subcutaneous once a week  -     Semaglutide-Weight Management (WEGOVY) 0.5 MG/0.5ML pen; Inject 0.5 mg Subcutaneous once a week    Gastroesophageal reflux disease with esophagitis without hemorrhage  -     Semaglutide-Weight Management (WEGOVY) 0.25 MG/0.5ML pen; Inject 0.25 mg Subcutaneous once a week  -     Semaglutide-Weight Management (WEGOVY) 0.5 MG/0.5ML pen; Inject 0.5 mg Subcutaneous once a week    Benign essential hypertension  -     Semaglutide-Weight Management (WEGOVY) 0.25 MG/0.5ML pen; Inject 0.25 mg Subcutaneous once a week  -     Semaglutide-Weight Management (WEGOVY) 0.5 MG/0.5ML pen; Inject 0.5 mg Subcutaneous once a week      Lengthy discussion today with regard to insulin resistance, carbohydrate limitation caloric restriction and increase in activity.  She seems very motivated to work on this.  She is interested in trying a GLP-1 so prescription written for semaglutide 0.25 mg weekly x4 with then plan to increase to 0.5 mg thereafter.  She tells me that we will have to fill out a form for her HSA each month with a new prescription.  We will follow-up virtually next month unless she is able to get back  here.  She weigh herself at home.  Support and encouragement offered.    A total of 32 minutes was spent with the patient, reviewing records, tests, ordering medications, tests or procedures and documenting clinical information in the EHR.     Neptali French MD          Answers for HPI/ROS submitted by the patient on 3/13/2023  What is the reason for your visit today? : Weight Management Options  How many servings of fruits and vegetables do you eat daily?: 2-3  On average, how many sweetened beverages do you drink each day (Examples: soda, juice, sweet tea, etc.  Do NOT count diet or artificially sweetened beverages)?: 1  How many minutes a day do you exercise enough to make your heart beat faster?: 20 to 29  How many days a week do you exercise enough to make your heart beat faster?: 3 or less  How many days per week do you miss taking your medication?: 0

## 2023-03-21 NOTE — NURSING NOTE
"Chief Complaint   Patient presents with     Recheck Medication     Here for an Rx for weight loss medication   She is here to talk about a weight loss medication.  Ruth Salazar LPN..................3/21/2023   2:46 PM      Initial BP (!) 155/91   Pulse 100   Temp 99.1  F (37.3  C) (Temporal)   Resp 20   Ht 1.302 m (4' 3.25\")   Wt 99.2 kg (218 lb 9.6 oz)   LMP 03/02/2023 (Exact Date)   SpO2 96%   Breastfeeding No   BMI 58.51 kg/m   Estimated body mass index is 58.51 kg/m  as calculated from the following:    Height as of this encounter: 1.302 m (4' 3.25\").    Weight as of this encounter: 99.2 kg (218 lb 9.6 oz).  Medication Reconciliation: complete    FOOD SECURITY SCREENING QUESTIONS  Hunger Vital Signs:  Within the past 12 months we worried whether our food would run out before we got money to buy more. Never  Within the past 12 months the food we bought just didn't last and we didn't have money to get more. Never        Advance care directive on file? no  Advance care directive provided to patient? declined     Ruth Salazar LPN  "

## 2023-03-21 NOTE — PROGRESS NOTES
"      Julio Guillen is a 52 year old, presenting for the following health issues:  Recheck Medication (Here for an Rx for weight loss medication)      History of Present Illness       Reason for visit:  Weight Management Options    She eats 2-3 servings of fruits and vegetables daily.She consumes 1 sweetened beverage(s) daily.She exercises with enough effort to increase her heart rate 20 to 29 minutes per day.  She exercises with enough effort to increase her heart rate 3 or less days per week.   She is taking medications regularly.             Review of Systems         Objective    BP (!) 155/91   Pulse 100   Temp 99.1  F (37.3  C) (Temporal)   Resp 20   Ht 1.302 m (4' 3.25\")   Wt 99.2 kg (218 lb 9.6 oz)   LMP 03/02/2023 (Exact Date)   SpO2 96%   Breastfeeding No   BMI 58.51 kg/m    Body mass index is 58.51 kg/m .  Physical Exam                       "

## 2023-03-22 ENCOUNTER — MYC MEDICAL ADVICE (OUTPATIENT)
Dept: FAMILY MEDICINE | Facility: OTHER | Age: 53
End: 2023-03-22
Payer: COMMERCIAL

## 2023-03-22 DIAGNOSIS — E88.810 METABOLIC SYNDROME X: ICD-10-CM

## 2023-03-22 DIAGNOSIS — E66.813 CLASS 3 SEVERE OBESITY WITH SERIOUS COMORBIDITY IN ADULT, UNSPECIFIED BMI, UNSPECIFIED OBESITY TYPE (H): ICD-10-CM

## 2023-03-22 DIAGNOSIS — K21.00 GASTROESOPHAGEAL REFLUX DISEASE WITH ESOPHAGITIS WITHOUT HEMORRHAGE: ICD-10-CM

## 2023-03-22 DIAGNOSIS — E66.01 CLASS 3 SEVERE OBESITY WITH SERIOUS COMORBIDITY IN ADULT, UNSPECIFIED BMI, UNSPECIFIED OBESITY TYPE (H): ICD-10-CM

## 2023-03-22 DIAGNOSIS — Q77.4 ACHONDROPLASIA: ICD-10-CM

## 2023-03-22 DIAGNOSIS — I10 BENIGN ESSENTIAL HYPERTENSION: ICD-10-CM

## 2023-04-13 DIAGNOSIS — F41.8 DEPRESSION WITH ANXIETY: ICD-10-CM

## 2023-04-13 DIAGNOSIS — I10 BENIGN ESSENTIAL HYPERTENSION: ICD-10-CM

## 2023-04-13 RX ORDER — LISINOPRIL 10 MG/1
TABLET ORAL
Qty: 90 TABLET | Refills: 11 | OUTPATIENT
Start: 2023-04-13

## 2023-04-13 RX ORDER — SERTRALINE HYDROCHLORIDE 100 MG/1
TABLET, FILM COATED ORAL
Qty: 180 TABLET | Refills: 11 | OUTPATIENT
Start: 2023-04-13

## 2023-04-13 NOTE — TELEPHONE ENCOUNTER
[id number] of [place] sent Rx request for the following:    Redundant refill request refused: Too soon:    sertraline (ZOLOFT) 100 MG tablet 180 tablet 11 11/3/2022  No   Sig - Route: Take 2 tablets (200 mg) by mouth daily - Oral   Sent to pharmacy as: Sertraline HCl 100 MG Oral Tablet (ZOLOFT)   Class: E-Prescribe   Order: [id number]   E-Prescribing Status: Receipt confirmed by pharmacy (11/3/2022  1:57 PM CDT)     lisinopril (ZESTRIL) 10 MG tablet 90 tablet 11 11/3/2022  No   Sig - Route: Take 1 tablet (10 mg) by mouth daily - Oral   Sent to pharmacy as: Lisinopril 10 MG Oral Tablet (ZESTRIL)   Class: E-Prescribe   Order: [id number]   E-Prescribing Status: Receipt confirmed by pharmacy (11/3/2022  1:57 PM CDT)     [id number] - [place], [place] - [address] [place] [address] & [facility]     Mary Jose RN .............. 4/13/2023  11:48 AM

## 2023-04-25 ENCOUNTER — VIRTUAL VISIT (OUTPATIENT)
Dept: FAMILY MEDICINE | Facility: OTHER | Age: 53
End: 2023-04-25
Attending: FAMILY MEDICINE
Payer: COMMERCIAL

## 2023-04-25 DIAGNOSIS — M54.50 CHRONIC MIDLINE LOW BACK PAIN WITHOUT SCIATICA: Primary | ICD-10-CM

## 2023-04-25 DIAGNOSIS — Z02.89 PAIN MEDICATION AGREEMENT COMPLETED: ICD-10-CM

## 2023-04-25 DIAGNOSIS — E66.01 CLASS 3 SEVERE OBESITY WITH SERIOUS COMORBIDITY IN ADULT, UNSPECIFIED BMI, UNSPECIFIED OBESITY TYPE (H): ICD-10-CM

## 2023-04-25 DIAGNOSIS — G89.29 CHRONIC MIDLINE LOW BACK PAIN WITHOUT SCIATICA: Primary | ICD-10-CM

## 2023-04-25 DIAGNOSIS — E66.813 CLASS 3 SEVERE OBESITY WITH SERIOUS COMORBIDITY IN ADULT, UNSPECIFIED BMI, UNSPECIFIED OBESITY TYPE (H): ICD-10-CM

## 2023-04-25 DIAGNOSIS — I10 BENIGN ESSENTIAL HYPERTENSION: ICD-10-CM

## 2023-04-25 DIAGNOSIS — F41.1 GENERALIZED ANXIETY DISORDER: ICD-10-CM

## 2023-04-25 DIAGNOSIS — E88.810 METABOLIC SYNDROME X: ICD-10-CM

## 2023-04-25 DIAGNOSIS — M79.2 NEUROPATHIC PAIN: ICD-10-CM

## 2023-04-25 DIAGNOSIS — F41.8 DEPRESSION WITH ANXIETY: ICD-10-CM

## 2023-04-25 DIAGNOSIS — Z79.891 LONG TERM (CURRENT) USE OF OPIATE ANALGESIC: ICD-10-CM

## 2023-04-25 DIAGNOSIS — Q77.4 ACHONDROPLASIA: ICD-10-CM

## 2023-04-25 PROCEDURE — 99213 OFFICE O/P EST LOW 20 MIN: CPT | Mod: 95 | Performed by: FAMILY MEDICINE

## 2023-04-25 RX ORDER — PREDNISOLONE ACETATE 10 MG/ML
SUSPENSION/ DROPS OPHTHALMIC
COMMUNITY
Start: 2023-04-10 | End: 2023-04-25

## 2023-04-25 RX ORDER — BUPROPION HYDROCHLORIDE 300 MG/1
300 TABLET ORAL EVERY MORNING
Qty: 90 TABLET | Refills: 11 | Status: SHIPPED | OUTPATIENT
Start: 2023-04-25 | End: 2024-03-21

## 2023-04-25 RX ORDER — HYDROCODONE BITARTRATE AND ACETAMINOPHEN 5; 325 MG/1; MG/1
2 TABLET ORAL 3 TIMES DAILY PRN
Qty: 200 TABLET | Refills: 0 | Status: SHIPPED | OUTPATIENT
Start: 2023-04-25 | End: 2023-06-08

## 2023-04-25 RX ORDER — APRACLONIDINE HYDROCHLORIDE OPHTHALMIC SOLUTION 10 MG/ML
SOLUTION/ DROPS OPHTHALMIC
COMMUNITY
Start: 2023-04-12 | End: 2023-04-25

## 2023-04-25 ASSESSMENT — ANXIETY QUESTIONNAIRES
1. FEELING NERVOUS, ANXIOUS, OR ON EDGE: NOT AT ALL
GAD7 TOTAL SCORE: 0
IF YOU CHECKED OFF ANY PROBLEMS ON THIS QUESTIONNAIRE, HOW DIFFICULT HAVE THESE PROBLEMS MADE IT FOR YOU TO DO YOUR WORK, TAKE CARE OF THINGS AT HOME, OR GET ALONG WITH OTHER PEOPLE: NOT DIFFICULT AT ALL
6. BECOMING EASILY ANNOYED OR IRRITABLE: NOT AT ALL
7. FEELING AFRAID AS IF SOMETHING AWFUL MIGHT HAPPEN: NOT AT ALL
3. WORRYING TOO MUCH ABOUT DIFFERENT THINGS: NOT AT ALL
5. BEING SO RESTLESS THAT IT IS HARD TO SIT STILL: NOT AT ALL
2. NOT BEING ABLE TO STOP OR CONTROL WORRYING: NOT AT ALL
GAD7 TOTAL SCORE: 0

## 2023-04-25 ASSESSMENT — PATIENT HEALTH QUESTIONNAIRE - PHQ9
5. POOR APPETITE OR OVEREATING: NOT AT ALL
SUM OF ALL RESPONSES TO PHQ QUESTIONS 1-9: 0

## 2023-04-25 NOTE — NURSING NOTE
"Chief Complaint   Patient presents with     Recheck Medication       Initial LMP 03/02/2023 (Exact Date)   Breastfeeding No  Estimated body mass index is 58.51 kg/m  as calculated from the following:    Height as of 3/21/23: 1.302 m (4' 3.25\").    Weight as of 3/21/23: 99.2 kg (218 lb 9.6 oz).  Medication Reconciliation: complete    FOOD SECURITY SCREENING QUESTIONS  Hunger Vital Signs:  Within the past 12 months we worried whether our food would run out before we got money to buy more. Never  Within the past 12 months the food we bought just didn't last and we didn't have money to get more. Never        Advance care directive on file? no      Ruth Salazar LPN  "

## 2023-04-25 NOTE — PROGRESS NOTES
Wilmer is a 52 year old who is being evaluated via a billable telephone visit.      What phone number would you like to be contacted at? [phone]  How would you like to obtain your AVS? Josh    Distant Location (provider location):  On-site    Diagnoses and associated orders for this visit:  Chronic midline low back pain without sciatica  -     HYDROcodone-acetaminophen (NORCO) 5-325 MG tablet; Take 2 tablets by mouth 3 times daily as needed for pain . May take a maximum of 7 tablets per day.  -     HYDROcodone-acetaminophen (NORCO) 5-325 MG tablet; Take 2 tablets by mouth 3 times daily as needed for severe pain May take a maximum of 7 tablets per day.    Achondroplasia  -     HYDROcodone-acetaminophen (NORCO) 5-325 MG tablet; Take 2 tablets by mouth 3 times daily as needed for pain . May take a maximum of 7 tablets per day.  -     HYDROcodone-acetaminophen (NORCO) 5-325 MG tablet; Take 2 tablets by mouth 3 times daily as needed for severe pain May take a maximum of 7 tablets per day.    Pain medication agreement completed, 3/19/18, 3/13/19, 7/6/2020, 6/22/21, 5/19/22  -     HYDROcodone-acetaminophen (NORCO) 5-325 MG tablet; Take 2 tablets by mouth 3 times daily as needed for pain . May take a maximum of 7 tablets per day.  -     HYDROcodone-acetaminophen (NORCO) 5-325 MG tablet; Take 2 tablets by mouth 3 times daily as needed for severe pain May take a maximum of 7 tablets per day.    Generalized anxiety disorder    Neuropathic pain    Long term (current) use of opiate analgesic    Benign essential hypertension    Class 3 severe obesity with serious comorbidity in adult, unspecified BMI, unspecified obesity type (H)    Metabolic syndrome X    Depression with anxiety  -     buPROPion (WELLBUTRIN XL) 300 MG 24 hr tablet; Take 1 tablet (300 mg) by mouth every morning      Renewed medications pursuant to her contract.  She will follow-up virtually in a month for reevaluation and increase of her semaglutide dose  to 1 mg weekly.  She seems to be tolerating it well.    Neptali French MD     Julio Guillen is a 52 year old, presenting for the following health issues:  Recheck Medication         View : No data to display.              JOEY Guillen is having follow-up for her pain medication management.  I last saw her a month ago for a visit regarding weight loss.  We started her on Ozempic.  We also discussed insulin resistance and its role in weight management.  Her HSA has agreed to cover the Ozempic.  She did 0.25 mg weekly for 4 weeks and then the plan would be to increase to 0.5 mg.  There apparently is a form for us to fill out each time she gets a fill. She is down 9#.  She feels her clothes fit better.    She continues on hydrocodone for management of her chronic back pain along with gabapentin.  She takes clonazepam at bedtime but does not take them with her pain medication.  Her last refill of hydrocodone was on 4/1/2023.    Pain History:  When did you first notice your pain? For years   Have you seen this provider for your pain in the past?   Yes   Where in your body do you have pain? Both legs  Are you seeing anyone else for your pain? No        3/2/2023    11:28 AM 3/21/2023     2:35 PM 4/25/2023     9:43 AM   PHQ-9 SCORE   PHQ-9 Total Score 3 3 0           3/2/2023    11:28 AM 3/21/2023     2:35 PM 4/25/2023     9:43 AM   YESI-7 SCORE   Total Score 2 2 0           4/25/2023     9:45 AM   PEG Score   PEG Total Score 5.33           Chronic Pain Follow Up:    Location of pain: both legs  Analgesia/pain control:    - Recent changes:  no    - Overall control: Comfortably manageable    - Current treatments: hydrocodone, gabapentin   Adherence:     - Do you ever take more pain medicine than prescribed? No    - When did you take your last dose of pain medicine?  730 am this morning   Adverse effects: No   PDMP Review       Value Time User    State PDMP site checked  Yes 4/25/2023 10:21 AM Neptali French MD         Last CSA Agreement:   CSA -- Patient Level:    CSA: None found at the patient level.       Last UDS: 11/10/2022            Review of Systems   ROS is negative except as noted above         Objective    Vitals - Patient Reported  Weight (Patient Reported): 95.7 kg (211 lb)  Pain Score: Moderate Pain (4)  Pain Loc:  (legs)        Physical Exam   healthy, alert and no distress  PSYCH: Alert and oriented times 3; coherent speech, normal   rate and volume, able to articulate logical thoughts, able   to abstract reason, no tangential thoughts, no hallucinations   or delusions  Her affect is normal  RESP: No cough, no audible wheezing, able to talk in full sentences  Remainder of exam unable to be completed due to telephone visits                Phone call duration: 12 minutes

## 2023-05-21 ENCOUNTER — MYC MEDICAL ADVICE (OUTPATIENT)
Dept: FAMILY MEDICINE | Facility: OTHER | Age: 53
End: 2023-05-21
Payer: COMMERCIAL

## 2023-05-21 DIAGNOSIS — E66.01 CLASS 3 SEVERE OBESITY WITH SERIOUS COMORBIDITY IN ADULT, UNSPECIFIED BMI, UNSPECIFIED OBESITY TYPE (H): Primary | ICD-10-CM

## 2023-05-21 DIAGNOSIS — E66.813 CLASS 3 SEVERE OBESITY WITH SERIOUS COMORBIDITY IN ADULT, UNSPECIFIED BMI, UNSPECIFIED OBESITY TYPE (H): Primary | ICD-10-CM

## 2023-06-07 ASSESSMENT — ANXIETY QUESTIONNAIRES
3. WORRYING TOO MUCH ABOUT DIFFERENT THINGS: NOT AT ALL
7. FEELING AFRAID AS IF SOMETHING AWFUL MIGHT HAPPEN: NOT AT ALL
4. TROUBLE RELAXING: NOT AT ALL
1. FEELING NERVOUS, ANXIOUS, OR ON EDGE: SEVERAL DAYS
IF YOU CHECKED OFF ANY PROBLEMS ON THIS QUESTIONNAIRE, HOW DIFFICULT HAVE THESE PROBLEMS MADE IT FOR YOU TO DO YOUR WORK, TAKE CARE OF THINGS AT HOME, OR GET ALONG WITH OTHER PEOPLE: SOMEWHAT DIFFICULT
5. BEING SO RESTLESS THAT IT IS HARD TO SIT STILL: NOT AT ALL
7. FEELING AFRAID AS IF SOMETHING AWFUL MIGHT HAPPEN: NOT AT ALL
8. IF YOU CHECKED OFF ANY PROBLEMS, HOW DIFFICULT HAVE THESE MADE IT FOR YOU TO DO YOUR WORK, TAKE CARE OF THINGS AT HOME, OR GET ALONG WITH OTHER PEOPLE?: SOMEWHAT DIFFICULT
GAD7 TOTAL SCORE: 2
GAD7 TOTAL SCORE: 2
6. BECOMING EASILY ANNOYED OR IRRITABLE: NOT AT ALL
2. NOT BEING ABLE TO STOP OR CONTROL WORRYING: SEVERAL DAYS
GAD7 TOTAL SCORE: 2

## 2023-06-07 ASSESSMENT — PATIENT HEALTH QUESTIONNAIRE - PHQ9
10. IF YOU CHECKED OFF ANY PROBLEMS, HOW DIFFICULT HAVE THESE PROBLEMS MADE IT FOR YOU TO DO YOUR WORK, TAKE CARE OF THINGS AT HOME, OR GET ALONG WITH OTHER PEOPLE: SOMEWHAT DIFFICULT
SUM OF ALL RESPONSES TO PHQ QUESTIONS 1-9: 2
SUM OF ALL RESPONSES TO PHQ QUESTIONS 1-9: 2

## 2023-06-08 ENCOUNTER — HOSPITAL ENCOUNTER (OUTPATIENT)
Dept: GENERAL RADIOLOGY | Facility: OTHER | Age: 53
Discharge: HOME | End: 2023-06-08
Attending: FAMILY MEDICINE
Payer: COMMERCIAL

## 2023-06-08 ENCOUNTER — TELEPHONE (OUTPATIENT)
Dept: FAMILY MEDICINE | Facility: OTHER | Age: 53
End: 2023-06-08

## 2023-06-08 ENCOUNTER — OFFICE VISIT (OUTPATIENT)
Dept: FAMILY MEDICINE | Facility: OTHER | Age: 53
End: 2023-06-08
Attending: FAMILY MEDICINE
Payer: COMMERCIAL

## 2023-06-08 VITALS
BODY MASS INDEX: 48.6 KG/M2 | RESPIRATION RATE: 18 BRPM | OXYGEN SATURATION: 95 % | WEIGHT: 210 LBS | TEMPERATURE: 98.3 F | HEIGHT: 55 IN

## 2023-06-08 DIAGNOSIS — K21.00 GASTROESOPHAGEAL REFLUX DISEASE WITH ESOPHAGITIS WITHOUT HEMORRHAGE: ICD-10-CM

## 2023-06-08 DIAGNOSIS — I10 BENIGN ESSENTIAL HYPERTENSION: ICD-10-CM

## 2023-06-08 DIAGNOSIS — E66.813 CLASS 3 SEVERE OBESITY WITH SERIOUS COMORBIDITY IN ADULT, UNSPECIFIED BMI, UNSPECIFIED OBESITY TYPE (H): ICD-10-CM

## 2023-06-08 DIAGNOSIS — E88.810 METABOLIC SYNDROME X: ICD-10-CM

## 2023-06-08 DIAGNOSIS — G89.29 CHRONIC MIDLINE LOW BACK PAIN WITHOUT SCIATICA: ICD-10-CM

## 2023-06-08 DIAGNOSIS — M54.50 CHRONIC MIDLINE LOW BACK PAIN WITHOUT SCIATICA: ICD-10-CM

## 2023-06-08 DIAGNOSIS — M25.571 ACUTE RIGHT ANKLE PAIN: ICD-10-CM

## 2023-06-08 DIAGNOSIS — M25.571 ACUTE RIGHT ANKLE PAIN: Primary | ICD-10-CM

## 2023-06-08 DIAGNOSIS — Z02.89 PAIN MEDICATION AGREEMENT COMPLETED: ICD-10-CM

## 2023-06-08 DIAGNOSIS — F41.8 DEPRESSION WITH ANXIETY: ICD-10-CM

## 2023-06-08 DIAGNOSIS — E66.01 CLASS 3 SEVERE OBESITY WITH SERIOUS COMORBIDITY IN ADULT, UNSPECIFIED BMI, UNSPECIFIED OBESITY TYPE (H): ICD-10-CM

## 2023-06-08 DIAGNOSIS — Q77.4 ACHONDROPLASIA: ICD-10-CM

## 2023-06-08 PROCEDURE — 73610 X-RAY EXAM OF ANKLE: CPT | Mod: RT

## 2023-06-08 PROCEDURE — 99214 OFFICE O/P EST MOD 30 MIN: CPT | Performed by: FAMILY MEDICINE

## 2023-06-08 RX ORDER — CLONAZEPAM 0.5 MG/1
TABLET ORAL
Qty: 90 TABLET | Refills: 5 | Status: SHIPPED | OUTPATIENT
Start: 2023-06-08 | End: 2024-01-02

## 2023-06-08 RX ORDER — HYDROCODONE BITARTRATE AND ACETAMINOPHEN 5; 325 MG/1; MG/1
2 TABLET ORAL 3 TIMES DAILY PRN
Qty: 200 TABLET | Refills: 0 | Status: SHIPPED | OUTPATIENT
Start: 2023-06-08 | End: 2023-08-09

## 2023-06-08 ASSESSMENT — ANXIETY QUESTIONNAIRES: GAD7 TOTAL SCORE: 2

## 2023-06-08 ASSESSMENT — PAIN SCALES - GENERAL: PAINLEVEL: EXTREME PAIN (9)

## 2023-06-08 ASSESSMENT — PATIENT HEALTH QUESTIONNAIRE - PHQ9
SUM OF ALL RESPONSES TO PHQ QUESTIONS 1-9: 2
10. IF YOU CHECKED OFF ANY PROBLEMS, HOW DIFFICULT HAVE THESE PROBLEMS MADE IT FOR YOU TO DO YOUR WORK, TAKE CARE OF THINGS AT HOME, OR GET ALONG WITH OTHER PEOPLE: SOMEWHAT DIFFICULT

## 2023-06-08 NOTE — TELEPHONE ENCOUNTER
States she had to set up an appt for next Wednesday regarding ortho referral. Would like to discuss what she needs to do until then. Please call

## 2023-06-08 NOTE — PROGRESS NOTES
"Nursing Notes:   Farida Shahid LPN  6/8/2023 12:00 PM  Signed  Chief Complaint   Patient presents with     Ankle Pain     Right ankle is unbearable now         Medication reconciliation completed.    FOOD SECURITY SCREENING QUESTIONS:    The next two questions are to help us understand your food security.  If you are feeling you need any assistance in this area, we have resources available to support you today.    Hunger Vital Signs:  Within the past 12 months we worried whether our food would run out before we got money to buy more. Never  Within the past 12 months the food we bought just didn't last and we didn't have money to get more. Never    Initial Temp 98.3  F (36.8  C) (Temporal)   Resp 18   Ht 1.302 m (4' 3.25\")   Wt 95.3 kg (210 lb)   LMP 05/15/2023 (Approximate)   SpO2 95%   Breastfeeding No   BMI 56.21 kg/m   Estimated body mass index is 56.21 kg/m  as calculated from the following:    Height as of this encounter: 1.302 m (4' 3.25\").    Weight as of this encounter: 95.3 kg (210 lb).       Farida Shahid LPN .......  6/8/2023  11:35 AM    SUBJECTIVE:  Wilmer Morse  is a 52 year old female who comes in today because of severe right ankle pain. It has been grinding for years.  It never hurt til a month ago. NKI, but it started hurting. It burns and spasms.  She has had to get up during the night and take a pain pill. Just having the covers on it hurts.  It has not gotten red swollen or tender to the touch.  Its been going on for several weeks now.  Better when she is off her feet, worse pairing weight.    She continues to use Wegovy for weight loss and has been tolerating it fine.  She is currently supposed to be on the 1 mg dose.  There is a shortage so she wonders about continuing on 0.5 which I think is reasonable    Past Medical, Family, and Social History reviewed and updated as noted below.   ROS is negative except as noted above       Allergies   Allergen Reactions     Penicillins " Unknown   ,   Family History   Problem Relation Age of Onset     Other - See Comments Mother         Achondroplasia.     Breast Cancer Mother         Cancer-breast,breast cancer     Breast Cancer Maternal Grandmother 70        Cancer-breast, breast cancer     Unknown/Adopted Father         Unknown     Family History Negative Daughter         Good Health     Family History Negative Son         Good Health     Family History Negative Other         Good Health     Family History Negative Son         Good Health,(stepson)91   ,   Current Outpatient Medications   Medication     buPROPion (WELLBUTRIN XL) 300 MG 24 hr tablet     clonazePAM (KLONOPIN) 0.5 MG tablet     FEROSUL 325 (65 Fe) MG tablet     gabapentin (NEURONTIN) 300 MG capsule     HYDROcodone-acetaminophen (NORCO) 5-325 MG tablet     HYDROcodone-acetaminophen (NORCO) 5-325 MG tablet     ibuprofen (ADVIL/MOTRIN) 600 MG tablet     lisinopril (ZESTRIL) 10 MG tablet     naloxone (NARCAN) 4 MG/0.1ML nasal spray     omeprazole (PRILOSEC) 20 MG DR capsule     oxybutynin ER (DITROPAN XL) 5 MG 24 hr tablet     pramipexole (MIRAPEX) 0.5 MG tablet     Semaglutide-Weight Management (WEGOVY) 0.25 MG/0.5ML pen     Semaglutide-Weight Management (WEGOVY) 0.5 MG/0.5ML pen     Semaglutide-Weight Management (WEGOVY) 1 MG/0.5ML pen     sertraline (ZOLOFT) 100 MG tablet     No current facility-administered medications for this visit.   ,   Past Medical History:   Diagnosis Date     Achondroplasia     No Comments Provided     Family history of malignant neoplasm of breast     No Comments Provided     Hyperlipidemia     not currently on treatment     Other intervertebral disc degeneration, lumbar region     No Comments Provided     Other specified anxiety disorders     No Comments Provided     Overactive bladder     2014     Personal history of other medical treatment (CODE)      3, Para 2-0-1-2.     Primary osteoarthritis of one knee     5/3/2012     Restless legs  syndrome     No Comments Provided     Zoster without complications     2006,left side of neck.   ,   Patient Active Problem List    Diagnosis Date Noted     Class 3 severe obesity with serious comorbidity in adult (H) 2023     Priority: Medium     S/P total knee arthroplasty, right 2023     Priority: Medium     Chronic pain syndrome 2023     Priority: Medium     Chronic, continuous use of opioids 2023     Priority: Medium     Benign essential hypertension 2018     Priority: Medium     Depression with anxiety 2018     Priority: Medium     Family history of breast cancer in female 2018     Priority: Medium     Hyperlipidemia 2018     Priority: Medium     Overview:   not currently on treatment       Restless leg syndrome 2018     Priority: Medium     Dependent edema 09/15/2017     Priority: Medium     Chronic midline low back pain without sciatica 2016     Priority: Medium     Achondroplasia 06/15/2016     Priority: Medium     Pain medication agreement completed 3/19/18, 3/13/19, 20, 2016     Priority: Medium     Osteoarthritis of knee, unilateral 2012     Priority: Medium   ,   Past Surgical History:   Procedure Laterality Date     ADENOIDECTOMY      as a child      SECTION      1996,Primary low transverse  section. Repeat      LAPAROSCOPIC TUBAL LIGATION           OTHER SURGICAL HISTORY      [id number],OSTEOTOMY,Limb lengthening procedures.     OTHER SURGICAL HISTORY      10/16,,,10/17,[id number],OTHER,Right     OTHER SURGICAL HISTORY      2017,[id number],OTHER,Right,fluorscopically guided. no improvement.     right knee total arthroplasty Right 2018    Dr. Magaña, [facility]    and   Social History     Tobacco Use     Smoking status: Never     Smokeless tobacco: Never   Vaping Use     Vaping status: Never Used   Substance Use Topics     Alcohol use: No     Alcohol/week: 0.0 standard drinks  "of alcohol     OBJECTIVE:  Temp 98.3  F (36.8  C) (Temporal)   Resp 18   Ht 1.302 m (4' 3.25\")   Wt 95.3 kg (210 lb)   LMP 05/15/2023 (Approximate)   SpO2 95%   Breastfeeding No   BMI 56.21 kg/m     EXAM:  Appears to be her normal self.  She has no swelling redness synovitis or tenderness to palpation about the right ankle joint.  Some discomfort with flexion but the joint is stable.  No calf or leg tenderness.  No ligamentous tenderness.    Results for orders placed or performed during the hospital encounter of 06/08/23   XR Ankle Right G/E 3 Views     Status: None    Narrative    Exam: XR ANKLE RIGHT G/E 3 VIEWS    Technique: Right ankle, 3 Views    Comparison: 7/24/2013    Exam reason: Acute right ankle pain    Findings:  No acute fracture or dislocation. There is similar deformity of the  distal tibia and fibula. There are severe degenerative changes of the  ankle, progressed slightly since 2013.     No focal soft tissue abnormality.      Impression    Impression:  No acute fracture or dislocation.    Severe degenerative changes of the ankle, progressed slightly since  2013. Similar deformity of the distal tibia and fibula.    SANJAY GARCIA MD         SYSTEM ID:  [id number]      ASSESSMENT/Plan :    Wilmer was seen today for ankle pain.    Diagnoses and all orders for this visit:    Acute right ankle pain  -     XR Ankle Right G/E 3 Views; Future  -     Orthopedic  Referral; Future    Class 3 severe obesity with serious comorbidity in adult, unspecified BMI, unspecified obesity type (H)  -     Semaglutide-Weight Management (WEGOVY) 0.5 MG/0.5ML pen; Inject 0.5 mg Subcutaneous once a week    Achondroplasia  -     Semaglutide-Weight Management (WEGOVY) 0.5 MG/0.5ML pen; Inject 0.5 mg Subcutaneous once a week  -     Orthopedic  Referral; Future  -     HYDROcodone-acetaminophen (NORCO) 5-325 MG tablet; Take 2 tablets by mouth 3 times daily as needed for pain . May take a maximum of 7 tablets " per day.  -     HYDROcodone-acetaminophen (NORCO) 5-325 MG tablet; Take 2 tablets by mouth 3 times daily as needed for severe pain May take a maximum of 7 tablets per day.    Metabolic syndrome X  -     Semaglutide-Weight Management (WEGOVY) 0.5 MG/0.5ML pen; Inject 0.5 mg Subcutaneous once a week    Gastroesophageal reflux disease with esophagitis without hemorrhage  -     Semaglutide-Weight Management (WEGOVY) 0.5 MG/0.5ML pen; Inject 0.5 mg Subcutaneous once a week    Benign essential hypertension  -     Semaglutide-Weight Management (WEGOVY) 0.5 MG/0.5ML pen; Inject 0.5 mg Subcutaneous once a week    Chronic midline low back pain without sciatica  -     HYDROcodone-acetaminophen (NORCO) 5-325 MG tablet; Take 2 tablets by mouth 3 times daily as needed for pain . May take a maximum of 7 tablets per day.  -     HYDROcodone-acetaminophen (NORCO) 5-325 MG tablet; Take 2 tablets by mouth 3 times daily as needed for severe pain May take a maximum of 7 tablets per day.    Pain medication agreement completed, 3/19/18, 3/13/19, 7/6/2020, 6/22/21, 5/19/22  -     HYDROcodone-acetaminophen (NORCO) 5-325 MG tablet; Take 2 tablets by mouth 3 times daily as needed for pain . May take a maximum of 7 tablets per day.  -     HYDROcodone-acetaminophen (NORCO) 5-325 MG tablet; Take 2 tablets by mouth 3 times daily as needed for severe pain May take a maximum of 7 tablets per day.    Depression with anxiety  -     clonazePAM (KLONOPIN) 0.5 MG tablet; TAKE 1 TABLET BY MOUTH THREE TIMES DAILY AS NEEDED    She has severe degenerative changes of the ankle.  We discussed being in a brace but we did not have anything that would fit her because of her short stature.  Prescription for a custom brace sent with her to get at [facility] orthotics.  We discussed referral to foot and ankle specialist.  Discussed options and she is in [place] and closer to the [facility] then [place].  We have Dr. Jones here where she can see someone else from  [place] but would rather go to the [facility].  Referral sent to [facility] orthopedics.  I suspect that she may come to some surgical intervention here.    Recommend she follow-up with me in 2 weeks.    Renewed pain medication pursuant to her contract.  Discussed use of ice and ibuprofen for her ankle as well.  Discussed offloading some weightbearing as tolerated.    Continue with the same dose of Wegovy until the new dose comes in.    A total of 30 minutes was spent with the patient, reviewing records, tests, ordering medications, tests or procedures and documenting clinical information in the EHRs.       Neptali French MD          Answers for HPI/ROS submitted by the patient on 6/7/2023  If you checked off any problems, how difficult have these problems made it for you to do your work, take care of things at home, or get along with other people?: Somewhat difficult  PHQ9 TOTAL SCORE: 2  YESI 7 TOTAL SCORE: 2  How many servings of fruits and vegetables do you eat daily?: 2-3  On average, how many sweetened beverages do you drink each day (Examples: soda, juice, sweet tea, etc.  Do NOT count diet or artificially sweetened beverages)?: 1  How many minutes a day do you exercise enough to make your heart beat faster?: 30 to 60  How many days a week do you exercise enough to make your heart beat faster?: 7  How many days per week do you miss taking your medication?: 0  What is the reason for your visit today?: CHRONIC RIGHT ANKLE PAIN  What are your symptoms?: SHARP PAIN WHEN WALKING, SPASMS AND PAIN WHEN RESTING  How would you describe these symptoms?: Severe  Are your symptoms:: Worsening  Have you had these symptoms before?: No       Biopsy Type: H and E Anesthesia Volume In Cc (Will Not Render If 0): 0.5 Type Of Destruction Used: Curettage Consent: Written consent was obtained and risks were reviewed including but not limited to scarring, infection, bleeding, scabbing, incomplete removal, nerve damage and allergy to anesthesia. Electrodesiccation And Curettage Text: The wound bed was treated with electrodesiccation and curettage after the biopsy was performed. X Size Of Lesion In Cm: 0 Electrodesiccation Text: The wound bed was treated with electrodesiccation after the biopsy was performed. Notification Instructions: Patient will be notified of biopsy results. However, patient instructed to call the office if not contacted within 2 weeks. Bill For Surgical Tray: no Dressing: bandage Render Post-Care Instructions In Note?: yes Silver Nitrate Text: The wound bed was treated with silver nitrate after the biopsy was performed. Curettage Text: The wound bed was treated with curettage after the biopsy was performed. Billing Type: Third-Party Bill Detail Level: Detailed Biopsy Method: Personna blade Hemostasis: Electrocautery Lab Facility: 3 Wound Care: Petrolatum Lab: 6 Depth Of Biopsy: dermis Anesthesia Type: 1% lidocaine with epinephrine Cryotherapy Text: The wound bed was treated with cryotherapy after the biopsy was performed. Post-Care Instructions: I reviewed with the patient in detail post-care instructions. Patient is to keep the biopsy site dry overnight, and then apply bacitracin twice daily until healed.

## 2023-06-08 NOTE — TELEPHONE ENCOUNTER
Notified patient of providers note.   Nupur Asif LPN ....................  6/8/2023   4:09 PM [id number]

## 2023-06-08 NOTE — PROGRESS NOTES
"Julio Guillen is a 52 year old, presenting for the following health issues:  Ankle Pain (Right ankle is unbearable now  )         View : No data to display.              Ankle Pain    History of Present Illness       Reason for visit:  CHRONIC RIGHT ANKLE PAIN  Symptoms include:  SHARP PAIN WHEN WALKING, SPASMS AND PAIN WHEN RESTING  Symptom intensity:  Severe  Symptom progression:  Worsening  Had these symptoms before:  No    She eats 2-3 servings of fruits and vegetables daily.She consumes 1 sweetened beverage(s) daily.She exercises with enough effort to increase her heart rate 30 to 60 minutes per day.  She exercises with enough effort to increase her heart rate 7 days per week.   She is taking medications regularly.    Today's PHQ-9         PHQ-9 Total Score: 2    PHQ-9 Q9 Thoughts of better off dead/self-harm past 2 weeks :   Not at all    How difficult have these problems made it for you to do your work, take care of things at home, or get along with other people: Somewhat difficult  Today's YESI-7 Score: 2       Right ankle pain has become unbearable         Review of Systems         Objective    Temp 98.3  F (36.8  C) (Temporal)   Resp 18   Ht 1.302 m (4' 3.25\")   Wt 95.3 kg (210 lb)   LMP 05/15/2023 (Approximate)   SpO2 95%   Breastfeeding No   BMI 56.21 kg/m    Body mass index is 56.21 kg/m .  Physical Exam                       "

## 2023-06-08 NOTE — NURSING NOTE
"Chief Complaint   Patient presents with     Ankle Pain     Right ankle is unbearable now         Medication reconciliation completed.    FOOD SECURITY SCREENING QUESTIONS:    The next two questions are to help us understand your food security.  If you are feeling you need any assistance in this area, we have resources available to support you today.    Hunger Vital Signs:  Within the past 12 months we worried whether our food would run out before we got money to buy more. Never  Within the past 12 months the food we bought just didn't last and we didn't have money to get more. Never    Initial Temp 98.3  F (36.8  C) (Temporal)   Resp 18   Ht 1.302 m (4' 3.25\")   Wt 95.3 kg (210 lb)   LMP 05/15/2023 (Approximate)   SpO2 95%   Breastfeeding No   BMI 56.21 kg/m   Estimated body mass index is 56.21 kg/m  as calculated from the following:    Height as of this encounter: 1.302 m (4' 3.25\").    Weight as of this encounter: 95.3 kg (210 lb).       Farida Shahid LPN .......  6/8/2023  11:35 AM  "

## 2023-06-08 NOTE — TELEPHONE ENCOUNTER
Wrap with an ace bandage for support, limit weight bearing, continue ice and ibuprofen.  Sorry it will take so long:/  Neptali French MD on 6/8/2023 at 4:07 PM

## 2023-06-09 ENCOUNTER — MYC MEDICAL ADVICE (OUTPATIENT)
Dept: FAMILY MEDICINE | Facility: OTHER | Age: 53
End: 2023-06-09
Payer: COMMERCIAL

## 2023-06-09 DIAGNOSIS — M25.571 ACUTE RIGHT ANKLE PAIN: Primary | ICD-10-CM

## 2023-06-09 NOTE — TELEPHONE ENCOUNTER
Called and spoke with patient.  Patient states that she dropped off her Rx for brace at [facility] yesterday and cannot get in to get fitted until Wednesday June 14th.    The [facility] in [place] has Prosthetic and orthotics.    Informed patient to call [facility] and explain her situation and if she could have Rx for brace at [facility] be faxed to them.    Patient verbalized understanding and will call back with any questions or concerns.  Gavi Fowler RN on 6/9/2023 at 11:27 AM

## 2023-06-13 ENCOUNTER — MYC MEDICAL ADVICE (OUTPATIENT)
Dept: FAMILY MEDICINE | Facility: OTHER | Age: 53
End: 2023-06-13
Payer: COMMERCIAL

## 2023-06-14 NOTE — TELEPHONE ENCOUNTER
Faxed Orthopedic Referral from 6/8 to [facility] at fax [phone] through [employer] routing system.      Patient update on [facility].    Ángela Early RN on 6/14/2023 at 3:48 PM

## 2023-06-27 ENCOUNTER — MYC MEDICAL ADVICE (OUTPATIENT)
Dept: FAMILY MEDICINE | Facility: OTHER | Age: 53
End: 2023-06-27
Payer: COMMERCIAL

## 2023-07-09 DIAGNOSIS — G25.81 RESTLESS LEG SYNDROME: ICD-10-CM

## 2023-07-11 ENCOUNTER — MYC MEDICAL ADVICE (OUTPATIENT)
Dept: FAMILY MEDICINE | Facility: OTHER | Age: 53
End: 2023-07-11
Payer: COMMERCIAL

## 2023-07-11 DIAGNOSIS — G25.81 RESTLESS LEG SYNDROME: ICD-10-CM

## 2023-07-11 RX ORDER — PRAMIPEXOLE DIHYDROCHLORIDE 0.5 MG/1
0.75 TABLET ORAL AT BEDTIME
Qty: 145 TABLET | Refills: 11 | Status: SHIPPED | OUTPATIENT
Start: 2023-07-11 | End: 2023-09-14

## 2023-07-11 NOTE — TELEPHONE ENCOUNTER
Requested Prescriptions   Pending Prescriptions Disp Refills     pramipexole (MIRAPEX) 0.5 MG tablet 145 tablet 11     Sig: Take 1.5 tablets (0.75 mg) by mouth At Bedtime       There is no refill protocol information for this order          Last Prescription Date:   5/12/2022  Last Fill Qty/Refills:         145, R-11    Last Office Visit:              6/8/2023   Future Office visit:           8/9/2023    Ángela Early RN on 7/11/2023 at 2:42 PM

## 2023-07-12 RX ORDER — PRAMIPEXOLE DIHYDROCHLORIDE 0.5 MG/1
TABLET ORAL
Qty: 145 TABLET | Refills: 11 | OUTPATIENT
Start: 2023-07-12

## 2023-07-12 NOTE — TELEPHONE ENCOUNTER
"Filled 07/11/23 #145 x 11. Contacted Pharmacy in [place], [place] who states, \" Yes we transferred the prescription from 's  in [place], [place]. Please disregard. Unable to complete prescription refill per RNMedication Refill Policy.................... Angy Velásquez RN ....................  7/12/2023   12:50 PM       Disp Refills Start End WHITNEY   pramipexole (MIRAPEX) 0.5 MG tablet 145 tablet 11 7/11/2023  No   Sig - Route: Take 1.5 tablets (0.75 mg) by mouth At Bedtime - Oral   Sent to pharmacy as: Pramipexole Dihydrochloride 0.5 MG Oral Tablet (MIRAPEX)   Class: E-Prescribe   Order: [id number]   E-Prescribing Status: Receipt confirmed by pharmacy (7/11/2023  3:20 PM CDT)       [id number] - [place], [place] - [address]       "

## 2023-07-13 RX ORDER — PRAMIPEXOLE DIHYDROCHLORIDE 0.5 MG/1
TABLET ORAL
Qty: 145 TABLET | Refills: 11 | OUTPATIENT
Start: 2023-07-13

## 2023-07-13 NOTE — TELEPHONE ENCOUNTER
pramipexole (MIRAPEX) 0.5 MG tablet 145 tablet 11 7/11/2023  No   Sig - Route: Take 1.5 tablets (0.75 mg) by mouth At Bedtime -      To [facility].    Called [facility] in [place] and they state patient just picked up a refill and has refills on file so not sure why a request was sent.    Request denied  Gavi Fowler RN on 7/13/2023 at 12:18 PM

## 2023-08-08 ENCOUNTER — PATIENT OUTREACH (OUTPATIENT)
Dept: FAMILY MEDICINE | Facility: OTHER | Age: 53
End: 2023-08-08
Payer: COMMERCIAL

## 2023-08-08 ASSESSMENT — ANXIETY QUESTIONNAIRES
4. TROUBLE RELAXING: NOT AT ALL
5. BEING SO RESTLESS THAT IT IS HARD TO SIT STILL: NOT AT ALL
7. FEELING AFRAID AS IF SOMETHING AWFUL MIGHT HAPPEN: NOT AT ALL
1. FEELING NERVOUS, ANXIOUS, OR ON EDGE: NOT AT ALL
GAD7 TOTAL SCORE: 2
6. BECOMING EASILY ANNOYED OR IRRITABLE: NOT AT ALL
IF YOU CHECKED OFF ANY PROBLEMS ON THIS QUESTIONNAIRE, HOW DIFFICULT HAVE THESE PROBLEMS MADE IT FOR YOU TO DO YOUR WORK, TAKE CARE OF THINGS AT HOME, OR GET ALONG WITH OTHER PEOPLE: SOMEWHAT DIFFICULT
3. WORRYING TOO MUCH ABOUT DIFFERENT THINGS: SEVERAL DAYS
GAD7 TOTAL SCORE: 2
2. NOT BEING ABLE TO STOP OR CONTROL WORRYING: SEVERAL DAYS

## 2023-08-08 NOTE — TELEPHONE ENCOUNTER
Patient Quality Outreach    Patient is due for the following:   Colon Cancer Screening    Next Steps:   No follow up needed at this time.    Type of outreach:    Phone, left message for patient/parent to call back.  Left message stating we are reaching out regarding a test that was sent to the pt s house in November 2022 and has not been returned or completed. Left main clinic number and instructed them to ask for Thelma QUINTANA in [address]. Ext. [phone]     Questions for provider review:    None           Thelma Watts

## 2023-08-09 ENCOUNTER — VIRTUAL VISIT (OUTPATIENT)
Dept: FAMILY MEDICINE | Facility: OTHER | Age: 53
End: 2023-08-09
Attending: FAMILY MEDICINE
Payer: COMMERCIAL

## 2023-08-09 DIAGNOSIS — Z02.89 PAIN MEDICATION AGREEMENT COMPLETED: ICD-10-CM

## 2023-08-09 DIAGNOSIS — F41.1 GENERALIZED ANXIETY DISORDER: ICD-10-CM

## 2023-08-09 DIAGNOSIS — G89.29 CHRONIC MIDLINE LOW BACK PAIN WITHOUT SCIATICA: ICD-10-CM

## 2023-08-09 DIAGNOSIS — G25.81 RESTLESS LEG SYNDROME: ICD-10-CM

## 2023-08-09 DIAGNOSIS — Q77.4 ACHONDROPLASIA: ICD-10-CM

## 2023-08-09 DIAGNOSIS — M79.2 NEUROPATHIC PAIN: Primary | ICD-10-CM

## 2023-08-09 DIAGNOSIS — M25.571 ACUTE RIGHT ANKLE PAIN: ICD-10-CM

## 2023-08-09 DIAGNOSIS — E66.01 CLASS 3 SEVERE OBESITY WITH SERIOUS COMORBIDITY IN ADULT, UNSPECIFIED BMI, UNSPECIFIED OBESITY TYPE (H): ICD-10-CM

## 2023-08-09 DIAGNOSIS — E66.813 CLASS 3 SEVERE OBESITY WITH SERIOUS COMORBIDITY IN ADULT, UNSPECIFIED BMI, UNSPECIFIED OBESITY TYPE (H): ICD-10-CM

## 2023-08-09 DIAGNOSIS — M54.50 CHRONIC MIDLINE LOW BACK PAIN WITHOUT SCIATICA: ICD-10-CM

## 2023-08-09 PROCEDURE — 99213 OFFICE O/P EST LOW 20 MIN: CPT | Mod: 95 | Performed by: FAMILY MEDICINE

## 2023-08-09 RX ORDER — HYDROCODONE BITARTRATE AND ACETAMINOPHEN 5; 325 MG/1; MG/1
2 TABLET ORAL 3 TIMES DAILY PRN
Qty: 200 TABLET | Refills: 0 | Status: SHIPPED | OUTPATIENT
Start: 2023-08-09 | End: 2023-10-05

## 2023-08-09 ASSESSMENT — PATIENT HEALTH QUESTIONNAIRE - PHQ9: SUM OF ALL RESPONSES TO PHQ QUESTIONS 1-9: 2

## 2023-08-09 NOTE — PROGRESS NOTES
Nursing Notes:   Estefania Muro CMA  8/9/2023  1:14 PM  Signed  Patient requests telephone visit today for follow up on medications for pain and weight.  Medication list reviewed.  Estefania Muro CMA(AAMA)..................8/9/2023   1:13 PM     Wilmer is a 52 year old who is being evaluated via a billable telephone visit.      What phone number would you like to be contacted at? [phone]  How would you like to obtain your AVS? MyChart    Distant Location (provider location):  On-site    Diagnoses and associated orders for this visit:  Neuropathic pain    Generalized anxiety disorder    Pain medication agreement completed  -     HYDROcodone-acetaminophen (NORCO) 5-325 MG tablet; Take 2 tablets by mouth 3 times daily as needed for pain . May take a maximum of 7 tablets per day.  -     HYDROcodone-acetaminophen (NORCO) 5-325 MG tablet; Take 2 tablets by mouth 3 times daily as needed for severe pain May take a maximum of 7 tablets per day.    Achondroplasia  -     HYDROcodone-acetaminophen (NORCO) 5-325 MG tablet; Take 2 tablets by mouth 3 times daily as needed for pain . May take a maximum of 7 tablets per day.  -     HYDROcodone-acetaminophen (NORCO) 5-325 MG tablet; Take 2 tablets by mouth 3 times daily as needed for severe pain May take a maximum of 7 tablets per day.    Class 3 severe obesity with serious comorbidity in adult, unspecified BMI, unspecified obesity type (H)  -     Semaglutide-Weight Management (WEGOVY) 1.7 MG/0.75ML pen; Inject 1.7 mg Subcutaneous once a week  -     Semaglutide-Weight Management (WEGOVY) 2.4 MG/0.75ML pen; Inject 2.4 mg Subcutaneous once a week    Restless leg syndrome    Acute right ankle pain    Pain medication agreement completed, 3/19/18, 3/13/19, 7/6/2020, 6/22/21, 5/19/22  -     HYDROcodone-acetaminophen (NORCO) 5-325 MG tablet; Take 2 tablets by mouth 3 times daily as needed for pain . May take a maximum of 7 tablets per day.  -     HYDROcodone-acetaminophen  (NORCO) 5-325 MG tablet; Take 2 tablets by mouth 3 times daily as needed for severe pain May take a maximum of 7 tablets per day.    Chronic midline low back pain without sciatica  -     HYDROcodone-acetaminophen (NORCO) 5-325 MG tablet; Take 2 tablets by mouth 3 times daily as needed for pain . May take a maximum of 7 tablets per day.  -     HYDROcodone-acetaminophen (NORCO) 5-325 MG tablet; Take 2 tablets by mouth 3 times daily as needed for severe pain May take a maximum of 7 tablets per day.      She will follow through with her orthopedic appointment and continue wearing her brace.    Discussed her pain medication and she will attempt to try and taper by 1/2 tablet/month now that her weight is down and her ankle seems to be better.  Renewed medications pursuant to her contract.    She is doing well with her weight loss.  She feels like she finally has gotten going with some improved weight loss and is tolerating her medicine well.  Will bump her Wegovy to 1.7 mg weekly for a month and then up to 2.4 and she will follow-up in 2 months, sooner if needed.    Julio Guillen is a 52 year old, presenting for the following health issues:  RECHECK      8/9/2023     1:12 PM   Additional Questions   Roomed by Estefania Muro CMA([facility])   Accompanied by nobody       HPI  Wilmer is having virtual telephone visit for follow-up of her medications.    She has chronic back issues and arthritic issues from her achondroplasia and has been on hydrocodone under medication agreement for some time.  Her last fill of hydrocodone was on 7/18/2023 she continues on clonazepam at nighttime but knows not to take these together and is tolerated these medications for quite some time.  Her last ToxAssure was in November.  She is at low risk.  PDMP Review         Value Time User    State PDMP site checked  Yes 8/9/2023  1:26 PM Neptali French MD             She has been on Wegovy for weight loss.  She has completed the 0.25 mg and 0.5  "mg and has been on the 1 mg weekly since her last visit.  She had some difficulty with dose escalation because of the shortage of the medication.  She has lost 19 pounds since starting.  She has been tolerating the 1 mg dose.     When I last saw her 2 months ago, she was having severe right ankle pain.  X-ray showed pretty severe degenerative changes of the ankle.  We sent a prescription for custom brace because we did not have anything off the shelf that would fit her.  We sent a referral to [facility] orthopedics for consultation. She has an appointment soon.  She did find a brace that fit off the shelf from [employer]. A custom brace was too expensive.           Review of Systems   ROS is negative except as noted above         Objective    Vitals - Patient Reported  Weight (Patient Reported): 90.7 kg (200 lb)  Pain Score: Severe Pain (6)  Pain Loc: Other - see comment (both legs, \"as usual\")        Physical Exam   healthy, alert, and no distress  PSYCH: Alert and oriented times 3; coherent speech, normal   rate and volume, able to articulate logical thoughts, able   to abstract reason, no tangential thoughts, no hallucinations   or delusions  Her affect is normal  RESP: No cough, no audible wheezing, able to talk in full sentences  Remainder of exam unable to be completed due to telephone visits                Phone call duration: 12 minutes   Answers submitted by the patient for this visit:  YESI-7 (Submitted on 8/8/2023)  YESI 7 TOTAL SCORE: 2    "

## 2023-08-09 NOTE — NURSING NOTE
Patient requests telephone visit today for follow up on medications for pain and weight.  Medication list reviewed.  Estefania Muro CMA([facility])..................8/9/2023   1:13 PM

## 2023-08-18 ENCOUNTER — MYC MEDICAL ADVICE (OUTPATIENT)
Dept: FAMILY MEDICINE | Facility: OTHER | Age: 53
End: 2023-08-18
Payer: COMMERCIAL

## 2023-08-18 NOTE — TELEPHONE ENCOUNTER
Called patient to get clarification from [employer] message.      Rx's were both stuck in system and released to [facility].  She gets this at [facility] in [place], which she said has weekend hours and she will get tomorrow.    Ángela Early RN on 8/18/2023 at 4:15 PM

## 2023-09-14 DIAGNOSIS — G25.81 RESTLESS LEG SYNDROME: ICD-10-CM

## 2023-09-20 RX ORDER — PRAMIPEXOLE DIHYDROCHLORIDE 0.5 MG/1
TABLET ORAL
Qty: 181 TABLET | OUTPATIENT
Start: 2023-09-20

## 2023-09-20 NOTE — TELEPHONE ENCOUNTER
Redundant refill request refused: Too soon:     Duplicate refill request.    Ángela Ryan RN on 9/20/2023 at 11:35 AM

## 2023-10-02 ENCOUNTER — HOSPITAL ENCOUNTER (OUTPATIENT)
Facility: CLINIC | Age: 53
End: 2023-10-02
Attending: ORTHOPAEDIC SURGERY | Admitting: ORTHOPAEDIC SURGERY
Payer: COMMERCIAL

## 2023-10-05 ENCOUNTER — VIRTUAL VISIT (OUTPATIENT)
Dept: FAMILY MEDICINE | Facility: OTHER | Age: 53
End: 2023-10-05
Attending: FAMILY MEDICINE
Payer: COMMERCIAL

## 2023-10-05 DIAGNOSIS — G89.29 CHRONIC MIDLINE LOW BACK PAIN WITHOUT SCIATICA: ICD-10-CM

## 2023-10-05 DIAGNOSIS — F43.23 ADJUSTMENT DISORDER WITH MIXED ANXIETY AND DEPRESSED MOOD: Primary | ICD-10-CM

## 2023-10-05 DIAGNOSIS — M54.50 CHRONIC MIDLINE LOW BACK PAIN WITHOUT SCIATICA: ICD-10-CM

## 2023-10-05 DIAGNOSIS — E66.813 CLASS 3 SEVERE OBESITY WITH SERIOUS COMORBIDITY IN ADULT, UNSPECIFIED BMI, UNSPECIFIED OBESITY TYPE (H): ICD-10-CM

## 2023-10-05 DIAGNOSIS — E66.01 CLASS 3 SEVERE OBESITY WITH SERIOUS COMORBIDITY IN ADULT, UNSPECIFIED BMI, UNSPECIFIED OBESITY TYPE (H): ICD-10-CM

## 2023-10-05 DIAGNOSIS — Z02.89 PAIN MEDICATION AGREEMENT COMPLETED: ICD-10-CM

## 2023-10-05 DIAGNOSIS — Q77.4 ACHONDROPLASIA: ICD-10-CM

## 2023-10-05 PROCEDURE — 99213 OFFICE O/P EST LOW 20 MIN: CPT | Mod: 95 | Performed by: FAMILY MEDICINE

## 2023-10-05 RX ORDER — HYDROCODONE BITARTRATE AND ACETAMINOPHEN 5; 325 MG/1; MG/1
2 TABLET ORAL 3 TIMES DAILY PRN
Qty: 200 TABLET | Refills: 0 | Status: SHIPPED | OUTPATIENT
Start: 2023-10-05 | End: 2023-12-06

## 2023-10-05 ASSESSMENT — ANXIETY QUESTIONNAIRES
1. FEELING NERVOUS, ANXIOUS, OR ON EDGE: SEVERAL DAYS
GAD7 TOTAL SCORE: 3
2. NOT BEING ABLE TO STOP OR CONTROL WORRYING: SEVERAL DAYS
GAD7 TOTAL SCORE: 3
5. BEING SO RESTLESS THAT IT IS HARD TO SIT STILL: NOT AT ALL
3. WORRYING TOO MUCH ABOUT DIFFERENT THINGS: SEVERAL DAYS
7. FEELING AFRAID AS IF SOMETHING AWFUL MIGHT HAPPEN: NOT AT ALL
6. BECOMING EASILY ANNOYED OR IRRITABLE: NOT AT ALL
IF YOU CHECKED OFF ANY PROBLEMS ON THIS QUESTIONNAIRE, HOW DIFFICULT HAVE THESE PROBLEMS MADE IT FOR YOU TO DO YOUR WORK, TAKE CARE OF THINGS AT HOME, OR GET ALONG WITH OTHER PEOPLE: SOMEWHAT DIFFICULT

## 2023-10-05 ASSESSMENT — PATIENT HEALTH QUESTIONNAIRE - PHQ9
SUM OF ALL RESPONSES TO PHQ QUESTIONS 1-9: 4
5. POOR APPETITE OR OVEREATING: NOT AT ALL

## 2023-10-05 NOTE — NURSING NOTE
"Chief Complaint   Patient presents with    Recheck Medication       Initial LMP 08/13/2023   Breastfeeding No  Estimated body mass index is 56.21 kg/m  as calculated from the following:    Height as of 6/8/23: 1.302 m (4' 3.25\").    Weight as of 6/8/23: 95.3 kg (210 lb).  Medication Reconciliation: complete      Advance care directive on file?  no      Ruth Salazar LPN    "
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
## 2023-10-11 RX ORDER — CLINDAMYCIN PHOSPHATE 900 MG/50ML
900 INJECTION, SOLUTION INTRAVENOUS
Status: CANCELLED | OUTPATIENT
Start: 2023-10-11

## 2023-10-11 RX ORDER — CLINDAMYCIN PHOSPHATE 900 MG/50ML
900 INJECTION, SOLUTION INTRAVENOUS SEE ADMIN INSTRUCTIONS
Status: CANCELLED | OUTPATIENT
Start: 2023-10-11

## 2023-10-11 RX ORDER — ACETAMINOPHEN 325 MG/1
975 TABLET ORAL ONCE
Status: CANCELLED | OUTPATIENT
Start: 2023-10-11 | End: 2023-10-11

## 2023-10-14 ENCOUNTER — PATIENT OUTREACH (OUTPATIENT)
Dept: FAMILY MEDICINE | Facility: OTHER | Age: 53
End: 2023-10-14
Payer: COMMERCIAL

## 2023-10-14 NOTE — TELEPHONE ENCOUNTER
Patient Quality Outreach    Patient is due for the following:   Hypertension -  Hypertension follow-up visit    Next Steps:   Schedule a office visit for Hypertension    Type of outreach:    Sent letter.      Questions for provider review:    None           Shona Kumar

## 2023-10-14 NOTE — LETTER
[facility]  [address]  [place] [id number] [phone]       October 14, 2023    Wilmer Morse  [address] [address]   [address]  [place] [address]    Dear Wilmer,    We care about your health and have reviewed your health plan and are making recommendations based on this review, to optimize your health.    You are in particular need of attention regarding:  -High Blood Pressure      In addition, here is a list of due or overdue Health Maintenance reminders.    Health Maintenance Due   Topic Date Due    Hepatitis B Vaccine (1 of 3 - 3-dose series) Never done    CONTROLLED SUBSTANCE AGREEMENT FOR CHRONIC PAIN MANAGEMENT  Never done    Colorectal Cancer Screening  Never done    Hepatitis C Screening  Never done    Zoster (Shingles) Vaccine (1 of 2) Never done    Yearly Preventive Visit  05/19/2023    Flu Vaccine (1) Never done    COVID-19 Vaccine (4 - 2023-24 season) 09/01/2023    Mammogram  10/10/2023    URINE DRUG SCREEN  11/03/2023    HPV Screening  03/13/2024    PAP Smear  03/13/2024       To address the above recommendations, we encourage you to contact us at [phone], via M2G .    Thank you for trusting [facility] and we appreciate the opportunity to serve you.  We look forward to supporting your healthcare needs in the future.    Healthy Regards,Your [facility] Team

## 2023-11-03 ENCOUNTER — HOSPITAL ENCOUNTER (OUTPATIENT)
Facility: CLINIC | Age: 53
End: 2023-11-03
Attending: ORTHOPAEDIC SURGERY | Admitting: ORTHOPAEDIC SURGERY
Payer: COMMERCIAL

## 2023-11-26 DIAGNOSIS — R32 URINARY INCONTINENCE, UNSPECIFIED TYPE: ICD-10-CM

## 2023-11-26 DIAGNOSIS — K21.00 GASTROESOPHAGEAL REFLUX DISEASE WITH ESOPHAGITIS WITHOUT HEMORRHAGE: ICD-10-CM

## 2023-11-26 DIAGNOSIS — R13.10 FOOD STICKS ON SWALLOWING: ICD-10-CM

## 2023-11-29 RX ORDER — OXYBUTYNIN CHLORIDE 5 MG/1
5 TABLET, EXTENDED RELEASE ORAL 2 TIMES DAILY
Qty: 60 TABLET | Refills: 11 | Status: SHIPPED | OUTPATIENT
Start: 2023-11-29

## 2023-11-29 NOTE — TELEPHONE ENCOUNTER
[facility] sent Rx request for the following:      Requested Prescriptions   Pending Prescriptions Disp Refills    oxyBUTYnin ER (DITROPAN XL) 5 MG 24 hr tablet [Pharmacy Med Name: OXYBUTYNIN ER 5MG TABLETS] 60 tablet 11     Sig: TAKE 1 TABLET(5 MG) BY MOUTH TWICE DAILY     Last Prescription Date:   11/3/22  Last Fill Qty/Refills:         60, R-11      omeprazole (PRILOSEC) 20 MG DR capsule [Pharmacy Med Name: OMEPRAZOLE 20MG CAPSULES] 30 capsule 9     Sig: TAKE 1 CAPSULE(20 MG) BY MOUTH DAILY       Last Prescription Date:   12/14/22  Last Fill Qty/Refills:         30, R-9    Last Office Visit:              11/5/23   Future Office visit:           12/6/23    Routing to PCP for refill consideration.  Charu Estrada RN on 11/29/2023 at 7:52 AM

## 2023-11-30 ENCOUNTER — MYC MEDICAL ADVICE (OUTPATIENT)
Dept: FAMILY MEDICINE | Facility: OTHER | Age: 53
End: 2023-11-30
Payer: COMMERCIAL

## 2023-11-30 DIAGNOSIS — Z12.11 COLON CANCER SCREENING: Primary | ICD-10-CM

## 2023-12-05 NOTE — COMMUNITY RESOURCES LIST (ENGLISH)
12/05/2023   [facility]  N/A  For questions about this resource list or additional care needs, please contact your primary care clinic or care manager.  Phone: [phone]   Email: N/A   Address: [address] [place] [address]   Hours: N/A        Financial Stability       Rent and mortgage payment assistance  1  [facility] Distance: 3.93 miles      In-Person   [address] [place] [address]  Language: English  Hours: Mon - Fri 8:00 AM - 4:00 PM  Fees: Free   Phone: (873) 759-5626 Email: info@[employer] Website: https://www.[employer]/          Important Numbers & Websites       Emergency Services   911  City Services   311  Poison Control   (919) 360-7967  Suicide Prevention Lifeline   (981) 744-1810 (TALK)  Child Abuse Hotline   (875) 997-3335 (4-A-Child)  Sexual Assault Hotline   (967) 837-6430 (HOPE)  National Runaway Safeline   (850) 568-2053 (RUNAWAY)  All-Options Talkline   (219) 705-7661  Substance Abuse Referral   (647) 567-8284 (HELP)

## 2023-12-06 ENCOUNTER — VIRTUAL VISIT (OUTPATIENT)
Dept: FAMILY MEDICINE | Facility: OTHER | Age: 53
End: 2023-12-06
Attending: FAMILY MEDICINE
Payer: COMMERCIAL

## 2023-12-06 ENCOUNTER — LAB (OUTPATIENT)
Dept: FAMILY MEDICINE | Facility: OTHER | Age: 53
End: 2023-12-06

## 2023-12-06 DIAGNOSIS — I10 BENIGN ESSENTIAL HYPERTENSION: ICD-10-CM

## 2023-12-06 DIAGNOSIS — E66.813 CLASS 3 SEVERE OBESITY WITH SERIOUS COMORBIDITY IN ADULT, UNSPECIFIED BMI, UNSPECIFIED OBESITY TYPE (H): ICD-10-CM

## 2023-12-06 DIAGNOSIS — Z02.89 PAIN MEDICATION AGREEMENT COMPLETED: ICD-10-CM

## 2023-12-06 DIAGNOSIS — Z12.11 COLON CANCER SCREENING: ICD-10-CM

## 2023-12-06 DIAGNOSIS — Q77.4 ACHONDROPLASIA: ICD-10-CM

## 2023-12-06 DIAGNOSIS — G89.29 CHRONIC MIDLINE LOW BACK PAIN WITHOUT SCIATICA: ICD-10-CM

## 2023-12-06 DIAGNOSIS — E66.01 CLASS 3 SEVERE OBESITY WITH SERIOUS COMORBIDITY IN ADULT, UNSPECIFIED BMI, UNSPECIFIED OBESITY TYPE (H): ICD-10-CM

## 2023-12-06 DIAGNOSIS — M54.50 CHRONIC MIDLINE LOW BACK PAIN WITHOUT SCIATICA: ICD-10-CM

## 2023-12-06 PROCEDURE — 99213 OFFICE O/P EST LOW 20 MIN: CPT | Mod: 95 | Performed by: FAMILY MEDICINE

## 2023-12-06 RX ORDER — HYDROCODONE BITARTRATE AND ACETAMINOPHEN 5; 325 MG/1; MG/1
2 TABLET ORAL 3 TIMES DAILY PRN
Qty: 200 TABLET | Refills: 0 | Status: SHIPPED | OUTPATIENT
Start: 2023-12-06 | End: 2024-02-01

## 2023-12-06 RX ORDER — LISINOPRIL 10 MG/1
10 TABLET ORAL DAILY
Qty: 90 TABLET | Refills: 11 | Status: SHIPPED | OUTPATIENT
Start: 2023-12-06

## 2023-12-06 ASSESSMENT — ANXIETY QUESTIONNAIRES
GAD7 TOTAL SCORE: 0
3. WORRYING TOO MUCH ABOUT DIFFERENT THINGS: NOT AT ALL
IF YOU CHECKED OFF ANY PROBLEMS ON THIS QUESTIONNAIRE, HOW DIFFICULT HAVE THESE PROBLEMS MADE IT FOR YOU TO DO YOUR WORK, TAKE CARE OF THINGS AT HOME, OR GET ALONG WITH OTHER PEOPLE: NOT DIFFICULT AT ALL
7. FEELING AFRAID AS IF SOMETHING AWFUL MIGHT HAPPEN: NOT AT ALL
6. BECOMING EASILY ANNOYED OR IRRITABLE: NOT AT ALL
5. BEING SO RESTLESS THAT IT IS HARD TO SIT STILL: NOT AT ALL
GAD7 TOTAL SCORE: 0
2. NOT BEING ABLE TO STOP OR CONTROL WORRYING: NOT AT ALL
1. FEELING NERVOUS, ANXIOUS, OR ON EDGE: NOT AT ALL

## 2023-12-06 ASSESSMENT — PATIENT HEALTH QUESTIONNAIRE - PHQ9
5. POOR APPETITE OR OVEREATING: NOT AT ALL
SUM OF ALL RESPONSES TO PHQ QUESTIONS 1-9: 0

## 2023-12-06 NOTE — NURSING NOTE
"Chief Complaint   Patient presents with    Recheck Medication       Initial Breastfeeding No  Estimated body mass index is 56.21 kg/m  as calculated from the following:    Height as of 6/8/23: 1.302 m (4' 3.25\").    Weight as of 6/8/23: 95.3 kg (210 lb).  Medication Review: complete    The next two questions are to help us understand your food security.  If you are feeling you need any assistance in this area, we have resources available to support you today.          12/5/2023   SDOH- Food Insecurity   Within the past 12 months, did you worry that your food would run out before you got money to buy more? N   Within the past 12 months, did the food you bought just not last and you didn t have money to get more? N         Health Care Directive:  Patient does not have a Health Care Directive or Living Will: Discussed advance care planning with patient; however, patient declined at this time.    Ruth Salazar LPN      "

## 2023-12-06 NOTE — COMMUNITY RESOURCES LIST (ENGLISH)
12/06/2023   [facility]  N/A  For additional resource needs, please contact your health insurance member services or your primary care team.  Phone: [phone]   Email: N/A   Address: [address] [place] [address]   Hours: N/A        Financial Stability       Rent and mortgage payment assistance  1  [facility] Distance: 3.93 miles      In-Person   [address] [place] [address]  Language: English  Hours: Mon - Fri 8:00 AM - 4:00 PM  Fees: Free   Phone: (505) 304-5763 Email: info@[employer] Website: https://www.[employer]/          Important Numbers & Websites       [facility]   211 211itedway.org  Poison Control   (877) 189-6880 [place]poison.[place]  Suicide and Crisis Lifeline   [address] [place], [place] [address]line.org  Childhelp [place] Child Abuse Hotline   487.701.4886 Childhelphotline.org  National Sexual Assault Hotline   (502) 821-1268 (HOPE) Rainn.org  [place] Runaway Safeline   (992) 741-5609 (RUNAWAY) [facility] Hospital[facility]away[facility]  Pregnancy & Postpartum Support [place]   Call/text 597-352-5623 Ppsupport[place].org  Substance Abuse National Helpline ([facility]   259-080-HELP (3328) Findtreatment.gov  Emergency Services   911

## 2023-12-06 NOTE — PROGRESS NOTES
"Nursing Notes:   Ruth Salazar LPN  12/6/2023  9:22 AM  Signed  Chief Complaint   Patient presents with    Recheck Medication       Initial Breastfeeding No  Estimated body mass index is 56.21 kg/m  as calculated from the following:    Height as of 6/8/23: 1.302 m (4' 3.25\").    Weight as of 6/8/23: 95.3 kg (210 lb).  Medication Review: complete    The next two questions are to help us understand your food security.  If you are feeling you need any assistance in this area, we have resources available to support you today.          12/5/2023   SDOH- Food Insecurity   Within the past 12 months, did you worry that your food would run out before you got money to buy more? N   Within the past 12 months, did the food you bought just not last and you didn t have money to get more? N         Health Care Directive:  Patient does not have a Health Care Directive or Living Will: Discussed advance care planning with patient; however, patient declined at this time.    Ruth Salazar LPN      Wilmer is a 52 year old who is being evaluated via a billable telephone visit.      What phone number would you like to be contacted at? [phone]  How would you like to obtain your AVS? MyChart    Distant Location (provider location):  On-site    Diagnoses and associated orders for this visit:  Achondroplasia  -     HYDROcodone-acetaminophen (NORCO) 5-325 MG tablet; Take 2 tablets by mouth 3 times daily as needed for pain . May take a maximum of 7 tablets per day.  -     HYDROcodone-acetaminophen (NORCO) 5-325 MG tablet; Take 2 tablets by mouth 3 times daily as needed for severe pain May take a maximum of 7 tablets per day.    Pain medication agreement completed, 3/19/18, 3/13/19, 7/6/2020, 6/22/21, 5/19/22  -     HYDROcodone-acetaminophen (NORCO) 5-325 MG tablet; Take 2 tablets by mouth 3 times daily as needed for pain . May take a maximum of 7 tablets per day.  -     HYDROcodone-acetaminophen (NORCO) 5-325 MG tablet; Take 2 " tablets by mouth 3 times daily as needed for severe pain May take a maximum of 7 tablets per day.    Chronic midline low back pain without sciatica  -     HYDROcodone-acetaminophen (NORCO) 5-325 MG tablet; Take 2 tablets by mouth 3 times daily as needed for pain . May take a maximum of 7 tablets per day.  -     HYDROcodone-acetaminophen (NORCO) 5-325 MG tablet; Take 2 tablets by mouth 3 times daily as needed for severe pain May take a maximum of 7 tablets per day.    Class 3 severe obesity with serious comorbidity in adult, unspecified BMI, unspecified obesity type (H)  -     Semaglutide-Weight Management (WEGOVY) 2.4 MG/0.75ML pen; Inject 2.4 mg Subcutaneous once a week    Benign essential hypertension  -     lisinopril (ZESTRIL) 10 MG tablet; Take 1 tablet (10 mg) by mouth daily      Renewed medications.  She will follow-up for preop prior to her ankle fusion.  Discussed the fact that she would need to stop her Wegovy a week prior to surgery and will resume it a week thereafter.    Neptali French MD     Julio Guillen is a 52 year old, presenting for the following health issues:  Recheck Medication        8/9/2023     1:12 PM   Additional Questions   Roomed by Estefania Muro CMA([facility])   Accompanied by nobody       HPI     Telephone visit today for follow-up of medications.  She is scheduled for right ankle fusion and January.  I last saw her on 10/5/2023. I last saw her virtually 2 months ago.  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  She uses clonazepam at night time for restless legs and anxiety but does not take these medicines together.  She has tolerated these medications for some time. Her last fill was 11/8/23    She has been on Wegovy for weight loss.  She was tolerating 2.4 mg daily.  As of her last visit she had lost almost 20 pounds.  Now she is down 28#. She weighs 190#.    She is in a one level townhouse as of the first of  the month. She is settled.       Pain History:  When did you first notice your pain? Years ago   Have you seen this provider for your pain in the past? Yes   Where in your body do you have pain? legs  Are you seeing anyone else for your pain? No        8/9/2023     1:09 PM 10/5/2023     2:50 PM 12/6/2023     9:16 AM   PHQ-9 SCORE   PHQ-9 Total Score 2 4 0           8/8/2023     1:46 PM 10/5/2023     2:50 PM 12/6/2023     9:16 AM   YESI-7 SCORE   Total Score 2 (minimal anxiety)     Total Score 2 3 0               Chronic Pain Follow Up:    Location of pain: legs  Analgesia/pain control:    - Recent changes:  no    - Overall control: Comfortably manageable    - Current treatments: Hydrocodone  Adherence:     - Do you ever take more pain medicine than prescribed? No    - When did you take your last dose of pain medicine?  530 am this morning   Adverse effects: No   PDMP Review         Value Time User    State PDMP site checked  Yes 12/6/2023 10:08 AM Neptali French MD          Last CSA Agreement:   CSA -- Patient Level:    CSA: None found at the patient level.       Last UDS: 11/10/2022                Review of Systems     ROS is negative except as noted above              Objective    Vitals - Patient Reported  Weight (Patient Reported): 86.2 kg (190 lb)  Pain Score: Severe Pain (7)  Pain Loc:  (legs)        Physical Exam   healthy, alert, and no distress  PSYCH: Alert and oriented times 3; coherent speech, normal   rate and volume, able to articulate logical thoughts, able   to abstract reason, no tangential thoughts, no hallucinations   or delusions  Her affect is normal  RESP: No cough, no audible wheezing, able to talk in full sentences  Remainder of exam unable to be completed due to telephone visits                Phone call duration: 16 minutes

## 2023-12-08 ENCOUNTER — MYC REFILL (OUTPATIENT)
Dept: FAMILY MEDICINE | Facility: OTHER | Age: 53
End: 2023-12-08
Payer: COMMERCIAL

## 2023-12-08 DIAGNOSIS — F41.8 DEPRESSION WITH ANXIETY: ICD-10-CM

## 2023-12-08 RX ORDER — SERTRALINE HYDROCHLORIDE 100 MG/1
200 TABLET, FILM COATED ORAL DAILY
Qty: 180 TABLET | Refills: 1 | Status: SHIPPED | OUTPATIENT
Start: 2023-12-08 | End: 2024-06-12

## 2023-12-08 NOTE — TELEPHONE ENCOUNTER
Requested Prescriptions   Pending Prescriptions Disp Refills    sertraline (ZOLOFT) 100 MG tablet 180 tablet 11     Sig: Take 2 tablets (200 mg) by mouth daily   Last Prescription Date:   11/3/22  Last Fill Qty/Refills:         180, R-11    Last Office Visit:              12/6/23   Future Office visit:             Next 5 appointments (look out 90 days)      Jan 03, 2024 11:00 AM  Josh Campbell with Neptali GIRON MD  [facility] ([facility] ) [address]  [place] [address]  [phone]          Prescription approved per [facility] Refill Protocol.      High  Drug-Drug: ibuprofen and sertralineToxic effects may be increased with concurrent administration of ibuprofen and Selective Serotonin Reuptake Inhibitors. The risk of upper gastrointestinal bleeding may be increased. Patients taking both drugs concurrently should be educated about the signs and symptoms of GI bleeding.  Details  Override reason          Warnings Override History for sertraline (ZOLOFT) 100 MG tablet [[id number]]    Overridden by Neptali French MD on Nov 3, 2022 1:56 PM   Drug-Drug   1. IBUPROFEN / SELECTIVE SEROTONIN REUPTAKE INHIBITORS [Level: Major] [Reason: Tolerated medication/side effects in past]   Other Orders: ibuprofen (ADVIL/MOTRIN) 600 MG tablet          Mary oJse RN .............. 12/8/2023  4:14 PM

## 2023-12-27 RX ORDER — CEFAZOLIN SODIUM/WATER 2 G/20 ML
2 SYRINGE (ML) INTRAVENOUS SEE ADMIN INSTRUCTIONS
Status: CANCELLED | OUTPATIENT
Start: 2023-12-27

## 2023-12-27 RX ORDER — CEFAZOLIN SODIUM/WATER 2 G/20 ML
2 SYRINGE (ML) INTRAVENOUS
Status: CANCELLED | OUTPATIENT
Start: 2023-12-27

## 2023-12-27 RX ORDER — ACETAMINOPHEN 325 MG/1
975 TABLET ORAL ONCE
Status: CANCELLED | OUTPATIENT
Start: 2023-12-27 | End: 2023-12-27

## 2024-01-02 ENCOUNTER — MYC REFILL (OUTPATIENT)
Dept: FAMILY MEDICINE | Facility: OTHER | Age: 54
End: 2024-01-02
Payer: COMMERCIAL

## 2024-01-02 DIAGNOSIS — F41.8 DEPRESSION WITH ANXIETY: ICD-10-CM

## 2024-01-02 RX ORDER — CLONAZEPAM 0.5 MG/1
TABLET ORAL
Qty: 90 TABLET | Refills: 5 | Status: SHIPPED | OUTPATIENT
Start: 2024-01-02 | End: 2024-07-24

## 2024-01-24 ENCOUNTER — HOSPITAL ENCOUNTER (OUTPATIENT)
Dept: MAMMOGRAPHY | Facility: OTHER | Age: 54
Discharge: HOME | End: 2024-01-24
Attending: FAMILY MEDICINE | Admitting: FAMILY MEDICINE
Payer: COMMERCIAL

## 2024-01-24 DIAGNOSIS — Z12.31 VISIT FOR SCREENING MAMMOGRAM: ICD-10-CM

## 2024-01-24 PROCEDURE — 77063 BREAST TOMOSYNTHESIS BI: CPT

## 2024-01-25 DIAGNOSIS — I10 BENIGN ESSENTIAL HYPERTENSION: ICD-10-CM

## 2024-01-25 RX ORDER — LISINOPRIL 10 MG/1
10 TABLET ORAL DAILY
Qty: 90 TABLET | Refills: 11 | OUTPATIENT
Start: 2024-01-25

## 2024-01-25 NOTE — TELEPHONE ENCOUNTER
[id number] of [place] sent Rx request for the following:      Redundant refill request refused: Too soon:  lisinopril (ZESTRIL) 10 MG tablet 90 tablet 11 12/6/2023 -- No   Sig - Route: Take 1 tablet (10 mg) by mouth daily - Oral   Sent to pharmacy as: Lisinopril 10 MG Oral Tablet (ZESTRIL)   Class: E-Prescribe   Order: [id number]   E-Prescribing Status: Receipt confirmed by pharmacy (12/6/2023 10:11 AM CST)     Printout Tracking    External Result Report     Pharmacy    [id number] - [place], [place] - [address] [place] [address]   Mary Jose RN .............. 1/25/2024  10:16 AM

## 2024-02-01 ENCOUNTER — ORDERS ONLY (AUTO-RELEASED) (OUTPATIENT)
Dept: FAMILY MEDICINE | Facility: OTHER | Age: 54
End: 2024-02-01

## 2024-02-01 ENCOUNTER — VIRTUAL VISIT (OUTPATIENT)
Dept: FAMILY MEDICINE | Facility: OTHER | Age: 54
End: 2024-02-01
Attending: FAMILY MEDICINE
Payer: COMMERCIAL

## 2024-02-01 DIAGNOSIS — E66.813 CLASS 3 SEVERE OBESITY WITH SERIOUS COMORBIDITY IN ADULT, UNSPECIFIED BMI, UNSPECIFIED OBESITY TYPE (H): ICD-10-CM

## 2024-02-01 DIAGNOSIS — M54.50 CHRONIC MIDLINE LOW BACK PAIN WITHOUT SCIATICA: ICD-10-CM

## 2024-02-01 DIAGNOSIS — Z12.11 COLON CANCER SCREENING: ICD-10-CM

## 2024-02-01 DIAGNOSIS — E66.01 CLASS 3 SEVERE OBESITY WITH SERIOUS COMORBIDITY IN ADULT, UNSPECIFIED BMI, UNSPECIFIED OBESITY TYPE (H): ICD-10-CM

## 2024-02-01 DIAGNOSIS — G89.29 CHRONIC MIDLINE LOW BACK PAIN WITHOUT SCIATICA: ICD-10-CM

## 2024-02-01 DIAGNOSIS — Z02.89 PAIN MEDICATION AGREEMENT COMPLETED: ICD-10-CM

## 2024-02-01 DIAGNOSIS — Q77.4 ACHONDROPLASIA: Primary | ICD-10-CM

## 2024-02-01 PROCEDURE — 99441 PR PHYSICIAN TELEPHONE EVALUATION 5-10 MIN: CPT | Mod: 93 | Performed by: FAMILY MEDICINE

## 2024-02-01 RX ORDER — HYDROCODONE BITARTRATE AND ACETAMINOPHEN 5; 325 MG/1; MG/1
2 TABLET ORAL 3 TIMES DAILY PRN
Qty: 200 TABLET | Refills: 0 | Status: SHIPPED | OUTPATIENT
Start: 2024-02-01 | End: 2024-03-21

## 2024-02-01 ASSESSMENT — ANXIETY QUESTIONNAIRES
1. FEELING NERVOUS, ANXIOUS, OR ON EDGE: SEVERAL DAYS
6. BECOMING EASILY ANNOYED OR IRRITABLE: NOT AT ALL
GAD7 TOTAL SCORE: 3
7. FEELING AFRAID AS IF SOMETHING AWFUL MIGHT HAPPEN: NOT AT ALL
5. BEING SO RESTLESS THAT IT IS HARD TO SIT STILL: NOT AT ALL
IF YOU CHECKED OFF ANY PROBLEMS ON THIS QUESTIONNAIRE, HOW DIFFICULT HAVE THESE PROBLEMS MADE IT FOR YOU TO DO YOUR WORK, TAKE CARE OF THINGS AT HOME, OR GET ALONG WITH OTHER PEOPLE: SOMEWHAT DIFFICULT
2. NOT BEING ABLE TO STOP OR CONTROL WORRYING: SEVERAL DAYS
3. WORRYING TOO MUCH ABOUT DIFFERENT THINGS: SEVERAL DAYS
GAD7 TOTAL SCORE: 3

## 2024-02-01 ASSESSMENT — PATIENT HEALTH QUESTIONNAIRE - PHQ9
5. POOR APPETITE OR OVEREATING: NOT AT ALL
SUM OF ALL RESPONSES TO PHQ QUESTIONS 1-9: 1

## 2024-02-01 NOTE — NURSING NOTE
"Chief Complaint   Patient presents with    Recheck Medication       Initial Breastfeeding No  Estimated body mass index is 56.21 kg/m  as calculated from the following:    Height as of 6/8/23: 1.302 m (4' 3.25\").    Weight as of 6/8/23: 95.3 kg (210 lb).  Medication Review: complete    The next two questions are to help us understand your food security.  If you are feeling you need any assistance in this area, we have resources available to support you today.          12/5/2023   SDOH- Food Insecurity   Within the past 12 months, did you worry that your food would run out before you got money to buy more? N   Within the past 12 months, did the food you bought just not last and you didn t have money to get more? N         Health Care Directive:  Patient does not have a Health Care Directive or Living Will: no    Ruth Salazar LPN      "

## 2024-02-01 NOTE — PROGRESS NOTES
Wilmer is a 53 year old who is being evaluated via a billable telephone visit.      What phone number would you like to be contacted at? [phone]  How would you like to obtain your AVS? Josh    Distant Location (provider location):  On-site    Diagnoses and associated orders for this visit:  Achondroplasia  -     HYDROcodone-acetaminophen (NORCO) 5-325 MG tablet; Take 2 tablets by mouth 3 times daily as needed for pain . May take a maximum of 7 tablets per day.  -     HYDROcodone-acetaminophen (NORCO) 5-325 MG tablet; Take 2 tablets by mouth 3 times daily as needed for severe pain May take a maximum of 7 tablets per day.    Pain medication agreement completed, 3/19/18, 3/13/19, 7/6/2020, 6/22/21, 5/19/22  -     HYDROcodone-acetaminophen (NORCO) 5-325 MG tablet; Take 2 tablets by mouth 3 times daily as needed for pain . May take a maximum of 7 tablets per day.  -     HYDROcodone-acetaminophen (NORCO) 5-325 MG tablet; Take 2 tablets by mouth 3 times daily as needed for severe pain May take a maximum of 7 tablets per day.    Chronic midline low back pain without sciatica  -     HYDROcodone-acetaminophen (NORCO) 5-325 MG tablet; Take 2 tablets by mouth 3 times daily as needed for pain . May take a maximum of 7 tablets per day.  -     HYDROcodone-acetaminophen (NORCO) 5-325 MG tablet; Take 2 tablets by mouth 3 times daily as needed for severe pain May take a maximum of 7 tablets per day.    Colon cancer screening  -     COLOGUARD([employer]); Future    Class 3 severe obesity with serious comorbidity in adult, unspecified BMI, unspecified obesity type (H)      Doing well with her weight loss.  Will continue with her current dose of Wegovy.    Needs colon cancer screening and is elected to do Cologuard.  Order placed.    Renewed medication pursuant to her contract.  Follow-up in 2 months, sooner if needed.    Neptali French MD     Subjective   Wilmer is a 53 year old, presenting for the following health  issues:  Recheck Medication    HPI     Follow-up telephone visit today for medication renewal.  Her last virtual visit was 12/6/2023.  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  She uses clonazepam at nighttime for anxiety and restless legs but does not take the medicines together and she has tolerated them for some time.  Her last fill of hydrocodone was 1/5/2024.   PDMP Review         Value Time User    State PDMP site checked  Yes 2/1/2024 10:37 AM Neptali French MD           She continues on Wegovy for weight loss and has been tolerating 2.4 mg weekly.  She is down 38# she weighed 190 pounds.    She has postponed her ankle surgery until August.     Pain History:  When did you first notice your pain? Years ago   Have you seen this provider for your pain in the past? Yes   Where in your body do you have pain? legs  Are you seeing anyone else for your pain? No        10/5/2023     2:50 PM 12/6/2023     9:16 AM 2/1/2024     9:35 AM   PHQ-9 SCORE   PHQ-9 Total Score 4 0 1           10/5/2023     2:50 PM 12/6/2023     9:16 AM 2/1/2024     9:35 AM   YESI-7 SCORE   Total Score 3 0 3               Chronic Pain Follow Up:    Location of pain: legs  Analgesia/pain control:    - Recent changes:  no    - Overall control: Comfortably manageable    - Current treatments: Hydrocodone   Adherence:     - Do you ever take more pain medicine than prescribed? No    - When did you take your last dose of pain medicine?  7 am   Adverse effects: No   PDMP Review         Value Time User    State PDMP site checked  Yes 2/1/2024 10:37 AM Neptali French MD          Last CSA Agreement:   CSA -- Patient Level:    CSA: None found at the patient level.       Last UDS: 11/10/2022                        Objective    Vitals - Patient Reported  Weight (Patient Reported): 86.2 kg (190 lb)  Pain Score: Moderate Pain (4)  Pain Loc:  (legs)        Physical Exam   General: Alert and  no distress //Respiratory: No audible wheeze, cough, or shortness of breath // Psychiatric:  Appropriate affect, tone, and pace of words            Phone call duration: 10 minutes  Signed Electronically by: Neptali French MD

## 2024-03-21 ENCOUNTER — OFFICE VISIT (OUTPATIENT)
Dept: FAMILY MEDICINE | Facility: OTHER | Age: 54
End: 2024-03-21
Attending: FAMILY MEDICINE
Payer: COMMERCIAL

## 2024-03-21 VITALS
OXYGEN SATURATION: 96 % | DIASTOLIC BLOOD PRESSURE: 86 MMHG | HEIGHT: 55 IN | WEIGHT: 194 LBS | TEMPERATURE: 97.1 F | HEART RATE: 109 BPM | SYSTOLIC BLOOD PRESSURE: 138 MMHG | BODY MASS INDEX: 44.9 KG/M2 | RESPIRATION RATE: 16 BRPM

## 2024-03-21 DIAGNOSIS — G25.0 BENIGN ESSENTIAL TREMOR: ICD-10-CM

## 2024-03-21 DIAGNOSIS — M79.2 NEUROPATHIC PAIN: ICD-10-CM

## 2024-03-21 DIAGNOSIS — Z02.89 PAIN MEDICATION AGREEMENT COMPLETED: ICD-10-CM

## 2024-03-21 DIAGNOSIS — M54.50 CHRONIC MIDLINE LOW BACK PAIN WITHOUT SCIATICA: ICD-10-CM

## 2024-03-21 DIAGNOSIS — Z13.29 SCREENING FOR HYPOTHYROIDISM: ICD-10-CM

## 2024-03-21 DIAGNOSIS — Z12.4 SCREENING FOR MALIGNANT NEOPLASM OF CERVIX: ICD-10-CM

## 2024-03-21 DIAGNOSIS — F41.1 GENERALIZED ANXIETY DISORDER: ICD-10-CM

## 2024-03-21 DIAGNOSIS — F41.8 DEPRESSION WITH ANXIETY: ICD-10-CM

## 2024-03-21 DIAGNOSIS — I10 BENIGN ESSENTIAL HYPERTENSION: ICD-10-CM

## 2024-03-21 DIAGNOSIS — Z13.0 SCREENING FOR DEFICIENCY ANEMIA: ICD-10-CM

## 2024-03-21 DIAGNOSIS — Z13.1 SCREENING FOR DIABETES MELLITUS: ICD-10-CM

## 2024-03-21 DIAGNOSIS — E66.01 CLASS 3 SEVERE OBESITY WITH SERIOUS COMORBIDITY IN ADULT, UNSPECIFIED BMI, UNSPECIFIED OBESITY TYPE (H): ICD-10-CM

## 2024-03-21 DIAGNOSIS — Z00.00 VISIT FOR PREVENTIVE HEALTH EXAMINATION: Primary | ICD-10-CM

## 2024-03-21 DIAGNOSIS — Z13.228 SCREENING FOR METABOLIC DISORDER: ICD-10-CM

## 2024-03-21 DIAGNOSIS — E66.813 CLASS 3 SEVERE OBESITY WITH SERIOUS COMORBIDITY IN ADULT, UNSPECIFIED BMI, UNSPECIFIED OBESITY TYPE (H): ICD-10-CM

## 2024-03-21 DIAGNOSIS — E88.810 METABOLIC SYNDROME X: ICD-10-CM

## 2024-03-21 DIAGNOSIS — Q77.4 ACHONDROPLASIA: ICD-10-CM

## 2024-03-21 DIAGNOSIS — G89.29 CHRONIC MIDLINE LOW BACK PAIN WITHOUT SCIATICA: ICD-10-CM

## 2024-03-21 LAB
ALBUMIN SERPL BCG-MCNC: 4.4 G/DL (ref 3.5–5.2)
ALP SERPL-CCNC: 102 U/L (ref 40–150)
ALT SERPL W P-5'-P-CCNC: 17 U/L (ref 0–50)
ANION GAP SERPL CALCULATED.3IONS-SCNC: 14 MMOL/L (ref 7–15)
AST SERPL W P-5'-P-CCNC: 24 U/L (ref 0–45)
BASOPHILS # BLD AUTO: 0 10E3/UL (ref 0–0.2)
BASOPHILS NFR BLD AUTO: 0 %
BILIRUB SERPL-MCNC: 0.3 MG/DL
BUN SERPL-MCNC: 11.7 MG/DL (ref 6–20)
CALCIUM SERPL-MCNC: 9.8 MG/DL (ref 8.6–10)
CHLORIDE SERPL-SCNC: 102 MMOL/L (ref 98–107)
CHOLEST SERPL-MCNC: 226 MG/DL
CREAT SERPL-MCNC: 0.54 MG/DL (ref 0.51–0.95)
CREAT UR-MCNC: 168 MG/DL
DEPRECATED HCO3 PLAS-SCNC: 21 MMOL/L (ref 22–29)
EGFRCR SERPLBLD CKD-EPI 2021: >90 ML/MIN/1.73M2
EOSINOPHIL # BLD AUTO: 0 10E3/UL (ref 0–0.7)
EOSINOPHIL NFR BLD AUTO: 0 %
ERYTHROCYTE [DISTWIDTH] IN BLOOD BY AUTOMATED COUNT: 13.8 % (ref 10–15)
FASTING STATUS PATIENT QL REPORTED: ABNORMAL
GLUCOSE SERPL-MCNC: 93 MG/DL (ref 70–99)
HBA1C MFR BLD: 5.1 % (ref 4–6.2)
HCT VFR BLD AUTO: 42 % (ref 35–47)
HDLC SERPL-MCNC: 63 MG/DL
HGB BLD-MCNC: 13.5 G/DL (ref 11.7–15.7)
IMM GRANULOCYTES # BLD: 0 10E3/UL
IMM GRANULOCYTES NFR BLD: 0 %
LDLC SERPL CALC-MCNC: 130 MG/DL
LYMPHOCYTES # BLD AUTO: 2.6 10E3/UL (ref 0.8–5.3)
LYMPHOCYTES NFR BLD AUTO: 32 %
MCH RBC QN AUTO: 27.1 PG (ref 26.5–33)
MCHC RBC AUTO-ENTMCNC: 32.1 G/DL (ref 31.5–36.5)
MCV RBC AUTO: 84 FL (ref 78–100)
MONOCYTES # BLD AUTO: 0.6 10E3/UL (ref 0–1.3)
MONOCYTES NFR BLD AUTO: 8 %
NEUTROPHILS # BLD AUTO: 4.8 10E3/UL (ref 1.6–8.3)
NEUTROPHILS NFR BLD AUTO: 59 %
NONHDLC SERPL-MCNC: 163 MG/DL
NRBC # BLD AUTO: 0 10E3/UL
NRBC BLD AUTO-RTO: 0 /100
PLATELET # BLD AUTO: 330 10E3/UL (ref 150–450)
POTASSIUM SERPL-SCNC: 4.5 MMOL/L (ref 3.4–5.3)
PROT SERPL-MCNC: 8 G/DL (ref 6.4–8.3)
RBC # BLD AUTO: 4.98 10E6/UL (ref 3.8–5.2)
SODIUM SERPL-SCNC: 137 MMOL/L (ref 135–145)
TRIGL SERPL-MCNC: 167 MG/DL
TSH SERPL DL<=0.005 MIU/L-ACNC: 1.19 UIU/ML (ref 0.3–4.2)
WBC # BLD AUTO: 8.1 10E3/UL (ref 4–11)

## 2024-03-21 PROCEDURE — 99214 OFFICE O/P EST MOD 30 MIN: CPT | Mod: 25 | Performed by: FAMILY MEDICINE

## 2024-03-21 PROCEDURE — 80061 LIPID PANEL: CPT | Mod: ZL | Performed by: FAMILY MEDICINE

## 2024-03-21 PROCEDURE — 87624 HPV HI-RISK TYP POOLED RSLT: CPT | Mod: ZL | Performed by: FAMILY MEDICINE

## 2024-03-21 PROCEDURE — 80346 BENZODIAZEPINES1-12: CPT | Mod: ZL | Performed by: FAMILY MEDICINE

## 2024-03-21 PROCEDURE — 84443 ASSAY THYROID STIM HORMONE: CPT | Mod: ZL | Performed by: FAMILY MEDICINE

## 2024-03-21 PROCEDURE — G0123 SCREEN CERV/VAG THIN LAYER: HCPCS | Performed by: FAMILY MEDICINE

## 2024-03-21 PROCEDURE — 80365 DRUG SCREENING OXYCODONE: CPT | Mod: ZL | Performed by: FAMILY MEDICINE

## 2024-03-21 PROCEDURE — 36415 COLL VENOUS BLD VENIPUNCTURE: CPT | Mod: ZL | Performed by: FAMILY MEDICINE

## 2024-03-21 PROCEDURE — 99396 PREV VISIT EST AGE 40-64: CPT | Performed by: FAMILY MEDICINE

## 2024-03-21 PROCEDURE — 85025 COMPLETE CBC W/AUTO DIFF WBC: CPT | Mod: ZL | Performed by: FAMILY MEDICINE

## 2024-03-21 PROCEDURE — 80053 COMPREHEN METABOLIC PANEL: CPT | Mod: ZL | Performed by: FAMILY MEDICINE

## 2024-03-21 PROCEDURE — 83036 HEMOGLOBIN GLYCOSYLATED A1C: CPT | Mod: ZL | Performed by: FAMILY MEDICINE

## 2024-03-21 RX ORDER — HYDROCODONE BITARTRATE AND ACETAMINOPHEN 5; 325 MG/1; MG/1
TABLET ORAL
Qty: 200 TABLET | Refills: 0 | Status: SHIPPED | OUTPATIENT
Start: 2024-03-21 | End: 2024-07-22

## 2024-03-21 RX ORDER — HYDROCODONE BITARTRATE AND ACETAMINOPHEN 5; 325 MG/1; MG/1
2 TABLET ORAL 3 TIMES DAILY PRN
Qty: 200 TABLET | Refills: 0 | Status: SHIPPED | OUTPATIENT
Start: 2024-03-21 | End: 2024-06-19

## 2024-03-21 RX ORDER — HYDROCODONE BITARTRATE AND ACETAMINOPHEN 5; 325 MG/1; MG/1
2 TABLET ORAL 3 TIMES DAILY PRN
Qty: 200 TABLET | Refills: 0 | Status: SHIPPED | OUTPATIENT
Start: 2024-03-21 | End: 2024-07-22

## 2024-03-21 RX ORDER — BUPROPION HYDROCHLORIDE 300 MG/1
300 TABLET ORAL EVERY MORNING
Qty: 90 TABLET | Refills: 11 | Status: SHIPPED | OUTPATIENT
Start: 2024-03-21

## 2024-03-21 RX ORDER — GABAPENTIN 300 MG/1
300 CAPSULE ORAL 3 TIMES DAILY
Qty: 90 CAPSULE | Refills: 11 | Status: SHIPPED | OUTPATIENT
Start: 2024-03-21 | End: 2024-07-22

## 2024-03-21 SDOH — HEALTH STABILITY: PHYSICAL HEALTH: ON AVERAGE, HOW MANY MINUTES DO YOU ENGAGE IN EXERCISE AT THIS LEVEL?: 30 MIN

## 2024-03-21 SDOH — HEALTH STABILITY: PHYSICAL HEALTH: ON AVERAGE, HOW MANY DAYS PER WEEK DO YOU ENGAGE IN MODERATE TO STRENUOUS EXERCISE (LIKE A BRISK WALK)?: 3 DAYS

## 2024-03-21 ASSESSMENT — ANXIETY QUESTIONNAIRES
4. TROUBLE RELAXING: NOT AT ALL
7. FEELING AFRAID AS IF SOMETHING AWFUL MIGHT HAPPEN: NOT AT ALL
IF YOU CHECKED OFF ANY PROBLEMS ON THIS QUESTIONNAIRE, HOW DIFFICULT HAVE THESE PROBLEMS MADE IT FOR YOU TO DO YOUR WORK, TAKE CARE OF THINGS AT HOME, OR GET ALONG WITH OTHER PEOPLE: SOMEWHAT DIFFICULT
1. FEELING NERVOUS, ANXIOUS, OR ON EDGE: SEVERAL DAYS
GAD7 TOTAL SCORE: 3
3. WORRYING TOO MUCH ABOUT DIFFERENT THINGS: NOT AT ALL
7. FEELING AFRAID AS IF SOMETHING AWFUL MIGHT HAPPEN: NOT AT ALL
8. IF YOU CHECKED OFF ANY PROBLEMS, HOW DIFFICULT HAVE THESE MADE IT FOR YOU TO DO YOUR WORK, TAKE CARE OF THINGS AT HOME, OR GET ALONG WITH OTHER PEOPLE?: SOMEWHAT DIFFICULT
5. BEING SO RESTLESS THAT IT IS HARD TO SIT STILL: SEVERAL DAYS
GAD7 TOTAL SCORE: 3
6. BECOMING EASILY ANNOYED OR IRRITABLE: NOT AT ALL
GAD7 TOTAL SCORE: 3
2. NOT BEING ABLE TO STOP OR CONTROL WORRYING: SEVERAL DAYS

## 2024-03-21 ASSESSMENT — PAIN SCALES - GENERAL: PAINLEVEL: MODERATE PAIN (4)

## 2024-03-21 ASSESSMENT — SOCIAL DETERMINANTS OF HEALTH (SDOH): HOW OFTEN DO YOU GET TOGETHER WITH FRIENDS OR RELATIVES?: ONCE A WEEK

## 2024-03-21 NOTE — NURSING NOTE
"Chief Complaint   Patient presents with    Physical       Initial /86   Pulse 109   Temp 97.1  F (36.2  C) (Temporal)   Resp 16   Ht 1.302 m (4' 3.25\")   Wt 88 kg (194 lb)   LMP 02/28/2024 (Approximate)   SpO2 96%   Breastfeeding No   BMI 51.93 kg/m   Estimated body mass index is 51.93 kg/m  as calculated from the following:    Height as of this encounter: 1.302 m (4' 3.25\").    Weight as of this encounter: 88 kg (194 lb).  Medication Review: complete    The next two questions are to help us understand your food security.  If you are feeling you need any assistance in this area, we have resources available to support you today.          3/21/2024   SDOH- Food Insecurity   Within the past 12 months, did you worry that your food would run out before you got money to buy more? N   Within the past 12 months, did the food you bought just not last and you didn t have money to get more? N         Health Care Directive:  Patient does not have a Health Care Directive or Living Will: Discussed advance care planning with patient; however, patient declined at this time.    Ruth Salazar LPN      "

## 2024-03-21 NOTE — PROGRESS NOTES
Preventive Care Visit  [facility]  Neptali French MD, Family Medicine  Mar 21, 2024      Diagnoses and associated orders for this visit:  Visit for preventive health examination    Screening for metabolic disorder  -     Comprehensive metabolic panel; Future  -     Comprehensive metabolic panel    Screening for diabetes mellitus  -     Hemoglobin A1c; Future  -     Hemoglobin A1c    Screening for hypothyroidism  -     TSH with free T4 reflex; Future  -     TSH with free T4 reflex    Metabolic syndrome X  -     Comprehensive metabolic panel; Future  -     Lipid Profile; Future  -     Hemoglobin A1c; Future  -     Comprehensive metabolic panel  -     Lipid Profile  -     Hemoglobin A1c    Class 3 severe obesity with serious comorbidity in adult, unspecified BMI, unspecified obesity type (H)    Achondroplasia  -     HYDROcodone-acetaminophen (NORCO) 5-325 MG tablet; Take 2 tablets by mouth 3 times daily as needed for pain . May take a maximum of 7 tablets per day.  -     HYDROcodone-acetaminophen (NORCO) 5-325 MG tablet; Take 2 tablets by mouth 3 times daily as needed for severe pain May take a maximum of 7 tablets per day.  -     HYDROcodone-acetaminophen (NORCO) 5-325 MG tablet; Take 2 tab by mouth 3 times per day as needed for pain.  May take a maximum of 7 tab per day.    Pain medication agreement completed, 3/21/24  -     Drug Confirmation Panel Urine with Creat; Future  -     Drug Confirmation Panel Urine with Creat  -     HYDROcodone-acetaminophen (NORCO) 5-325 MG tablet; Take 2 tablets by mouth 3 times daily as needed for pain . May take a maximum of 7 tablets per day.  -     HYDROcodone-acetaminophen (NORCO) 5-325 MG tablet; Take 2 tablets by mouth 3 times daily as needed for severe pain May take a maximum of 7 tablets per day.  -     HYDROcodone-acetaminophen (NORCO) 5-325 MG tablet; Take 2 tab by mouth 3 times per day as needed for pain.  May take a maximum of 7 tab per  day.    Chronic midline low back pain without sciatica  -     Drug Confirmation Panel Urine with Creat; Future  -     Drug Confirmation Panel Urine with Creat  -     HYDROcodone-acetaminophen (NORCO) 5-325 MG tablet; Take 2 tablets by mouth 3 times daily as needed for pain . May take a maximum of 7 tablets per day.  -     HYDROcodone-acetaminophen (NORCO) 5-325 MG tablet; Take 2 tablets by mouth 3 times daily as needed for severe pain May take a maximum of 7 tablets per day.  -     HYDROcodone-acetaminophen (NORCO) 5-325 MG tablet; Take 2 tab by mouth 3 times per day as needed for pain.  May take a maximum of 7 tab per day.    Benign essential hypertension  -     Comprehensive metabolic panel; Future  -     Comprehensive metabolic panel    Depression with anxiety  -     buPROPion (WELLBUTRIN XL) 300 MG 24 hr tablet; Take 1 tablet (300 mg) by mouth every morning    Screening for deficiency anemia  -     CBC with Platelets & Differential; Future  -     CBC with Platelets & Differential    Generalized anxiety disorder    Neuropathic pain  -     gabapentin (NEURONTIN) 300 MG capsule; Take 1 capsule (300 mg) by mouth 3 times daily    Screening for malignant neoplasm of cervix  -     Pap Screen with HPV - recommended age 30 - 65 years    Benign essential tremor    Pain medication agreement completed, 3/19/18, 3/13/19, 7/6/2020, 6/22/21, 5/19/22  -     Drug Confirmation Panel Urine with Creat; Future  -     Drug Confirmation Panel Urine with Creat  -     HYDROcodone-acetaminophen (NORCO) 5-325 MG tablet; Take 2 tablets by mouth 3 times daily as needed for pain . May take a maximum of 7 tablets per day.  -     HYDROcodone-acetaminophen (NORCO) 5-325 MG tablet; Take 2 tablets by mouth 3 times daily as needed for severe pain May take a maximum of 7 tablets per day.  -     HYDROcodone-acetaminophen (NORCO) 5-325 MG tablet; Take 2 tab by mouth 3 times per day as needed for pain.  May take a maximum of 7 tab per day.    Other  orders  -     TDAP 7+ (ADACEL,BOOSTRIX)        Julio Guillen is a 53 year old, presenting for the following:  Physical        3/21/2024     1:36 PM   Additional Questions   Roomed by Ruth Salazar LPN        Health Care Directive  Patient does not have a Health Care Directive or Living Will: Discussed advance care planning with patient; however, patient declined at this time.    HPI      Pain History:  When did you first notice your pain? Years ago   Have you seen this provider for your pain in the past? Yes   Where in your body do you have pain? Both legs  Are you seeing anyone else for your pain? No        12/6/2023     9:16 AM 2/1/2024     9:35 AM 3/21/2024     1:06 PM   PHQ-9 SCORE   PHQ-9 Total Score MyChart   3 (Minimal depression)   PHQ-9 Total Score 0 1 3           12/6/2023     9:16 AM 2/1/2024     9:35 AM 3/21/2024     1:07 PM   YESI-7 SCORE   Total Score   3 (minimal anxiety)   Total Score 0 3 3           8/9/2023     1:09 PM 12/6/2023     9:20 AM 3/21/2024     1:39 PM   PEG Score   PEG Total Score 6 5 5           Chronic Pain Follow Up:    Location of pain: Both legs  Analgesia/pain control:    - Recent changes:  no    - Overall control: Comfortably manageable    - Current treatments: Hydrocodone, gabapentin  Adherence:     - Do you ever take more pain medicine than prescribed? No    - When did you take your last dose of pain medicine?  1200 pm today   Adverse effects: No   PDMP Review         Value Time User    State PDMP site checked  Yes 2/1/2024 10:37 AM Neptali French MD          Last CSA Agreement:   CSA -- Patient Level:    CSA: None found at the patient level.       Last UDS: 11/10/2022                    3/21/2024   General Health   How would you rate your overall physical health? Good   Feel stress (tense, anxious, or unable to sleep) Only a little   (!) STRESS CONCERN      3/21/2024   Nutrition   Three or more servings of calcium each day? Yes   Diet: Regular (no restrictions)   How  many servings of fruit and vegetables per day? (!) 2-3   How many sweetened beverages each day? 0-1         3/21/2024   Exercise   Days per week of moderate/strenous exercise 3 days   Average minutes spent exercising at this level 30 min         3/21/2024   Social Factors   Frequency of gathering with friends or relatives Once a week   Worry food won't last until get money to buy more No   Food not last or not have enough money for food? No   Do you have housing?  Yes   Are you worried about losing your housing? No   Lack of transportation? No   Unable to get utilities (heat,electricity)? No         3/21/2024   Dental   Dentist two times every year? (!) DECLINE         3/21/2024   TB Screening   Were you born outside of the [place]? No       Today's PHQ-9 Score:       3/21/2024     1:06 PM   PHQ-9 SCORE   PHQ-9 Total Score MyChart 3 (Minimal depression)   PHQ-9 Total Score 3         3/21/2024   Substance Use   Alcohol more than 3/day or more than 7/wk No   Do you use any other substances recreationally? No     Social History     Tobacco Use    Smoking status: Never     Passive exposure: Past    Smokeless tobacco: Never   Vaping Use    Vaping Use: Never used   Substance Use Topics    Alcohol use: No     Alcohol/week: 0.0 standard drinks of alcohol    Drug use: Never     Comment: Drug use: No             3/21/2024   Breast Cancer Screening   Family history of breast, colon, or ovarian cancer? Yes         3/21/2024   LAST FHS-7 RESULTS   1st degree relative breast or ovarian cancer Yes   Any relative bilateral breast cancer Yes   Any male have breast cancer No   Any ONE woman have BOTH breast AND ovarian cancer No   Any woman with breast cancer before 50yrs No   2 or more relatives with breast AND/OR ovarian cancer Yes   2 or more relatives with breast AND/OR bowel cancer Yes                3/21/2024   STI Screening   New sexual partner(s) since last STI/HIV test? (!) YES      History of abnormal Pap smear: NO - age 30-65  "PAP every 5 years with negative HPV co-testing recommended        Latest Ref Rng & Units 3/13/2019     1:31 PM   PAP / HPV   PAP (Historical)  NIL    HPV 16 DNA NEG^Negative Negative    HPV 18 DNA NEG^Negative Negative    Other HR HPV NEG^Negative Negative      ASCVD Risk   The 10-year ASCVD risk score (Kemar RAMACHANDRAN, et al., 2019) is: 3%    Values used to calculate the score:      Age: 53 years      Sex: Female      Is Non- : No      Diabetic: No      Tobacco smoker: No      Systolic Blood Pressure: 138 mmHg      Is BP treated: Yes      HDL Cholesterol: 57 mg/dL      Total Cholesterol: 254 mg/dL           Reviewed and updated as needed this visit by Provider                             Objective    Exam  /86   Pulse 109   Temp 97.1  F (36.2  C) (Temporal)   Resp 16   Ht 1.302 m (4' 3.25\")   Wt 88 kg (194 lb)   LMP 02/28/2024 (Approximate)   SpO2 96%   Breastfeeding No   BMI 51.93 kg/m     Estimated body mass index is 51.93 kg/m  as calculated from the following:    Height as of this encounter: 1.302 m (4' 3.25\").    Weight as of this encounter: 88 kg (194 lb).    Physical Exam          Signed Electronically by: Neptali French MD    "

## 2024-03-21 NOTE — PROGRESS NOTES
"Nursing Notes:   Ruth Salazar LPN  3/21/2024  1:51 PM  Sign at exiting of workspace  Chief Complaint   Patient presents with    Physical       Initial /86   Pulse 109   Temp 97.1  F (36.2  C) (Temporal)   Resp 16   Ht 1.302 m (4' 3.25\")   Wt 88 kg (194 lb)   LMP 02/28/2024 (Approximate)   SpO2 96%   Breastfeeding No   BMI 51.93 kg/m   Estimated body mass index is 51.93 kg/m  as calculated from the following:    Height as of this encounter: 1.302 m (4' 3.25\").    Weight as of this encounter: 88 kg (194 lb).  Medication Review: complete    The next two questions are to help us understand your food security.  If you are feeling you need any assistance in this area, we have resources available to support you today.          3/21/2024   SDOH- Food Insecurity   Within the past 12 months, did you worry that your food would run out before you got money to buy more? N   Within the past 12 months, did the food you bought just not last and you didn t have money to get more? N         Health Care Directive:  Patient does not have a Health Care Directive or Living Will: Discussed advance care planning with patient; however, patient declined at this time.    Ruth Salazar LPN      SUBJECTIVE:  Wilmer Morse  is a 53 year old female who comes in today for a physical and complete evaluation.  She is due for Pap smear.  It is difficult because her uterus and cervix are not really down in the pelvis. She is still getting her period.     She has continued on Wegovy for weight loss.  As of her last virtual visit 6 weeks ago, she was down 38 pounds.  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  BMI is really not helpful because of her height.  She does have chronic back issues and continues on a medication agreement for hydrocodone.  She uses clonazepam at night for restless legs and anxiety but does not take it with " her pain medication and has tolerated it well.    Her last fill hydrocodone was March 2, 2024   PDMP Review         Value Time User    State PDMP site checked  Yes 3/21/2024  1:58 PM Neptali French MD             She is scheduled to have ankle surgery but that is not going to happen until August.    Past Medical, Family, and Social History reviewed and updated as noted below.   ROS is negative except as noted above       Allergies   Allergen Reactions    Penicillins Unknown   ,   Family History   Problem Relation Age of Onset    Other - See Comments Mother         Achondroplasia.    Breast Cancer Mother         Cancer-breast,breast cancer    Breast Cancer Maternal Grandmother 70        Cancer-breast, breast cancer    Unknown/Adopted Father         Unknown    Family History Negative Daughter         Good Health    Family History Negative Son         Good Health    Family History Negative Other         Good Health    Family History Negative Son         Good Health,(stepson)7/24/91   ,   Current Outpatient Medications   Medication    buPROPion (WELLBUTRIN XL) 300 MG 24 hr tablet    clonazePAM (KLONOPIN) 0.5 MG tablet    FEROSUL 325 (65 Fe) MG tablet    gabapentin (NEURONTIN) 300 MG capsule    HYDROcodone-acetaminophen (NORCO) 5-325 MG tablet    HYDROcodone-acetaminophen (NORCO) 5-325 MG tablet    HYDROcodone-acetaminophen (NORCO) 5-325 MG tablet    ibuprofen (ADVIL/MOTRIN) 600 MG tablet    lisinopril (ZESTRIL) 10 MG tablet    naloxone (NARCAN) 4 MG/0.1ML nasal spray    omeprazole (PRILOSEC) 20 MG DR capsule    oxyBUTYnin ER (DITROPAN XL) 5 MG 24 hr tablet    pramipexole (MIRAPEX) 0.5 MG tablet    Semaglutide-Weight Management (WEGOVY) 2.4 MG/0.75ML pen    sertraline (ZOLOFT) 100 MG tablet     No current facility-administered medications for this visit.   ,   Past Medical History:   Diagnosis Date    Achondroplasia     No Comments Provided    Family history of malignant neoplasm of breast     No Comments Provided     Hyperlipidemia     not currently on treatment    Other intervertebral disc degeneration, lumbar region     No Comments Provided    Other specified anxiety disorders     No Comments Provided    Overactive bladder     2014    Personal history of other medical treatment (CODE)      3, Para 2-0-1-2.    Primary osteoarthritis of one knee     5/3/2012    Restless legs syndrome     No Comments Provided    Zoster without complications     2006,left side of neck.   ,   Patient Active Problem List    Diagnosis Date Noted    Benign essential tremor 2024     Priority: Medium    Class 3 severe obesity with serious comorbidity in adult (H) 2023     Priority: Medium    S/P total knee arthroplasty, right 2023     Priority: Medium    Chronic pain syndrome 2023     Priority: Medium    Chronic, continuous use of opioids 2023     Priority: Medium    Benign essential hypertension 2018     Priority: Medium    Depression with anxiety 2018     Priority: Medium    Family history of breast cancer in female 2018     Priority: Medium    Hyperlipidemia 2018     Priority: Medium     Overview:   not currently on treatment      Restless leg syndrome 2018     Priority: Medium    Dependent edema 09/15/2017     Priority: Medium    Chronic midline low back pain without sciatica 2016     Priority: Medium    Achondroplasia 06/15/2016     Priority: Medium    Pain medication agreement completed 3/19/18, 3/13/19, 20, 2016     Priority: Medium    Osteoarthritis of knee, unilateral 2012     Priority: Medium   ,   Past Surgical History:   Procedure Laterality Date    ADENOIDECTOMY      as a child     SECTION      1996,Primary low transverse  section. Repeat     LAPAROSCOPIC TUBAL LIGATION          OTHER SURGICAL HISTORY      [id number],OSTEOTOMY,Limb lengthening procedures.    OTHER SURGICAL HISTORY       "10/16,4/17,7/17,10/17,[id number],OTHER,Right    OTHER SURGICAL HISTORY      09/13/2017,[id number],OTHER,Right,fluorscopically guided. no improvement.    right knee total arthroplasty Right 05/01/2018    Dr. Magaña, [facility]'s    and   Social History     Tobacco Use    Smoking status: Never     Passive exposure: Past    Smokeless tobacco: Never   Substance Use Topics    Alcohol use: No     Alcohol/week: 0.0 standard drinks of alcohol     OBJECTIVE:  /86   Pulse 109   Temp 97.1  F (36.2  C) (Temporal)   Resp 16   Ht 1.302 m (4' 3.25\")   Wt 88 kg (194 lb)   LMP 02/28/2024 (Approximate)   SpO2 96%   Breastfeeding No   BMI 51.93 kg/m     EXAM:  General Appearance: Pleasant, alert, appropriate appearance for achondroplasia.  No acute distress  Head Exam: Normal. Normocephalic, atraumatic.  Eye Exam: PERRLA, EOMI, conjunctivae, sclerae normal.  Ear Exam: Normal TM's bilaterally. Normal auditory canals and external ears. Non-tender.  Nose Exam: Normal external nose, mucus membranes, and septum.  OroPharynx Exam:  Dental hygiene adequate. Normal buccal mucosa. Normal pharynx.  Neck Exam:  Supple, no masses or nodes. No bruits  Thyroid Exam: No nodules or enlargement.  Chest/Respiratory Exam: Normal chest wall and respirations. Clear to auscultation.  Breast Exam: No dimpling, nipple retraction or discharge. No masses or nodes.  Cardiovascular Exam: Regular rate and rhythm. S1, S2, no murmur, click, gallop, or rubs.  Gastrointestinal Exam: Soft, non-tender, no masses or organomegaly.  Genitourinary exam: Normal external genitalia. Speculum exam does not allow visualization of the cervix because her uterus and cervix are up out of the pelvic inlet and not immediately seen.  Pap smear taken, and done blindly.   Bimanual exam reveals normal sized, non tender uterus that is not in the pelvis.. Adnexa are negative.  Lymphatic Exam: Non-palpable nodes in neck,clavicular, axillary, or inguinal regions.  Musculoskeletal Exam: " Back is straight and non-tender, full ROM of upper and lower extremities.  Foot Exam: Left and right foot: good pedal pulses  Skin: no rash or abnormalities  Neurologic Exam:  normal gross motor, tone coordination and mild intention tremor.  No resting tremor or stiffness or cogwheeling.  Psychiatric Exam: Alert and oriented - appropriate affect.    Results for orders placed or performed in visit on 03/21/24   Comprehensive metabolic panel     Status: Abnormal   Result Value Ref Range    Sodium 137 135 - 145 mmol/L    Potassium 4.5 3.4 - 5.3 mmol/L    Carbon Dioxide (CO2) 21 (L) 22 - 29 mmol/L    Anion Gap 14 7 - 15 mmol/L    Urea Nitrogen 11.7 6.0 - 20.0 mg/dL    Creatinine 0.54 0.51 - 0.95 mg/dL    GFR Estimate >90 >60 mL/min/1.73m2    Calcium 9.8 8.6 - 10.0 mg/dL    Chloride 102 98 - 107 mmol/L    Glucose 93 70 - 99 mg/dL    Alkaline Phosphatase 102 40 - 150 U/L    AST 24 0 - 45 U/L    ALT 17 0 - 50 U/L    Protein Total 8.0 6.4 - 8.3 g/dL    Albumin 4.4 3.5 - 5.2 g/dL    Bilirubin Total 0.3 <=1.2 mg/dL   Lipid Profile     Status: Abnormal   Result Value Ref Range    Cholesterol 226 (H) <200 mg/dL    Triglycerides 167 (H) <150 mg/dL    Direct Measure HDL 63 >=50 mg/dL    LDL Cholesterol Calculated 130 (H) <=100 mg/dL    Non HDL Cholesterol 163 (H) <130 mg/dL    Patient Fasting > 8hrs? Unknown     Narrative    Cholesterol  Desirable:  <200 mg/dL    Triglycerides  Normal:  Less than 150 mg/dL  Borderline High:  150-199 mg/dL  High:  200-499 mg/dL  Very High:  Greater than or equal to 500 mg/dL    Direct Measure HDL  Female:  Greater than or equal to 50 mg/dL   Male:  Greater than or equal to 40 mg/dL    LDL Cholesterol  Desirable:  <100mg/dL  Above Desirable:  100-129 mg/dL   Borderline High:  130-159 mg/dL   High:  160-189 mg/dL   Very High:  >= 190 mg/dL    Non HDL Cholesterol  Desirable:  130 mg/dL  Above Desirable:  130-159 mg/dL  Borderline High:  160-189 mg/dL  High:  190-219 mg/dL  Very High:  Greater than or  equal to 220 mg/dL   TSH with free T4 reflex     Status: Normal   Result Value Ref Range    TSH 1.19 0.30 - 4.20 uIU/mL   Hemoglobin A1c     Status: Normal   Result Value Ref Range    Hemoglobin A1C 5.1 4.0 - 6.2 %   CBC with platelets and differential     Status: None   Result Value Ref Range    WBC Count 8.1 4.0 - 11.0 10e3/uL    RBC Count 4.98 3.80 - 5.20 10e6/uL    Hemoglobin 13.5 11.7 - 15.7 g/dL    Hematocrit 42.0 35.0 - 47.0 %    MCV 84 78 - 100 fL    MCH 27.1 26.5 - 33.0 pg    MCHC 32.1 31.5 - 36.5 g/dL    RDW 13.8 10.0 - 15.0 %    Platelet Count 330 150 - 450 10e3/uL    % Neutrophils 59 %    % Lymphocytes 32 %    % Monocytes 8 %    % Eosinophils 0 %    % Basophils 0 %    % Immature Granulocytes 0 %    NRBCs per 100 WBC 0 <1 /100    Absolute Neutrophils 4.8 1.6 - 8.3 10e3/uL    Absolute Lymphocytes 2.6 0.8 - 5.3 10e3/uL    Absolute Monocytes 0.6 0.0 - 1.3 10e3/uL    Absolute Eosinophils 0.0 0.0 - 0.7 10e3/uL    Absolute Basophils 0.0 0.0 - 0.2 10e3/uL    Absolute Immature Granulocytes 0.0 <=0.4 10e3/uL    Absolute NRBCs 0.0 10e3/uL   Urine Creatinine for Drug Screen Panel     Status: None   Result Value Ref Range    Creatinine Urine for Drug Screen 168 mg/dL   CBC with Platelets & Differential     Status: None    Narrative    The following orders were created for panel order CBC with Platelets & Differential.  Procedure                               Abnormality         Status                     ---------                               -----------         ------                     CBC with platelets and d...[[id number]]                      Final result               RBC and Platelet Morphology[[id number]]                                                   Please view results for these tests on the individual orders.   Drug Confirmation Panel Urine with Creat     Status: None (In process)    Narrative    The following orders were created for panel order Drug Confirmation Panel Urine with Creat.  Procedure                                Abnormality         Status                     ---------                               -----------         ------                     Urine Drug Confirmation ...[[id number]]                      In process                 Urine Creatinine for [place]...[[id number]]                      Final result                 Please view results for these tests on the individual orders.      ASSESSMENT/Plan :    Wilmer was seen today for physical.    Diagnoses and all orders for this visit:    Visit for preventive health examination    Screening for metabolic disorder  -     Comprehensive metabolic panel; Future  -     Comprehensive metabolic panel    Screening for diabetes mellitus  -     Hemoglobin A1c; Future  -     Hemoglobin A1c    Screening for hypothyroidism  -     TSH with free T4 reflex; Future  -     TSH with free T4 reflex    Metabolic syndrome X  -     Comprehensive metabolic panel; Future  -     Lipid Profile; Future  -     Hemoglobin A1c; Future  -     Comprehensive metabolic panel  -     Lipid Profile  -     Hemoglobin A1c    Class 3 severe obesity with serious comorbidity in adult, unspecified BMI, unspecified obesity type (H)    Achondroplasia  -     HYDROcodone-acetaminophen (NORCO) 5-325 MG tablet; Take 2 tablets by mouth 3 times daily as needed for pain . May take a maximum of 7 tablets per day.  -     HYDROcodone-acetaminophen (NORCO) 5-325 MG tablet; Take 2 tablets by mouth 3 times daily as needed for severe pain May take a maximum of 7 tablets per day.  -     HYDROcodone-acetaminophen (NORCO) 5-325 MG tablet; Take 2 tab by mouth 3 times per day as needed for pain.  May take a maximum of 7 tab per day.    Pain medication agreement completed, 3/21/24  -     Drug Confirmation Panel Urine with Creat; Future  -     Drug Confirmation Panel Urine with Creat  -     HYDROcodone-acetaminophen (NORCO) 5-325 MG tablet; Take 2 tablets by mouth 3 times daily as needed for pain . May take a maximum of  7 tablets per day.  -     HYDROcodone-acetaminophen (NORCO) 5-325 MG tablet; Take 2 tablets by mouth 3 times daily as needed for severe pain May take a maximum of 7 tablets per day.  -     HYDROcodone-acetaminophen (NORCO) 5-325 MG tablet; Take 2 tab by mouth 3 times per day as needed for pain.  May take a maximum of 7 tab per day.    Chronic midline low back pain without sciatica  -     Drug Confirmation Panel Urine with Creat; Future  -     Drug Confirmation Panel Urine with Creat  -     HYDROcodone-acetaminophen (NORCO) 5-325 MG tablet; Take 2 tablets by mouth 3 times daily as needed for pain . May take a maximum of 7 tablets per day.  -     HYDROcodone-acetaminophen (NORCO) 5-325 MG tablet; Take 2 tablets by mouth 3 times daily as needed for severe pain May take a maximum of 7 tablets per day.  -     HYDROcodone-acetaminophen (NORCO) 5-325 MG tablet; Take 2 tab by mouth 3 times per day as needed for pain.  May take a maximum of 7 tab per day.    Benign essential hypertension  -     Comprehensive metabolic panel; Future  -     Comprehensive metabolic panel    Depression with anxiety  -     buPROPion (WELLBUTRIN XL) 300 MG 24 hr tablet; Take 1 tablet (300 mg) by mouth every morning    Screening for deficiency anemia  -     CBC with Platelets & Differential; Future  -     CBC with Platelets & Differential    Generalized anxiety disorder    Neuropathic pain  -     gabapentin (NEURONTIN) 300 MG capsule; Take 1 capsule (300 mg) by mouth 3 times daily    Screening for malignant neoplasm of cervix  -     Pap Screen with HPV - recommended age 30 - 65 years    Benign essential tremor    Pain medication agreement completed, 3/19/18, 3/13/19, 7/6/2020, 6/22/21, 5/19/22  -     Drug Confirmation Panel Urine with Creat; Future  -     Drug Confirmation Panel Urine with Creat  -     HYDROcodone-acetaminophen (NORCO) 5-325 MG tablet; Take 2 tablets by mouth 3 times daily as needed for pain . May take a maximum of 7 tablets per  day.  -     HYDROcodone-acetaminophen (NORCO) 5-325 MG tablet; Take 2 tablets by mouth 3 times daily as needed for severe pain May take a maximum of 7 tablets per day.  -     HYDROcodone-acetaminophen (NORCO) 5-325 MG tablet; Take 2 tab by mouth 3 times per day as needed for pain.  May take a maximum of 7 tab per day.    Other orders  -     Cancel: TDAP 7+ (ADACEL,BOOSTRIX)      Will notify of lab results when available. Discussed diet, exercise and healthy lifestyle changes. Continue current medications.     Discussed Boostrix.    Will continue Wegovy.  She is on 2.4 mg weekly and is tolerating that well.  Recommend follow-up in 2 to 3 months.    Renewed medication agreement and did ToxAssure today.  She is continuing to work on a slow wean of her hydrocodone by half tablet per day per month.  As she continues to lose weight, it is hopeful that her skill skeletal symptoms will significantly improve.  She has been on Klonopin for anxiety and restless legs along with Mirapex for many years.  We did try and wean her off of it unsuccessfully a number of years ago.  She is aware of the risks of being on a benzodiazepine and an opiate at the same time and has managed to successfully for many years.  She is low risk.    Will await Cologuard results.    She would like to see Dr. Garcia when I retire.     Neptali French MD      Answers submitted by the patient for this visit:  Patient Health Questionnaire (Submitted on 3/21/2024)  If you checked off any problems, how difficult have these problems made it for you to do your work, take care of things at home, or get along with other people?: Somewhat difficult  PHQ9 TOTAL SCORE: 3  YESI-7 (Submitted on 3/21/2024)  YESI 7 TOTAL SCORE: 3

## 2024-03-21 NOTE — LETTER
Opioid / Opioid Plus Controlled Substance Agreement    This is an agreement between you and your provider about the safe and appropriate use of controlled substance/opioids prescribed by your care team. Controlled substances are medicines that can cause physical and mental dependence (abuse).    There are strict laws about having and using these medicines. We here at [facility] are committing to working with you in your efforts to get better. To support you in this work, we ll help you schedule regular office appointments for medicine refills. If we must cancel or change your appointment for any reason, we ll make sure you have enough medicine to last until your next appointment.     As a Provider, I will:  Listen carefully to your concerns and treat you with respect.   Recommend a treatment plan that I believe is in your best interest. This plan may involve therapies other than opioid pain medication.   Talk with you often about the possible benefits, and the risk of harm of any medicine that we prescribe for you.   Provide a plan on how to taper (discontinue or go off) using this medicine if the decision is made to stop its use.    As a Patient, I understand that opioid(s):   Are a controlled substance prescribed by my care team to help me function or work and manage my condition(s).   Are strong medicines and can cause serious side effects such as:  Drowsiness, which can seriously affect my driving ability  A lower breathing rate, enough to cause death  Harm to my thinking ability   Depression   Abuse of and addiction to this medicine  Need to be taken exactly as prescribed. Combining opioids with certain medicines or chemicals (such as illegal drugs, sedatives, sleeping pills, and benzodiazepines) can be dangerous or even fatal. If I stop opioids suddenly, I may have severe withdrawal symptoms.  Do not work for all types of pain nor for all patients. If they re not helpful, I may be asked to stop  them.        The risks, benefits and side effects of these medicine(s) were explained to me. I agree that:  I will take part in other treatments as advised by my care team. This may be psychiatry or counseling, physical therapy, behavioral therapy, group treatment or a referral to a specialist.     I will keep all my appointments. I understand that this is part of the monitoring of opioids. My care team may require an office visit for EVERY opioid/controlled substance refill. If I miss appointments or don t follow instructions, my care team may stop my medicine.    I will take my medicines as prescribed. I will not change the dose or schedule unless my care team tells me to. There will be no refills if I run out early.     I may be asked to come to the clinic and complete a urine drug test or complete a pill count at any time. If I don t give a urine sample or participate in a pill count, the care team may stop my medicine.    I will only receive prescriptions from this clinic for chronic pain. If I am treated by another provider for acute pain issues, I will tell them that I am taking opioid pain medication for chronic pain and that I have a treatment agreement with this provider. I will inform my [facility] care team within one business day if I am given a prescription for any pain medication by another healthcare provider. My [facility] care team can contact other providers and pharmacists about my use of any medicines.    It is up to me to make sure that I don t run out of my medicines on weekends or holidays. If my care team is willing to refill my opioid prescription without a visit, I must request refills only during office hours. Refills may take up to 3 business days to process. I will use one pharmacy to fill all my opioid and other controlled substance prescriptions. I will notify the clinic about any changes to my insurance or medication availability.    I am responsible for my  prescriptions. If the medicine/prescription is lost, stolen or destroyed, it will not be replaced. I also agree not to share controlled substance medicines with anyone.    I am aware I should not use any illegal or recreational drugs. I agree not to drink alcohol unless my care team says I can.       If I enroll in the [place] [facility] program, I will tell my care team prior to my next refill.     I will tell my care team right away if I become pregnant, have a new medical problem treated outside of my regular clinic, or have a change in my medications.    I understand that this medicine can affect my thinking, judgment and reaction time. Alcohol and drugs affect the brain and body, which can affect the safety of my driving. Being under the influence of alcohol or drugs can affect my decision-making, behaviors, personal safety, and the safety of others. Driving while impaired (DWI) can occur if a person is driving, operating, or in physical control of a car, motorcycle, boat, snowmobile, ATV, motorbike, off-road vehicle, or any other motor vehicle ([place] Statute 169A.20). I understand the risk if I choose to drive or operate any vehicle or machinery.    I understand that if I do not follow any of the conditions above, my prescriptions or treatment may be stopped or changed.          Opioids  What You Need to Know    What are opioids?   Opioids are pain medicines that must be prescribed by a doctor. They are also known as narcotics.     Examples are:   morphine (MS Contin, Sherlyn)  oxycodone (Oxycontin)  oxycodone and acetaminophen (Percocet)  hydrocodone and acetaminophen (Vicodin, Norco)   fentanyl patch (Duragesic)   hydromorphone (Dilaudid)   methadone  codeine (Tylenol #3)     What do opioids do well?   Opioids are best for severe short-term pain such as after a surgery or injury. They may work well for cancer pain. They may help some people with long-lasting (chronic) pain.     What do opioids NOT do  well?   Opioids never get rid of pain entirely, and they don t work well for most patients with chronic pain. Opioids don t reduce swelling, one of the causes of pain.                                    Other ways to manage chronic pain and improve function include:     Treat the health problem that may be causing pain  Anti-inflammation medicines, which reduce swelling and tenderness, such as ibuprofen (Advil, Motrin) or naproxen (Aleve)  Acetaminophen (Tylenol)  Antidepressants and anti-seizure medicines, especially for nerve pain  Topical treatments such as patches or creams  Injections or nerve blocks  Chiropractic or osteopathic treatment  Acupuncture, massage, deep breathing, meditation, visual imagery, aromatherapy  Use heat or ice at the pain site  Physical therapy   Exercise  Stop smoking  Take part in therapy       Risks and side effects     Talk to your doctor before you start or decide to keep taking opioids. Possible side effects include:    Lowering your breathing rate enough to cause death  Overdose, including death, especially if taking higher than prescribed doses  Worse depression symptoms; less pleasure in things you usually enjoy  Feeling tired or sluggish  Slower thoughts or cloudy thinking  Being more sensitive to pain over time; pain is harder to control  Trouble sleeping or restless sleep  Changes in hormone levels (for example, less testosterone)  Changes in sex drive or ability to have sex  Constipation  Unsafe driving  Itching and sweating  Dizziness  Nausea, throwing up and dry mouth    What else should I know about opioids?    Opioids may lead to dependence, tolerance, or addiction.    Dependence means that if you stop or reduce the medicine too quickly, you will have withdrawal symptoms. These include loose poop (diarrhea), jitters, flu-like symptoms, nervousness and tremors. Dependence is not the same as addiction.                     Tolerance means needing higher doses over time to  get the same effect. This may increase the chance of serious side effects.    Addiction is when people improperly use a substance that harms their body, their mind or their relations with others. Use of opiates can cause a relapse of addiction if you have a history of drug or alcohol abuse.    People who have used opioids for a long time may have a lower quality of life, worse depression, higher levels of pain and more visits to doctors.    You can overdose on opioids. Take these steps to lower your risk of overdose:    Recognize the signs:  Signs of overdose include decrease or loss of consciousness (blackout), slowed breathing, trouble waking up and blue lips. If someone is worried about overdose, they should call 911.    Talk to your doctor about Narcan (naloxone).   If you are at risk for overdose, you may be given a prescription for Narcan. This medicine very quickly reverses the effects of opioids.   If you overdose, a friend or family member can give you Narcan while waiting for the ambulance. They need to know the signs of overdose and how to give Narcan.     Don't use alcohol or street drugs.   Taking them with opioids can cause death.    Do not take any of these medicines unless your doctor says it s OK. Taking these with opioids can cause death:  Benzodiazepines, such as lorazepam (Ativan), alprazolam (Xanax) or diazepam (Valium)  Muscle relaxers, such as cyclobenzaprine (Flexeril)  Sleeping pills like zolpidem (Ambien)   Other opioids      How to keep you and other people safe while taking opioids:    Never share your opioids with others.  Opioid medicines are regulated by the Drug Enforcement Agency ([place]). Selling or sharing medications is a criminal act.    2. Be sure to store opioids in a secure place, locked up if possible. Young children can easily swallow them and overdose.    3. When you are traveling with your medicines, keep them in the original bottles. If you use a pill box, be sure you also  carry a copy of your medicine list from your clinic or pharmacy.    4. Safe disposal of opioids    Most pharmacies have places to get rid of medicine, called disposal kiosks. Medicine disposal options are also available in every [facility]. Search your county and  medication disposal  to find more options. You can find more details at:  [email or web address]://[email or web address]-green/managing-[email or web address]-medications     I agree that my provider, clinic care team, and pharmacy may work with any city, state or federal law enforcement agency that investigates the misuse, sale, or other diversion of my controlled medicine. I will allow my provider to discuss my care with, or share a copy of, this agreement with any other treating provider, pharmacy or emergency room where I receive care.    I have read this agreement and have asked questions about anything I did not understand.    _______________________________________________________  Patient Signature - Wilmer Morse _____________________                   Date     _______________________________________________________  Provider Signature - Neptali French MD   _____________________                   Date     _______________________________________________________  Witness Signature (required if provider not present while patient signing)   _____________________                   Date

## 2024-03-22 ENCOUNTER — MYC MEDICAL ADVICE (OUTPATIENT)
Dept: FAMILY MEDICINE | Facility: OTHER | Age: 54
End: 2024-03-22
Payer: COMMERCIAL

## 2024-03-22 NOTE — TELEPHONE ENCOUNTER
Called [facility] to inquire about lab results still in process.     All send out's.      Patient update on [facility].    Ángela Early RN on 3/22/2024 at 4:02 PM

## 2024-03-25 LAB
BKR LAB AP GYN ADEQUACY: NORMAL
BKR LAB AP GYN INTERPRETATION: NORMAL
BKR LAB AP HPV REFLEX: NORMAL
BKR LAB AP LMP: NORMAL
BKR LAB AP PREVIOUS ABNORMAL: NORMAL
PATH REPORT.COMMENTS IMP SPEC: NORMAL
PATH REPORT.COMMENTS IMP SPEC: NORMAL
PATH REPORT.RELEVANT HX SPEC: NORMAL

## 2024-03-27 LAB
7AMINOCLONAZEPAM UR QL CFM: PRESENT
DHC UR CFM-MCNC: 1140 NG/ML
DHC/CREAT UR: 679 NG/MG {CREAT}
HYDROCODONE UR CFM-MCNC: 2780 NG/ML
HYDROCODONE/CREAT UR: 1655 NG/MG {CREAT}
HYDROMORPHONE UR CFM-MCNC: 81 NG/ML
HYDROMORPHONE/CREAT UR: 48 NG/MG {CREAT}

## 2024-03-28 ENCOUNTER — MYC MEDICAL ADVICE (OUTPATIENT)
Dept: FAMILY MEDICINE | Facility: OTHER | Age: 54
End: 2024-03-28
Payer: COMMERCIAL

## 2024-03-28 LAB
HUMAN PAPILLOMA VIRUS 16 DNA: NEGATIVE
HUMAN PAPILLOMA VIRUS 18 DNA: NEGATIVE
HUMAN PAPILLOMA VIRUS FINAL DIAGNOSIS: NORMAL
HUMAN PAPILLOMA VIRUS OTHER HR: NEGATIVE

## 2024-03-28 NOTE — TELEPHONE ENCOUNTER
Results are normal. Pap smear and HPV screening are normal.   Sharon Yuen PA-C on 3/28/2024 at 4:40 PM

## 2024-03-28 NOTE — TELEPHONE ENCOUNTER
3/21 results ordered by Manolo.  Manolo out until 4/1.      Ángela Early RN on 3/28/2024 at 3:52 PM

## 2024-04-11 LAB — NONINV COLON CA DNA+OCC BLD SCRN STL QL: NEGATIVE

## 2024-04-30 ENCOUNTER — MYC REFILL (OUTPATIENT)
Dept: FAMILY MEDICINE | Facility: OTHER | Age: 54
End: 2024-04-30
Payer: COMMERCIAL

## 2024-04-30 DIAGNOSIS — E66.813 CLASS 3 SEVERE OBESITY WITH SERIOUS COMORBIDITY IN ADULT, UNSPECIFIED BMI, UNSPECIFIED OBESITY TYPE (H): ICD-10-CM

## 2024-04-30 DIAGNOSIS — E66.01 CLASS 3 SEVERE OBESITY WITH SERIOUS COMORBIDITY IN ADULT, UNSPECIFIED BMI, UNSPECIFIED OBESITY TYPE (H): ICD-10-CM

## 2024-05-06 ENCOUNTER — MYC MEDICAL ADVICE (OUTPATIENT)
Dept: FAMILY MEDICINE | Facility: OTHER | Age: 54
End: 2024-05-06
Payer: COMMERCIAL

## 2024-05-07 NOTE — TELEPHONE ENCOUNTER
Refill requested 4/30/24.    Requested Prescriptions   Pending Prescriptions Disp Refills    Semaglutide-Weight Management (WEGOVY) 2.4 MG/0.75ML pen 3 mL 3     Sig: Inject 2.4 mg Subcutaneous once a week       There is no refill protocol information for this order          Last Prescription Date:   12/6/2023  Last Fill Qty/Refills:         3ml, R-3    Last Office Visit:              3/21/2024   Future Office visit:            None    Ángela Early RN on 5/7/2024 at 8:16 AM

## 2024-05-07 NOTE — TELEPHONE ENCOUNTER
See refill encounter.   Routed to CP.      Patient update on [facility].    Ángela Early RN on 5/7/2024 at 8:17 AM

## 2024-06-09 DIAGNOSIS — F41.8 DEPRESSION WITH ANXIETY: ICD-10-CM

## 2024-06-12 RX ORDER — SERTRALINE HYDROCHLORIDE 100 MG/1
200 TABLET, FILM COATED ORAL DAILY
Qty: 180 TABLET | Refills: 0 | Status: SHIPPED | OUTPATIENT
Start: 2024-06-12 | End: 2024-09-11

## 2024-06-12 NOTE — TELEPHONE ENCOUNTER
[id number] of [place] sent Rx request for the following:      Requested Prescriptions   Pending Prescriptions Disp Refills    sertraline (ZOLOFT) 100 MG tablet [Pharmacy Med Name: SERTRALINE 100MG TABLETS] 180 tablet 1     Sig: TAKE 2 TABLETS(200 MG) BY MOUTH DAILY     Last Prescription Date:   12/8/23  Last Fill Qty/Refills:         180, R-1    Last Office Visit:              3/21/24 (physical- J)   Future Office visit:             Next 5 appointments (look out 90 days)      Jul 22, 2024 11:40 AM  (Arrive by 11:25 AM)  Provider Visit with Karon Garcia MD  [facility] ([facility] ) [address]  [place] [id number] [phone]         Warnings Override History for sertraline (ZOLOFT) 100 MG tablet [[id number]]    Overridden by Mary Jose RN on Dec 8, 2023 4:16 PM   Drug-Drug   1. IBUPROFEN / SELECTIVE SEROTONIN REUPTAKE INHIBITORS [Level: Major] [Reason: Tolerated medication/side effects in past]   Other Orders: ibuprofen (ADVIL/MOTRIN) 600 MG tablet        Prescription refilled per RN Medication Refill Policy.................... Mary Jose RN ....................  6/12/2024   1:53 PM

## 2024-06-19 ENCOUNTER — MYC MEDICAL ADVICE (OUTPATIENT)
Dept: FAMILY MEDICINE | Facility: OTHER | Age: 54
End: 2024-06-19
Payer: COMMERCIAL

## 2024-06-19 ENCOUNTER — MYC REFILL (OUTPATIENT)
Dept: FAMILY MEDICINE | Facility: OTHER | Age: 54
End: 2024-06-19
Payer: COMMERCIAL

## 2024-06-19 DIAGNOSIS — G89.29 CHRONIC MIDLINE LOW BACK PAIN WITHOUT SCIATICA: ICD-10-CM

## 2024-06-19 DIAGNOSIS — Q77.4 ACHONDROPLASIA: ICD-10-CM

## 2024-06-19 DIAGNOSIS — Z02.89 PAIN MEDICATION AGREEMENT COMPLETED: ICD-10-CM

## 2024-06-19 DIAGNOSIS — M54.50 CHRONIC MIDLINE LOW BACK PAIN WITHOUT SCIATICA: ICD-10-CM

## 2024-06-19 RX ORDER — HYDROCODONE BITARTRATE AND ACETAMINOPHEN 5; 325 MG/1; MG/1
2 TABLET ORAL 3 TIMES DAILY PRN
Qty: 200 TABLET | Refills: 0 | Status: SHIPPED | OUTPATIENT
Start: 2024-06-24 | End: 2024-07-22

## 2024-06-19 RX ORDER — HYDROCODONE BITARTRATE AND ACETAMINOPHEN 5; 325 MG/1; MG/1
TABLET ORAL
Qty: 200 TABLET | Refills: 0 | Status: CANCELLED | OUTPATIENT
Start: 2024-06-19

## 2024-06-19 RX ORDER — HYDROCODONE BITARTRATE AND ACETAMINOPHEN 5; 325 MG/1; MG/1
2 TABLET ORAL 3 TIMES DAILY PRN
Qty: 200 TABLET | Refills: 0 | Status: CANCELLED | OUTPATIENT
Start: 2024-06-19

## 2024-06-19 NOTE — TELEPHONE ENCOUNTER
Prescriptions refilled.  But last refill was on 5/26/2024, so should have enough to last until 6/25/2024.  Karon Garcia MD

## 2024-06-19 NOTE — TELEPHONE ENCOUNTER
Patient comment: Hi Dr. Garcia, I am following up per our prior conversation regarding having this Med refilled on or before the 23rd of June, I do have an IN OFFICE new patient visit scheduled in July as it was the soonest you had at the time I scheduled it. Please let me know if you have any questions or concerns otherwise I will wait to hear from my Pharmacy and continue the plan to see you in July. THANK YOU     Requested Prescriptions   Pending Prescriptions Disp Refills    HYDROcodone-acetaminophen (NORCO) 5-325 MG tablet 200 tablet 0     Sig: Take 2 tablets by mouth 3 times daily as needed for pain . May take a maximum of 7 tablets per day.       There is no refill protocol information for this order     Last Prescription Date:   3/21/24 ([facility])  Last Fill Qty/Refills:         200, R-0    Last Office Visit:              3/21/24 (physical)   Future Office visit:             Next 5 appointments (look out 90 days)      Jul 22, 2024 11:40 AM  (Arrive by 11:25 AM)  Provider Visit with Karon Garcia MD  [facility] ([facility] ) [address]  [place] [address] [phone]     Unable to complete prescription refill per RN Medication Refill Policy. Mary Jose RN .............. 6/19/2024  8:42 AM

## 2024-07-19 ENCOUNTER — MYC REFILL (OUTPATIENT)
Dept: FAMILY MEDICINE | Facility: OTHER | Age: 54
End: 2024-07-19
Payer: COMMERCIAL

## 2024-07-19 DIAGNOSIS — E66.01 CLASS 3 SEVERE OBESITY WITH SERIOUS COMORBIDITY IN ADULT, UNSPECIFIED BMI, UNSPECIFIED OBESITY TYPE (H): ICD-10-CM

## 2024-07-19 DIAGNOSIS — E66.813 CLASS 3 SEVERE OBESITY WITH SERIOUS COMORBIDITY IN ADULT, UNSPECIFIED BMI, UNSPECIFIED OBESITY TYPE (H): ICD-10-CM

## 2024-07-19 ASSESSMENT — ANXIETY QUESTIONNAIRES
7. FEELING AFRAID AS IF SOMETHING AWFUL MIGHT HAPPEN: NOT AT ALL
4. TROUBLE RELAXING: NOT AT ALL
2. NOT BEING ABLE TO STOP OR CONTROL WORRYING: NOT AT ALL
IF YOU CHECKED OFF ANY PROBLEMS ON THIS QUESTIONNAIRE, HOW DIFFICULT HAVE THESE PROBLEMS MADE IT FOR YOU TO DO YOUR WORK, TAKE CARE OF THINGS AT HOME, OR GET ALONG WITH OTHER PEOPLE: SOMEWHAT DIFFICULT
7. FEELING AFRAID AS IF SOMETHING AWFUL MIGHT HAPPEN: NOT AT ALL
5. BEING SO RESTLESS THAT IT IS HARD TO SIT STILL: NOT AT ALL
8. IF YOU CHECKED OFF ANY PROBLEMS, HOW DIFFICULT HAVE THESE MADE IT FOR YOU TO DO YOUR WORK, TAKE CARE OF THINGS AT HOME, OR GET ALONG WITH OTHER PEOPLE?: SOMEWHAT DIFFICULT
GAD7 TOTAL SCORE: 1
6. BECOMING EASILY ANNOYED OR IRRITABLE: NOT AT ALL
1. FEELING NERVOUS, ANXIOUS, OR ON EDGE: NOT AT ALL
3. WORRYING TOO MUCH ABOUT DIFFERENT THINGS: SEVERAL DAYS
GAD7 TOTAL SCORE: 1
GAD7 TOTAL SCORE: 1

## 2024-07-22 ENCOUNTER — OFFICE VISIT (OUTPATIENT)
Dept: FAMILY MEDICINE | Facility: OTHER | Age: 54
End: 2024-07-22
Attending: FAMILY MEDICINE
Payer: COMMERCIAL

## 2024-07-22 VITALS
RESPIRATION RATE: 18 BRPM | OXYGEN SATURATION: 97 % | TEMPERATURE: 99 F | SYSTOLIC BLOOD PRESSURE: 128 MMHG | WEIGHT: 193.4 LBS | BODY MASS INDEX: 44.76 KG/M2 | HEIGHT: 55 IN | DIASTOLIC BLOOD PRESSURE: 88 MMHG | HEART RATE: 88 BPM

## 2024-07-22 DIAGNOSIS — E66.01 CLASS 3 SEVERE OBESITY WITH SERIOUS COMORBIDITY IN ADULT, UNSPECIFIED BMI, UNSPECIFIED OBESITY TYPE (H): ICD-10-CM

## 2024-07-22 DIAGNOSIS — G89.29 CHRONIC MIDLINE LOW BACK PAIN WITHOUT SCIATICA: ICD-10-CM

## 2024-07-22 DIAGNOSIS — G89.29 CHRONIC PAIN OF BOTH LOWER EXTREMITIES: Primary | ICD-10-CM

## 2024-07-22 DIAGNOSIS — Q77.4 ACHONDROPLASIA: ICD-10-CM

## 2024-07-22 DIAGNOSIS — Z02.89 PAIN MEDICATION AGREEMENT COMPLETED: ICD-10-CM

## 2024-07-22 DIAGNOSIS — M79.604 CHRONIC PAIN OF BOTH LOWER EXTREMITIES: Primary | ICD-10-CM

## 2024-07-22 DIAGNOSIS — M79.605 CHRONIC PAIN OF BOTH LOWER EXTREMITIES: Primary | ICD-10-CM

## 2024-07-22 DIAGNOSIS — M54.50 CHRONIC MIDLINE LOW BACK PAIN WITHOUT SCIATICA: ICD-10-CM

## 2024-07-22 DIAGNOSIS — E66.813 CLASS 3 SEVERE OBESITY WITH SERIOUS COMORBIDITY IN ADULT, UNSPECIFIED BMI, UNSPECIFIED OBESITY TYPE (H): ICD-10-CM

## 2024-07-22 PROCEDURE — G2211 COMPLEX E/M VISIT ADD ON: HCPCS | Performed by: FAMILY MEDICINE

## 2024-07-22 PROCEDURE — 99214 OFFICE O/P EST MOD 30 MIN: CPT | Performed by: FAMILY MEDICINE

## 2024-07-22 RX ORDER — PREGABALIN 25 MG/1
25 CAPSULE ORAL 2 TIMES DAILY
Qty: 60 CAPSULE | Refills: 2 | Status: SHIPPED | OUTPATIENT
Start: 2024-07-22

## 2024-07-22 RX ORDER — HYDROCODONE BITARTRATE AND ACETAMINOPHEN 5; 325 MG/1; MG/1
2 TABLET ORAL 3 TIMES DAILY PRN
Qty: 200 TABLET | Refills: 0 | Status: SHIPPED | OUTPATIENT
Start: 2024-07-22

## 2024-07-22 RX ORDER — HYDROCODONE BITARTRATE AND ACETAMINOPHEN 5; 325 MG/1; MG/1
2 TABLET ORAL 3 TIMES DAILY PRN
Qty: 200 TABLET | Refills: 0 | Status: SHIPPED | OUTPATIENT
Start: 2024-09-20

## 2024-07-22 RX ORDER — HYDROCODONE BITARTRATE AND ACETAMINOPHEN 5; 325 MG/1; MG/1
2 TABLET ORAL 3 TIMES DAILY
Qty: 200 TABLET | Refills: 0 | Status: SHIPPED | OUTPATIENT
Start: 2024-08-21

## 2024-07-22 ASSESSMENT — PAIN SCALES - GENERAL: PAINLEVEL: EXTREME PAIN (8)

## 2024-07-22 NOTE — PATIENT INSTRUCTIONS
May be worth considering establishing care with a primary provider in [place], much closer to home. Since this is a transition period for your anyway.     Will place referral to the pain clinic in [place] through [facility], looking for more ways to help manage your chronic pain. If you can get established there, you could also consider asking that provider to recommend a primary provider in the area that would be a good fit.     I am happy to continue to see you every 3 months to work on addressing these issues, but that's a pretty far drive when you are needing a new physician anyway.     Some thoughts: physical therapy (especially water based and/or someone who works on myofascial pain), mental health therapist (chronic pain worsens depression, depression worsens chronic pain), water based exercise in general    Stop all gabapentin.     Trial of lyrica. Start at 25mg twice daily. If tolerating okay, would recommend increase after a month. Send a message to let me know how its going.     Schedule a follow up for 3 months. If you decide to switch care over to [place], then you can always cancel.

## 2024-07-22 NOTE — NURSING NOTE
"Chief Complaint   Patient presents with    [facility]     Transferring care from [facility].        Initial /88 (BP Location: Right arm, Patient Position: Sitting, Cuff Size: Adult Regular)   Pulse 88   Temp 99  F (37.2  C) (Tympanic)   Resp 18   Ht 1.295 m (4' 3\")   Wt 87.7 kg (193 lb 6.4 oz)   SpO2 97%   BMI 52.28 kg/m   Estimated body mass index is 52.28 kg/m  as calculated from the following:    Height as of this encounter: 1.295 m (4' 3\").    Weight as of this encounter: 87.7 kg (193 lb 6.4 oz).  Medication Reconciliation: complete    Richa So LPN    Advance Care Directive reviewed    "

## 2024-07-22 NOTE — TELEPHONE ENCOUNTER
Medication refilled at [facility] 7/22/24. Refused as duplicate per protocol Soledad Barksdale RN on 7/22/2024 at 4:17 PM

## 2024-07-22 NOTE — PROGRESS NOTES
Assessment & Plan       ICD-10-CM    1. Chronic pain of both lower extremities  M79.604 Pain Management  Referral    M79.605 pregabalin (LYRICA) 25 MG capsule    G89.29       2. 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 Chronic midline low back pain without sciatica  M54.50 HYDROcodone-acetaminophen (NORCO) 5-325 MG tablet    G89.29 HYDROcodone-acetaminophen (NORCO) 5-325 MG tablet     HYDROcodone-acetaminophen (NORCO) 5-325 MG tablet     Pain Management  Referral     pregabalin (LYRICA) 25 MG capsule      5. Pain medication agreement completed, 3/19/18, 3/13/19, 7/6/2020, 6/22/21, 5/19/22  Z02.89 HYDROcodone-acetaminophen (NORCO) 5-325 MG tablet     HYDROcodone-acetaminophen (NORCO) 5-325 MG tablet     HYDROcodone-acetaminophen (NORCO) 5-325 MG tablet        Reviewed some options, see below.    I definitely have certain providers I would recommend that she see locally, but this is not practical for her to come back to this area for physical therapy, acupuncture, water-based exercise, etc.    Think she could probably benefit from some reevaluation of her pain management program, and may find that she has improved pain.  But I think it is pretty impractical for that to happen here when she lives 2 hours away.    She will need to be seen in person every 3 months if wanting to continue care here.      Patient Instructions   May be worth considering establishing care with a primary provider in [place], much closer to home. Since this is a transition period for you  "anyway.     Will place referral to the pain clinic in [place] through [facility], looking for more ways to help manage your chronic pain. If you can get established there, you could also consider asking that provider to recommend a primary provider in the area that would be a good fit.     I am happy to continue to see you every 3 months to work on addressing these issues, but that's a pretty far drive when you are needing a new physician anyway.     Some thoughts: physical therapy (especially water based and/or someone who works on myofascial pain), mental health therapist (chronic pain worsens depression, depression worsens chronic pain), water based exercise in general    Stop all gabapentin.     Trial of lyrica. Start at 25mg twice daily. If tolerating okay, would recommend increase after a month. Send a message to let me know how its going.     Schedule a follow up for 3 months. If you decide to switch care over to [place], then you can always cancel.       BMI  Estimated body mass index is 52.28 kg/m  as calculated from the following:    Height as of this encounter: 1.295 m (4' 3\").    Weight as of this encounter: 87.7 kg (193 lb 6.4 oz).       No follow-ups on file.    Julio Guillen is a 53 year old, presenting for the following health issues:  Establish Care (Transferring care from [facility]. )        7/22/2024    11:31 AM   Additional Questions   Roomed by Richa   Accompanied by self     History of Present Illness       Back Pain:  She presents for follow up of back pain. Patient's back pain is a chronic problem.  Location of back pain:  Right lower back, left lower back and other  Description of back pain: burning and dull ache  Back pain spreads: right thigh, left thigh, right knee, left knee, right foot and left foot    Since patient first noticed back pain, pain is: always present, but gets better and worse  Does back pain interfere with her job:  Not applicable       Mental Health Follow-up:  Patient " presents to follow-up on Depression & Anxiety.Patient's depression since last visit has been:  No change  The patient is not having other symptoms associated with depression.  Patient's anxiety since last visit has been:  No change  The patient is not having other symptoms associated with anxiety.  Any significant life events: relationship concerns, financial concerns and housing concerns  Patient is not feeling anxious or having panic attacks.  Patient has no concerns about alcohol or drug use.    Hypertension: She presents for follow up of hypertension.  She does not check blood pressure  regularly outside of the clinic. Outside blood pressures have been over 140/90. She follows a low salt diet.     Reason for visit:  New Patient Visit    She eats 2-3 servings of fruits and vegetables daily.She consumes 1 sweetened beverage(s) daily.She exercises with enough effort to increase her heart rate 20 to 29 minutes per day.  She exercises with enough effort to increase her heart rate 5 days per week.   She is taking medications regularly.     Patient presents to [facility] care.  She has previously seen Dr. French, for many years.  She has achondroplasia.  She has had multiple lower extremity surgeries, at least 18.  She states that when she was younger, she was one of the first people to have a an experimental surgery to try to lengthen her legs.  There were a lot of complications, and several follow-up surgeries needed.  Ultimately she gained 4 inches (the goal had been 6 to 8 inches)    She has been on Norco for a number of years for management of the symptoms.  She currently gets 200 tabs of 5/325 every month.  She feels that this helps take the edge off, but agrees that she does not have great pain management.    He is also on Klonopin for management of anxiety.  She is aware of the concern regarding both of these medications used together.    She has a prescription for gabapentin, but has not filled it since  "March.  She states that she uses it more as needed when she is having severe pain.    She is planning to have an ankle fusion surgery coming up this fall.    She moved to [place] 2 years ago after going through a divorce.  She has family and support in that area.  It is a 2-hour drive from they are back to this clinic.        Objective    /88 (BP Location: Right arm, Patient Position: Sitting, Cuff Size: Adult Regular)   Pulse 88   Temp 99  F (37.2  C) (Tympanic)   Resp 18   Ht 1.295 m (4' 3\")   Wt 87.7 kg (193 lb 6.4 oz)   SpO2 97%   BMI 52.28 kg/m    Body mass index is 52.28 kg/m .  Physical Exam  Constitutional:       Appearance: She is well-developed.   HENT:      Right Ear: External ear normal.      Left Ear: External ear normal.   Eyes:      General: No scleral icterus.     Conjunctiva/sclera: Conjunctivae normal.   Cardiovascular:      Rate and Rhythm: Normal rate.   Pulmonary:      Effort: Pulmonary effort is normal. No respiratory distress.   Skin:     Findings: No rash.   Neurological:      Mental Status: She is alert.        The longitudinal plan of care for the diagnosis(es)/condition(s) as documented were addressed during this visit. Due to the added complexity in care, I will continue to support Wilmer in the subsequent management and with ongoing continuity of care.       Signed Electronically by: Karon Garcia MD    "

## 2024-07-23 ENCOUNTER — MYC MEDICAL ADVICE (OUTPATIENT)
Dept: FAMILY MEDICINE | Facility: OTHER | Age: 54
End: 2024-07-23
Payer: COMMERCIAL

## 2024-07-23 DIAGNOSIS — F41.8 DEPRESSION WITH ANXIETY: ICD-10-CM

## 2024-07-24 RX ORDER — CLONAZEPAM 0.5 MG/1
TABLET ORAL
Qty: 90 TABLET | Refills: 5 | Status: SHIPPED | OUTPATIENT
Start: 2024-07-24

## 2024-07-24 RX ORDER — CLONAZEPAM 0.5 MG/1
TABLET ORAL
Qty: 90 TABLET | OUTPATIENT
Start: 2024-07-24

## 2024-07-24 NOTE — TELEPHONE ENCOUNTER
[facility] requesting:    clonazePAM (KLONOPIN) 0.5 MG [id number]/24/2024--NoSig: TAKE 1 TABLET BY MOUTH THREE TIMES DAILY AS NEEDEDSent to pharmacy as: clonazePAM 0.5 MG Oral Tablet (klonoPIN)Class: E-PrescribeOrder: [id number] Status: Receipt confirmed by pharmacy (7/24/2024  2:18 PM CDT)     Prescription sent to [facility].   Spoke with patient, she will go to [facility] to receive prescription.    Rachel Cardona RN on 7/24/2024 at 3:50 PM

## 2024-08-30 ENCOUNTER — TELEPHONE (OUTPATIENT)
Dept: FAMILY MEDICINE | Facility: OTHER | Age: 54
End: 2024-08-30
Payer: COMMERCIAL

## 2024-08-30 NOTE — TELEPHONE ENCOUNTER
Talked with Patient she is going to call her pharmacy to make sure they have faxed over the request for the PA that is now needed. The last one we have on file is the approval from 7/24/24, Insurance is now stating she needs another PA/ New Pa for this medication.   Richa So LPN (Ext [phone] ) ..........8/30/2024 4:08 PM

## 2024-08-30 NOTE — TELEPHONE ENCOUNTER
Patient states that she met with Dr Garcia in August and her Rx for weygovy and now insurance is stating a new preauthorization needs to be done in order to refill. Please call.       Denisa Irizarry on 8/30/2024 at 2:38 PM

## 2024-09-08 DIAGNOSIS — F41.8 DEPRESSION WITH ANXIETY: ICD-10-CM

## 2024-09-11 RX ORDER — SERTRALINE HYDROCHLORIDE 100 MG/1
200 TABLET, FILM COATED ORAL DAILY
Qty: 180 TABLET | Refills: 3 | Status: SHIPPED | OUTPATIENT
Start: 2024-09-11

## 2024-09-11 NOTE — TELEPHONE ENCOUNTER
[facility] sent Rx request for the following:      Requested Prescriptions   Pending Prescriptions Disp Refills    sertraline (ZOLOFT) 100 MG tablet [Pharmacy Med Name: SERTRALINE 100MG TABLETS] 180 tablet 0     Sig: TAKE 2 TABLETS(200 MG) BY MOUTH DAILY       SSRIs Protocol Passed - 9/11/2024  4:24 PM       Last Prescription Date:   6/12/24  Last Fill Qty/Refills:         180, R-0    Last Office Visit:              3/21/24   Future Office visit:             Next 5 appointments (look out 90 days)      Oct 11, 2024 11:40 AM  (Arrive by 11:25 AM)  Provider Visit with Karon Garcia MD  [facility] ([facility] ) [address]  [place] [id number] [phone]           Routing refill request to provider for review/approval because:  Drug interaction warning    Gavi Fowler RN on 9/11/2024 at 4:28 PM

## 2024-10-13 DIAGNOSIS — R13.10 FOOD STICKS ON SWALLOWING: ICD-10-CM

## 2024-10-13 DIAGNOSIS — K21.00 GASTROESOPHAGEAL REFLUX DISEASE WITH ESOPHAGITIS WITHOUT HEMORRHAGE: ICD-10-CM

## 2024-11-10 DIAGNOSIS — G25.81 RESTLESS LEG SYNDROME: ICD-10-CM

## 2024-11-13 RX ORDER — PRAMIPEXOLE DIHYDROCHLORIDE 0.5 MG/1
TABLET ORAL
Qty: 145 TABLET | Refills: 1 | Status: SHIPPED | OUTPATIENT
Start: 2024-11-13

## 2024-11-13 NOTE — TELEPHONE ENCOUNTER
[facility] sent Rx request for the following:      Requested Prescriptions   Pending Prescriptions Disp Refills    pramipexole (MIRAPEX) 0.5 MG tablet [Pharmacy Med Name: PRAMIPEXOLE 0.5MG TABLETS] 145 tablet 11     Sig: TAKE 2 TABLETS(1 MG) BY MOUTH AT BEDTIME          Last Prescription Date:   9/14/23  Last Fill Qty/Refills:         145, R-11    Last Office Visit:              7/22/24   Future Office visit:           none    Per LOV note:  She will need to be seen in person every 3 months if wanting to continue care here.     Routing refill request to provider for review/approval     Rachel Cardona RN on 11/13/2024 at 9:32 AM

## 2024-12-16 DIAGNOSIS — R32 URINARY INCONTINENCE, UNSPECIFIED TYPE: ICD-10-CM

## 2024-12-19 RX ORDER — OXYBUTYNIN CHLORIDE 5 MG/1
5 TABLET, EXTENDED RELEASE ORAL 2 TIMES DAILY
Qty: 60 TABLET | Refills: 11 | OUTPATIENT
Start: 2024-12-19

## 2024-12-19 NOTE — TELEPHONE ENCOUNTER
[facility] sent Rx request for the following:      Requested Prescriptions   Pending Prescriptions Disp Refills    oxyBUTYnin ER (DITROPAN XL) 5 MG 24 hr tablet [Pharmacy Med Name: OXYBUTYNIN ER 5MG TABLETS] 60 tablet 11     Sig: TAKE 1 TABLET(5 MG) BY MOUTH TWICE DAILY       Muscarinic Antagonists (Urinary Incontinence Agents) Passed - 12/19/2024  9:22 AM        Last Prescription Date:   11/29/2023  Last Fill Qty/Refills:         60, R-11      [facility] requested patient no longer doctoring at this facility    Unable to complete prescription refill per RN Medication Refill Policy. Brianda Woo RN on 12/19/2024 at 9:28 AM

## 2025-02-07 DIAGNOSIS — F41.8 DEPRESSION WITH ANXIETY: ICD-10-CM

## 2025-02-11 RX ORDER — CLONAZEPAM 0.5 MG/1
TABLET ORAL
Qty: 90 TABLET | Refills: 5 | OUTPATIENT
Start: 2025-02-11

## 2025-02-11 NOTE — TELEPHONE ENCOUNTER
[facility] sent Rx request for the following:      Requested Prescriptions   Pending Prescriptions Disp Refills    clonazePAM (KLONOPIN) 0.5 MG tablet [Pharmacy Med Name: CLONAZEPAM 0.5MG TABLET] 90 tablet 5     Sig: TAKE 1 TABLET BY MOUTH THREE TIMES DAILY AS NEEDED       There is no refill protocol information for this order          Last Prescription Date:   7/24/24  Last Fill Qty/Refills:         90, R-5    Last Office Visit:              7/22/24   Future Office visit:            none    See refill request 11/19/24: She states she no longer comes to [facility]. See goes to [facility] now.     Rachel Cardona RN on 2/11/2025 at 3:26 PM

## 2025-07-13 DIAGNOSIS — K21.00 GASTROESOPHAGEAL REFLUX DISEASE WITH ESOPHAGITIS WITHOUT HEMORRHAGE: ICD-10-CM

## 2025-07-13 DIAGNOSIS — R13.10 FOOD STICKS ON SWALLOWING: ICD-10-CM

## 2025-07-17 RX ORDER — OMEPRAZOLE 20 MG/1
20 CAPSULE, DELAYED RELEASE ORAL DAILY
Qty: 90 CAPSULE | OUTPATIENT
Start: 2025-07-17

## 2025-07-17 NOTE — TELEPHONE ENCOUNTER
Unable to complete prescription refill per RN Medication Refill Policy.   Patient in pass note states no longer comes to [facility], she goes to [facility] now.   Francis Pena RN on 7/17/2025 at 3:07 PM

## (undated) RX ORDER — TRIAMCINOLONE ACETONIDE 40 MG/ML
INJECTION, SUSPENSION INTRA-ARTICULAR; INTRAMUSCULAR
Status: DISPENSED
Start: 2018-09-20

## (undated) RX ORDER — LIDOCAINE HYDROCHLORIDE 10 MG/ML
INJECTION, SOLUTION EPIDURAL; INFILTRATION; INTRACAUDAL; PERINEURAL
Status: DISPENSED
Start: 2018-09-20